# Patient Record
Sex: FEMALE | Race: WHITE | NOT HISPANIC OR LATINO | URBAN - METROPOLITAN AREA
[De-identification: names, ages, dates, MRNs, and addresses within clinical notes are randomized per-mention and may not be internally consistent; named-entity substitution may affect disease eponyms.]

---

## 2019-09-19 PROBLEM — Z00.00 ENCOUNTER FOR PREVENTIVE HEALTH EXAMINATION: Noted: 2019-09-19

## 2020-05-31 ENCOUNTER — INPATIENT (INPATIENT)
Facility: HOSPITAL | Age: 85
LOS: 4 days | Discharge: NURSING, REHAB OR LONG-TERM CARE | End: 2020-06-05
Attending: INTERNAL MEDICINE | Admitting: INTERNAL MEDICINE
Payer: MEDICARE

## 2020-05-31 VITALS
HEART RATE: 68 BPM | OXYGEN SATURATION: 78 % | DIASTOLIC BLOOD PRESSURE: 64 MMHG | RESPIRATION RATE: 20 BRPM | SYSTOLIC BLOOD PRESSURE: 99 MMHG | HEIGHT: 62 IN | WEIGHT: 149.91 LBS

## 2020-05-31 DIAGNOSIS — J96.01 ACUTE RESPIRATORY FAILURE WITH HYPOXIA: ICD-10-CM

## 2020-05-31 DIAGNOSIS — J44.1 CHRONIC OBSTRUCTIVE PULMONARY DISEASE WITH (ACUTE) EXACERBATION: ICD-10-CM

## 2020-05-31 DIAGNOSIS — I48.19 OTHER PERSISTENT ATRIAL FIBRILLATION: ICD-10-CM

## 2020-05-31 DIAGNOSIS — J15.6 PNEUMONIA DUE TO OTHER GRAM-NEGATIVE BACTERIA: ICD-10-CM

## 2020-05-31 DIAGNOSIS — E87.1 HYPO-OSMOLALITY AND HYPONATREMIA: ICD-10-CM

## 2020-05-31 LAB
ALBUMIN SERPL ELPH-MCNC: 3.5 G/DL — SIGNIFICANT CHANGE UP (ref 3.3–5)
ALP SERPL-CCNC: 76 U/L — SIGNIFICANT CHANGE UP (ref 40–120)
ALT FLD-CCNC: 24 U/L — SIGNIFICANT CHANGE UP (ref 12–78)
ANION GAP SERPL CALC-SCNC: 8 MMOL/L — SIGNIFICANT CHANGE UP (ref 5–17)
APPEARANCE UR: CLEAR — SIGNIFICANT CHANGE UP
APTT BLD: 30.3 SEC — SIGNIFICANT CHANGE UP (ref 27.5–36.3)
AST SERPL-CCNC: 34 U/L — SIGNIFICANT CHANGE UP (ref 15–37)
BACTERIA # UR AUTO: ABNORMAL
BASE EXCESS BLDA CALC-SCNC: 2 MMOL/L — SIGNIFICANT CHANGE UP (ref -2–2)
BASOPHILS # BLD AUTO: 0.04 K/UL — SIGNIFICANT CHANGE UP (ref 0–0.2)
BASOPHILS NFR BLD AUTO: 0.5 % — SIGNIFICANT CHANGE UP (ref 0–2)
BILIRUB SERPL-MCNC: 0.7 MG/DL — SIGNIFICANT CHANGE UP (ref 0.2–1.2)
BILIRUB UR-MCNC: NEGATIVE — SIGNIFICANT CHANGE UP
BLOOD GAS COMMENTS: SIGNIFICANT CHANGE UP
BLOOD GAS COMMENTS: SIGNIFICANT CHANGE UP
BLOOD GAS SOURCE: SIGNIFICANT CHANGE UP
BUN SERPL-MCNC: 24 MG/DL — HIGH (ref 7–23)
CALCIUM SERPL-MCNC: 8.5 MG/DL — SIGNIFICANT CHANGE UP (ref 8.5–10.1)
CHLORIDE SERPL-SCNC: 94 MMOL/L — LOW (ref 96–108)
CK SERPL-CCNC: 102 U/L — SIGNIFICANT CHANGE UP (ref 26–192)
CO2 SERPL-SCNC: 25 MMOL/L — SIGNIFICANT CHANGE UP (ref 22–31)
COLOR SPEC: YELLOW — SIGNIFICANT CHANGE UP
CREAT SERPL-MCNC: 0.93 MG/DL — SIGNIFICANT CHANGE UP (ref 0.5–1.3)
D DIMER BLD IA.RAPID-MCNC: 363 NG/ML DDU — HIGH
DIFF PNL FLD: NEGATIVE — SIGNIFICANT CHANGE UP
EOSINOPHIL # BLD AUTO: 0.18 K/UL — SIGNIFICANT CHANGE UP (ref 0–0.5)
EOSINOPHIL NFR BLD AUTO: 2.3 % — SIGNIFICANT CHANGE UP (ref 0–6)
EPI CELLS # UR: SIGNIFICANT CHANGE UP
FIBRINOGEN PPP-MCNC: 455 MG/DL — SIGNIFICANT CHANGE UP (ref 350–510)
GLUCOSE SERPL-MCNC: 101 MG/DL — HIGH (ref 70–99)
GLUCOSE UR QL: NEGATIVE MG/DL — SIGNIFICANT CHANGE UP
HCO3 BLDA-SCNC: 28 MMOL/L — SIGNIFICANT CHANGE UP (ref 21–29)
HCT VFR BLD CALC: 33.8 % — LOW (ref 34.5–45)
HGB BLD-MCNC: 11.4 G/DL — LOW (ref 11.5–15.5)
HOROWITZ INDEX BLDA+IHG-RTO: 100 — SIGNIFICANT CHANGE UP
IMM GRANULOCYTES NFR BLD AUTO: 0.3 % — SIGNIFICANT CHANGE UP (ref 0–1.5)
INR BLD: 2.05 RATIO — HIGH (ref 0.88–1.16)
KETONES UR-MCNC: NEGATIVE — SIGNIFICANT CHANGE UP
LACTATE SERPL-SCNC: 0.8 MMOL/L — SIGNIFICANT CHANGE UP (ref 0.7–2)
LEUKOCYTE ESTERASE UR-ACNC: NEGATIVE — SIGNIFICANT CHANGE UP
LYMPHOCYTES # BLD AUTO: 1.84 K/UL — SIGNIFICANT CHANGE UP (ref 1–3.3)
LYMPHOCYTES # BLD AUTO: 23.2 % — SIGNIFICANT CHANGE UP (ref 13–44)
MCHC RBC-ENTMCNC: 33.7 GM/DL — SIGNIFICANT CHANGE UP (ref 32–36)
MCHC RBC-ENTMCNC: 34.5 PG — HIGH (ref 27–34)
MCV RBC AUTO: 102.4 FL — HIGH (ref 80–100)
MONOCYTES # BLD AUTO: 0.74 K/UL — SIGNIFICANT CHANGE UP (ref 0–0.9)
MONOCYTES NFR BLD AUTO: 9.3 % — SIGNIFICANT CHANGE UP (ref 2–14)
NEUTROPHILS # BLD AUTO: 5.1 K/UL — SIGNIFICANT CHANGE UP (ref 1.8–7.4)
NEUTROPHILS NFR BLD AUTO: 64.4 % — SIGNIFICANT CHANGE UP (ref 43–77)
NITRITE UR-MCNC: NEGATIVE — SIGNIFICANT CHANGE UP
NRBC # BLD: 0 /100 WBCS — SIGNIFICANT CHANGE UP (ref 0–0)
NT-PROBNP SERPL-SCNC: 1956 PG/ML — HIGH (ref 0–450)
PCO2 BLDA: 51 MMHG — HIGH (ref 32–46)
PH BLD: 7.35 — SIGNIFICANT CHANGE UP (ref 7.35–7.45)
PH UR: 5 — SIGNIFICANT CHANGE UP (ref 5–8)
PLATELET # BLD AUTO: 180 K/UL — SIGNIFICANT CHANGE UP (ref 150–400)
PO2 BLDA: 222 MMHG — HIGH (ref 74–108)
POTASSIUM SERPL-MCNC: 5.7 MMOL/L — HIGH (ref 3.5–5.3)
POTASSIUM SERPL-SCNC: 5.7 MMOL/L — HIGH (ref 3.5–5.3)
PROT SERPL-MCNC: 7.7 GM/DL — SIGNIFICANT CHANGE UP (ref 6–8.3)
PROT UR-MCNC: 30 MG/DL
PROTHROM AB SERPL-ACNC: 23.5 SEC — HIGH (ref 10–12.9)
RBC # BLD: 3.3 M/UL — LOW (ref 3.8–5.2)
RBC # FLD: 15.4 % — HIGH (ref 10.3–14.5)
RBC CASTS # UR COMP ASSIST: NEGATIVE /HPF — SIGNIFICANT CHANGE UP (ref 0–4)
SAO2 % BLDA: 100 % — HIGH (ref 92–96)
SARS-COV-2 RNA SPEC QL NAA+PROBE: SIGNIFICANT CHANGE UP
SODIUM SERPL-SCNC: 127 MMOL/L — LOW (ref 135–145)
SP GR SPEC: 1.01 — SIGNIFICANT CHANGE UP (ref 1.01–1.02)
TROPONIN I SERPL-MCNC: <.015 NG/ML — SIGNIFICANT CHANGE UP (ref 0.01–0.04)
UROBILINOGEN FLD QL: NEGATIVE MG/DL — SIGNIFICANT CHANGE UP
WBC # BLD: 7.92 K/UL — SIGNIFICANT CHANGE UP (ref 3.8–10.5)
WBC # FLD AUTO: 7.92 K/UL — SIGNIFICANT CHANGE UP (ref 3.8–10.5)
WBC UR QL: SIGNIFICANT CHANGE UP

## 2020-05-31 PROCEDURE — 93010 ELECTROCARDIOGRAM REPORT: CPT

## 2020-05-31 PROCEDURE — 71045 X-RAY EXAM CHEST 1 VIEW: CPT | Mod: 26

## 2020-05-31 PROCEDURE — 99291 CRITICAL CARE FIRST HOUR: CPT

## 2020-05-31 RX ORDER — AZITHROMYCIN 500 MG/1
500 TABLET, FILM COATED ORAL EVERY 24 HOURS
Refills: 0 | Status: DISCONTINUED | OUTPATIENT
Start: 2020-06-01 | End: 2020-06-03

## 2020-05-31 RX ORDER — AZITHROMYCIN 500 MG/1
500 TABLET, FILM COATED ORAL ONCE
Refills: 0 | Status: COMPLETED | OUTPATIENT
Start: 2020-05-31 | End: 2020-05-31

## 2020-05-31 RX ORDER — IPRATROPIUM/ALBUTEROL SULFATE 18-103MCG
3 AEROSOL WITH ADAPTER (GRAM) INHALATION EVERY 6 HOURS
Refills: 0 | Status: DISCONTINUED | OUTPATIENT
Start: 2020-05-31 | End: 2020-06-05

## 2020-05-31 RX ORDER — SODIUM CHLORIDE 9 MG/ML
1000 INJECTION INTRAMUSCULAR; INTRAVENOUS; SUBCUTANEOUS
Refills: 0 | Status: DISCONTINUED | OUTPATIENT
Start: 2020-05-31 | End: 2020-06-02

## 2020-05-31 RX ORDER — PANTOPRAZOLE SODIUM 20 MG/1
40 TABLET, DELAYED RELEASE ORAL
Refills: 0 | Status: DISCONTINUED | OUTPATIENT
Start: 2020-05-31 | End: 2020-06-05

## 2020-05-31 RX ORDER — CEFTRIAXONE 500 MG/1
1000 INJECTION, POWDER, FOR SOLUTION INTRAMUSCULAR; INTRAVENOUS ONCE
Refills: 0 | Status: COMPLETED | OUTPATIENT
Start: 2020-05-31 | End: 2020-05-31

## 2020-05-31 RX ORDER — IPRATROPIUM/ALBUTEROL SULFATE 18-103MCG
3 AEROSOL WITH ADAPTER (GRAM) INHALATION
Refills: 0 | Status: COMPLETED | OUTPATIENT
Start: 2020-05-31 | End: 2020-05-31

## 2020-05-31 RX ORDER — CEFTRIAXONE 500 MG/1
1000 INJECTION, POWDER, FOR SOLUTION INTRAMUSCULAR; INTRAVENOUS EVERY 24 HOURS
Refills: 0 | Status: DISCONTINUED | OUTPATIENT
Start: 2020-06-01 | End: 2020-06-03

## 2020-05-31 RX ADMIN — Medication 3 MILLILITER(S): at 19:00

## 2020-05-31 RX ADMIN — AZITHROMYCIN 255 MILLIGRAM(S): 500 TABLET, FILM COATED ORAL at 19:34

## 2020-05-31 RX ADMIN — SODIUM CHLORIDE 60 MILLILITER(S): 9 INJECTION INTRAMUSCULAR; INTRAVENOUS; SUBCUTANEOUS at 19:35

## 2020-05-31 RX ADMIN — CEFTRIAXONE 100 MILLIGRAM(S): 500 INJECTION, POWDER, FOR SOLUTION INTRAMUSCULAR; INTRAVENOUS at 20:21

## 2020-05-31 RX ADMIN — SODIUM CHLORIDE 60 MILLILITER(S): 9 INJECTION INTRAMUSCULAR; INTRAVENOUS; SUBCUTANEOUS at 20:22

## 2020-05-31 RX ADMIN — Medication 3 MILLILITER(S): at 18:45

## 2020-05-31 RX ADMIN — Medication 0.5 MILLIGRAM(S): at 20:27

## 2020-05-31 RX ADMIN — AZITHROMYCIN 500 MILLIGRAM(S): 500 TABLET, FILM COATED ORAL at 20:23

## 2020-05-31 RX ADMIN — Medication 40 MILLIGRAM(S): at 22:16

## 2020-05-31 RX ADMIN — Medication 125 MILLIGRAM(S): at 19:34

## 2020-05-31 RX ADMIN — Medication 3 MILLILITER(S): at 20:00

## 2020-05-31 NOTE — ED PROVIDER NOTE - CLINICAL SUMMARY MEDICAL DECISION MAKING FREE TEXT BOX
pt comfortable on bipap, + copd exacerbation, possible pna, still r/o covid. d/w dr saldivar for admission.

## 2020-05-31 NOTE — ED PROVIDER NOTE - EKG #1 DATE/TIME
Pt here with c/o R flank and R abd pain since this morning. Nausea, no vomiting. No dysuria. C-sections, hysterectomy. ABC intact. A&O x4.   
31-May-2020 18:08

## 2020-05-31 NOTE — ED ADULT NURSE NOTE - ED STAT RN HANDOFF DETAILS
Report received from RN at this time. Assessment available on KB. will continue to monitor. NN not completed

## 2020-05-31 NOTE — CONSULT NOTE ADULT - SUBJECTIVE AND OBJECTIVE BOX
Patient is a 89y old  Female who presents with a chief complaint of Cough and SOB (31 May 2020 19:02)    HPI:  89yo, WF with HTN, COPD, CHF, hypothyroidism ,A fib on Coumadin, Valve replacement.,  Obesity. Has home O2 from prior admission but not using.  Presented  with increasing cough and SOB for 10 days.  . Per EMS, SPO2 was 75% on RA.  Denies CP,  palpitation, n/v/d, fever, chills, weakness, abdominal pain ,dysuria ,HA ,dizziness. (31 May 2020 19:02).  Started on BIPAP for hypoxic Respiratory failure. No more details available.    PAST MEDICAL & SURGICAL HISTORY: as above    FAMILY HISTORY: not available    SOCIAL HISTORY: BMI (kg/m2): 27.4 ( @ 17:02). not known    Allergies  No Known Allergies    MEDICATIONS  (STANDING):  methylPREDNISolone sodium succinate Injectable 40 milliGRAM(s) IV Push every 8 hours  multivitamin 1 Tablet(s) Oral daily  pantoprazole    Tablet 40 milliGRAM(s) Oral before breakfast  sodium chloride 0.9%. 1000 milliLiter(s) (60 mL/Hr) IV Continuous <Continuous>    MEDICATIONS  (PRN):  bisacodyl 5 milliGRAM(s) Oral daily PRN Constipation    REVIEW OF SYSTEMS:  not able to provide.      Vital Signs Last 24 Hrs  T(C): 36.8 (31 May 2020 18:30), Max: 36.8 (31 May 2020 18:30)  T(F): 98.2 (31 May 2020 18:30), Max: 98.2 (31 May 2020 18:30)  HR: 100 (31 May 2020 22:08) (53 - 106)  BP: 149/76 (31 May 2020 20:30) (99/64 - 149/76)  BP(mean): --  RR: 18 (31 May 2020 20:30) (18 - 22)  SpO2: 98% (31 May 2020 22:08) (78% - 99%)    PHYSICAL EXAM:  GEN:         Lethargic, comfortable.  HEENT:    BIPAP  RESP:       decreased air entry.  CVS:           Regular rate and rhythm.   ABD:         Soft, non-tender, non-distended; obese  SKIN:           Warm and dry.  EXTR:           trace edema  CNS:            lethargic  PSYCH:        lethargic    LABS:                        11.4   7.92  )-----------( 180      ( 31 May 2020 17:55 )             33.8         127<L>  |  94<L>  |  24<H>  ----------------------------<  101<H>  5.7<H>   |  25  |  0.93    Ca    8.5      31 May 2020 17:55    TPro  7.7  /  Alb  3.5  /  TBili  0.7  /  DBili  x   /  AST  34  /  ALT  24  /  AlkPhos  76      PT/INR - ( 31 May 2020 17:57 )   PT: 23.5 sec;   INR: 2.05 ratio      PTT - ( 31 May 2020 17:57 )  PTT:30.3 sec   @ 18:17  pH: 7.35  pCO2: 51  pO2: 222  SaO2: 100    Urinalysis Basic - ( 31 May 2020 17:54 )    Color: Yellow / Appearance: Clear / S.010 / pH: x  Gluc: x / Ketone: Negative  / Bili: Negative / Urobili: Negative mg/dL   Blood: x / Protein: 30 mg/dL / Nitrite: Negative   Leuk Esterase: Negative / RBC: Negative /HPF / WBC 0-2   Sq Epi: x / Non Sq Epi: Occasional / Bacteria: Few    EKG: irregular rhythm    RADIOLOGY & ADDITIONAL STUDIES:  Sternotomy Bilateral opacities.    ASSESSMENT AND PLAN:  ·	Acute hypoxic Respiratory failure.  ·	Acute COPD exacerbation.  ·	Suspected Pneumonia.  ·	Hyponatremia.  ·	Hyperkalemia.  ·	CHF Systolic/diastolic.  ·	Chronic A Fib.  ·	Hypothyroidism.      Continue BIPAP, titrate down FIO2.  Continue steroids, will add nebulizer.  Obtain procalcitonin, Echo.  Repeat proBNP.  On Coumadin.

## 2020-05-31 NOTE — H&P ADULT - NSHPPHYSICALEXAM_GEN_ALL_CORE
PHYSICAL EXAM:  GENERAL: NAD, On BIPAP  HEAD:  Atraumatic, Normocephalic  EYES: EOMI, PERRLA, conjunctiva and sclera clear  ENMT: No tonsillar erythema, exudates, or enlargement; Moist mucous membranes, No lesions  NECK: Supple, No JVD, Normal thyroid  NERVOUS SYSTEM:  Alert ; Motor Strength 4/5   CHEST/LUNG: Wheezing bilaterally; No rales, rhonchi, or rubs  HEART: IRRegular rate and rhythm; No murmurs, rubs, or gallops  ABDOMEN: Soft, Nontender, Nondistended; Bowel sounds present  EXTREMITIES:  No clubbing, cyanosis.   Trace  edema  LYMPH: No lymphadenopathy noted  SKIN: No rashes or lesions

## 2020-05-31 NOTE — ED PROVIDER NOTE - PHYSICAL EXAMINATION
Gen: Alert, NAD, mild tachypea, sat 97% on NRB  Head: NC, AT, PERRL, normal lids/conjunctiva   ENT: Bilateral TM WNL, patent oropharynx without erythema/exudate, uvula midline  Neck: supple, no tenderness/meningismus  Pulm: dimished, exp wheeze  CV: RRR, no M/R/G, +dist pulses   Abd: soft, NT/ND, +BS, no guarding/rebound tenderness  Mskel: + mild LE edema,   Skin: no rash, no bruising  Neuro: AAO, no sensory/motor deficits, CN 2-12 intact

## 2020-05-31 NOTE — ED ADULT NURSE NOTE - NSIMPLEMENTINTERV_GEN_ALL_ED
Implemented All Fall with Harm Risk Interventions:  [place] to call system. Call bell, personal items and telephone within reach. Instruct patient to call for assistance. Room bathroom lighting operational. Non-slip footwear when patient is off stretcher. Physically safe environment: no spills, clutter or unnecessary equipment. Stretcher in lowest position, wheels locked, appropriate side rails in place. Provide visual cue, wrist band, yellow gown, etc. Monitor gait and stability. Monitor for mental status changes and reorient to person, place, and time. Review medications for side effects contributing to fall risk. Reinforce activity limits and safety measures with patient and family. Provide visual clues: red socks.

## 2020-05-31 NOTE — H&P ADULT - NSHPLABSRESULTS_GEN_ALL_CORE
LABS:                        11.4   7.92  )-----------( 180      ( 31 May 2020 17:55 )             33.8     05-    127<L>  |  94<L>  |  24<H>  ----------------------------<  101<H>  5.7<H>   |  25  |  0.93    Ca    8.5      31 May 2020 17:55    TPro  7.7  /  Alb  3.5  /  TBili  0.7  /  DBili  x   /  AST  34  /  ALT  24  /  AlkPhos  76  05-31    PT/INR - ( 31 May 2020 17:57 )   PT: 23.5 sec;   INR: 2.05 ratio         PTT - ( 31 May 2020 17:57 )  PTT:30.3 sec  Urinalysis Basic - ( 31 May 2020 17:54 )    Color: Yellow / Appearance: Clear / S.010 / pH: x  Gluc: x / Ketone: Negative  / Bili: Negative / Urobili: Negative mg/dL   Blood: x / Protein: 30 mg/dL / Nitrite: Negative   Leuk Esterase: Negative / RBC: Negative /HPF / WBC 0-2   Sq Epi: x / Non Sq Epi: Occasional / Bacteria: Few      CAPILLARY BLOOD GLUCOSE        CARDIAC MARKERS ( 31 May 2020 17:55 )  <.015 ng/mL / x     / 102 U/L / x     / x

## 2020-05-31 NOTE — ED PROVIDER NOTE - OBJECTIVE STATEMENT
89yo female from home with pmh HTN, COPD, CHF, hypothyroid, presents with tachypnea and hypoxia. Per EMS, pt 75% on RA. Pt denies fever, but c/o sob, cough, cp.     meds: lasix 20mg, diltiazem, potassium, losartan. Prior chart with xanax, coumadin,    No fever/chills, No photophobia/eye pain/changes in vision, No ear pain/sore throat/dysphagia, +chest pain, no palpitations, + SOB/cough, no wheeze/stridor, No abdominal pain, No N/V/D, no dysuria/frequency/discharge, No neck/back pain, no rash, no changes in neurological status/function. 89yo female from home with pmh HTN, COPD, CHF, hypothyroid, psh: cardiac surgery presents with tachypnea and hypoxia. Per EMS, pt 75% on RA. Pt denies fever, but c/o sob, cough, cp.   pmd: [facility] dr sony valdes [phone], cardio: dr iman juares [phone]  meds: lasix 20mg, diltiazem, potassium, losartan. Prior chart with xanax, coumadin,    No fever/chills, No photophobia/eye pain/changes in vision, No ear pain/sore throat/dysphagia, +chest pain, no palpitations, + SOB/cough, no wheeze/stridor, No abdominal pain, No N/V/D, no dysuria/frequency/discharge, No neck/back pain, no rash, no changes in neurological status/function. 89yo female from home with pmh HTN, COPD, CHF, hypothyroid, ? afib psh: cardiac surgery/midline scar presents with tachypnea and hypoxia. Per EMS, pt 75% on RA. Pt denies fever, but c/o sob, cough, cp.   pmd: [facility] dr sony valdes [phone], cardio: dr iman juares [phone]  meds: lasix 20mg, diltiazem, potassium, losartan. Prior chart with xanax, coumadin,    No fever/chills, No photophobia/eye pain/changes in vision, No ear pain/sore throat/dysphagia, +chest pain, no palpitations, + SOB/cough, no wheeze/stridor, No abdominal pain, No N/V/D, no dysuria/frequency/discharge, No neck/back pain, no rash, no changes in neurological status/function. 89yo female from home with pmh HTN, COPD, valve surgery, hypothyroid, afib on coumadin presents with tachypnea and hypoxia. per daughter, pt has been sick with cough, sob, x 10 days. per daughter, no fever.  Was started on home O2 2L she had from prior admission but not normally on it. Per EMS, pt 75% on RA. Pt denies fever, but c/o sob, cough, cp.   pmd: [facility] dr sony valdes [phone], cardio: dr iman juares [phone]  meds: lasix 20mg, diltiazem, potassium, losartan. Prior chart with xanax, coumadin,    No fever/chills, No photophobia/eye pain/changes in vision, No ear pain/sore throat/dysphagia, +chest pain, no palpitations, + SOB/cough, no wheeze/stridor, No abdominal pain, No N/V/D, no dysuria/frequency/discharge, No neck/back pain, no rash, no changes in neurological status/function.

## 2020-05-31 NOTE — ED ADULT NURSE REASSESSMENT NOTE - NS ED NURSE REASSESS COMMENT FT1
anxious daughter contacted ER and spoke to MD for results and this RN for status of mother. States mother is severely claustrophobic. Reassured of plan of care and comfort measures provided to mother. Pt resting comfortably at this time. No s/s of pain noted. BIPAP in progress

## 2020-05-31 NOTE — H&P ADULT - HISTORY OF PRESENT ILLNESS
89yo, WF with h/o HTN, COPD, CHF, hypothyroid ,A fib, presents with increasing cough and SOB for 10 days.  . Per EMS, pt 75% on RA.  Denies CP,  palpitation, n/v/d, fever, chills, weakness, abdominal pain ,dysuria ,HA ,dizziness. 89yo, WF with h/o HTN, COPD, CHF, hypothyroid ,A fib, Value replacement., presents with increasing cough and SOB for 10 days.  . Per EMS, pt 75% on RA.  Denies CP,  palpitation, n/v/d, fever, chills, weakness, abdominal pain ,dysuria ,HA ,dizziness.

## 2020-06-01 DIAGNOSIS — I50.41 ACUTE COMBINED SYSTOLIC (CONGESTIVE) AND DIASTOLIC (CONGESTIVE) HEART FAILURE: ICD-10-CM

## 2020-06-01 PROBLEM — Z00.00 ENCOUNTER FOR PREVENTIVE HEALTH EXAMINATION: Status: ACTIVE | Noted: 2020-06-01

## 2020-06-01 LAB
ANION GAP SERPL CALC-SCNC: 2 MMOL/L — LOW (ref 5–17)
BASOPHILS # BLD AUTO: 0 K/UL — SIGNIFICANT CHANGE UP (ref 0–0.2)
BASOPHILS NFR BLD AUTO: 0 % — SIGNIFICANT CHANGE UP (ref 0–2)
BUN SERPL-MCNC: 20 MG/DL — SIGNIFICANT CHANGE UP (ref 7–23)
CALCIUM SERPL-MCNC: 8.2 MG/DL — LOW (ref 8.5–10.1)
CHLORIDE SERPL-SCNC: 99 MMOL/L — SIGNIFICANT CHANGE UP (ref 96–108)
CO2 SERPL-SCNC: 30 MMOL/L — SIGNIFICANT CHANGE UP (ref 22–31)
CREAT SERPL-MCNC: 0.84 MG/DL — SIGNIFICANT CHANGE UP (ref 0.5–1.3)
CRP SERPL-MCNC: <0.1 MG/DL — SIGNIFICANT CHANGE UP (ref 0–0.4)
CULTURE RESULTS: NO GROWTH — SIGNIFICANT CHANGE UP
EOSINOPHIL # BLD AUTO: 0 K/UL — SIGNIFICANT CHANGE UP (ref 0–0.5)
EOSINOPHIL NFR BLD AUTO: 0 % — SIGNIFICANT CHANGE UP (ref 0–6)
FERRITIN SERPL-MCNC: 57 NG/ML — SIGNIFICANT CHANGE UP (ref 15–150)
GLUCOSE SERPL-MCNC: 148 MG/DL — HIGH (ref 70–99)
HCT VFR BLD CALC: 34.4 % — LOW (ref 34.5–45)
HGB BLD-MCNC: 11 G/DL — LOW (ref 11.5–15.5)
HYPOCHROMIA BLD QL: SLIGHT — SIGNIFICANT CHANGE UP
INR BLD: 1.83 RATIO — HIGH (ref 0.88–1.16)
LACTATE SERPL-SCNC: 0.7 MMOL/L — SIGNIFICANT CHANGE UP (ref 0.7–2)
LDH SERPL L TO P-CCNC: 320 U/L — HIGH (ref 50–242)
LG PLATELETS BLD QL AUTO: SLIGHT — SIGNIFICANT CHANGE UP
LYMPHOCYTES # BLD AUTO: 0.64 K/UL — LOW (ref 1–3.3)
LYMPHOCYTES # BLD AUTO: 13 % — SIGNIFICANT CHANGE UP (ref 13–44)
MACROCYTES BLD QL: SLIGHT — SIGNIFICANT CHANGE UP
MANUAL SMEAR VERIFICATION: SIGNIFICANT CHANGE UP
MCHC RBC-ENTMCNC: 32 GM/DL — SIGNIFICANT CHANGE UP (ref 32–36)
MCHC RBC-ENTMCNC: 34.1 PG — HIGH (ref 27–34)
MCV RBC AUTO: 106.5 FL — HIGH (ref 80–100)
MICROCYTES BLD QL: SLIGHT — SIGNIFICANT CHANGE UP
MONOCYTES # BLD AUTO: 0.1 K/UL — SIGNIFICANT CHANGE UP (ref 0–0.9)
MONOCYTES NFR BLD AUTO: 2 % — SIGNIFICANT CHANGE UP (ref 2–14)
NEUTROPHILS # BLD AUTO: 4.11 K/UL — SIGNIFICANT CHANGE UP (ref 1.8–7.4)
NEUTROPHILS NFR BLD AUTO: 82 % — HIGH (ref 43–77)
NEUTS BAND # BLD: 1 % — SIGNIFICANT CHANGE UP (ref 0–8)
NRBC # BLD: 0 /100 — SIGNIFICANT CHANGE UP (ref 0–0)
NRBC # BLD: SIGNIFICANT CHANGE UP /100 WBCS (ref 0–0)
OVALOCYTES BLD QL SMEAR: SLIGHT — SIGNIFICANT CHANGE UP
PLAT MORPH BLD: ABNORMAL
PLATELET # BLD AUTO: 158 K/UL — SIGNIFICANT CHANGE UP (ref 150–400)
PLATELET COUNT - ESTIMATE: NORMAL — SIGNIFICANT CHANGE UP
POLYCHROMASIA BLD QL SMEAR: SLIGHT — SIGNIFICANT CHANGE UP
POTASSIUM SERPL-MCNC: 5 MMOL/L — SIGNIFICANT CHANGE UP (ref 3.5–5.3)
POTASSIUM SERPL-SCNC: 5 MMOL/L — SIGNIFICANT CHANGE UP (ref 3.5–5.3)
PROCALCITONIN SERPL-MCNC: 0.04 NG/ML — SIGNIFICANT CHANGE UP (ref 0.02–0.1)
PROCALCITONIN SERPL-MCNC: 0.04 NG/ML — SIGNIFICANT CHANGE UP (ref 0.02–0.1)
PROTHROM AB SERPL-ACNC: 20.9 SEC — HIGH (ref 10–12.9)
RBC # BLD: 3.23 M/UL — LOW (ref 3.8–5.2)
RBC # FLD: 15.3 % — HIGH (ref 10.3–14.5)
RBC BLD AUTO: ABNORMAL
SMUDGE CELLS # BLD: PRESENT — SIGNIFICANT CHANGE UP
SODIUM SERPL-SCNC: 131 MMOL/L — LOW (ref 135–145)
SPECIMEN SOURCE: SIGNIFICANT CHANGE UP
VARIANT LYMPHS # BLD: 2 % — SIGNIFICANT CHANGE UP (ref 0–6)
WBC # BLD: 4.95 K/UL — SIGNIFICANT CHANGE UP (ref 3.8–10.5)
WBC # FLD AUTO: 4.95 K/UL — SIGNIFICANT CHANGE UP (ref 3.8–10.5)

## 2020-06-01 PROCEDURE — 93306 TTE W/DOPPLER COMPLETE: CPT | Mod: 26

## 2020-06-01 RX ORDER — WARFARIN SODIUM 2.5 MG/1
2.5 TABLET ORAL ONCE
Refills: 0 | Status: COMPLETED | OUTPATIENT
Start: 2020-06-01 | End: 2020-06-01

## 2020-06-01 RX ORDER — LEVOTHYROXINE SODIUM 125 MCG
50 TABLET ORAL DAILY
Refills: 0 | Status: DISCONTINUED | OUTPATIENT
Start: 2020-06-01 | End: 2020-06-05

## 2020-06-01 RX ORDER — LANOLIN ALCOHOL/MO/W.PET/CERES
3 CREAM (GRAM) TOPICAL ONCE
Refills: 0 | Status: COMPLETED | OUTPATIENT
Start: 2020-06-01 | End: 2020-06-02

## 2020-06-01 RX ORDER — DILTIAZEM HCL 120 MG
300 CAPSULE, EXT RELEASE 24 HR ORAL DAILY
Refills: 0 | Status: DISCONTINUED | OUTPATIENT
Start: 2020-06-01 | End: 2020-06-05

## 2020-06-01 RX ORDER — FUROSEMIDE 40 MG
40 TABLET ORAL DAILY
Refills: 0 | Status: COMPLETED | OUTPATIENT
Start: 2020-06-01 | End: 2020-06-04

## 2020-06-01 RX ORDER — LOSARTAN POTASSIUM 100 MG/1
50 TABLET, FILM COATED ORAL DAILY
Refills: 0 | Status: DISCONTINUED | OUTPATIENT
Start: 2020-06-01 | End: 2020-06-05

## 2020-06-01 RX ADMIN — PANTOPRAZOLE SODIUM 40 MILLIGRAM(S): 20 TABLET, DELAYED RELEASE ORAL at 08:48

## 2020-06-01 RX ADMIN — Medication 3 MILLILITER(S): at 05:25

## 2020-06-01 RX ADMIN — WARFARIN SODIUM 2.5 MILLIGRAM(S): 2.5 TABLET ORAL at 21:36

## 2020-06-01 RX ADMIN — Medication 40 MILLIGRAM(S): at 17:19

## 2020-06-01 RX ADMIN — Medication 3 MILLILITER(S): at 17:57

## 2020-06-01 RX ADMIN — AZITHROMYCIN 255 MILLIGRAM(S): 500 TABLET, FILM COATED ORAL at 20:44

## 2020-06-01 RX ADMIN — Medication 1 TABLET(S): at 12:58

## 2020-06-01 RX ADMIN — Medication 40 MILLIGRAM(S): at 05:31

## 2020-06-01 RX ADMIN — CEFTRIAXONE 100 MILLIGRAM(S): 500 INJECTION, POWDER, FOR SOLUTION INTRAMUSCULAR; INTRAVENOUS at 20:08

## 2020-06-01 RX ADMIN — Medication 40 MILLIGRAM(S): at 21:36

## 2020-06-01 RX ADMIN — Medication 3 MILLILITER(S): at 00:24

## 2020-06-01 RX ADMIN — Medication 40 MILLIGRAM(S): at 17:20

## 2020-06-01 RX ADMIN — Medication 3 MILLILITER(S): at 12:00

## 2020-06-01 RX ADMIN — Medication 20 MILLIGRAM(S): at 21:36

## 2020-06-01 NOTE — INPATIENT CERTIFICATION FOR MEDICARE PATIENTS - THE STATUS OF COMORBIDITIES.
2. The status of comorbities. (See ED/admit documents) Pt slightly more calm today, AOx3, still disorganized, perseverative, insists she is pregnant.  Ate well per PCA.  Needed Haldol PRN this AM.

## 2020-06-01 NOTE — PROGRESS NOTE ADULT - PROBLEM SELECTOR PLAN 1
Pulmonary consult noted;  ·Acute hypoxic Respiratory failure.  ·Acute COPD exacerbation.  ·Suspected Pneumonia.  ·Hyponatremia.  ·Hyperkalemia.  ·CHF Systolic/diastolic.  ·Chronic A Fib.  ·Hypothyroidism.  Continue BIPAP, titrate down FIO2.  Continue steroids, will add nebulizer.  Obtain procalcitonin, Echo.  Repeat proBNP.  On Coumadin.  IV Steroids

## 2020-06-01 NOTE — PROGRESS NOTE ADULT - SUBJECTIVE AND OBJECTIVE BOX
Patient is a 89y old  Female who presents with a chief complaint of Cough and SOB (31 May 2020 22:13)      SUBJECTIVE/OBJECTIVE:    Without complaints. Patient resting comfortably.     MEDICATIONS  (STANDING):  albuterol/ipratropium for Nebulization 3 milliLiter(s) Nebulizer every 6 hours  azithromycin  IVPB 500 milliGRAM(s) IV Intermittent every 24 hours  cefTRIAXone   IVPB 1000 milliGRAM(s) IV Intermittent every 24 hours  methylPREDNISolone sodium succinate Injectable 40 milliGRAM(s) IV Push every 8 hours  multivitamin 1 Tablet(s) Oral daily  pantoprazole    Tablet 40 milliGRAM(s) Oral before breakfast  sodium chloride 0.9%. 1000 milliLiter(s) (60 mL/Hr) IV Continuous <Continuous>    MEDICATIONS  (PRN):  bisacodyl 5 milliGRAM(s) Oral daily PRN Constipation      Allergies    No Known Allergies    Intolerances          Vital Signs Last 24 Hrs  T(C): 36.7 (31 May 2020 22:08), Max: 36.8 (31 May 2020 18:30)  T(F): 98.1 (31 May 2020 22:08), Max: 98.2 (31 May 2020 18:30)  HR: 67 (2020 12:45) (53 - 106)  BP: 157/69 (2020 10:45) (99/64 - 157/69)  BP(mean): --  RR: 18 (2020 10:45) (17 - 22)  SpO2: 94% (2020 12:45) (78% - 99%)    Physical Exam: PHYSICAL EXAM:  GENERAL: NAD, On NC  HEAD:  Atraumatic, Normocephalic  EYES: EOMI, PERRLA, conjunctiva and sclera clear  ENMT: No tonsillar erythema, exudates, or enlargement; Moist mucous membranes, No lesions  NECK: Supple, No JVD, Normal thyroid  NERVOUS SYSTEM:  Alert ; Motor Strength 4/5   CHEST/LUNG: Decreased BS base bilaterally; No rales, wheezing, rhonchi, or rubs  HEART: IRRegular rate and rhythm; No murmurs, rubs, or gallops  ABDOMEN: Soft, Nontender, Nondistended; Bowel sounds present  EXTREMITIES:  No clubbing, cyanosis.   Trace  edema  LYMPH: No lymphadenopathy noted SKIN: No rashes or lesions	      LABS:                        11.0   4.95  )-----------( 158      ( 2020 06:31 )             34.4     06-01    131<L>  |  99  |  20  ----------------------------<  148<H>  5.0   |  30  |  0.84    Ca    8.2<L>      2020 06:31    TPro  7.7  /  Alb  3.5  /  TBili  0.7  /  DBili  x   /  AST  34  /  ALT  24  /  AlkPhos  76  05-31    PT/INR - ( 2020 06:31 )   PT: 20.9 sec;   INR: 1.83 ratio         PTT - ( 31 May 2020 17:57 )  PTT:30.3 sec  Urinalysis Basic - ( 31 May 2020 17:54 )    Color: Yellow / Appearance: Clear / S.010 / pH: x  Gluc: x / Ketone: Negative  / Bili: Negative / Urobili: Negative mg/dL   Blood: x / Protein: 30 mg/dL / Nitrite: Negative   Leuk Esterase: Negative / RBC: Negative /HPF / WBC 0-2   Sq Epi: x / Non Sq Epi: Occasional / Bacteria: Few      CAPILLARY BLOOD GLUCOSE        CARDIAC MARKERS ( 31 May 2020 17:55 )  <.015 ng/mL / x     / 102 U/L / x     / x        COVID-19 PCR: NotDetec: This test has been validated by [employer] to be accurate;  though it has not been FDA cleared/approved by the usual pathway  As with all laboratory test, results should be correlated with clinical  findings.  https://www.fda.gov/media/820999/download  https://www.fda.gov/media/227412/download (20 @ 19:19)        RADIOLOGY & ADDITIONAL TESTS:  < from: Xray Chest 1 View-PORTABLE IMMEDIATE (20 @ 19:32) >  FINDINGS:   There is extensive overlying artifact related to sheets and bedding.    There is right basilar opacity obscuring the right hemidiaphragm and blunting of the right lateral costophrenic angle, compatible with small pleural effusion and atelectasis.    There is questionable perihilar opacity versus prominent vasculature.    The cardiac silhouette appears enlarged.    Aortic contours are not well visualized.    Sternal wires are intact and well aligned.    There is osteoarthrosis in both glenohumeral joints.    IMPRESSION:   1.  Radiograph is degraded by extensive overlying artifact.    2.  Small right pleural effusion and atelectasis.  3.  Questionable perihilar opacity versus prominent vasculature.  Pulmonary edema or infection is not excluded.  4.  The cardiac silhouette appears enlarged.  Pericardial effusion is not excluded.      < end of copied text >        Consultant(s) Notes Reviewed:  [ ] YES  [ ] NO

## 2020-06-01 NOTE — PROGRESS NOTE ADULT - SUBJECTIVE AND OBJECTIVE BOX
INTERVAL HPI:  89yo, WF with HTN, COPD, CHF, hypothyroidism ,A fib on Coumadin, Valve replacement.,  Obesity. Has home O2 from prior admission but not using.  Presented  with increasing cough and SOB for 10 days.  . Per EMS, SPO2 was 75% on RA.  Denies CP,  palpitation, n/v/d, fever, chills, weakness, abdominal pain ,dysuria ,HA ,dizziness. (31 May 2020 19:02).  Started on BIPAP for hypoxic Respiratory failure. No more details available.    OVERNIGHT EVENTS:  Awake, responsive, on nasal O2    Vital Signs Last 24 Hrs  T(C): 36.7 (31 May 2020 22:08), Max: 36.7 (31 May 2020 22:08)  T(F): 98.1 (31 May 2020 22:08), Max: 98.1 (31 May 2020 22:08)  HR: 56 (2020 18:15) (56 - 106)  BP: 131/75 (2020 17:05) (100/70 - 157/69)  BP(mean): --  RR: 18 (2020 17:05) (17 - 22)  SpO2: 92% (2020 18:15) (92% - 99%)    PHYSICAL EXAM:  GEN:         Awake, responsive and comfortable.  HEENT:    Normal.    RESP:        no distress  CVS:          Regular rate and rhythm.   ABD:         Soft, non-tender, non-distended;     MEDICATIONS  (STANDING):  albuterol/ipratropium for Nebulization 3 milliLiter(s) Nebulizer every 6 hours  azithromycin  IVPB 500 milliGRAM(s) IV Intermittent every 24 hours  cefTRIAXone   IVPB 1000 milliGRAM(s) IV Intermittent every 24 hours  diltiazem    milliGRAM(s) Oral daily  furosemide   Injectable 40 milliGRAM(s) IV Push daily  levothyroxine 50 MICROGram(s) Oral daily  losartan 50 milliGRAM(s) Oral daily  methylPREDNISolone sodium succinate Injectable 40 milliGRAM(s) IV Push every 8 hours  multivitamin 1 Tablet(s) Oral daily  pantoprazole    Tablet 40 milliGRAM(s) Oral before breakfast  sodium chloride 0.9%. 1000 milliLiter(s) (60 mL/Hr) IV Continuous <Continuous>  warfarin 2.5 milliGRAM(s) Oral once    MEDICATIONS  (PRN):  bisacodyl 5 milliGRAM(s) Oral daily PRN Constipation    LABS:                        11.0   4.95  )-----------( 158      ( 2020 06:31 )             34.4     06-01    131<L>  |  99  |  20  ----------------------------<  148<H>  5.0   |  30  |  0.84    Ca    8.2<L>      2020 06:31    TPro  7.7  /  Alb  3.5  /  TBili  0.7  /  DBili  x   /  AST  34  /  ALT  24  /  AlkPhos  76      PT/INR - ( 2020 06:31 )   PT: 20.9 sec;   INR: 1.83 ratio      PTT - ( 31 May 2020 17:57 )  PTT:30.3 sec   @ 18:17  pH: 7.35  pCO2: 51  pO2: 222  SaO2: 100    Urinalysis Basic - ( 31 May 2020 17:54 )    Color: Yellow / Appearance: Clear / S.010 / pH: x  Gluc: x / Ketone: Negative  / Bili: Negative / Urobili: Negative mg/dL   Blood: x / Protein: 30 mg/dL / Nitrite: Negative   Leuk Esterase: Negative / RBC: Negative /HPF / WBC 0-2   Sq Epi: x / Non Sq Epi: Occasional / Bacteria: Few  < from: TTE Echo Complete w/o contrast w/ Doppler (20 @ 10:59) >   EXAM:  ECHO TTE WO CON COMP W DOPP      PROCEDURE DATE:  2020      INTERPRETATION:  REPORT:    TRANSTHORACIC ECHOCARDIOGRAM REPORT    Patient Name:   FREDERICK MA Patient Location: [facility] Rec #:  [id number]       Accession #:      [id number]  Account #:                       Height:           61.8 in 157.0 cm  YOB: 1929        Weight:           153.7 lb 69.70 kg  Patient Age:    89 years         BSA:              1.71 m²  Patient Gender: F                BP:               157/69 mmHg    Date of Exam:        2020 10:59:28 AM  Sonographer:         SANTANA  Referring Physician: IVANA    Procedure:     2D Echo/Doppler/Color Doppler Complete.  Indications:   Dyspnea, unspecified - R06.00  Diagnosis:     Dyspnea, unspecified - R06.00  Study Details: Technically suboptimal study. Study quality was adversely                 affected due to body habitus.    2D AND M-MODE MEASUREMENTS (normal ranges within parentheses):  Left Ventricle:  Normal   Aorta/Left Atrium:          Normal  IVSd (2D):              1.15 cm (0.7-1.1) Aortic Root (2D):  1.91 cm (2.4-3.7)  LVPWd (2D):             1.11 cm (0.7-1.1) Left Atrium (2D):  4.37 cm (1.9-4.0)  LVIDd (2D):             3.96 cm (3.4-5.7) Right Ventricle:  LVIDs (2D):             2.59 cm           TAPSE:           0.87 cm  LV FS (2D):             34.6 %   (>25%)  Relative Wall Thickness  0.56    (<0.42)    LV DIASTOLIC FUNCTION:  MV Peak E: 1.28 m/s Decel Time: 170 msec  MV Peak A: 0.35m/s  E/A Ratio: 3.69    SPECTRAL DOPPLER ANALYSIS (where applicable):  Mitral Valve:  MV P1/2 Time: 49.27 msec  MV Area, PHT: 4.47 cm²    Aortic Valve: AoV Max Matias: 2.05 m/s AoV Peak P.9 mmHg AoV Mean P.6 mmHg    LVOT Vmax: 1.10 m/s LVOT VTI: 0.197 m LVOT Diameter: 1.60 cm    AoV Area, Vmax: 1.08 cm² AoV Area, VTI: 1.03 cm² AoV Area, Vmn: 1.02 cm²    Tricuspid Valve and PA/RV Systolic Pressure: TR Max Velocity: 3.63 m/s RA Pressure: 15 mmHg RVSP/PASP: 67.7 mmHg    PHYSICIAN INTERPRETATION:  Left Ventricle: Normal left ventricular size and wall thicknesses, with normal systolic and diastolic function. The left ventricle was not well visualized.  Left ventricular ejection fraction, by visual estimation, is 55 to 60%. [place] not ell visualized in any view. Consider contrast echo, if clinically warranted for further evaluation.  Right Ventricle: Normal right ventricular size and function.  Left Atrium: Mildly enlarged left atrium.  Right Atrium: The right atrium is normal in size. Mildly enlarged right atrium.  Pericardium: There is no evidence of pericardial effusion.  Mitral Valve: The mitral valve is degenerative in appearance. There is mild mitral annular calcification. Mild mitral valve regurgitation is seen.  Tricuspid Valve: The tricuspid valve is degenerative in appearance. Moderate tricuspid regurgitation is visualized. Estimated pulmonary artery systolic pressure is 67.7 mmHg assuming a right atrial pressure of 15 mmHg, which is consistent with severe pulmonary hypertension.  Aortic Valve: The aortic valve was not well visualized. The aortic valve is sclerotic with decreased cusp excursion. Peak transaortic gradient equals 16.9 mmHg, mean transaortic gradient equals 8.6 mmHg, the calculated aortic valve area equals 1.03 cm² by the continuity equation consistent with moderate aortic stenosis. Mild aortic valve regurgitation is seen.  Pulmonic Valve: Structurally normal pulmonic valve, with normal leaflet excursion. The pulmonic valve is normal. No indicationof pulmonic valve regurgitation.  Aorta: The aortic root and ascending aorta are structurally normal, with no evidence of dilitation.  Pulmonary Artery: The main pulmonary artery is normal in size.     Summary:   1. Left ventricular ejection fraction, by visual estimation, is 55 to 60%.   2. Technically suboptimal study.   3. [place] not ell visualized in any view. Consider contrast echo, if clinically warranted for further evaluation.   4. Degenerative mitral valve.   5. Mild mitral annular calcification.   6. Mild mitral valve regurgitation.   7. Degenerative tricuspid valve.   8. Moderate tricuspid regurgitation.   9. Aortic valve is sclerotic with decreased cusp excursion.  10. Mild aortic regurgitation.  11. Estimated pulmonary artery systolic pressure is 67.7 mmHg assuming a right atrial pressure of 15 mmHg, which is consistent with severe pulmonary hypertension.  12. Peak transaortic gradient equals 16.9 mmHg, mean transaortic gradient equals 8.6 mmHg, the calculated aortic valve area equals 1.03 cm² by the continuity equation consistent with moderate aortic stenosis.    [id number] Sesar Kendall MD, FACCMD, FACC  Electronically signed on 2020 at 6:08:15 PM     SESAR KENDALL M.D.; Attending Cardiologist  This document has been electronically signed. 2020 10:59AM      ASSESSMENT AND PLAN:  ·	Acute hypoxic Respiratory failure.  ·	Acute COPD exacerbation.  ·	Severe pHTN  ·	Suspected Pneumonia.  ·	Hyponatremia.  ·	Hyperkalemia.  ·	CHF Systolic/diastolic.  ·	Chronic A Fib.  ·	Hypothyroidism.    Continue O2 with as needed BIPAP.  Continue steroids and nebulizer.  Echo shows severe pHTN.  Procalcitonin is normal.  Add Sildenafil

## 2020-06-01 NOTE — ED ADULT NURSE REASSESSMENT NOTE - NS ED NURSE REASSESS COMMENT FT1
Patient transported to floor at this time. Respiratory handling Bipap and patient transported with NRB

## 2020-06-02 DIAGNOSIS — I27.20 PULMONARY HYPERTENSION, UNSPECIFIED: ICD-10-CM

## 2020-06-02 LAB
ANION GAP SERPL CALC-SCNC: 6 MMOL/L — SIGNIFICANT CHANGE UP (ref 5–17)
BUN SERPL-MCNC: 22 MG/DL — SIGNIFICANT CHANGE UP (ref 7–23)
CALCIUM SERPL-MCNC: 8.7 MG/DL — SIGNIFICANT CHANGE UP (ref 8.5–10.1)
CHLORIDE SERPL-SCNC: 98 MMOL/L — SIGNIFICANT CHANGE UP (ref 96–108)
CO2 SERPL-SCNC: 29 MMOL/L — SIGNIFICANT CHANGE UP (ref 22–31)
CREAT SERPL-MCNC: 0.79 MG/DL — SIGNIFICANT CHANGE UP (ref 0.5–1.3)
GLUCOSE SERPL-MCNC: 149 MG/DL — HIGH (ref 70–99)
HCT VFR BLD CALC: 33.4 % — LOW (ref 34.5–45)
HGB BLD-MCNC: 11.2 G/DL — LOW (ref 11.5–15.5)
INR BLD: 1.58 RATIO — HIGH (ref 0.88–1.16)
MCHC RBC-ENTMCNC: 33.5 GM/DL — SIGNIFICANT CHANGE UP (ref 32–36)
MCHC RBC-ENTMCNC: 34.5 PG — HIGH (ref 27–34)
MCV RBC AUTO: 102.8 FL — HIGH (ref 80–100)
NRBC # BLD: 0 /100 WBCS — SIGNIFICANT CHANGE UP (ref 0–0)
PLATELET # BLD AUTO: 155 K/UL — SIGNIFICANT CHANGE UP (ref 150–400)
POTASSIUM SERPL-MCNC: 3.9 MMOL/L — SIGNIFICANT CHANGE UP (ref 3.5–5.3)
POTASSIUM SERPL-SCNC: 3.9 MMOL/L — SIGNIFICANT CHANGE UP (ref 3.5–5.3)
PROTHROM AB SERPL-ACNC: 17.9 SEC — HIGH (ref 10–12.9)
RBC # BLD: 3.25 M/UL — LOW (ref 3.8–5.2)
RBC # FLD: 15.1 % — HIGH (ref 10.3–14.5)
SODIUM SERPL-SCNC: 133 MMOL/L — LOW (ref 135–145)
WBC # BLD: 6.65 K/UL — SIGNIFICANT CHANGE UP (ref 3.8–10.5)
WBC # FLD AUTO: 6.65 K/UL — SIGNIFICANT CHANGE UP (ref 3.8–10.5)

## 2020-06-02 RX ORDER — LANOLIN ALCOHOL/MO/W.PET/CERES
3 CREAM (GRAM) TOPICAL AT BEDTIME
Refills: 0 | Status: DISCONTINUED | OUTPATIENT
Start: 2020-06-02 | End: 2020-06-05

## 2020-06-02 RX ORDER — WARFARIN SODIUM 2.5 MG/1
2.5 TABLET ORAL ONCE
Refills: 0 | Status: COMPLETED | OUTPATIENT
Start: 2020-06-02 | End: 2020-06-02

## 2020-06-02 RX ORDER — ACETAMINOPHEN 500 MG
650 TABLET ORAL EVERY 6 HOURS
Refills: 0 | Status: DISCONTINUED | OUTPATIENT
Start: 2020-06-02 | End: 2020-06-05

## 2020-06-02 RX ADMIN — WARFARIN SODIUM 2.5 MILLIGRAM(S): 2.5 TABLET ORAL at 21:43

## 2020-06-02 RX ADMIN — Medication 3 MILLILITER(S): at 11:50

## 2020-06-02 RX ADMIN — Medication 20 MILLIGRAM(S): at 15:31

## 2020-06-02 RX ADMIN — Medication 20 MILLIGRAM(S): at 20:17

## 2020-06-02 RX ADMIN — Medication 1 TABLET(S): at 11:23

## 2020-06-02 RX ADMIN — Medication 3 MILLIGRAM(S): at 20:53

## 2020-06-02 RX ADMIN — Medication 40 MILLIGRAM(S): at 15:31

## 2020-06-02 RX ADMIN — Medication 40 MILLIGRAM(S): at 05:30

## 2020-06-02 RX ADMIN — PANTOPRAZOLE SODIUM 40 MILLIGRAM(S): 20 TABLET, DELAYED RELEASE ORAL at 09:02

## 2020-06-02 RX ADMIN — Medication 3 MILLILITER(S): at 00:16

## 2020-06-02 RX ADMIN — Medication 300 MILLIGRAM(S): at 05:30

## 2020-06-02 RX ADMIN — CEFTRIAXONE 100 MILLIGRAM(S): 500 INJECTION, POWDER, FOR SOLUTION INTRAMUSCULAR; INTRAVENOUS at 20:02

## 2020-06-02 RX ADMIN — Medication 200 MILLIGRAM(S): at 15:31

## 2020-06-02 RX ADMIN — AZITHROMYCIN 255 MILLIGRAM(S): 500 TABLET, FILM COATED ORAL at 20:17

## 2020-06-02 RX ADMIN — Medication 200 MILLIGRAM(S): at 20:17

## 2020-06-02 RX ADMIN — Medication 650 MILLIGRAM(S): at 20:53

## 2020-06-02 RX ADMIN — Medication 50 MICROGRAM(S): at 05:29

## 2020-06-02 RX ADMIN — Medication 3 MILLILITER(S): at 17:36

## 2020-06-02 RX ADMIN — Medication 3 MILLILITER(S): at 05:54

## 2020-06-02 RX ADMIN — SODIUM CHLORIDE 60 MILLILITER(S): 9 INJECTION INTRAMUSCULAR; INTRAVENOUS; SUBCUTANEOUS at 00:04

## 2020-06-02 RX ADMIN — LOSARTAN POTASSIUM 50 MILLIGRAM(S): 100 TABLET, FILM COATED ORAL at 05:29

## 2020-06-02 RX ADMIN — Medication 20 MILLIGRAM(S): at 05:30

## 2020-06-02 RX ADMIN — Medication 20 MILLIGRAM(S): at 21:43

## 2020-06-02 RX ADMIN — Medication 3 MILLILITER(S): at 23:42

## 2020-06-02 RX ADMIN — Medication 3 MILLIGRAM(S): at 00:03

## 2020-06-02 NOTE — PROGRESS NOTE ADULT - SUBJECTIVE AND OBJECTIVE BOX
Patient is a 89y old  Female who presents with a chief complaint of Cough and SOB (2020 18:49)      SUBJECTIVE/OBJECTIVE:    Without complaints. Patient resting comfortably.   OOB in chair    MEDICATIONS  (STANDING):  albuterol/ipratropium for Nebulization 3 milliLiter(s) Nebulizer every 6 hours  azithromycin  IVPB 500 milliGRAM(s) IV Intermittent every 24 hours  cefTRIAXone   IVPB 1000 milliGRAM(s) IV Intermittent every 24 hours  diltiazem    milliGRAM(s) Oral daily  furosemide   Injectable 40 milliGRAM(s) IV Push daily  guaiFENesin   Syrup  (Sugar-Free) 200 milliGRAM(s) Oral every 6 hours  levothyroxine 50 MICROGram(s) Oral daily  losartan 50 milliGRAM(s) Oral daily  methylPREDNISolone sodium succinate Injectable 40 milliGRAM(s) IV Push every 8 hours  multivitamin 1 Tablet(s) Oral daily  pantoprazole    Tablet 40 milliGRAM(s) Oral before breakfast  sildenafil (REVATIO) 20 milliGRAM(s) Oral every 8 hours  warfarin 2.5 milliGRAM(s) Oral once    MEDICATIONS  (PRN):  bisacodyl 5 milliGRAM(s) Oral daily PRN Constipation      Allergies    No Known Allergies    Intolerances          Vital Signs Last 24 Hrs  T(C): 37.1 (2020 04:15), Max: 37.1 (2020 00:03)  T(F): 98.8 (2020 04:15), Max: 98.8 (2020 00:03)  HR: 107 (2020 09:18) (56 - 107)  BP: 134/85 (2020 04:15) (131/75 - 140/58)  BP(mean): --  RR: 18 (2020 04:15) (16 - 18)  SpO2: 90% (2020 09:18) (90% - 98%)          Physical Exam: PHYSICAL EXAM:  GENERAL: NAD, On NC  HEAD:  Atraumatic, Normocephalic  EYES: EOMI, PERRLA, conjunctiva and sclera clear  ENMT: No tonsillar erythema, exudates, or enlargement; Moist mucous membranes, No lesions  NECK: Supple, No JVD, Normal thyroid  NERVOUS SYSTEM:  Alert ; Motor Strength 4/5   CHEST/LUNG: Decreased BS base bilaterally; No rales, wheezing, rhonchi, or rubs  HEART: IRRegular rate and rhythm; No murmurs, rubs, or gallops  ABDOMEN: Soft, Nontender, Nondistended; Bowel sounds present  EXTREMITIES:  No clubbing, cyanosis.   Trace  edema  LYMPH: No lymphadenopathy noted SKIN: No rashes or lesions	          LABS:                        11.2   6.65  )-----------( 155      ( 2020 06:19 )             33.4     06-02    133<L>  |  98  |  22  ----------------------------<  149<H>  3.9   |  29  |  0.79    Ca    8.7      2020 06:19    TPro  7.7  /  Alb  3.5  /  TBili  0.7  /  DBili  x   /  AST  34  /  ALT  24  /  AlkPhos  76  05-31    PT/INR - ( 2020 06:19 )   PT: 17.9 sec;   INR: 1.58 ratio         PTT - ( 31 May 2020 17:57 )  PTT:30.3 sec  Urinalysis Basic - ( 31 May 2020 17:54 )    Color: Yellow / Appearance: Clear / S.010 / pH: x  Gluc: x / Ketone: Negative  / Bili: Negative / Urobili: Negative mg/dL   Blood: x / Protein: 30 mg/dL / Nitrite: Negative   Leuk Esterase: Negative / RBC: Negative /HPF / WBC 0-2   Sq Epi: x / Non Sq Epi: Occasional / Bacteria: Few      CAPILLARY BLOOD GLUCOSE        CARDIAC MARKERS ( 31 May 2020 17:55 )  <.015 ng/mL / x     / 102 U/L / x     / x            RADIOLOGY & ADDITIONAL TESTS:      < from: TTE Echo Complete w/o contrast w/ Doppler (20 @ 10:59) >  Summary:   1. Left ventricular ejection fraction, by visual estimation, is 55 to 60%.   2. Technically suboptimal study.   3. [place] not ell visualized in any view. Consider contrast echo, if clinically warranted for further evaluation.   4. Degenerative mitral valve.   5. Mild mitral annular calcification.   6. Mild mitral valve regurgitation.   7. Degenerative tricuspid valve.   8. Moderate tricuspid regurgitation.   9. Aortic valve is sclerotic with decreased cusp excursion.  10. Mild aortic regurgitation.  11. Estimated pulmonary artery systolic pressure is 67.7 mmHg assuming a right atrial pressure of 15 mmHg, which is consistent with severe pulmonary hypertension.  12. Peak transaortic gradient equals 16.9 mmHg, mean transaortic gradient equals 8.6 mmHg, the calculated aortic valve area equals 1.03 cm² by the continuity equation consistent with moderate aortic stenosis.    < end of copied text >    Consultant(s) Notes Reviewed:  [xxx ] YES  [ ] NO

## 2020-06-02 NOTE — PHYSICAL THERAPY INITIAL EVALUATION ADULT - ADDITIONAL COMMENTS
As per pt and Care Coordination Notes, pt lives alone c 6 steps to enter the home. Pt amenities located on main floor of the home. Pt states she was ambulating prior to admission with the use of Rolling Walker (good condition) and minimal assist of HHA. Pt has HHA for 7days/12hr/day. Pt has Home O2 prior to admission.

## 2020-06-02 NOTE — PHYSICAL THERAPY INITIAL EVALUATION ADULT - STRENGTHENING, PT EVAL
Pt will improve bilat. UE and LE muscle strength full grade within 6-8 weeks to improve safety and decrease risk of falls.

## 2020-06-02 NOTE — PHYSICAL THERAPY INITIAL EVALUATION ADULT - GENERAL OBSERVATIONS, REHAB EVAL
Pt encountered seated in bedside chair, +supp O2 via nasal cannula, +primafit, +IV lock, denies pain and discomfort, NAd and comfortable.

## 2020-06-02 NOTE — PHYSICAL THERAPY INITIAL EVALUATION ADULT - BALANCE TRAINING, PT EVAL
Patient will improve static and dynamic standing balance with rolling walker to fair+ or greater balance in 8 weeks to improve safety and decrease risk of falls.

## 2020-06-02 NOTE — PROGRESS NOTE ADULT - SUBJECTIVE AND OBJECTIVE BOX
INTERVAL HPI:  89yo, WF with HTN, COPD, CHF, hypothyroidism ,A fib on Coumadin, Valve replacement.,  Obesity. Has home O2 from prior admission but not using.  Presented  with increasing cough and SOB for 10 days.  . Per EMS, SPO2 was 75% on RA.  Denies CP,  palpitation, n/v/d, fever, chills, weakness, abdominal pain ,dysuria ,HA ,dizziness. (31 May 2020 19:02).  Started on BIPAP for hypoxic Respiratory failure. No more details available.    OVERNIGHT EVENTS:  Comfortable on nasal O2, refused BIPAP per RN    Vital Signs Last 24 Hrs  T(C): 36.6 (02 Jun 2020 17:39), Max: 37.1 (02 Jun 2020 00:03)  T(F): 97.8 (02 Jun 2020 17:39), Max: 98.8 (02 Jun 2020 00:03)  HR: 57 (02 Jun 2020 18:18) (57 - 107)  BP: 123/64 (02 Jun 2020 17:39) (122/57 - 147/71)  BP(mean): --  RR: 18 (02 Jun 2020 17:39) (16 - 18)  SpO2: 96% (02 Jun 2020 18:18) (90% - 98%)    PHYSICAL EXAM:  GEN:         Awake, responsive and comfortable.  HEENT:    Normal.    RESP:        no wheezing.  CVS:           Regular rate and rhythm.   ABD:         Soft, non-tender, non-distended;     MEDICATIONS  (STANDING):  albuterol/ipratropium for Nebulization 3 milliLiter(s) Nebulizer every 6 hours  azithromycin  IVPB 500 milliGRAM(s) IV Intermittent every 24 hours  cefTRIAXone   IVPB 1000 milliGRAM(s) IV Intermittent every 24 hours  diltiazem    milliGRAM(s) Oral daily  furosemide   Injectable 40 milliGRAM(s) IV Push daily  guaiFENesin   Syrup  (Sugar-Free) 200 milliGRAM(s) Oral every 6 hours  levothyroxine 50 MICROGram(s) Oral daily  losartan 50 milliGRAM(s) Oral daily  methylPREDNISolone sodium succinate Injectable 40 milliGRAM(s) IV Push every 8 hours  multivitamin 1 Tablet(s) Oral daily  pantoprazole    Tablet 40 milliGRAM(s) Oral before breakfast  sildenafil (REVATIO) 20 milliGRAM(s) Oral every 8 hours  warfarin 2.5 milliGRAM(s) Oral once    MEDICATIONS  (PRN):  bisacodyl 5 milliGRAM(s) Oral daily PRN Constipation    LABS:                        11.2   6.65  )-----------( 155      ( 02 Jun 2020 06:19 )             33.4     06-02    133<L>  |  98  |  22  ----------------------------<  149<H>  3.9   |  29  |  0.79    Ca    8.7      02 Jun 2020 06:19    PT/INR - ( 02 Jun 2020 06:19 )   PT: 17.9 sec;   INR: 1.58 ratio      05-31 @ 18:17  pH: 7.35  pCO2: 51  pO2: 222  SaO2: 100    ASSESSMENT AND PLAN:  ·	Acute hypoxic Respiratory failure.  ·	Acute COPD exacerbation.  ·	Severe pHTN  ·	Suspected Pneumonia.  ·	Hyponatremia.  ·	Hyperkalemia.  ·	CHF Systolic/diastolic.  ·	Chronic A Fib.  ·	Hypothyroidism.    Continue O2, DC BIPAP as refusing to use.  Reduce steroids.  Continue Sildenafil.  On empiric antibiotics.  Nebulizer as needed.

## 2020-06-03 LAB
ANION GAP SERPL CALC-SCNC: 7 MMOL/L — SIGNIFICANT CHANGE UP (ref 5–17)
BUN SERPL-MCNC: 32 MG/DL — HIGH (ref 7–23)
CALCIUM SERPL-MCNC: 8.8 MG/DL — SIGNIFICANT CHANGE UP (ref 8.5–10.1)
CHLORIDE SERPL-SCNC: 96 MMOL/L — SIGNIFICANT CHANGE UP (ref 96–108)
CO2 SERPL-SCNC: 29 MMOL/L — SIGNIFICANT CHANGE UP (ref 22–31)
CREAT SERPL-MCNC: 0.94 MG/DL — SIGNIFICANT CHANGE UP (ref 0.5–1.3)
GLUCOSE SERPL-MCNC: 132 MG/DL — HIGH (ref 70–99)
HCT VFR BLD CALC: 33.7 % — LOW (ref 34.5–45)
HGB BLD-MCNC: 11.6 G/DL — SIGNIFICANT CHANGE UP (ref 11.5–15.5)
INR BLD: 1.5 RATIO — HIGH (ref 0.88–1.16)
MCHC RBC-ENTMCNC: 34.4 GM/DL — SIGNIFICANT CHANGE UP (ref 32–36)
MCHC RBC-ENTMCNC: 34.5 PG — HIGH (ref 27–34)
MCV RBC AUTO: 100.3 FL — HIGH (ref 80–100)
NRBC # BLD: 0 /100 WBCS — SIGNIFICANT CHANGE UP (ref 0–0)
PLATELET # BLD AUTO: 174 K/UL — SIGNIFICANT CHANGE UP (ref 150–400)
POTASSIUM SERPL-MCNC: 3.8 MMOL/L — SIGNIFICANT CHANGE UP (ref 3.5–5.3)
POTASSIUM SERPL-SCNC: 3.8 MMOL/L — SIGNIFICANT CHANGE UP (ref 3.5–5.3)
PROTHROM AB SERPL-ACNC: 17 SEC — HIGH (ref 10–12.9)
RBC # BLD: 3.36 M/UL — LOW (ref 3.8–5.2)
RBC # FLD: 15.5 % — HIGH (ref 10.3–14.5)
SODIUM SERPL-SCNC: 132 MMOL/L — LOW (ref 135–145)
WBC # BLD: 8.74 K/UL — SIGNIFICANT CHANGE UP (ref 3.8–10.5)
WBC # FLD AUTO: 8.74 K/UL — SIGNIFICANT CHANGE UP (ref 3.8–10.5)

## 2020-06-03 PROCEDURE — 71045 X-RAY EXAM CHEST 1 VIEW: CPT | Mod: 26

## 2020-06-03 RX ORDER — CEFTRIAXONE 500 MG/1
1000 INJECTION, POWDER, FOR SOLUTION INTRAMUSCULAR; INTRAVENOUS EVERY 24 HOURS
Refills: 0 | Status: COMPLETED | OUTPATIENT
Start: 2020-06-03 | End: 2020-06-04

## 2020-06-03 RX ORDER — AZITHROMYCIN 500 MG/1
500 TABLET, FILM COATED ORAL EVERY 24 HOURS
Refills: 0 | Status: COMPLETED | OUTPATIENT
Start: 2020-06-03 | End: 2020-06-04

## 2020-06-03 RX ORDER — WARFARIN SODIUM 2.5 MG/1
3 TABLET ORAL ONCE
Refills: 0 | Status: COMPLETED | OUTPATIENT
Start: 2020-06-03 | End: 2020-06-03

## 2020-06-03 RX ADMIN — Medication 650 MILLIGRAM(S): at 21:52

## 2020-06-03 RX ADMIN — Medication 3 MILLILITER(S): at 05:03

## 2020-06-03 RX ADMIN — PANTOPRAZOLE SODIUM 40 MILLIGRAM(S): 20 TABLET, DELAYED RELEASE ORAL at 07:41

## 2020-06-03 RX ADMIN — Medication 20 MILLIGRAM(S): at 21:50

## 2020-06-03 RX ADMIN — WARFARIN SODIUM 3 MILLIGRAM(S): 2.5 TABLET ORAL at 22:56

## 2020-06-03 RX ADMIN — Medication 200 MILLIGRAM(S): at 22:57

## 2020-06-03 RX ADMIN — Medication 200 MILLIGRAM(S): at 06:22

## 2020-06-03 RX ADMIN — Medication 20 MILLIGRAM(S): at 13:05

## 2020-06-03 RX ADMIN — Medication 3 MILLILITER(S): at 17:11

## 2020-06-03 RX ADMIN — Medication 1 TABLET(S): at 11:31

## 2020-06-03 RX ADMIN — Medication 3 MILLILITER(S): at 11:31

## 2020-06-03 RX ADMIN — Medication 40 MILLIGRAM(S): at 06:22

## 2020-06-03 RX ADMIN — Medication 200 MILLIGRAM(S): at 17:11

## 2020-06-03 RX ADMIN — Medication 20 MILLIGRAM(S): at 06:22

## 2020-06-03 RX ADMIN — Medication 300 MILLIGRAM(S): at 06:22

## 2020-06-03 RX ADMIN — Medication 200 MILLIGRAM(S): at 12:58

## 2020-06-03 RX ADMIN — AZITHROMYCIN 500 MILLIGRAM(S): 500 TABLET, FILM COATED ORAL at 21:51

## 2020-06-03 RX ADMIN — Medication 20 MILLIGRAM(S): at 21:51

## 2020-06-03 RX ADMIN — Medication 200 MILLIGRAM(S): at 01:45

## 2020-06-03 RX ADMIN — Medication 50 MICROGRAM(S): at 06:22

## 2020-06-03 RX ADMIN — CEFTRIAXONE 100 MILLIGRAM(S): 500 INJECTION, POWDER, FOR SOLUTION INTRAMUSCULAR; INTRAVENOUS at 21:51

## 2020-06-03 RX ADMIN — LOSARTAN POTASSIUM 50 MILLIGRAM(S): 100 TABLET, FILM COATED ORAL at 06:22

## 2020-06-03 RX ADMIN — Medication 20 MILLIGRAM(S): at 13:04

## 2020-06-03 NOTE — PROGRESS NOTE ADULT - SUBJECTIVE AND OBJECTIVE BOX
INTERVAL HPI:  89yo, WF with HTN, COPD, CHF, hypothyroidism ,A fib on Coumadin, Valve replacement.,  Obesity. Has home O2 from prior admission but not using.  Presented  with increasing cough and SOB for 10 days.  . Per EMS, SPO2 was 75% on RA.  Denies CP,  palpitation, n/v/d, fever, chills, weakness, abdominal pain ,dysuria ,HA ,dizziness. (31 May 2020 19:02).  Started on BIPAP for hypoxic Respiratory failure. No more details available.    OVERNIGHT EVENTS:  Awake responsive and comfortable.    Vital Signs Last 24 Hrs  T(C): 37.1 (03 Jun 2020 14:16), Max: 37.1 (03 Jun 2020 14:16)  T(F): 98.8 (03 Jun 2020 14:16), Max: 98.8 (03 Jun 2020 14:16)  HR: 106 (03 Jun 2020 17:12) (57 - 108)  BP: 134/70 (03 Jun 2020 14:16) (121/62 - 137/80)  BP(mean): --  RR: 18 (03 Jun 2020 14:16) (18 - 18)  SpO2: 92% (03 Jun 2020 17:12) (91% - 97%)    PHYSICAL EXAM:  GEN:         Awake, responsive and comfortable.  HEENT:    Normal.    RESP:        no wheezing.  CVS:             Regular rate and rhythm.   ABD:         Soft, non-tender, non-distended;     MEDICATIONS  (STANDING):  albuterol/ipratropium for Nebulization 3 milliLiter(s) Nebulizer every 6 hours  azithromycin   Tablet 500 milliGRAM(s) Oral every 24 hours  cefTRIAXone   IVPB 1000 milliGRAM(s) IV Intermittent every 24 hours  diltiazem    milliGRAM(s) Oral daily  furosemide   Injectable 40 milliGRAM(s) IV Push daily  guaiFENesin   Syrup  (Sugar-Free) 200 milliGRAM(s) Oral every 6 hours  levothyroxine 50 MICROGram(s) Oral daily  losartan 50 milliGRAM(s) Oral daily  methylPREDNISolone sodium succinate Injectable 20 milliGRAM(s) IV Push every 8 hours  multivitamin 1 Tablet(s) Oral daily  pantoprazole    Tablet 40 milliGRAM(s) Oral before breakfast  sildenafil (REVATIO) 20 milliGRAM(s) Oral every 8 hours  warfarin 3 milliGRAM(s) Oral once    MEDICATIONS  (PRN):  acetaminophen   Tablet .. 650 milliGRAM(s) Oral every 6 hours PRN Mild Pain (1 - 3)  bisacodyl 5 milliGRAM(s) Oral daily PRN Constipation  melatonin 3 milliGRAM(s) Oral at bedtime PRN Sleep    LABS:                        11.6   8.74  )-----------( 174      ( 03 Jun 2020 07:42 )             33.7     06-03    132<L>  |  96  |  32<H>  ----------------------------<  132<H>  3.8   |  29  |  0.94    Ca    8.8      03 Jun 2020 07:42    PT/INR - ( 03 Jun 2020 07:42 )   PT: 17.0 sec;   INR: 1.50 ratio      05-31 @ 18:17  pH: 7.35  pCO2: 51  pO2: 222  SaO2: 100    ASSESSMENT AND PLAN:  ·	Acute hypoxic Respiratory failure.  ·	Acute COPD exacerbation.  ·	Severe pHTN  ·	Suspected Pneumonia.  ·	Hyponatremia.  ·	Hyperkalemia.  ·	CHF Systolic/diastolic.  ·	Chronic A Fib.  ·	Hypothyroidism.    Continue O2.  Reduce steroids.  Continue Sildenafil.  On empiric antibiotics.  Nebulizer as needed.

## 2020-06-03 NOTE — PROGRESS NOTE ADULT - SUBJECTIVE AND OBJECTIVE BOX
Patient is a 89y old  Female who presents with a chief complaint of Cough and SOB (02 Jun 2020 18:33)      SUBJECTIVE/OBJECTIVE:    Having neb treatment.  Comfortable    MEDICATIONS  (STANDING):  albuterol/ipratropium for Nebulization 3 milliLiter(s) Nebulizer every 6 hours  azithromycin  IVPB 500 milliGRAM(s) IV Intermittent every 24 hours  cefTRIAXone   IVPB 1000 milliGRAM(s) IV Intermittent every 24 hours  diltiazem    milliGRAM(s) Oral daily  furosemide   Injectable 40 milliGRAM(s) IV Push daily  guaiFENesin   Syrup  (Sugar-Free) 200 milliGRAM(s) Oral every 6 hours  levothyroxine 50 MICROGram(s) Oral daily  losartan 50 milliGRAM(s) Oral daily  methylPREDNISolone sodium succinate Injectable 20 milliGRAM(s) IV Push every 8 hours  multivitamin 1 Tablet(s) Oral daily  pantoprazole    Tablet 40 milliGRAM(s) Oral before breakfast  sildenafil (REVATIO) 20 milliGRAM(s) Oral every 8 hours  warfarin 3 milliGRAM(s) Oral once    MEDICATIONS  (PRN):  acetaminophen   Tablet .. 650 milliGRAM(s) Oral every 6 hours PRN Mild Pain (1 - 3)  bisacodyl 5 milliGRAM(s) Oral daily PRN Constipation  melatonin 3 milliGRAM(s) Oral at bedtime PRN Sleep      Allergies    No Known Allergies    Intolerances          Vital Signs Last 24 Hrs  T(C): 36.1 (03 Jun 2020 05:33), Max: 36.6 (02 Jun 2020 17:39)  T(F): 97 (03 Jun 2020 05:33), Max: 97.8 (02 Jun 2020 17:39)  HR: 102 (03 Jun 2020 11:35) (57 - 108)  BP: 137/80 (03 Jun 2020 05:33) (121/62 - 147/71)  BP(mean): --  RR: 18 (03 Jun 2020 05:33) (18 - 18)  SpO2: 92% (03 Jun 2020 11:35) (91% - 97%)      Physical Exam: PHYSICAL EXAM:  GENERAL: NAD, On NC  HEAD:  Atraumatic, Normocephalic  EYES: EOMI, PERRLA, conjunctiva and sclera clear  ENMT: No tonsillar erythema, exudates, or enlargement; Moist mucous membranes, No lesions  NECK: Supple, No JVD, Normal thyroid  NERVOUS SYSTEM:  Alert ; Motor Strength 4/5   CHEST/LUNG: Mild wheezes bilaterally; No rales,, rhonchi, or rubs  HEART: IRRegular rate and rhythm; No murmurs, rubs, or gallops  ABDOMEN: Soft, Nontender, Nondistended; Bowel sounds present  EXTREMITIES:  No clubbing, cyanosis. No  edema  LYMPH: No lymphadenopathy noted SKIN: No rashes or lesions	              LABS:                        11.6   8.74  )-----------( 174      ( 03 Jun 2020 07:42 )             33.7     06-03    132<L>  |  96  |  32<H>  ----------------------------<  132<H>  3.8   |  29  |  0.94    Ca    8.8      03 Jun 2020 07:42      PT/INR - ( 03 Jun 2020 07:42 )   PT: 17.0 sec;   INR: 1.50 ratio             CAPILLARY BLOOD GLUCOSE              RADIOLOGY & ADDITIONAL TESTS:        Consultant(s) Notes Reviewed:  [xxx ] YES  [ ] NO

## 2020-06-03 NOTE — PROGRESS NOTE ADULT - PROBLEM SELECTOR PLAN 5
Monitoring INR,PT  Coumadin INR 2-3 Tenderness to palpation at the upper trapezius, left greater than right. Tenderness to palpation to the left paraspinal greater than right paraspinal, L3, L4.

## 2020-06-04 LAB
ANION GAP SERPL CALC-SCNC: 5 MMOL/L — SIGNIFICANT CHANGE UP (ref 5–17)
BUN SERPL-MCNC: 35 MG/DL — HIGH (ref 7–23)
CALCIUM SERPL-MCNC: 8.3 MG/DL — LOW (ref 8.5–10.1)
CHLORIDE SERPL-SCNC: 96 MMOL/L — SIGNIFICANT CHANGE UP (ref 96–108)
CO2 SERPL-SCNC: 36 MMOL/L — HIGH (ref 22–31)
CREAT SERPL-MCNC: 1.06 MG/DL — SIGNIFICANT CHANGE UP (ref 0.5–1.3)
GLUCOSE SERPL-MCNC: 133 MG/DL — HIGH (ref 70–99)
HCT VFR BLD CALC: 33.7 % — LOW (ref 34.5–45)
HGB BLD-MCNC: 11.6 G/DL — SIGNIFICANT CHANGE UP (ref 11.5–15.5)
INR BLD: 1.62 RATIO — HIGH (ref 0.88–1.16)
MCHC RBC-ENTMCNC: 34.4 GM/DL — SIGNIFICANT CHANGE UP (ref 32–36)
MCHC RBC-ENTMCNC: 34.6 PG — HIGH (ref 27–34)
MCV RBC AUTO: 100.6 FL — HIGH (ref 80–100)
NRBC # BLD: 0 /100 WBCS — SIGNIFICANT CHANGE UP (ref 0–0)
PLATELET # BLD AUTO: 169 K/UL — SIGNIFICANT CHANGE UP (ref 150–400)
POTASSIUM SERPL-MCNC: 3.3 MMOL/L — LOW (ref 3.5–5.3)
POTASSIUM SERPL-SCNC: 3.3 MMOL/L — LOW (ref 3.5–5.3)
PROTHROM AB SERPL-ACNC: 18.3 SEC — HIGH (ref 10–12.9)
RBC # BLD: 3.35 M/UL — LOW (ref 3.8–5.2)
RBC # FLD: 15.8 % — HIGH (ref 10.3–14.5)
SODIUM SERPL-SCNC: 137 MMOL/L — SIGNIFICANT CHANGE UP (ref 135–145)
WBC # BLD: 7.99 K/UL — SIGNIFICANT CHANGE UP (ref 3.8–10.5)
WBC # FLD AUTO: 7.99 K/UL — SIGNIFICANT CHANGE UP (ref 3.8–10.5)

## 2020-06-04 RX ORDER — WARFARIN SODIUM 2.5 MG/1
3 TABLET ORAL ONCE
Refills: 0 | Status: COMPLETED | OUTPATIENT
Start: 2020-06-04 | End: 2020-06-04

## 2020-06-04 RX ORDER — POTASSIUM CHLORIDE 20 MEQ
40 PACKET (EA) ORAL ONCE
Refills: 0 | Status: COMPLETED | OUTPATIENT
Start: 2020-06-04 | End: 2020-06-04

## 2020-06-04 RX ORDER — FUROSEMIDE 40 MG
40 TABLET ORAL DAILY
Refills: 0 | Status: DISCONTINUED | OUTPATIENT
Start: 2020-06-04 | End: 2020-06-05

## 2020-06-04 RX ADMIN — Medication 40 MILLIGRAM(S): at 11:00

## 2020-06-04 RX ADMIN — LOSARTAN POTASSIUM 50 MILLIGRAM(S): 100 TABLET, FILM COATED ORAL at 05:01

## 2020-06-04 RX ADMIN — Medication 3 MILLILITER(S): at 17:02

## 2020-06-04 RX ADMIN — AZITHROMYCIN 500 MILLIGRAM(S): 500 TABLET, FILM COATED ORAL at 21:14

## 2020-06-04 RX ADMIN — Medication 3 MILLILITER(S): at 05:05

## 2020-06-04 RX ADMIN — Medication 20 MILLIGRAM(S): at 17:23

## 2020-06-04 RX ADMIN — Medication 1 TABLET(S): at 11:01

## 2020-06-04 RX ADMIN — Medication 20 MILLIGRAM(S): at 21:14

## 2020-06-04 RX ADMIN — Medication 40 MILLIEQUIVALENT(S): at 11:00

## 2020-06-04 RX ADMIN — Medication 3 MILLILITER(S): at 00:16

## 2020-06-04 RX ADMIN — Medication 50 MICROGRAM(S): at 05:01

## 2020-06-04 RX ADMIN — Medication 650 MILLIGRAM(S): at 17:27

## 2020-06-04 RX ADMIN — PANTOPRAZOLE SODIUM 40 MILLIGRAM(S): 20 TABLET, DELAYED RELEASE ORAL at 08:14

## 2020-06-04 RX ADMIN — CEFTRIAXONE 100 MILLIGRAM(S): 500 INJECTION, POWDER, FOR SOLUTION INTRAMUSCULAR; INTRAVENOUS at 21:14

## 2020-06-04 RX ADMIN — Medication 40 MILLIGRAM(S): at 05:01

## 2020-06-04 RX ADMIN — Medication 20 MILLIGRAM(S): at 13:14

## 2020-06-04 RX ADMIN — Medication 300 MILLIGRAM(S): at 05:01

## 2020-06-04 RX ADMIN — Medication 3 MILLILITER(S): at 11:02

## 2020-06-04 RX ADMIN — WARFARIN SODIUM 3 MILLIGRAM(S): 2.5 TABLET ORAL at 21:14

## 2020-06-04 RX ADMIN — Medication 20 MILLIGRAM(S): at 05:01

## 2020-06-04 RX ADMIN — Medication 200 MILLIGRAM(S): at 05:01

## 2020-06-04 RX ADMIN — Medication 20 MILLIGRAM(S): at 05:00

## 2020-06-04 NOTE — PROGRESS NOTE ADULT - SUBJECTIVE AND OBJECTIVE BOX
Patient is a 89y old  Female who presents with a chief complaint of Cough and SOB (03 Jun 2020 17:18)      SUBJECTIVE/OBJECTIVE:    Without complaints. Patient resting comfortably.     MEDICATIONS  (STANDING):  albuterol/ipratropium for Nebulization 3 milliLiter(s) Nebulizer every 6 hours  azithromycin   Tablet 500 milliGRAM(s) Oral every 24 hours  cefTRIAXone   IVPB 1000 milliGRAM(s) IV Intermittent every 24 hours  diltiazem    milliGRAM(s) Oral daily  furosemide   Injectable 40 milliGRAM(s) IV Push daily  levothyroxine 50 MICROGram(s) Oral daily  losartan 50 milliGRAM(s) Oral daily  methylPREDNISolone sodium succinate Injectable 20 milliGRAM(s) IV Push every 8 hours  multivitamin 1 Tablet(s) Oral daily  pantoprazole    Tablet 40 milliGRAM(s) Oral before breakfast  sildenafil (REVATIO) 20 milliGRAM(s) Oral every 8 hours  warfarin 3 milliGRAM(s) Oral once    MEDICATIONS  (PRN):  acetaminophen   Tablet .. 650 milliGRAM(s) Oral every 6 hours PRN Mild Pain (1 - 3)  bisacodyl 5 milliGRAM(s) Oral daily PRN Constipation  melatonin 3 milliGRAM(s) Oral at bedtime PRN Sleep      Allergies    No Known Allergies    Intolerances          Vital Signs Last 24 Hrs  T(C): 36.4 (04 Jun 2020 05:17), Max: 37.1 (03 Jun 2020 14:16)  T(F): 97.6 (04 Jun 2020 05:17), Max: 98.8 (03 Jun 2020 14:16)  HR: 106 (04 Jun 2020 05:17) (72 - 108)  BP: 154/76 (04 Jun 2020 05:17) (129/63 - 154/76)  BP(mean): --  RR: 18 (04 Jun 2020 05:17) (16 - 18)  SpO2: 96% (04 Jun 2020 05:17) (92% - 96%)      Physical Exam: PHYSICAL EXAM:  GENERAL: NAD,   HEAD:  Atraumatic, Normocephalic  EYES: EOMI, PERRLA, conjunctiva and sclera clear  ENMT: No tonsillar erythema, exudates, or enlargement; Moist mucous membranes, No lesions  NECK: Supple, No JVD, Normal thyroid  NERVOUS SYSTEM:  Alert ; Motor Strength 4/5   CHEST/LUNG: Clear bilaterally; No rales, wheezes  rhonchi, or rubs  HEART: IRRegular rate and rhythm; No murmurs, rubs, or gallops  ABDOMEN: Soft, Nontender, Nondistended; Bowel sounds present  EXTREMITIES:  No clubbing, cyanosis. No  edema  LYMPH: No lymphadenopathy noted SKIN: No rashes or lesions	      LABS:                        11.6   7.99  )-----------( 169      ( 04 Jun 2020 08:16 )             33.7     06-04    137  |  96  |  35<H>  ----------------------------<  133<H>  3.3<L>   |  36<H>  |  1.06    Ca    8.3<L>      04 Jun 2020 08:16      PT/INR - ( 04 Jun 2020 08:16 )   PT: 18.3 sec;   INR: 1.62 ratio             CAPILLARY BLOOD GLUCOSE              RADIOLOGY & ADDITIONAL TESTS:        Consultant(s) Notes Reviewed:  [ xx] YES  [ ] NO

## 2020-06-04 NOTE — PROGRESS NOTE ADULT - SUBJECTIVE AND OBJECTIVE BOX
INTERVAL HPI:    89yo, WF with HTN, COPD, CHF, hypothyroidism ,A fib on Coumadin, Valve replacement.,  Obesity. Has home O2 from prior admission but not using.  Presented  with increasing cough and SOB for 10 days.  . Per EMS, SPO2 was 75% on RA.  Denies CP,  palpitation, n/v/d, fever, chills, weakness, abdominal pain ,dysuria ,HA ,dizziness. (31 May 2020 19:02).  Started on BIPAP for hypoxic Respiratory failure. No more details available.    OVERNIGHT EVENTS:  Comfortable in bed.    Vital Signs Last 24 Hrs  T(C): 36.9 (04 Jun 2020 12:09), Max: 37.1 (03 Jun 2020 14:16)  T(F): 98.5 (04 Jun 2020 12:09), Max: 98.8 (03 Jun 2020 14:16)  HR: 85 (04 Jun 2020 12:09) (72 - 108)  BP: 133/62 (04 Jun 2020 12:09) (129/63 - 154/76)  BP(mean): --  RR: 18 (04 Jun 2020 12:09) (16 - 18)  SpO2: 93% (04 Jun 2020 12:09) (92% - 96%)    PHYSICAL EXAM:  GEN:         Awake, responsive and comfortable.  HEENT:    Normal.    RESP:        no wheezing.  CVS:             Regular rate and rhythm.   ABD:         Soft, non-tender, non-distended;     MEDICATIONS  (STANDING):  albuterol/ipratropium for Nebulization 3 milliLiter(s) Nebulizer every 6 hours  azithromycin   Tablet 500 milliGRAM(s) Oral every 24 hours  cefTRIAXone   IVPB 1000 milliGRAM(s) IV Intermittent every 24 hours  diltiazem    milliGRAM(s) Oral daily  furosemide   Injectable 40 milliGRAM(s) IV Push daily  levothyroxine 50 MICROGram(s) Oral daily  losartan 50 milliGRAM(s) Oral daily  methylPREDNISolone sodium succinate Injectable 20 milliGRAM(s) IV Push every 8 hours  multivitamin 1 Tablet(s) Oral daily  pantoprazole    Tablet 40 milliGRAM(s) Oral before breakfast  sildenafil (REVATIO) 20 milliGRAM(s) Oral every 8 hours  warfarin 3 milliGRAM(s) Oral once    MEDICATIONS  (PRN):  acetaminophen   Tablet .. 650 milliGRAM(s) Oral every 6 hours PRN Mild Pain (1 - 3)  bisacodyl 5 milliGRAM(s) Oral daily PRN Constipation  melatonin 3 milliGRAM(s) Oral at bedtime PRN Sleep    LABS:                        11.6   7.99  )-----------( 169      ( 04 Jun 2020 08:16 )             33.7     06-04    137  |  96  |  35<H>  ----------------------------<  133<H>  3.3<L>   |  36<H>  |  1.06    Ca    8.3<L>      04 Jun 2020 08:16    PT/INR - ( 04 Jun 2020 08:16 )   PT: 18.3 sec;   INR: 1.62 ratio      05-31 @ 18:17  pH: 7.35  pCO2: 51  pO2: 222  SaO2: 100    ASSESSMENT AND PLAN:  ·	Acute hypoxic Respiratory failure.  ·	Acute COPD exacerbation.  ·	Severe pHTN  ·	Suspected Pneumonia.  ·	Hyponatremia.  ·	Hyperkalemia.  ·	CHF Systolic/diastolic.  ·	Chronic A Fib.  ·	Hypothyroidism.    Taper steroids, continue O2 and nebulizer.  Continue Sildenafil.  On empiric antibiotics.

## 2020-06-05 ENCOUNTER — TRANSCRIPTION ENCOUNTER (OUTPATIENT)
Age: 85
End: 2020-06-05

## 2020-06-05 VITALS — OXYGEN SATURATION: 94 %

## 2020-06-05 LAB
ANION GAP SERPL CALC-SCNC: 4 MMOL/L — LOW (ref 5–17)
BUN SERPL-MCNC: 36 MG/DL — HIGH (ref 7–23)
CALCIUM SERPL-MCNC: 8.5 MG/DL — SIGNIFICANT CHANGE UP (ref 8.5–10.1)
CHLORIDE SERPL-SCNC: 96 MMOL/L — SIGNIFICANT CHANGE UP (ref 96–108)
CO2 SERPL-SCNC: 36 MMOL/L — HIGH (ref 22–31)
CREAT SERPL-MCNC: 1.08 MG/DL — SIGNIFICANT CHANGE UP (ref 0.5–1.3)
GLUCOSE SERPL-MCNC: 115 MG/DL — HIGH (ref 70–99)
HCT VFR BLD CALC: 34.9 % — SIGNIFICANT CHANGE UP (ref 34.5–45)
HGB BLD-MCNC: 11.7 G/DL — SIGNIFICANT CHANGE UP (ref 11.5–15.5)
INR BLD: 1.79 RATIO — HIGH (ref 0.88–1.16)
MCHC RBC-ENTMCNC: 33.5 GM/DL — SIGNIFICANT CHANGE UP (ref 32–36)
MCHC RBC-ENTMCNC: 33.9 PG — SIGNIFICANT CHANGE UP (ref 27–34)
MCV RBC AUTO: 101.2 FL — HIGH (ref 80–100)
NRBC # BLD: 0 /100 WBCS — SIGNIFICANT CHANGE UP (ref 0–0)
PLATELET # BLD AUTO: 158 K/UL — SIGNIFICANT CHANGE UP (ref 150–400)
POTASSIUM SERPL-MCNC: 3.8 MMOL/L — SIGNIFICANT CHANGE UP (ref 3.5–5.3)
POTASSIUM SERPL-SCNC: 3.8 MMOL/L — SIGNIFICANT CHANGE UP (ref 3.5–5.3)
PROTHROM AB SERPL-ACNC: 20.2 SEC — HIGH (ref 10–12.9)
RBC # BLD: 3.45 M/UL — LOW (ref 3.8–5.2)
RBC # FLD: 15.9 % — HIGH (ref 10.3–14.5)
SARS-COV-2 RNA SPEC QL NAA+PROBE: SIGNIFICANT CHANGE UP
SODIUM SERPL-SCNC: 136 MMOL/L — SIGNIFICANT CHANGE UP (ref 135–145)
WBC # BLD: 9.5 K/UL — SIGNIFICANT CHANGE UP (ref 3.8–10.5)
WBC # FLD AUTO: 9.5 K/UL — SIGNIFICANT CHANGE UP (ref 3.8–10.5)

## 2020-06-05 RX ORDER — ALPRAZOLAM 0.25 MG
1 TABLET ORAL
Qty: 0 | Refills: 0 | DISCHARGE
Start: 2020-06-05

## 2020-06-05 RX ORDER — LANOLIN ALCOHOL/MO/W.PET/CERES
1 CREAM (GRAM) TOPICAL
Qty: 0 | Refills: 0 | DISCHARGE
Start: 2020-06-05

## 2020-06-05 RX ORDER — ALPRAZOLAM 0.25 MG
0.25 TABLET ORAL AT BEDTIME
Refills: 0 | Status: DISCONTINUED | OUTPATIENT
Start: 2020-06-05 | End: 2020-06-05

## 2020-06-05 RX ORDER — WARFARIN SODIUM 2.5 MG/1
3 TABLET ORAL ONCE
Refills: 0 | Status: DISCONTINUED | OUTPATIENT
Start: 2020-06-05 | End: 2020-06-05

## 2020-06-05 RX ORDER — ACETAMINOPHEN 500 MG
2 TABLET ORAL
Qty: 0 | Refills: 0 | DISCHARGE
Start: 2020-06-05

## 2020-06-05 RX ADMIN — Medication 3 MILLILITER(S): at 11:08

## 2020-06-05 RX ADMIN — Medication 20 MILLIGRAM(S): at 04:37

## 2020-06-05 RX ADMIN — Medication 1 TABLET(S): at 11:55

## 2020-06-05 RX ADMIN — Medication 40 MILLIGRAM(S): at 04:37

## 2020-06-05 RX ADMIN — Medication 300 MILLIGRAM(S): at 04:37

## 2020-06-05 RX ADMIN — PANTOPRAZOLE SODIUM 40 MILLIGRAM(S): 20 TABLET, DELAYED RELEASE ORAL at 07:45

## 2020-06-05 RX ADMIN — Medication 0.25 MILLIGRAM(S): at 12:25

## 2020-06-05 RX ADMIN — Medication 20 MILLIGRAM(S): at 17:42

## 2020-06-05 RX ADMIN — Medication 3 MILLILITER(S): at 05:28

## 2020-06-05 RX ADMIN — Medication 3 MILLILITER(S): at 00:58

## 2020-06-05 RX ADMIN — LOSARTAN POTASSIUM 50 MILLIGRAM(S): 100 TABLET, FILM COATED ORAL at 04:37

## 2020-06-05 RX ADMIN — Medication 20 MILLIGRAM(S): at 14:18

## 2020-06-05 RX ADMIN — Medication 3 MILLILITER(S): at 17:33

## 2020-06-05 RX ADMIN — Medication 50 MICROGRAM(S): at 04:37

## 2020-06-05 NOTE — DISCHARGE NOTE PROVIDER - NSDCCPCAREPLAN_GEN_ALL_CORE_FT
PRINCIPAL DISCHARGE DIAGNOSIS  Diagnosis: COPD exacerbation  Assessment and Plan of Treatment: CLINICALLY STABLE TO DISCHARGE TO REHAB  continue medrol dose pack PENDING COVID RESULT.      SECONDARY DISCHARGE DIAGNOSES  Diagnosis: Pneumonia  Assessment and Plan of Treatment: Augmentin 875 mg PO BID X10 DAYS

## 2020-06-05 NOTE — DISCHARGE NOTE NURSING/CASE MANAGEMENT/SOCIAL WORK - PATIENT PORTAL LINK FT
You can access the FollowMyHealth Patient Portal offered by [facility] by registering at the following website: [email or web address]. By joining [facility]’s FollowMyHealth portal, you will also be able to view your health information using other applications (apps) compatible with our system.

## 2020-06-05 NOTE — DIETITIAN INITIAL EVALUATION ADULT. - OTHER INFO
Pt is confused, poking inside her nose, pt requested napkin, unable to obtain diet hx, strong accent/language barrier noted. Pt appeared to have own teeth, no problems c chewing/swallowing noted.   Pt lived alone c HHA PTA, d/c plan is for Rehab.   Problem diagnosis include acute combined systolic and diastolic heart failure.

## 2020-06-05 NOTE — DIETITIAN INITIAL EVALUATION ADULT. - PERTINENT LABORATORY DATA
06-05 Na136 mmol/L Glu 115 mg/dL<H> K+ 3.8 mmol/L Cr  1.08 mg/dL BUN 36 mg/dL<H> 05-31 Alb 3.5 g/dL05-31 ALT 24 U/L AST 34 U/L Alkaline Phosphatase 76 U/L

## 2020-06-05 NOTE — DIETITIAN INITIAL EVALUATION ADULT. - ENERGY NEEDS
Height (cm): 157.48 (05-31)  Weight (kg): 69.7 (06-01)  BMI (kg/m2): 28.1 (06-01)  IBW: 49.8 kg         % IBW:  139%           UBW:  ?         %UBW: ?, wt. fluctuation c wt. loss of 1 kg/1.43% wt. loss noted( on furosemide)

## 2020-06-05 NOTE — PROGRESS NOTE ADULT - REASON FOR ADMISSION
Cough and SOB

## 2020-06-05 NOTE — PROGRESS NOTE ADULT - SUBJECTIVE AND OBJECTIVE BOX
Patient is a 89y old  Female who presents with a chief complaint of Cough and SOB (04 Jun 2020 12:14)      SUBJECTIVE/OBJECTIVE:    Without complaints. Patient resting comfortably.     MEDICATIONS  (STANDING):  albuterol/ipratropium for Nebulization 3 milliLiter(s) Nebulizer every 6 hours  ALPRAZolam 0.25 milliGRAM(s) Oral at bedtime  diltiazem    milliGRAM(s) Oral daily  furosemide   Injectable 40 milliGRAM(s) IV Push daily  levothyroxine 50 MICROGram(s) Oral daily  losartan 50 milliGRAM(s) Oral daily  methylPREDNISolone sodium succinate Injectable 20 milliGRAM(s) IV Push every 12 hours  multivitamin 1 Tablet(s) Oral daily  pantoprazole    Tablet 40 milliGRAM(s) Oral before breakfast  sildenafil (REVATIO) 20 milliGRAM(s) Oral every 8 hours  warfarin 3 milliGRAM(s) Oral once    MEDICATIONS  (PRN):  acetaminophen   Tablet .. 650 milliGRAM(s) Oral every 6 hours PRN Mild Pain (1 - 3)  bisacodyl 5 milliGRAM(s) Oral daily PRN Constipation  melatonin 3 milliGRAM(s) Oral at bedtime PRN Sleep      Allergies    No Known Allergies    Intolerances          Vital Signs Last 24 Hrs  T(C): 36.2 (05 Jun 2020 05:50), Max: 36.9 (04 Jun 2020 12:09)  T(F): 97.1 (05 Jun 2020 05:50), Max: 98.5 (04 Jun 2020 12:09)  HR: 61 (05 Jun 2020 11:12) (61 - 96)  BP: 156/75 (05 Jun 2020 05:50) (122/62 - 156/75)  BP(mean): --  RR: 18 (05 Jun 2020 05:50) (18 - 18)  SpO2: 92% (05 Jun 2020 11:12) (92% - 96%)          Physical Exam: PHYSICAL EXAM:  GENERAL: NAD,   HEAD:  Atraumatic, Normocephalic  EYES: EOMI, PERRLA, conjunctiva and sclera clear  ENMT: No tonsillar erythema, exudates, or enlargement; Moist mucous membranes, No lesions  NECK: Supple, No JVD, Normal thyroid  NERVOUS SYSTEM:  Alert ; Motor Strength 4/5   CHEST/LUNG: Clear bilaterally; No rales, wheezes  rhonchi, or rubs  HEART: IRRegular rate and rhythm; No murmurs, rubs, or gallops  ABDOMEN: Soft, Nontender, Nondistended; Bowel sounds present  EXTREMITIES:  No clubbing, cyanosis. No  edema  LYMPH: No lymphadenopathy noted SKIN: No rashes or lesions	        LABS:                        11.7   9.50  )-----------( 158      ( 05 Jun 2020 06:12 )             34.9     06-05    136  |  96  |  36<H>  ----------------------------<  115<H>  3.8   |  36<H>  |  1.08    Ca    8.5      05 Jun 2020 06:12      PT/INR - ( 05 Jun 2020 06:12 )   PT: 20.2 sec;   INR: 1.79 ratio             CAPILLARY BLOOD GLUCOSE              RADIOLOGY & ADDITIONAL TESTS:        Consultant(s) Notes Reviewed:  [ ] YES  [ ] NO

## 2020-06-05 NOTE — DIETITIAN INITIAL EVALUATION ADULT. - PHYSICAL APPEARANCE
other (specify)/BMI=28.1(06/01/20) pt appeared to have mild orbital, buccal, temple & clavicle areas.

## 2020-06-05 NOTE — DIETITIAN INITIAL EVALUATION ADULT. - PERTINENT MEDS FT
MEDICATIONS  (STANDING):  albuterol/ipratropium for Nebulization 3 milliLiter(s) Nebulizer every 6 hours  ALPRAZolam 0.25 milliGRAM(s) Oral at bedtime  diltiazem    milliGRAM(s) Oral daily  furosemide   Injectable 40 milliGRAM(s) IV Push daily  levothyroxine 50 MICROGram(s) Oral daily  losartan 50 milliGRAM(s) Oral daily  methylPREDNISolone sodium succinate Injectable 20 milliGRAM(s) IV Push every 12 hours  multivitamin 1 Tablet(s) Oral daily  pantoprazole    Tablet 40 milliGRAM(s) Oral before breakfast  sildenafil (REVATIO) 20 milliGRAM(s) Oral every 8 hours  warfarin 3 milliGRAM(s) Oral once    MEDICATIONS  (PRN):  acetaminophen   Tablet .. 650 milliGRAM(s) Oral every 6 hours PRN Mild Pain (1 - 3)  bisacodyl 5 milliGRAM(s) Oral daily PRN Constipation  melatonin 3 milliGRAM(s) Oral at bedtime PRN Sleep

## 2020-06-05 NOTE — DISCHARGE NOTE PROVIDER - NSDCMRMEDTOKEN_GEN_ALL_CORE_FT
acetaminophen 325 mg oral tablet: 2 tab(s) orally every 6 hours, As needed, Mild Pain (1 - 3)  ALPRAZolam 0.25 mg oral tablet: 1 tab(s) orally once a day (at bedtime)  bisacodyl 5 mg oral delayed release tablet: 1 tab(s) orally once a day, As needed, Constipation  Coumadin 3 mg oral tablet: 1 tab(s) orally once a day  DilTIAZem Hydrochloride  mg/24 hours oral capsule, extended release: 1 cap(s) orally once a day  ipratropium-albuterol 0.5 mg-2.5 mg/3 mLinhalation solution: 3 milliliter(s) inhaled every 6 hours  Lasix 20 mg oral tablet: 1 tab(s) orally once a day  losartan 50 mg oral tablet: 1 tab(s) orally once a day  Medrol 2 mg oral tablet: 3 tab(s) oral - orally once a day x 3 days  2 tab(s) oral - orally once a day x 3 days  1 tab(s) oral - orally once a day x 3 days  melatonin 3 mg oral tablet: 1 tab(s) orally once a day (at bedtime), As needed, Sleep  Multiple Vitamins oral tablet: 1 tab(s) orally once a day  pantoprazole 40 mg oral delayed release tablet: 1 tab(s) orally once a day (before a meal)  sildenafil 20 mg oral tablet: 1 tab(s) orally every 8 hours  Synthroid: 50  orally once a day acetaminophen 325 mg oral tablet: 2 tab(s) orally every 6 hours, As needed, Mild Pain (1 - 3)  ALPRAZolam 0.25 mg oral tablet: 1 tab(s) orally once a day (at bedtime)  Augmentin 875 mg-125 mg oral tablet: 875 milligram(s) orally every 12 hours   bisacodyl 5 mg oral delayed release tablet: 1 tab(s) orally once a day, As needed, Constipation  Coumadin 3 mg oral tablet: 1 tab(s) orally once a day  DilTIAZem Hydrochloride  mg/24 hours oral capsule, extended release: 1 cap(s) orally once a day  ipratropium-albuterol 0.5 mg-2.5 mg/3 mLinhalation solution: 3 milliliter(s) inhaled every 6 hours  Lasix 20 mg oral tablet: 1 tab(s) orally once a day  losartan 50 mg oral tablet: 1 tab(s) orally once a day  Medrol 2 mg oral tablet: 3 tab(s) oral - orally once a day x 3 days  2 tab(s) oral - orally once a day x 3 days  1 tab(s) oral - orally once a day x 3 days  melatonin 3 mg oral tablet: 1 tab(s) orally once a day (at bedtime), As needed, Sleep  Multiple Vitamins oral tablet: 1 tab(s) orally once a day  pantoprazole 40 mg oral delayed release tablet: 1 tab(s) orally once a day (before a meal)  sildenafil 20 mg oral tablet: 1 tab(s) orally every 8 hours  Synthroid: 50  orally once a day

## 2020-06-05 NOTE — DIETITIAN INITIAL EVALUATION ADULT. - FACTORS AFF FOOD INTAKE
change in mental status/other (specify)/pt c/o difficulty breathing/anxious, needed something to calm her down, RN aware, sickness c cough, SOB x 10 days PTA noted on adm

## 2020-06-05 NOTE — DISCHARGE NOTE PROVIDER - HOSPITAL COURSE
89yo, WF with h/o HTN, COPD, CHF, hypothyroid ,A fib, Value replacement., presents with increasing cough and SOB for 10 days.  . Per EMS, pt 75% on RA.  Denies CP,  palpitation, n/v/d, fever, chills, weakness, abdominal pain ,dysuria ,HA ,dizziness.     · Assessment    Admitted for Respiratory Failure with COPD exacerbation PNA, r/o COVID    Problem/Plan - 1:    ·  Problem: COPD exacerbation.  Plan: Pulmonary consult noted;  ·Acute hypoxic Respiratory failure.    ·Acute COPD exacerbation.    ·Suspected Pneumonia.    ·Hyponatremia.    ·Hyperkalemia.    ·CHF Systolic/diastolic.    ·Chronic A Fib.    ·Hypothyroidism.    Continue BIPAP, titrate down FIO2.    Continue steroids, will add nebulizer.    Obtain procalcitonin, Echo.    Repeat proBNP.    On Coumadin.    IV Steroids.     Problem/Plan - 2:    ·  Problem: Pneumonia due to other aerobic gram-negative bacteria.  Plan: IV Rocephin, Zithro.     Problem/Plan - 3:    ·  Problem: Acute respiratory failure with hypoxia and hypercapnia.  Plan: O2 support and monitoring    Isolation    COVID Negative.     Problem/Plan - 4:    ·  Problem: Hyponatremia.  Plan: IVF discontinued    Normalized.     Problem/Plan - 5:    ·  Problem: Persistent atrial fibrillation.  Plan: Monitoring INR,PT    Coumadin INR 2-3.     Problem/Plan - 6:    Problem: Acute combined systolic and diastolic congestive heart failure. Plan: IV Lasix.    Problem/Plan - 7:    ·  Problem: Pulmonary hypertension.  Plan: Started Sildenafil     Spoke with patient Cardiologist.     Attending Attestation:     Discharge planning for Rehab    Repeat COVID test x2    Continue current care and treatment.    Discharge to rehab pending Covid result.

## 2020-06-05 NOTE — PROGRESS NOTE ADULT - ATTENDING COMMENTS
Continue current care and treatment.
Discharge planning for Rehab  Repeat COVID test x2  Continue current care and treatment.
Discussed GOC with daughter today.  Continue current care and treatment.
Discussed GOC with daughter.  Continue current care and treatment.
Pulmonary following;  Continue O2, DC BIPAP as refusing to use.  Reduce steroids.  Continue Sildenafil.  On empiric antibiotics.  Nebulizer as needed.    CXR today  PT  Continue current care and treatment.

## 2020-06-05 NOTE — PROGRESS NOTE ADULT - SUBJECTIVE AND OBJECTIVE BOX
INTERVAL HPI:  89yo, WF with HTN, COPD, CHF, hypothyroidism ,A fib on Coumadin, Valve replacement.,  Obesity. Has home O2 from prior admission but not using.  Presented  with increasing cough and SOB for 10 days.  . Per EMS, SPO2 was 75% on RA.  Denies CP,  palpitation, n/v/d, fever, chills, weakness, abdominal pain ,dysuria ,HA ,dizziness. (31 May 2020 19:02).  Started on BIPAP for hypoxic Respiratory failure. No more details available.    OVERNIGHT EVENTS:  Awake and comfortable.    Vital Signs Last 24 Hrs  T(C): 36.6 (05 Jun 2020 12:06), Max: 36.6 (04 Jun 2020 18:05)  T(F): 97.8 (05 Jun 2020 12:06), Max: 97.9 (04 Jun 2020 18:05)  HR: 88 (05 Jun 2020 12:06) (61 - 96)  BP: 123/76 (05 Jun 2020 12:06) (122/62 - 156/75)  BP(mean): --  RR: 18 (05 Jun 2020 12:06) (18 - 18)  SpO2: 95% (05 Jun 2020 12:06) (92% - 96%)    PHYSICAL EXAM:  GEN:         Awake, responsive and comfortable.  HEENT:    Normal.    RESP:       no distress  CVS:             Regular rate and rhythm.   ABD:         Soft, non-tender, non-distended;     MEDICATIONS  (STANDING):  albuterol/ipratropium for Nebulization 3 milliLiter(s) Nebulizer every 6 hours  ALPRAZolam 0.25 milliGRAM(s) Oral at bedtime  diltiazem    milliGRAM(s) Oral daily  furosemide   Injectable 40 milliGRAM(s) IV Push daily  levothyroxine 50 MICROGram(s) Oral daily  losartan 50 milliGRAM(s) Oral daily  methylPREDNISolone sodium succinate Injectable 20 milliGRAM(s) IV Push every 12 hours  multivitamin 1 Tablet(s) Oral daily  pantoprazole    Tablet 40 milliGRAM(s) Oral before breakfast  sildenafil (REVATIO) 20 milliGRAM(s) Oral every 8 hours  warfarin 3 milliGRAM(s) Oral once    MEDICATIONS  (PRN):  acetaminophen   Tablet .. 650 milliGRAM(s) Oral every 6 hours PRN Mild Pain (1 - 3)  bisacodyl 5 milliGRAM(s) Oral daily PRN Constipation  melatonin 3 milliGRAM(s) Oral at bedtime PRN Sleep    LABS:                        11.7   9.50  )-----------( 158      ( 05 Jun 2020 06:12 )             34.9     06-05    136  |  96  |  36<H>  ----------------------------<  115<H>  3.8   |  36<H>  |  1.08    Ca    8.5      05 Jun 2020 06:12    PT/INR - ( 05 Jun 2020 06:12 )   PT: 20.2 sec;   INR: 1.79 ratio      05-31 @ 18:17  pH: 7.35  pCO2: 51  pO2: 222  SaO2: 100    ASSESSMENT AND PLAN:  ·	Acute hypoxic Respiratory failure.  ·	Acute COPD exacerbation.  ·	Severe pHTN  ·	Suspected Pneumonia.  ·	Hyponatremia.  ·	Hyperkalemia.  ·	CHF Systolic/diastolic.  ·	Chronic A Fib.  ·	Hypothyroidism.    Taper steroid over 5-7 days.  Continue nebulizer.  Continue Sildenafil.  Complete one week of antibiotics.

## 2020-06-06 LAB
CULTURE RESULTS: SIGNIFICANT CHANGE UP
CULTURE RESULTS: SIGNIFICANT CHANGE UP
SPECIMEN SOURCE: SIGNIFICANT CHANGE UP
SPECIMEN SOURCE: SIGNIFICANT CHANGE UP

## 2020-06-09 DIAGNOSIS — Z79.01 LONG TERM (CURRENT) USE OF ANTICOAGULANTS: ICD-10-CM

## 2020-06-09 DIAGNOSIS — I11.0 HYPERTENSIVE HEART DISEASE WITH HEART FAILURE: ICD-10-CM

## 2020-06-09 DIAGNOSIS — Z66 DO NOT RESUSCITATE: ICD-10-CM

## 2020-06-09 DIAGNOSIS — I48.19 OTHER PERSISTENT ATRIAL FIBRILLATION: ICD-10-CM

## 2020-06-09 DIAGNOSIS — Z95.2 PRESENCE OF PROSTHETIC HEART VALVE: ICD-10-CM

## 2020-06-09 DIAGNOSIS — J44.1 CHRONIC OBSTRUCTIVE PULMONARY DISEASE WITH (ACUTE) EXACERBATION: ICD-10-CM

## 2020-06-09 DIAGNOSIS — J15.6 PNEUMONIA DUE TO OTHER GRAM-NEGATIVE BACTERIA: ICD-10-CM

## 2020-06-09 DIAGNOSIS — I50.43 ACUTE ON CHRONIC COMBINED SYSTOLIC (CONGESTIVE) AND DIASTOLIC (CONGESTIVE) HEART FAILURE: ICD-10-CM

## 2020-06-09 DIAGNOSIS — I27.20 PULMONARY HYPERTENSION, UNSPECIFIED: ICD-10-CM

## 2020-06-09 DIAGNOSIS — J44.0 CHRONIC OBSTRUCTIVE PULMONARY DISEASE WITH (ACUTE) LOWER RESPIRATORY INFECTION: ICD-10-CM

## 2020-06-09 DIAGNOSIS — J96.02 ACUTE RESPIRATORY FAILURE WITH HYPERCAPNIA: ICD-10-CM

## 2020-06-09 DIAGNOSIS — Z11.59 ENCOUNTER FOR SCREENING FOR OTHER VIRAL DISEASES: ICD-10-CM

## 2020-06-09 DIAGNOSIS — E44.0 MODERATE PROTEIN-CALORIE MALNUTRITION: ICD-10-CM

## 2020-06-09 DIAGNOSIS — E87.1 HYPO-OSMOLALITY AND HYPONATREMIA: ICD-10-CM

## 2020-06-09 DIAGNOSIS — E03.9 HYPOTHYROIDISM, UNSPECIFIED: ICD-10-CM

## 2020-06-09 DIAGNOSIS — J96.01 ACUTE RESPIRATORY FAILURE WITH HYPOXIA: ICD-10-CM

## 2020-06-09 DIAGNOSIS — E87.5 HYPERKALEMIA: ICD-10-CM

## 2020-06-14 ENCOUNTER — INPATIENT (INPATIENT)
Facility: HOSPITAL | Age: 85
LOS: 5 days | Discharge: NURSING, REHAB OR LONG-TERM CARE | End: 2020-06-20
Attending: INTERNAL MEDICINE | Admitting: INTERNAL MEDICINE
Payer: MEDICARE

## 2020-06-14 VITALS
TEMPERATURE: 98 F | HEART RATE: 74 BPM | HEIGHT: 61.81 IN | DIASTOLIC BLOOD PRESSURE: 82 MMHG | OXYGEN SATURATION: 98 % | RESPIRATION RATE: 17 BRPM | SYSTOLIC BLOOD PRESSURE: 134 MMHG | WEIGHT: 123.46 LBS

## 2020-06-14 DIAGNOSIS — Z29.9 ENCOUNTER FOR PROPHYLACTIC MEASURES, UNSPECIFIED: ICD-10-CM

## 2020-06-14 LAB
ALBUMIN SERPL ELPH-MCNC: 3.1 G/DL — LOW (ref 3.3–5)
ALP SERPL-CCNC: 62 U/L — SIGNIFICANT CHANGE UP (ref 40–120)
ALT FLD-CCNC: 50 U/L — SIGNIFICANT CHANGE UP (ref 12–78)
ANION GAP SERPL CALC-SCNC: 5 MMOL/L — SIGNIFICANT CHANGE UP (ref 5–17)
APTT BLD: 34.3 SEC — SIGNIFICANT CHANGE UP (ref 27.5–36.3)
AST SERPL-CCNC: 33 U/L — SIGNIFICANT CHANGE UP (ref 15–37)
BASE EXCESS BLDA CALC-SCNC: 12.8 MMOL/L — HIGH (ref -2–2)
BILIRUB SERPL-MCNC: 0.5 MG/DL — SIGNIFICANT CHANGE UP (ref 0.2–1.2)
BLOOD GAS COMMENTS: SIGNIFICANT CHANGE UP
BLOOD GAS SOURCE: SIGNIFICANT CHANGE UP
BUN SERPL-MCNC: 39 MG/DL — HIGH (ref 7–23)
CALCIUM SERPL-MCNC: 8.3 MG/DL — LOW (ref 8.5–10.1)
CHLORIDE SERPL-SCNC: 102 MMOL/L — SIGNIFICANT CHANGE UP (ref 96–108)
CO2 SERPL-SCNC: 35 MMOL/L — HIGH (ref 22–31)
CREAT SERPL-MCNC: 1.03 MG/DL — SIGNIFICANT CHANGE UP (ref 0.5–1.3)
GLUCOSE SERPL-MCNC: 178 MG/DL — HIGH (ref 70–99)
HCO3 BLDA-SCNC: 70 MMOL/L — HIGH (ref 21–29)
HCT VFR BLD CALC: 36.7 % — SIGNIFICANT CHANGE UP (ref 34.5–45)
HGB BLD-MCNC: 11.6 G/DL — SIGNIFICANT CHANGE UP (ref 11.5–15.5)
HOROWITZ INDEX BLDA+IHG-RTO: 32 — SIGNIFICANT CHANGE UP
INR BLD: 2.86 RATIO — HIGH (ref 0.88–1.16)
MAGNESIUM SERPL-MCNC: 2.4 MG/DL — SIGNIFICANT CHANGE UP (ref 1.6–2.6)
MCHC RBC-ENTMCNC: 31.6 GM/DL — LOW (ref 32–36)
MCHC RBC-ENTMCNC: 34.2 PG — HIGH (ref 27–34)
MCV RBC AUTO: 108.3 FL — HIGH (ref 80–100)
NRBC # BLD: 0 /100 WBCS — SIGNIFICANT CHANGE UP (ref 0–0)
NT-PROBNP SERPL-SCNC: 1176 PG/ML — HIGH (ref 0–450)
PCO2 BLDA: 70 MMHG — CRITICAL HIGH (ref 32–46)
PH BLD: 7.38 — SIGNIFICANT CHANGE UP (ref 7.35–7.45)
PLATELET # BLD AUTO: 92 K/UL — LOW (ref 150–400)
PO2 BLDA: 74 MMHG — SIGNIFICANT CHANGE UP (ref 74–108)
POTASSIUM SERPL-MCNC: 5.3 MMOL/L — SIGNIFICANT CHANGE UP (ref 3.5–5.3)
POTASSIUM SERPL-SCNC: 5.3 MMOL/L — SIGNIFICANT CHANGE UP (ref 3.5–5.3)
PROT SERPL-MCNC: 7 GM/DL — SIGNIFICANT CHANGE UP (ref 6–8.3)
PROTHROM AB SERPL-ACNC: 32.8 SEC — HIGH (ref 10–12.9)
RBC # BLD: 3.39 M/UL — LOW (ref 3.8–5.2)
RBC # FLD: 15.8 % — HIGH (ref 10.3–14.5)
SAO2 % BLDA: 95 % — SIGNIFICANT CHANGE UP (ref 92–96)
SARS-COV-2 RNA SPEC QL NAA+PROBE: SIGNIFICANT CHANGE UP
SODIUM SERPL-SCNC: 142 MMOL/L — SIGNIFICANT CHANGE UP (ref 135–145)
TROPONIN I SERPL-MCNC: <.015 NG/ML — SIGNIFICANT CHANGE UP (ref 0.01–0.04)
WBC # BLD: 10.64 K/UL — HIGH (ref 3.8–10.5)
WBC # FLD AUTO: 10.64 K/UL — HIGH (ref 3.8–10.5)

## 2020-06-14 PROCEDURE — 99223 1ST HOSP IP/OBS HIGH 75: CPT | Mod: AI

## 2020-06-14 PROCEDURE — 71045 X-RAY EXAM CHEST 1 VIEW: CPT | Mod: 26

## 2020-06-14 PROCEDURE — 93010 ELECTROCARDIOGRAM REPORT: CPT

## 2020-06-14 PROCEDURE — 99285 EMERGENCY DEPT VISIT HI MDM: CPT | Mod: CS

## 2020-06-14 RX ORDER — LEVOTHYROXINE SODIUM 125 MCG
50 TABLET ORAL DAILY
Refills: 0 | Status: DISCONTINUED | OUTPATIENT
Start: 2020-06-14 | End: 2020-06-20

## 2020-06-14 RX ORDER — PANTOPRAZOLE SODIUM 20 MG/1
40 TABLET, DELAYED RELEASE ORAL
Refills: 0 | Status: DISCONTINUED | OUTPATIENT
Start: 2020-06-14 | End: 2020-06-20

## 2020-06-14 RX ORDER — WARFARIN SODIUM 2.5 MG/1
3 TABLET ORAL ONCE
Refills: 0 | Status: COMPLETED | OUTPATIENT
Start: 2020-06-14 | End: 2020-06-15

## 2020-06-14 RX ORDER — LOSARTAN POTASSIUM 100 MG/1
50 TABLET, FILM COATED ORAL DAILY
Refills: 0 | Status: DISCONTINUED | OUTPATIENT
Start: 2020-06-14 | End: 2020-06-20

## 2020-06-14 RX ORDER — ACETAMINOPHEN 500 MG
650 TABLET ORAL EVERY 6 HOURS
Refills: 0 | Status: DISCONTINUED | OUTPATIENT
Start: 2020-06-14 | End: 2020-06-20

## 2020-06-14 RX ORDER — ALPRAZOLAM 0.25 MG
0.25 TABLET ORAL AT BEDTIME
Refills: 0 | Status: DISCONTINUED | OUTPATIENT
Start: 2020-06-14 | End: 2020-06-20

## 2020-06-14 RX ORDER — IPRATROPIUM/ALBUTEROL SULFATE 18-103MCG
3 AEROSOL WITH ADAPTER (GRAM) INHALATION
Refills: 0 | Status: COMPLETED | OUTPATIENT
Start: 2020-06-14 | End: 2020-06-14

## 2020-06-14 RX ORDER — DILTIAZEM HCL 120 MG
300 CAPSULE, EXT RELEASE 24 HR ORAL DAILY
Refills: 0 | Status: DISCONTINUED | OUTPATIENT
Start: 2020-06-14 | End: 2020-06-20

## 2020-06-14 RX ORDER — IPRATROPIUM/ALBUTEROL SULFATE 18-103MCG
3 AEROSOL WITH ADAPTER (GRAM) INHALATION EVERY 6 HOURS
Refills: 0 | Status: DISCONTINUED | OUTPATIENT
Start: 2020-06-14 | End: 2020-06-20

## 2020-06-14 RX ADMIN — Medication 3 MILLILITER(S): at 20:20

## 2020-06-14 RX ADMIN — Medication 125 MILLIGRAM(S): at 20:19

## 2020-06-14 RX ADMIN — Medication 3 MILLILITER(S): at 21:18

## 2020-06-14 RX ADMIN — Medication 3 MILLILITER(S): at 20:41

## 2020-06-14 NOTE — ED ADULT TRIAGE NOTE - NSTRIAGECARE_GEN_A_ER
Supplemental Oxygen (see orders)/Face Mask/EKG Supplemental Oxygen (see orders)/Face Mask/EKG/Cardiac Monitoring

## 2020-06-14 NOTE — ED ADULT NURSE NOTE - OBJECTIVE STATEMENT
pt received from [facility] c/o SOB after dinner. at this time pt is using any acessory muscles to brearthe, pt appears comfortable on room airt

## 2020-06-14 NOTE — ED ADULT NURSE NOTE - CHIEF COMPLAINT QUOTE
[facility] stated [facility] "[facility]". POX 80% on 3LNC  Patient wheezing in triage on auscultation  HX anxiety, PNA, pulm HTN, coumading on hold  DNR/DNI, covid neg

## 2020-06-14 NOTE — H&P ADULT - NSHPPHYSICALEXAM_GEN_ALL_CORE
Vital Signs Last 24 Hrs  T(C): 36.4 (14 Jun 2020 22:34), Max: 36.7 (14 Jun 2020 19:43)  T(F): 97.5 (14 Jun 2020 22:34), Max: 98 (14 Jun 2020 19:43)  HR: 86 (14 Jun 2020 22:34) (70 - 86)  BP: 134/69 (14 Jun 2020 22:34) (131/58 - 134/82)  BP(mean): --  RR: 16 (14 Jun 2020 22:34) (16 - 18)  SpO2: 95% (14 Jun 2020 22:34) (90% - 98%)    PHYSICAL EXAM:    GENERAL: elderly female on bipap  HEAD:  Atraumatic, Normocephalic  EYES: EOMI, PERRLA, conjunctiva and sclera clear  ENMT: on bipap  NECK: Supple, No JVD, Normal thyroid  NERVOUS SYSTEM:  Alert & Oriented [id number] Strength 5/5 B/L upper and lower extremities; DTRs 2+ intact and symmetric  CHEST/LUNG: pt w/mild wheezing  HEART: Regular rate and rhythm; No rubs, or gallops, +S1,S2  ABDOMEN: Soft, Nontender, Nondistended; Bowel sounds present  EXTREMITIES:  2+ Peripheral Pulses, No clubbing, cyanosis, or edema  LYMPH: No cervical adenopathy  RECTAL: deferred  BREAST: deferred  : deferred  SKIN: No rashes or lesions    IMPROVE VTE Individual Risk Assessment        RISK                                                          Points  [  ] Previous VTE                                                3  [  ] Thrombophilia                                             2  [  ] Lower limb paralysis                                    2        (unable to hold up >15 seconds)    [  ] Current Cancer                                             2         (within 6 months)  [  x] Immobilization > 24 hrs                              1  [  ] ICU/CCU stay > 24 hours                            1  [ x ] Age > 60                                                    1  IMPROVE VTE Score ___2______

## 2020-06-14 NOTE — ED ADULT NURSE REASSESSMENT NOTE - NS ED NURSE REASSESS COMMENT FT1
pt's O2 dropped to 89% after first neb treatment. Dr. Wade made aware. stating it is okay to proceed with the next neb treatment as long as O2 is above 88%

## 2020-06-14 NOTE — H&P ADULT - NSHPLABSRESULTS_GEN_ALL_CORE
LABS:                        11.6   10.64 )-----------( 92       ( 14 Jun 2020 20:28 )             36.7     06-14    142  |  102  |  39<H>  ----------------------------<  178<H>  5.3   |  35<H>  |  1.03    Ca    8.3<L>      14 Jun 2020 20:28  Mg     2.4     06-14    TPro  7.0  /  Alb  3.1<L>  /  TBili  0.5  /  DBili  x   /  AST  33  /  ALT  50  /  AlkPhos  62  06-14    PT/INR - ( 14 Jun 2020 20:28 )   PT: 32.8 sec;   INR: 2.86 ratio         PTT - ( 14 Jun 2020 20:28 )  PTT:34.3 sec    CAPILLARY BLOOD GLUCOSE            RADIOLOGY & ADDITIONAL TESTS:    Imaging Personally Reviewed:  [ X] YES  [ ] NO

## 2020-06-14 NOTE — ED PROVIDER NOTE - PROGRESS NOTE DETAILS
pt feels muchj better, though still diffusely wheezing. O2 90% on 3L. will do abg. ABg noted, mild hypercapnea, will start on bipap and admit.

## 2020-06-14 NOTE — ED PROVIDER NOTE - OBJECTIVE STATEMENT
89yo female from [facility] with pmh COPD on continuous 3L O2 NC, afib on coumadin, HTN, CHF, hypothyroid, anxiety on xanax, recent admission for COPD/pna currently on day 8 of 10 for pna presents with sob and chest tightness and not feeling well after dinner ~ 2 hours ago. RN reports pt was well prior. Noted O2 went down to 80%, but was also anxious and panting. Pt calmed down with RN and pulse ox currently on 3L is 98%.     No fever/chills, No photophobia/eye pain/changes in vision, No ear pain/sore throat/dysphagia, + chest pain, no palpitations, +SOB, no cough, no wheeze/stridor, No abdominal pain, No N/V/D, no dysuria/frequency/discharge, No neck/back pain, no rash, no changes in neurological status/function.

## 2020-06-14 NOTE — ED PROVIDER NOTE - CLINICAL SUMMARY MEDICAL DECISION MAKING FREE TEXT BOX
pt feels improved, but still diffusely wheezing, mild hypoxia compared to baseline, will place on bipap and admit. dw dr woods for admission. xray improved from prior, still with rt pleural effusion, but also improved. afebrile. no wcc. DNR confirmed with son.

## 2020-06-14 NOTE — ED PROVIDER NOTE - PHYSICAL EXAMINATION
Gen: Alert, Well appearing. NAD    Head: NC, AT, PERRL, normal lids/conjunctiva   ENT: Bilateral TM WNL, patent oropharynx without erythema/exudate, uvula midline  Neck: supple, no tenderness/meningismus  Pulm: diffuse wheeze  CV: RRR, no M/R/G, +dist pulses   Abd: soft, NT/ND, +BS, no guarding/rebound tenderness  Mskel: no edema/erythema/cyanosis   Skin: no rash, no bruising  Neuro: AAOx3, no sensory/motor deficits, CN 2-12 intact

## 2020-06-14 NOTE — ED ADULT TRIAGE NOTE - CHIEF COMPLAINT QUOTE
From [place] at 6pm after dinner stated she "can't breath". POX 80% on 3LNC  HX anxiety, PNA, pulm HTN, coumading on hold  [facility]DNI[facility] "[facility]". POX 80% on 3LNC  Patient wheezing in triage on auscultation  HX anxiety, PNA, pulm HTN, coumading on hold  DNR/DNI, covid neg

## 2020-06-14 NOTE — H&P ADULT - HISTORY OF PRESENT ILLNESS
Pt is  a 89 y/o, female w/pmhx of  HTN, COPD on 3L, CHF, hypothyroid ,A fib, Value replacement., and anxiet sent in for sob.  Pt was recently admitted for pna, Today after dinner became sob and on pulse ox 80% and sent in.  pt denies any feve, hills sob, cpm, palpitations, n.v.d.

## 2020-06-15 RX ADMIN — Medication 300 MILLIGRAM(S): at 06:06

## 2020-06-15 RX ADMIN — Medication 40 MILLIGRAM(S): at 17:29

## 2020-06-15 RX ADMIN — Medication 1 TABLET(S): at 12:40

## 2020-06-15 RX ADMIN — Medication 3 MILLILITER(S): at 23:57

## 2020-06-15 RX ADMIN — Medication 650 MILLIGRAM(S): at 17:30

## 2020-06-15 RX ADMIN — Medication 20 MILLIGRAM(S): at 12:40

## 2020-06-15 RX ADMIN — Medication 20 MILLIGRAM(S): at 22:11

## 2020-06-15 RX ADMIN — Medication 3 MILLILITER(S): at 17:01

## 2020-06-15 RX ADMIN — Medication 0.25 MILLIGRAM(S): at 22:10

## 2020-06-15 RX ADMIN — PANTOPRAZOLE SODIUM 40 MILLIGRAM(S): 20 TABLET, DELAYED RELEASE ORAL at 06:07

## 2020-06-15 RX ADMIN — WARFARIN SODIUM 3 MILLIGRAM(S): 2.5 TABLET ORAL at 22:11

## 2020-06-15 RX ADMIN — Medication 3 MILLILITER(S): at 05:29

## 2020-06-15 RX ADMIN — Medication 20 MILLIGRAM(S): at 06:07

## 2020-06-15 RX ADMIN — Medication 40 MILLIGRAM(S): at 06:07

## 2020-06-15 RX ADMIN — LOSARTAN POTASSIUM 50 MILLIGRAM(S): 100 TABLET, FILM COATED ORAL at 06:07

## 2020-06-15 RX ADMIN — Medication 50 MICROGRAM(S): at 06:07

## 2020-06-15 RX ADMIN — Medication 3 MILLILITER(S): at 11:11

## 2020-06-15 RX ADMIN — Medication 3 MILLILITER(S): at 01:00

## 2020-06-15 NOTE — PROGRESS NOTE ADULT - SUBJECTIVE AND OBJECTIVE BOX
HPI:  Pt is  a 89 y/o, female w/pmhx of  HTN, COPD on 3L, CHF, hypothyroid ,A fib, Value replacement., and anxiet sent in for sob.  Pt was recently admitted for pna, Today after dinner became sob and on pulse ox 80% and sent in.  pt denies any feve, hills sob, cpm, palpitations, n.v.d. (14 Jun 2020 23:29)      PAST MEDICAL & SURGICAL HISTORY:  COPD exacerbation      REVIEW OF SYSTEMS:    CONSTITUTIONAL: No fever, weight loss, or fatigue  EYES: No eye pain, visual disturbances, or discharge  ENMT:  No difficulty hearing, tinnitus, vertigo; No sinus or throat pain  NECK: No pain or stiffness  BREASTS: No pain, masses, or nipple discharge  RESPIRATORY: No cough, wheezing, chills or hemoptysis; No shortness of breath  CARDIOVASCULAR: No chest pain, palpitations, dizziness, or leg swelling  GASTROINTESTINAL: No abdominal or epigastric pain. No nausea, vomiting, or hematemesis; No diarrhea or constipation. No melena or hematochezia.  GENITOURINARY: No dysuria, frequency, hematuria, or incontinence  NEUROLOGICAL: No headaches, memory loss, loss of strength, numbness, or tremors  SKIN: No itching, burning, rashes, or lesions   LYMPH NODES: No enlarged glands  ENDOCRINE: No heat or cold intolerance; No hair loss  MUSCULOSKELETAL: No joint pain or swelling; No muscle, back, or extremity pain  PSYCHIATRIC: No depression, anxiety, mood swings, or difficulty sleeping  HEME/LYMPH: No easy bruising, or bleeding gums  ALLERGY AND IMMUNOLOGIC: No hives or eczema      MEDICATIONS  (STANDING):  albuterol/ipratropium for Nebulization 3 milliLiter(s) Nebulizer every 6 hours  ALPRAZolam 0.25 milliGRAM(s) Oral at bedtime  diltiazem    milliGRAM(s) Oral daily  levothyroxine 50 MICROGram(s) Oral daily  losartan 50 milliGRAM(s) Oral daily  methylPREDNISolone sodium succinate Injectable 40 milliGRAM(s) IV Push two times a day  multivitamin 1 Tablet(s) Oral daily  pantoprazole    Tablet 40 milliGRAM(s) Oral before breakfast  sildenafil (REVATIO) 20 milliGRAM(s) Oral every 8 hours  warfarin 3 milliGRAM(s) Oral once    MEDICATIONS  (PRN):  acetaminophen   Tablet .. 650 milliGRAM(s) Oral every 6 hours PRN Temp greater or equal to 38C (100.4F), Mild Pain (1 - 3)      Allergies    No Known Allergies    Intolerances        SOCIAL HISTORY:    FAMILY HISTORY:      Vital Signs Last 24 Hrs  T(C): 36.6 (15 Chet 2020 04:36), Max: 36.7 (14 Jun 2020 19:43)  T(F): 97.8 (15 Chet 2020 04:36), Max: 98 (14 Jun 2020 19:43)  HR: 85 (15 Chet 2020 05:30) (70 - 89)  BP: 129/82 (15 Chet 2020 04:36) (129/82 - 140/74)  BP(mean): --  RR: 16 (15 Chet 2020 04:36) (16 - 18)  SpO2: 95% (15 Chet 2020 05:30) (90% - 98%)    PHYSICAL EXAM:    GENERAL: NAD,  well-developed. On BiPAP  HEAD:  Atraumatic, Normocephalic  EYES: EOMI, PERRL, conjunctiva and sclera clear  ENMT: No tonsillar erythema, exudates, or enlargement; Moist mucous membranes, Good dentition, No lesions  NECK: Supple, No JVD, Normal thyroid  NERVOUS SYSTEM:  Alert & Oriented X 3, Good concentration; Motor Strength 5/5 B/L upper and lower extremities; DTRs 2+ intact and symmetric  CHEST/LUNG: Clear to percussion bilaterally; No rales, rhonchi, wheezing, or rubs  HEART: Regular rate and rhythm; No murmurs, rubs, or gallops  ABDOMEN: Soft, Nontender, Nondistended; Bowel sounds present  EXTREMITIES:  2+ Peripheral Pulses, No clubbing, cyanosis, or edema  LYMPH: No lymphadenopathy notedRECTAL: No masses, prostate normal size and smooth, Guiac negative   BREAST: No palpatble masses, skin no lesions no retractions, no discharages. adenexa no palpable masses noted   SKIN: No rashes or lesions      LABS:                        11.6   10.64 )-----------( 92       ( 14 Jun 2020 20:28 )             36.7     06-14    142  |  102  |  39<H>  ----------------------------<  178<H>  5.3   |  35<H>  |  1.03    Ca    8.3<L>      14 Jun 2020 20:28  Mg     2.4     06-14    TPro  7.0  /  Alb  3.1<L>  /  TBili  0.5  /  DBili  x   /  AST  33  /  ALT  50  /  AlkPhos  62  06-14    PT/INR - ( 14 Jun 2020 20:28 )   PT: 32.8 sec;   INR: 2.86 ratio         PTT - ( 14 Jun 2020 20:28 )  PTT:34.3 sec      RADIOLOGY & ADDITIONAL STUDIES:  < from: Xray Chest 1 View- PORTABLE-Routine (06.03.20 @ 12:04) >  IMPRESSION:   1. Stable cardiomegaly.  2. Interval worsening of pulmonary edema and/or bilateral infiltrates, as above.  3. Stable small, right pleural effusion.    < end of copied text >  < from: TTE Echo Complete w/o contrast w/ Doppler (06.01.20 @ 10:59) >  Summary:   1. Left ventricular ejection fraction, by visual estimation, is 55 to 60%.   2. Technically suboptimal study.   3. [place] not ell visualized in any view. Consider contrast echo, if clinically warranted for further evaluation.   4. Degenerative mitral valve.   5. Mild mitral annular calcification.   6. Mild mitral valve regurgitation.   7. Degenerative tricuspid valve.   8. Moderate tricuspid regurgitation.   9. Aortic valve is sclerotic with decreased cusp excursion.  10. Mild aortic regurgitation.  11. Estimated pulmonary artery systolic pressure is 67.7 mmHg assuming a right atrial pressure of 15 mmHg, which is consistent with severe pulmonary hypertension.  12. Peak transaortic gradient equals 16.9 mmHg, mean transaortic gradient equals 8.6 mmHg, the calculated aortic valve area equals 1.03 cm² by the continuity equation consistent with moderate aortic stenosis.    [id number] Sesar Kendall MD, FACCMD, FACC  Electronically signed on 6/1/2020 at 6:08:15 PM    < end of copied text >

## 2020-06-15 NOTE — PROGRESS NOTE ADULT - SUBJECTIVE AND OBJECTIVE BOX
INTERVAL HPI:   89yo, WF with HTN, COPD on O2, Diastolic CHF, Severe pHTN, Hypothyroidism ,A fib on Coumadin, Valve replacement.,  Recent admission hypoxic hypercarbic Respiratory failure requiring BIPAP support. Was transferred to [facility] on tapering steroids, O2, Sildenafil and and as needed nebulizer. Also completed course of antibiotic for suspected pneumonia.  20: Sent  from [facility] with acute sob and reported to have SPO2 80% on 3 litre nasal O2. Reports having mild cough but no fever, chills or chest pain.    OVERNIGHT EVENTS:  Admitted from [facility] with hypoxic hypercarbic Respiratory failure    Vital Signs Last 24 Hrs  T(C): 36.8 (15 Chet 2020 16:25), Max: 36.8 (15 Chet 2020 16:25)  T(F): 98.3 (15 Chet 2020 16:25), Max: 98.3 (15 Chet 2020 16:25)  HR: 70 (15 Chet 2020 17:03) (62 - 89)  BP: 122/70 (15 Chet 2020 16:25) (122/70 - 140/74)  BP(mean): --  RR: 20 (15 Chet 2020 16:25) (16 - 20)  SpO2: 92% (15 Chet 2020 17:03) (90% - 98%)    PHYSICAL EXAM:  GEN:         Awake, responsive and comfortable.  HEENT:    Normal.    RESP:        decreased air entry.  CVS:           Regular rate and rhythm.   ABD:         Soft, non-tender, non-distended;   :             No costovertebral angle tenderness  EXTR:            No clubbing, cyanosis or edema  CNS:              Intact sensory and motor function.    MEDICATIONS  (STANDING):  albuterol/ipratropium for Nebulization 3 milliLiter(s) Nebulizer every 6 hours  ALPRAZolam 0.25 milliGRAM(s) Oral at bedtime  diltiazem    milliGRAM(s) Oral daily  levothyroxine 50 MICROGram(s) Oral daily  losartan 50 milliGRAM(s) Oral daily  methylPREDNISolone sodium succinate Injectable 40 milliGRAM(s) IV Push two times a day  multivitamin 1 Tablet(s) Oral daily  pantoprazole    Tablet 40 milliGRAM(s) Oral before breakfast  sildenafil (REVATIO) 20 milliGRAM(s) Oral every 8 hours  warfarin 3 milliGRAM(s) Oral once    MEDICATIONS  (PRN):  acetaminophen   Tablet .. 650 milliGRAM(s) Oral every 6 hours PRN Temp greater or equal to 38C (100.4F), Mild Pain (1 - 3)    LABS:                        11.6   10.64 )-----------( 92       ( 2020 20:28 )             36.7         142  |  102  |  39<H>  ----------------------------<  178<H>  5.3   |  35<H>  |  1.03    Ca    8.3<L>      2020 20:28  Mg     2.4         TPro  7.0  /  Alb  3.1<L>  /  TBili  0.5  /  DBili  x   /  AST  33  /  ALT  50  /  AlkPhos  62      PT/INR - ( 2020 20:28 )   PT: 32.8 sec;   INR: 2.86 ratio      PTT - ( 2020 20:28 )  PTT:34.3 sec   @ 21:39  pH: 7.38  pCO2: 70  pO2: 74  SaO2: 95    RADIOLOGY & ADDITIONAL STUDIES:  < from: Xray Chest 1 View AP/PA. (20 @ 21:18) >  EXAM:  XR CHEST AP OR PA 1V                          PROCEDURE DATE:  2020      INTERPRETATION:  RADIOGRAPH OF THE CHEST    Clinical Indication: Chest pain    Technique: Single frontal radiograph of the chest    Comparison: Prior radiograph of the chest from 6/3/2020 and 2020    Findings:    Lungs and pleura: Compared to the prior study, there has been slight interval improvement in previously seen pulmonary vascular congestion, with mild residual. There is a small right pleural effusion with adjacent right basilar atelectasis/consolidation, similar to the prior study. There is no pneumothorax.    Heart and mediastinum: The cardiomediastinal silhouette is stably prominent. Mediastinal vascular clips are noted. There is a normal hilar configuration. The aorta is calcified an tortuous/ectatic.    Bones and soft tissue: There are no obvious acute osseous or soft tissue abnormalities. Median sternotomy wires are noted. Severe degenerative changes of the bilateral shoulders noted with high riding humeral heads seen bilaterally.    IMPRESSION:   Compared to the prior study, there has been slight interval improvement in previously seen pulmonary vascular congestion, with mild residual.     Small right pleural effusion withadjacent right basilar atelectasis/consolidation, similar to the prior study.      Stable borderline cardiomegaly.    If symptoms persist despite conservative therapy and further evaluation of lung parenchyma required, a Chest CT can be considered toexclude occult pathology not evident on plain film radiography.    GILBERT VILLALTA M.D., ATTENDING RADIOLOGIST  This document has been electronically signed. Chet 15 2020  9:24AM    < from: TTE Echo Complete w/o contrast w/ Doppler (20 @ 10:59) >   EXAM:  ECHO TTE WO CON COMP W DOPP      PROCEDURE DATE:  2020      INTERPRETATION:  REPORT:    TRANSTHORACIC ECHOCARDIOGRAM REPORT    Patient Name:   FREDERICK MA Patient Location: [facility] Rec #:  [id number]       Accession #:      [id number]  Account #:                       Height:           61.8 in 157.0 cm  YOB: 1929        Weight:           153.7 lb 69.70 kg  Patient Age:    89 years         BSA:              1.71 m²  Patient Gender: F                BP:               157/69 mmHg    Date of Exam:        2020 10:59:28 AM  Sonographer:         SANTANA  Referring Physician: IVANA    Procedure:     2D Echo/Doppler/Color Doppler Complete.  Indications:   Dyspnea, unspecified - R06.00  Diagnosis:     Dyspnea, unspecified - R06.00  Study Details: Technically suboptimal study. Study quality was adversely                 affected due to body habitus.  2D AND M-MODE MEASUREMENTS (normal ranges within parentheses):  Left Ventricle:  Normal   Aorta/Left Atrium:          Normal  IVSd (2D):              1.15 cm (0.7-1.1) Aortic Root (2D):  1.91 cm (2.4-3.7)  LVPWd (2D):             1.11 cm (0.7-1.1) Left Atrium (2D):  4.37 cm (1.9-4.0)  LVIDd (2D):             3.96 cm (3.4-5.7) Right Ventricle:  LVIDs (2D):             2.59 cm           TAPSE:           0.87 cm  LV FS (2D):             34.6 %   (>25%)  Relative Wall Thickness  0.56    (<0.42)    LV DIASTOLIC FUNCTION:  MV Peak E: 1.28 m/s Decel Time: 170 msec  MV Peak A: 0.35m/s  E/A Ratio: 3.69    SPECTRAL DOPPLER ANALYSIS (where applicable):  Mitral Valve:  MV P1/2 Time: 49.27 msec  MV Area, PHT: 4.47 cm²    Aortic Valve: AoV Max Matias: 2.05 m/s AoV Peak P.9 mmHg AoV Mean P.6 mmHg    LVOT Vmax: 1.10 m/s LVOT VTI: 0.197 m LVOT Diameter: 1.60 cm    AoV Area, Vmax: 1.08 cm² AoV Area, VTI: 1.03 cm² AoV Area, Vmn: 1.02 cm²    Tricuspid Valve and PA/RV Systolic Pressure: TR Max Velocity: 3.63 m/s RA Pressure: 15 mmHg RVSP/PASP: 67.7 mmHg    PHYSICIAN INTERPRETATION:  Left Ventricle: Normal left ventricular size and wall thicknesses, with normal systolic and diastolic function. The left ventricle was not well visualized.  Left ventricular ejection fraction, by visual estimation, is 55 to 60%. Manderson not ell visualized in any view. Consider contrast echo, if clinically warranted for further evaluation.  Right Ventricle: Normal right ventricular size and function.  Left Atrium: Mildly enlarged left atrium.  Right Atrium: The right atrium is normal in size. Mildly enlarged right atrium.  Pericardium: There is no evidence of pericardial effusion.  Mitral Valve: The mitral valve is degenerative in appearance. There is mild mitral annular calcification. Mild mitral valve regurgitation is seen.  Tricuspid Valve: The tricuspid valve is degenerative in appearance. Moderate tricuspid regurgitation is visualized. Estimated pulmonary artery systolic pressure is 67.7 mmHg assuming a right atrial pressure of 15 mmHg, which is consistent with severe pulmonary hypertension.  Aortic Valve: The aortic valve was not well visualized. The aortic valve is sclerotic with decreased cusp excursion. Peak transaortic gradient equals 16.9 mmHg, mean transaortic gradient equals 8.6 mmHg, the calculated aortic valve area equals 1.03 cm² by the continuity equation consistent with moderate aortic stenosis. Mild aortic valve regurgitation is seen.  Pulmonic Valve: Structurally normal pulmonic valve, with normal leaflet excursion. The pulmonic valve is normal. No indicationof pulmonic valve regurgitation.  Aorta: The aortic root and ascending aorta are structurally normal, with no evidence of dilitation.  Pulmonary Artery: The main pulmonary artery is normal in size.     Summary:   1. Left ventricular ejection fraction, by visual estimation, is 55 to 60%.   2. Technically suboptimal study.   3. Manderson not ell visualized in any view. Consider contrast echo, if clinically warranted for further evaluation.   4. Degenerative mitral valve.   5. Mild mitral annular calcification.   6. Mild mitral valve regurgitation.   7. Degenerative tricuspid valve.   8. Moderate tricuspid regurgitation.   9. Aortic valve is sclerotic with decreased cusp excursion.  10. Mild aortic regurgitation.  11. Estimated pulmonary artery systolic pressure is 67.7 mmHg assuming a right atrial pressure of 15 mmHg, which is consistent with severe pulmonary hypertension.  12. Peak transaortic gradient equals 16.9 mmHg, mean transaortic gradient equals 8.6 mmHg, the calculated aortic valve area equals 1.03 cm² by the continuity equation consistent with moderate aortic stenosis.    [id number] Sesar Kendall MD, FACCMD, FACC  Electronically signed on 2020 at 6:08:15 PM    SESAR KENDALL M.D.; Attending Cardiologist  This document has been electronically signed. 2020 10:59AM      ASSESSMENT AND PLAN:  ·	Acute on chronic hypoxic hypercarbic  Respiratory failure.  ·	Acute COPD exacerbation.  ·	Chronic diastolic CHF,  ·	Severe pHTN  ·	Renal Insuffiencey.  ·	Chronic A Fib.  ·	Hypothyroidism.    Oxygenation better with supplemental O2, will try nocturnal BIPAP.  Continue steroids and nebulizer.  Continue Sildenafil.   On Coumadin with therapeutic INR.

## 2020-06-16 LAB
INR BLD: 1.78 RATIO — HIGH (ref 0.88–1.16)
PROCALCITONIN SERPL-MCNC: 0.03 NG/ML — SIGNIFICANT CHANGE UP (ref 0.02–0.1)
PROTHROM AB SERPL-ACNC: 20.1 SEC — HIGH (ref 10–12.9)

## 2020-06-16 PROCEDURE — 71045 X-RAY EXAM CHEST 1 VIEW: CPT | Mod: 26

## 2020-06-16 RX ORDER — WARFARIN SODIUM 2.5 MG/1
3 TABLET ORAL ONCE
Refills: 0 | Status: COMPLETED | OUTPATIENT
Start: 2020-06-16 | End: 2020-06-16

## 2020-06-16 RX ADMIN — Medication 300 MILLIGRAM(S): at 07:00

## 2020-06-16 RX ADMIN — Medication 3 MILLILITER(S): at 17:45

## 2020-06-16 RX ADMIN — Medication 40 MILLIGRAM(S): at 16:09

## 2020-06-16 RX ADMIN — WARFARIN SODIUM 3 MILLIGRAM(S): 2.5 TABLET ORAL at 21:41

## 2020-06-16 RX ADMIN — PANTOPRAZOLE SODIUM 40 MILLIGRAM(S): 20 TABLET, DELAYED RELEASE ORAL at 07:01

## 2020-06-16 RX ADMIN — Medication 0.25 MILLIGRAM(S): at 21:41

## 2020-06-16 RX ADMIN — Medication 40 MILLIGRAM(S): at 07:00

## 2020-06-16 RX ADMIN — Medication 20 MILLIGRAM(S): at 21:41

## 2020-06-16 RX ADMIN — Medication 3 MILLILITER(S): at 11:27

## 2020-06-16 RX ADMIN — LOSARTAN POTASSIUM 50 MILLIGRAM(S): 100 TABLET, FILM COATED ORAL at 07:00

## 2020-06-16 RX ADMIN — Medication 3 MILLILITER(S): at 05:33

## 2020-06-16 RX ADMIN — Medication 50 MICROGRAM(S): at 07:00

## 2020-06-16 RX ADMIN — Medication 20 MILLIGRAM(S): at 07:01

## 2020-06-16 RX ADMIN — Medication 20 MILLIGRAM(S): at 13:29

## 2020-06-16 RX ADMIN — Medication 1 TABLET(S): at 11:46

## 2020-06-16 NOTE — PHYSICAL THERAPY INITIAL EVALUATION ADULT - BALANCE TRAINING, PT EVAL
GOAL: pt will be able to independently sit at edge of bed while performing UE manipulation without loss of balance within 4 weeks. pt will be able to ambulate 100ft with supervision & use of rolling walker without loss of balance within 4 weeks

## 2020-06-16 NOTE — PROGRESS NOTE ADULT - ASSESSMENT
pt w/copd exacerbation. S/P Pneumonia.  06/16/2020 : More alert, On nasal canula, saturating well. Pulmonary consult noted. BiPAP @ night. Chest X-ray noted. Improved. Continue Steroids and Duoneb.

## 2020-06-16 NOTE — CONSULT NOTE ADULT - ASSESSMENT
I was called to evaluated this patient admitted because of Acute hypoxic/hypercapnic resp on BIPAP, COPD- E, Hyponatremia-Resolved    Recently admitted because of PNA    No fever  No significant leukocytosis  Normal procalcitonin  Elevated BNP: 1176    UA no significant for infection    COVID-19 PCR: negative    CXR: Since prior evaluation there is a suggestion for interval increase in bilateral lung parenchymal airspace opacities with bilateral pleural effusions    Dx:  COPD-Exacervation  Decompensated CHF  Rec:  Doubt PNA with normal procalcitonin, no fever, no leukocytosis.  -No clear need of Abx at this time  -Continue steroids and resp therapy  -Pulmonologist following    Thank you for the courtesy of this consultation.  Patient will be follow up closely with you. I was called to evaluated this patient admitted because of Acute hypoxic/hypercapnic resp on BIPAP, COPD- E, Hyponatremia-Resolved    Recently admitted because of PNA    With NC at the moment of evaluation  No fever  No significant leukocytosis  Normal procalcitonin  Elevated BNP: 1176    UA no significant for infection    COVID-19 PCR: negative    CXR: Since prior evaluation there is a suggestion for interval increase in bilateral lung parenchymal airspace opacities with bilateral pleural effusions    Dx:  COPD-Exacervation  Decompensated CHF  Rec:  Doubt PNA with normal procalcitonin, no fever, no leukocytosis.  -No clear need of Abx at this time  -Continue steroids and resp therapy  -Pulmonologist following    Thank you for the courtesy of this consultation.  Patient will be follow up closely with you.

## 2020-06-16 NOTE — PROGRESS NOTE ADULT - SUBJECTIVE AND OBJECTIVE BOX
INTERVAL HPI:  89yo, WF with HTN, COPD on O2, Diastolic CHF, Severe pHTN, Hypothyroidism ,A fib on Coumadin, Valve replacement.,  Recent admission hypoxic hypercarbic Respiratory failure requiring BIPAP support. Was transferred to [facility] on tapering steroids, O2, Sildenafil and and as needed nebulizer. Also completed course of antibiotic for suspected pneumonia.  06/13/20: Sent  from [facility] with acute sob and reported to have SPO2 80% on 3 litre nasal O2. Reports having mild cough but no fever, chills or chest pain.    OVERNIGHT EVENTS:  Out of bed to chair and comfortable.    Vital Signs Last 24 Hrs  T(C): 36.6 (16 Jun 2020 10:40), Max: 36.8 (15 Chet 2020 16:25)  T(F): 97.9 (16 Jun 2020 10:40), Max: 98.3 (15 Chet 2020 16:25)  HR: 85 (16 Jun 2020 12:00) (69 - 88)  BP: 120/60 (16 Jun 2020 12:00) (115/59 - 125/65)  BP(mean): --  RR: 18 (16 Jun 2020 12:00) (18 - 20)  SpO2: 96% (16 Jun 2020 12:00) (92% - 96%)    PHYSICAL EXAM:  GEN:         Awake, responsive and comfortable.  HEENT:    Normal.    RESP:       no wheezing.  CVS:             Regular rate and rhythm.   ABD:         Soft, non-tender, non-distended;     MEDICATIONS  (STANDING):  albuterol/ipratropium for Nebulization 3 milliLiter(s) Nebulizer every 6 hours  ALPRAZolam 0.25 milliGRAM(s) Oral at bedtime  diltiazem    milliGRAM(s) Oral daily  levothyroxine 50 MICROGram(s) Oral daily  losartan 50 milliGRAM(s) Oral daily  methylPREDNISolone sodium succinate Injectable 40 milliGRAM(s) IV Push two times a day  multivitamin 1 Tablet(s) Oral daily  pantoprazole    Tablet 40 milliGRAM(s) Oral before breakfast  sildenafil (REVATIO) 20 milliGRAM(s) Oral every 8 hours  warfarin 3 milliGRAM(s) Oral once    MEDICATIONS  (PRN):  acetaminophen   Tablet .. 650 milliGRAM(s) Oral every 6 hours PRN Temp greater or equal to 38C (100.4F), Mild Pain (1 - 3)    LABS:                        11.6   10.64 )-----------( 92       ( 14 Jun 2020 20:28 )             36.7     06-14    142  |  102  |  39<H>  ----------------------------<  178<H>  5.3   |  35<H>  |  1.03    Ca    8.3<L>      14 Jun 2020 20:28  Mg     2.4     06-14    TPro  7.0  /  Alb  3.1<L>  /  TBili  0.5  /  DBili  x   /  AST  33  /  ALT  50  /  AlkPhos  62  06-14    PT/INR - ( 16 Jun 2020 06:58 )   PT: 20.1 sec;   INR: 1.78 ratio      PTT - ( 14 Jun 2020 20:28 )  PTT:34.3 sec  06-14 @ 21:39  pH: 7.38  pCO2: 70  pO2: 74  SaO2: 95    ASSESSMENT AND PLAN:  ·	Acute on chronic hypoxic hypercarbic  Respiratory failure.  ·	Acute COPD exacerbation.  ·	Chronic diastolic CHF,  ·	Severe pHTN  ·	Renal Insuffiencey.  ·	Chronic A Fib.  ·	Hypothyroidism.    Continue steroids and nebulizer.  Continue Sildenafil.   O2 with nocturnal BIPAP.

## 2020-06-16 NOTE — CONSULT NOTE ADULT - SUBJECTIVE AND OBJECTIVE BOX
HPI:  Pt is  a 89 y/o, female w/pmhx of  HTN, COPD on 3L, CHF, hypothyroid ,A fib, Value replacement., and anxiet sent in for sob.  Pt was recently admitted for pna, Today after dinner became sob and on pulse ox 80% and sent in.  pt denies any feve, hills sob, cpm, palpitations, n.v.d. (14 Jun 2020 23:29)      PAST MEDICAL & SURGICAL HISTORY:  COPD exacerbation      SOCHX:   tobacco,  -  alcohol    FMHX: FA/MO  - contributory       Recent Travel:    Immunizations:    Allergies    No Known Allergies    Intolerances        MEDICATIONS  (STANDING):  albuterol/ipratropium for Nebulization 3 milliLiter(s) Nebulizer every 6 hours  ALPRAZolam 0.25 milliGRAM(s) Oral at bedtime  diltiazem    milliGRAM(s) Oral daily  levothyroxine 50 MICROGram(s) Oral daily  losartan 50 milliGRAM(s) Oral daily  methylPREDNISolone sodium succinate Injectable 40 milliGRAM(s) IV Push two times a day  multivitamin 1 Tablet(s) Oral daily  pantoprazole    Tablet 40 milliGRAM(s) Oral before breakfast  sildenafil (REVATIO) 20 milliGRAM(s) Oral every 8 hours  warfarin 3 milliGRAM(s) Oral once    MEDICATIONS  (PRN):  acetaminophen   Tablet .. 650 milliGRAM(s) Oral every 6 hours PRN Temp greater or equal to 38C (100.4F), Mild Pain (1 - 3)      REVIEW OF SYSTEMS:  CONSTITUTIONAL: No fever, weight loss, or fatigue  EYES: No eye pain, visual disturbances, or discharge  ENMT:  No difficulty hearing, tinnitus, vertigo; No sinus or throat pain  NECK: No pain or stiffness  BREASTS: No pain, masses, or nipple discharge  RESPIRATORY: No cough, wheezing, chills or hemoptysis; No shortness of breath  CARDIOVASCULAR: No chest pain, palpitations, dizziness, or leg swelling  GASTROINTESTINAL: No abdominal or epigastric pain. No nausea, vomiting, or hematemesis; No diarrhea or constipation. No melena or hematochezia.  GENITOURINARY: No dysuria, frequency, hematuria, or incontinence  NEUROLOGICAL: No headaches, memory loss, loss of strength, numbness, or tremors  SKIN: No itching, burning, rashes, or lesions   LYMPH NODES: No enlarged glands  ENDOCRINE: No heat or cold intolerance; No hair loss  MUSCULOSKELETAL: No joint pain or swelling; No muscle, back, or extremity pain  PSYCHIATRIC: No depression, anxiety, mood swings, or difficulty sleeping  HEME/LYMPH: No easy bruising, or bleeding gums  ALLERGY AND IMMUNOLOGIC: No hives or eczema    VITAL SIGNS:    T(C): 36.6 (06-16-20 @ 10:40), Max: 36.8 (06-15-20 @ 16:25)  T(F): 97.9 (06-16-20 @ 10:40), Max: 98.3 (06-15-20 @ 16:25)  HR: 85 (06-16-20 @ 12:00) (69 - 88)  BP: 120/60 (06-16-20 @ 12:00) (115/59 - 125/65)  RR: 18 (06-16-20 @ 12:00) (18 - 20)  SpO2: 96% (06-16-20 @ 12:00) (92% - 96%)    PHYSICAL EXAM:    GENERAL: NAD, well-groomed, well-developed  HEAD:  Atraumatic, Normocephalic  EYES: EOMI, PERRLA, conjunctiva and sclera clear  ENMT: No tonsillar erythema, exudates, or enlargement; Moist mucous membranes, Good dentition, No lesions  NECK: Supple, No JVD, Normal thyroid  NERVOUS SYSTEM:  Alert & Oriented X3, Good concentration; Motor Strength 5/5 B/L upper and lower extremities; DTRs 2+ intact and symmetric  CHEST/LUNG: Clear bilaterally; No rales, rhonchi, wheezing bilaterally  HEART: Regular rate and rhythm; No murmurs, rubs, or gallops  ABDOMEN: Soft, Nontender, Nondistended; Bowel sounds present  EXTREMITIES:  2+ Peripheral Pulses, No clubbing, cyanosis, or edema  LYMPH: No lymphadenopathy noted  SKIN: No rashes or lesions  BACK: no pressor sore     LABS:                         11.6   10.64 )-----------( 92       ( 14 Jun 2020 20:28 )             36.7     06-14    142  |  102  |  39<H>  ----------------------------<  178<H>  5.3   |  35<H>  |  1.03    Ca    8.3<L>      14 Jun 2020 20:28  Mg     2.4     06-14    TPro  7.0  /  Alb  3.1<L>  /  TBili  0.5  /  DBili  x   /  AST  33  /  ALT  50  /  AlkPhos  62  06-14    LIVER FUNCTIONS - ( 14 Jun 2020 20:28 )  Alb: 3.1 g/dL / Pro: 7.0 gm/dL / ALK PHOS: 62 U/L / ALT: 50 U/L / AST: 33 U/L / GGT: x           PT/INR - ( 16 Jun 2020 06:58 )   PT: 20.1 sec;   INR: 1.78 ratio         PTT - ( 14 Jun 2020 20:28 )  PTT:34.3 sec    ABG - ( 14 Jun 2020 21:39 )  pH, Arterial: x     pH, Blood: 7.38  /  pCO2: 70    /  pO2: 74    / HCO3: 70    / Base Excess: 12.8  /  SaO2: 95          CARDIAC MARKERS ( 14 Jun 2020 20:28 )  <.015 ng/mL / x     / x     / x     / x          Radiology: HPI:  Pt is  a 89 y/o, female w/pmhx of  HTN, COPD on 3L, CHF, hypothyroid ,A fib, Value replacement., and anxiet sent in for sob.  Pt was recently admitted for pna, Today after dinner became sob and on pulse ox 80% and sent in.  pt denies any feve, hills sob, cpm, palpitations, n.v.d. (14 Jun 2020 23:29)      PAST MEDICAL & SURGICAL HISTORY:  COPD exacerbation      SOCHX:   tobacco,  -  alcohol    FMHX: FA/MO  - contributory       Recent Travel:    Immunizations:    Allergies    No Known Allergies    Intolerances        MEDICATIONS  (STANDING):  albuterol/ipratropium for Nebulization 3 milliLiter(s) Nebulizer every 6 hours  ALPRAZolam 0.25 milliGRAM(s) Oral at bedtime  diltiazem    milliGRAM(s) Oral daily  levothyroxine 50 MICROGram(s) Oral daily  losartan 50 milliGRAM(s) Oral daily  methylPREDNISolone sodium succinate Injectable 40 milliGRAM(s) IV Push two times a day  multivitamin 1 Tablet(s) Oral daily  pantoprazole    Tablet 40 milliGRAM(s) Oral before breakfast  sildenafil (REVATIO) 20 milliGRAM(s) Oral every 8 hours  warfarin 3 milliGRAM(s) Oral once    MEDICATIONS  (PRN):  acetaminophen   Tablet .. 650 milliGRAM(s) Oral every 6 hours PRN Temp greater or equal to 38C (100.4F), Mild Pain (1 - 3)      REVIEW OF SYSTEMS:  CONSTITUTIONAL: No fever, weight loss, or fatigue  EYES: No eye pain, visual disturbances, or discharge  ENMT:  No difficulty hearing, tinnitus, vertigo; No sinus or throat pain  NECK: No pain or stiffness  BREASTS: No pain, masses, or nipple discharge  RESPIRATORY: No cough, wheezing, chills or hemoptysis; No shortness of breath  CARDIOVASCULAR: No chest pain, palpitations, dizziness, or leg swelling  GASTROINTESTINAL: No abdominal or epigastric pain. No nausea, vomiting, or hematemesis; No diarrhea or constipation. No melena or hematochezia.  GENITOURINARY: No dysuria, frequency, hematuria, or incontinence  NEUROLOGICAL: No headaches, memory loss, loss of strength, numbness, or tremors  SKIN: No itching, burning, rashes, or lesions   LYMPH NODES: No enlarged glands  ENDOCRINE: No heat or cold intolerance; No hair loss  MUSCULOSKELETAL: No joint pain or swelling; No muscle, back, or extremity pain  PSYCHIATRIC: No depression, anxiety, mood swings, or difficulty sleeping  HEME/LYMPH: No easy bruising, or bleeding gums  ALLERGY AND IMMUNOLOGIC: No hives or eczema    VITAL SIGNS:    T(C): 36.6 (06-16-20 @ 10:40), Max: 36.8 (06-15-20 @ 16:25)  T(F): 97.9 (06-16-20 @ 10:40), Max: 98.3 (06-15-20 @ 16:25)  HR: 85 (06-16-20 @ 12:00) (69 - 88)  BP: 120/60 (06-16-20 @ 12:00) (115/59 - 125/65)  RR: 18 (06-16-20 @ 12:00) (18 - 20)  SpO2: 96% (06-16-20 @ 12:00) (92% - 96%)    PHYSICAL EXAM:    GENERAL: NAD, well-groomed, well-developed  HEAD:  Atraumatic, Normocephalic  EYES: EOMI, PERRLA, conjunctiva and sclera clear, Nasal canula on place  ENMT: No tonsillar erythema, exudates, or enlargement; Moist mucous membranes, Good dentition, No lesions  NECK: Supple, No JVD, Normal thyroid  NERVOUS SYSTEM:  Alert & Oriented X3, Good concentration; Motor Strength 5/5 B/L upper and lower extremities; DTRs 2+ intact and symmetric  CHEST/LUNG: Clear bilaterally; No rales, rhonchi, wheezing bilaterally  HEART: Regular rate and rhythm; No murmurs, rubs, or gallops  ABDOMEN: Soft, Nontender, Nondistended; Bowel sounds present  EXTREMITIES:  2+ Peripheral Pulses, No clubbing, cyanosis, or edema  LYMPH: No lymphadenopathy noted  SKIN: No rashes or lesions  BACK: no pressor sore     LABS:                         11.6   10.64 )-----------( 92       ( 14 Jun 2020 20:28 )             36.7     06-14    142  |  102  |  39<H>  ----------------------------<  178<H>  5.3   |  35<H>  |  1.03    Ca    8.3<L>      14 Jun 2020 20:28  Mg     2.4     06-14    TPro  7.0  /  Alb  3.1<L>  /  TBili  0.5  /  DBili  x   /  AST  33  /  ALT  50  /  AlkPhos  62  06-14    LIVER FUNCTIONS - ( 14 Jun 2020 20:28 )  Alb: 3.1 g/dL / Pro: 7.0 gm/dL / ALK PHOS: 62 U/L / ALT: 50 U/L / AST: 33 U/L / GGT: x           PT/INR - ( 16 Jun 2020 06:58 )   PT: 20.1 sec;   INR: 1.78 ratio         PTT - ( 14 Jun 2020 20:28 )  PTT:34.3 sec    ABG - ( 14 Jun 2020 21:39 )  pH, Arterial: x     pH, Blood: 7.38  /  pCO2: 70    /  pO2: 74    / HCO3: 70    / Base Excess: 12.8  /  SaO2: 95          CARDIAC MARKERS ( 14 Jun 2020 20:28 )  <.015 ng/mL / x     / x     / x     / x          Radiology:

## 2020-06-16 NOTE — PROGRESS NOTE ADULT - SUBJECTIVE AND OBJECTIVE BOX
HPI:  Pt is  a 89 y/o, female w/pmhx of  HTN, COPD on 3L, CHF, hypothyroid ,A fib, Value replacement., and anxiet sent in for sob.  Pt was recently admitted for pna, Today after dinner became sob and on pulse ox 80% and sent in.  pt denies any feve, hills sob, cpm, palpitations, n.v.d. (14 Jun 2020 23:29)      PAST MEDICAL & SURGICAL HISTORY:  COPD exacerbation  EDICATIONS  (STANDING):  albuterol/ipratropium for Nebulization 3 milliLiter(s) Nebulizer every 6 hours  ALPRAZolam 0.25 milliGRAM(s) Oral at bedtime  diltiazem    milliGRAM(s) Oral daily  levothyroxine 50 MICROGram(s) Oral daily  losartan 50 milliGRAM(s) Oral daily  methylPREDNISolone sodium succinate Injectable 40 milliGRAM(s) IV Push two times a day  multivitamin 1 Tablet(s) Oral daily  pantoprazole    Tablet 40 milliGRAM(s) Oral before breakfast  sildenafil (REVATIO) 20 milliGRAM(s) Oral every 8 hours  warfarin 3 milliGRAM(s) Oral once    REVIEW OF SYSTEMS:    CONSTITUTIONAL: No fever, weight loss, or fatigue  EYES: No eye pain, visual disturbances, or discharge  ENMT:  No difficulty hearing, tinnitus, vertigo; No sinus or throat pain  NECK: No pain or stiffness  BREASTS: No pain, masses, or nipple discharge  RESPIRATORY: No cough, wheezing, chills or hemoptysis; No shortness of breath  CARDIOVASCULAR: No chest pain, palpitations, dizziness, or leg swelling  GASTROINTESTINAL: No abdominal or epigastric pain. No nausea, vomiting, or hematemesis; No diarrhea or constipation. No melena or hematochezia.  GENITOURINARY: No dysuria, frequency, hematuria, or incontinence  NEUROLOGICAL: No headaches, memory loss, loss of strength, numbness, or tremors  SKIN: No itching, burning, rashes, or lesions   LYMPH NODES: No enlarged glands  ENDOCRINE: No heat or cold intolerance; No hair loss  MUSCULOSKELETAL: No joint pain or swelling; No muscle, back, or extremity pain      PHYSICAL EXAM:    GENERAL: NAD,  well-developed. On BiPAP  HEAD:  Atraumatic, Normocephalic  EYES: EOMI, PERRL, conjunctiva and sclera clear  ENMT: No tonsillar erythema, exudates, or enlargement; Moist mucous membranes, Good dentition, No lesions  NECK: Supple, No JVD, Normal thyroid  NERVOUS SYSTEM:  Alert & Oriented X 3, Good concentration; Motor Strength 5/5 B/L upper and lower extremities; DTRs 2+ intact and symmetric  CHEST/LUNG: Clear to percussion bilaterally; No rales, rhonchi, wheezing, or rubs  HEART: Regular rate and rhythm; No murmurs, rubs, or gallops  ABDOMEN: Soft, Nontender, Nondistended; Bowel sounds present  EXTREMITIES:  2+ Peripheral Pulses, No clubbing, cyanosis, or edema  LYMPH: No lymphadenopathy notedRECTAL: No masses, prostate normal size and smooth, Guiac negative   BREAST: No palpatble masses, skin no lesions no retractions, no discharages. adenexa no palpable masses noted

## 2020-06-16 NOTE — PHYSICAL THERAPY INITIAL EVALUATION ADULT - PERTINENT HX OF CURRENT PROBLEM, REHAB EVAL
Pt is a 89yo Salvadorean-speaking F admitted from [facility] 2/2 SOB & hypoxia. Pt being treated for COPD exacerbation.

## 2020-06-16 NOTE — PHYSICAL THERAPY INITIAL EVALUATION ADULT - ADDITIONAL COMMENTS
Pt with recent admission to [facility] from 5/31-6/5 2/2 COPD. Pt was D/C'd to [facility] for rehab placement. Pt lives alone in a private home, 5 entry steps (+ rail), no steps to negotiate once inside. Prior to admission in May pt requires 1 person assist for all functional mobility including household ambulation (with use of rolling walker) & stair negotiation (with use of railing). Pt has 24/7 live in home health aide services (2 aides, 12 hour shifts). Pt needs assist with ADL's at baseline. Pt uses 3L home O2 at all times prior to admission. Pt is [place]guill[place]-speaking with minimal understanding of English. Goal of therapy: return to rehab.

## 2020-06-17 LAB
INR BLD: 1.85 RATIO — HIGH (ref 0.88–1.16)
PROTHROM AB SERPL-ACNC: 21.1 SEC — HIGH (ref 10–12.9)

## 2020-06-17 RX ORDER — POTASSIUM CHLORIDE 20 MEQ
10 PACKET (EA) ORAL DAILY
Refills: 0 | Status: DISCONTINUED | OUTPATIENT
Start: 2020-06-17 | End: 2020-06-20

## 2020-06-17 RX ORDER — FUROSEMIDE 40 MG
40 TABLET ORAL DAILY
Refills: 0 | Status: COMPLETED | OUTPATIENT
Start: 2020-06-17 | End: 2020-06-19

## 2020-06-17 RX ORDER — WARFARIN SODIUM 2.5 MG/1
3 TABLET ORAL ONCE
Refills: 0 | Status: COMPLETED | OUTPATIENT
Start: 2020-06-17 | End: 2020-06-17

## 2020-06-17 RX ADMIN — Medication 20 MILLIGRAM(S): at 21:41

## 2020-06-17 RX ADMIN — Medication 40 MILLIGRAM(S): at 06:23

## 2020-06-17 RX ADMIN — Medication 300 MILLIGRAM(S): at 06:21

## 2020-06-17 RX ADMIN — Medication 40 MILLIGRAM(S): at 18:21

## 2020-06-17 RX ADMIN — Medication 3 MILLILITER(S): at 00:00

## 2020-06-17 RX ADMIN — Medication 650 MILLIGRAM(S): at 12:27

## 2020-06-17 RX ADMIN — Medication 40 MILLIGRAM(S): at 21:41

## 2020-06-17 RX ADMIN — Medication 10 MILLIEQUIVALENT(S): at 12:26

## 2020-06-17 RX ADMIN — Medication 3 MILLILITER(S): at 11:01

## 2020-06-17 RX ADMIN — Medication 20 MILLIGRAM(S): at 06:21

## 2020-06-17 RX ADMIN — Medication 3 MILLILITER(S): at 05:03

## 2020-06-17 RX ADMIN — Medication 20 MILLIGRAM(S): at 18:21

## 2020-06-17 RX ADMIN — Medication 1 TABLET(S): at 12:26

## 2020-06-17 RX ADMIN — WARFARIN SODIUM 3 MILLIGRAM(S): 2.5 TABLET ORAL at 21:41

## 2020-06-17 RX ADMIN — PANTOPRAZOLE SODIUM 40 MILLIGRAM(S): 20 TABLET, DELAYED RELEASE ORAL at 06:21

## 2020-06-17 RX ADMIN — Medication 50 MICROGRAM(S): at 06:23

## 2020-06-17 RX ADMIN — Medication 3 MILLILITER(S): at 17:39

## 2020-06-17 RX ADMIN — LOSARTAN POTASSIUM 50 MILLIGRAM(S): 100 TABLET, FILM COATED ORAL at 06:21

## 2020-06-17 RX ADMIN — Medication 0.25 MILLIGRAM(S): at 21:41

## 2020-06-17 NOTE — PROGRESS NOTE ADULT - SUBJECTIVE AND OBJECTIVE BOX
Patient is a 89y old  Female who presents with a chief complaint of sob (16 Jun 2020 16:18)      INTERVAL HPI/OVERNIGHT EVENTS:    MEDICATIONS  (STANDING):  albuterol/ipratropium for Nebulization 3 milliLiter(s) Nebulizer every 6 hours  ALPRAZolam 0.25 milliGRAM(s) Oral at bedtime  diltiazem    milliGRAM(s) Oral daily  levothyroxine 50 MICROGram(s) Oral daily  losartan 50 milliGRAM(s) Oral daily  methylPREDNISolone sodium succinate Injectable 40 milliGRAM(s) IV Push two times a day  multivitamin 1 Tablet(s) Oral daily  pantoprazole    Tablet 40 milliGRAM(s) Oral before breakfast  sildenafil (REVATIO) 20 milliGRAM(s) Oral every 8 hours  warfarin 3 milliGRAM(s) Oral once    MEDICATIONS  (PRN):  acetaminophen   Tablet .. 650 milliGRAM(s) Oral every 6 hours PRN Temp greater or equal to 38C (100.4F), Mild Pain (1 - 3)      Allergies    No Known Allergies    Intolerances        REVIEW OF SYSTEMS:  CONSTITUTIONAL: No fever, weight loss, or fatigue  EYES: No eye pain, visual disturbances, or discharge  ENMT:  No difficulty hearing, tinnitus, vertigo; No sinus or throat pain  NECK: No pain or stiffness  BREASTS: No pain, masses, or nipple discharge  RESPIRATORY: No cough, wheezing, chills or hemoptysis; No shortness of breath  CARDIOVASCULAR: No chest pain, palpitations, dizziness, or leg swelling  GASTROINTESTINAL: No abdominal or epigastric pain. No nausea, vomiting, or hematemesis; No diarrhea or constipation. No melena or hematochezia.  GENITOURINARY: No dysuria, frequency, hematuria, or incontinence  NEUROLOGICAL: No headaches, memory loss, loss of strength, numbness, or tremors  SKIN: No itching, burning, rashes, or lesions   LYMPH NODES: No enlarged glands  ENDOCRINE: No heat or cold intolerance; No hair loss  MUSCULOSKELETAL: No joint pain or swelling; No muscle, back, or extremity pain  PSYCHIATRIC: No depression, anxiety, mood swings, or difficulty sleeping  HEME/LYMPH: No easy bruising, or bleeding gums  ALLERGY AND IMMUNOLOGIC: No hives or eczema    Vital Signs Last 24 Hrs  T(C): 35.8 (17 Jun 2020 06:11), Max: 36.6 (16 Jun 2020 10:40)  T(F): 96.5 (17 Jun 2020 06:11), Max: 97.9 (16 Jun 2020 10:40)  HR: 68 (17 Jun 2020 08:34) (68 - 88)  BP: 145/79 (17 Jun 2020 06:11) (115/59 - 146/63)  BP(mean): --  RR: 18 (16 Jun 2020 23:58) (18 - 18)  SpO2: 92% (17 Jun 2020 08:34) (92% - 97%)    PHYSICAL EXAM:  GENERAL: NAD, well-groomed, well-developed  HEAD:  Atraumatic, Normocephalic  EYES: EOMI, PERRL, conjunctiva and sclera clear  ENMT:  Moist mucous membranes, Good dentition, No lesions  NECK: Supple, No JVD, Normal thyroid  NERVOUS SYSTEM:  Alert & Oriented X3, Good concentration; Motor Strength 5/5 B/L upper and lower extremities; DTRs 2+ intact and symmetric  CHEST/LUNG: Clear to percussion bilaterally; No rales, rhonchi, wheezing, or rubs  HEART: Regular rate and rhythm; No murmurs, rubs, or gallops  ABDOMEN: Soft, Nontender, Nondistended; Bowel sounds present  EXTREMITIES:  2+ Peripheral Pulses, No clubbing, cyanosis, or edema  LYMPH: No lymphadenopathy noted  SKIN: No rashes or lesions    LABS:          PT/INR - ( 17 Jun 2020 07:40 )   PT: 21.1 sec;   INR: 1.85 ratio             CAPILLARY BLOOD GLUCOSE              Procalcitonin, Serum: 0.03 ng/mL (06-16 @ 09:39)            RADIOLOGY & ADDITIONAL TESTS:  < from: Xray Chest 1 View- PORTABLE-Routine (06.16.20 @ 07:34) >    EXAM:  XR CHEST PORTABLE ROUTINE 1V                            PROCEDURE DATE:  06/16/2020          INTERPRETATION:  INDICATION: Respiratory insufficiency.    PRIORS: 6/14/2020 9:03 PM    VIEWS: Portable AP radiography of the chest performed.    FINDINGS: Since prior evaluation there is a suggestion for interval increase in bilateral lung parenchymal airspace opacities with bilateral pleural effusions. Heart size appears within normal limits. Tortuosity and calcification of the thoracic aorta is demonstrated. Sternotomy wires are noted. No superior mediastinal widening is identified. No pneumothorax is demonstrated. No mediastinal shift noted. Degenerative changes of the shoulder regions identified.    IMPRESSION: Since prior evaluation there is a suggestion for interval increase in bilateral lung parenchymal airspace opacities with bilateral pleural effusions.                  MIRNA GLASER M.D., ATTENDING RADIOLOGIST  This document has been electronically signed. Jun 16 2020 11:55AM               < end of copied text >    Imaging Personally Reviewed:  [ ] YES  [ ] NO    Consultant(s) Notes Reviewed:  [ ] YES  [ ] NO    Care Discussed with Consultants/Other Providers [ ] YES  [ ] NO    PROBLEMS:  CHRONIC OBSTRUCTIVE PULMONARY DISEASE EXACERBATION  HYPERTENTION  COPD exacerbation  Preventive measure      Care discussed with family,         [  ]   yes  [  ]  No    imp:    stable[ ]    unstable[  ]     improving [   ]       unchanged  [  ]                Plans:  Continue present plans  [  ]               New consult [  ]   specialty  .......               order sabina[  ]    test name.                  Discharge Planning  [  ]

## 2020-06-17 NOTE — PROGRESS NOTE ADULT - ASSESSMENT
Patient is being seen by ID because of the concern of pssible PNA/ Acute hypoxic/hypercapnic resp on BIPAP/ COPD- E    No fever  No significant leukocytosis  Normal procalcitonin  Elevated BNP: 1176    UA no significant for infection    COVID-19 PCR: negative    CXR: Since prior evaluation there is a suggestion for interval increase in bilateral lung parenchymal airspace opacities with bilateral pleural effusions    Dx:  COPD- Exacerbation  CHF  Rec:  Doubt PNA with normal procalcitonin, no fever, no leukocytosis.  -Stable off Abx  -Continue steroids and resp therapy as per pulmonologist  will sign off the case, please reconsult if needed  Thank You

## 2020-06-17 NOTE — PROGRESS NOTE ADULT - SUBJECTIVE AND OBJECTIVE BOX
INTERVAL HPI:   89yo, WF with HTN, COPD on O2, Diastolic CHF, Severe pHTN, Hypothyroidism ,A fib on Coumadin, Valve replacement.,  Recent admission hypoxic hypercarbic Respiratory failure requiring BIPAP support. Was transferred to [facility] on tapering steroids, O2, Sildenafil and and as needed nebulizer. Also completed course of antibiotic for suspected pneumonia.  06/13/20: Sent  from [facility] with acute sob and reported to have SPO2 80% on 3 litre nasal O2. Reports having mild cough but no fever, chills or chest pain.    OVERNIGHT EVENTS:  OUt of bed to chair.    Vital Signs Last 24 Hrs  T(C): 36.4 (17 Jun 2020 11:22), Max: 36.6 (16 Jun 2020 18:35)  T(F): 97.6 (17 Jun 2020 11:22), Max: 97.8 (16 Jun 2020 18:35)  HR: 70 (17 Jun 2020 12:00) (68 - 88)  BP: 118/64 (17 Jun 2020 11:22) (118/64 - 146/63)  BP(mean): --  RR: 18 (17 Jun 2020 12:00) (17 - 18)  SpO2: 95% (17 Jun 2020 12:00) (92% - 97%)    PHYSICAL EXAM:  GEN:         Awake, responsive and comfortable.  HEENT:    Normal.    RESP:        no distress  CVS:          Regular rate and rhythm.   ABD:         Soft, non-tender, non-distended;     MEDICATIONS  (STANDING):  albuterol/ipratropium for Nebulization 3 milliLiter(s) Nebulizer every 6 hours  ALPRAZolam 0.25 milliGRAM(s) Oral at bedtime  diltiazem    milliGRAM(s) Oral daily  furosemide    Tablet 40 milliGRAM(s) Oral daily  levothyroxine 50 MICROGram(s) Oral daily  losartan 50 milliGRAM(s) Oral daily  methylPREDNISolone sodium succinate Injectable 40 milliGRAM(s) IV Push two times a day  multivitamin 1 Tablet(s) Oral daily  pantoprazole    Tablet 40 milliGRAM(s) Oral before breakfast  potassium chloride    Tablet ER 10 milliEquivalent(s) Oral daily  sildenafil (REVATIO) 20 milliGRAM(s) Oral every 8 hours  warfarin 3 milliGRAM(s) Oral once    MEDICATIONS  (PRN):  acetaminophen   Tablet .. 650 milliGRAM(s) Oral every 6 hours PRN Temp greater or equal to 38C (100.4F), Mild Pain (1 - 3)    PT/INR - ( 17 Jun 2020 07:40 )   PT: 21.1 sec;   INR: 1.85 ratio      06-14 @ 21:39  pH: 7.38  pCO2: 70  pO2: 74  SaO2: 95    ASSESSMENT AND PLAN:  ·	Acute on chronic hypoxic hypercarbic  Respiratory failure.  ·	Acute COPD exacerbation.  ·	Chronic diastolic CHF,  ·	Severe pHTN  ·	Renal Insuffiencey.  ·	Chronic A Fib.  ·	Hypothyroidism.    Taper steroids, continue nebulizer.  On Sildenafil.  O2 with nocturnal BIPAP.

## 2020-06-17 NOTE — PROGRESS NOTE ADULT - ASSESSMENT
pt w/copd exacerbation. S/P Pneumonia.  06/16/2020 : More alert, On nasal canula, saturating well. Pulmonary consult noted. BiPAP @ night. Chest X-ray noted. Improved. Continue Steroids and Duoneb.  06/17/2020 : Pt. alert, awake. C/O pain in joints. Pulmonary f/u noted. Id consult noted. No AbTx in view of normal WBC and Normal Procalcitonin. Will Start on diuretics.

## 2020-06-17 NOTE — PROGRESS NOTE ADULT - SUBJECTIVE AND OBJECTIVE BOX
HPI:  Pt is  a 89 y/o, female w/pmhx of  HTN, COPD on 3L, CHF, hypothyroid ,A fib, Value replacement., and anxiet sent in for sob.  Pt was recently admitted for pna, Today after dinner became sob and on pulse ox 80% and sent in.  pt denies any feve, hills sob, cpm, palpitations, n.v.d. (14 Jun 2020 23:29)      Allergies    No Known Allergies    Intolerances        MEDICATIONS  (STANDING):  albuterol/ipratropium for Nebulization 3 milliLiter(s) Nebulizer every 6 hours  ALPRAZolam 0.25 milliGRAM(s) Oral at bedtime  diltiazem    milliGRAM(s) Oral daily  furosemide    Tablet 40 milliGRAM(s) Oral daily  levothyroxine 50 MICROGram(s) Oral daily  losartan 50 milliGRAM(s) Oral daily  methylPREDNISolone sodium succinate Injectable 40 milliGRAM(s) IV Push two times a day  multivitamin 1 Tablet(s) Oral daily  pantoprazole    Tablet 40 milliGRAM(s) Oral before breakfast  potassium chloride    Tablet ER 10 milliEquivalent(s) Oral daily  sildenafil (REVATIO) 20 milliGRAM(s) Oral every 8 hours  warfarin 3 milliGRAM(s) Oral once    MEDICATIONS  (PRN):  acetaminophen   Tablet .. 650 milliGRAM(s) Oral every 6 hours PRN Temp greater or equal to 38C (100.4F), Mild Pain (1 - 3)      REVIEW OF SYSTEMS:    CONSTITUTIONAL: No fever, chills, weight loss, or fatigue  HEENT: No sore throat, runny nose, ear ache  RESPIRATORY: No cough, wheezing, No shortness of breath  CARDIOVASCULAR: No chest pain, palpitations, dizziness  GASTROINTESTINAL: No abdominal pain. No nausea, vomiting, diarrhea  GENITOURINARY: No dysuria, increase frequency, hematuria, or incontinence  NEUROLOGICAL: No headaches, memory loss, loss of strength, numbness, or tremors, no weakness  EXTREMITY: No pedal edema BLE  SKIN: No itching, burning, rashes, or lesions     VITAL SIGNS:  T(C): 36.4 (06-17-20 @ 11:22), Max: 36.6 (06-16-20 @ 18:35)  T(F): 97.6 (06-17-20 @ 11:22), Max: 97.8 (06-16-20 @ 18:35)  HR: 70 (06-17-20 @ 12:00) (68 - 88)  BP: 118/64 (06-17-20 @ 11:22) (118/64 - 146/63)  RR: 18 (06-17-20 @ 12:00) (17 - 18)  SpO2: 95% (06-17-20 @ 12:00) (92% - 97%)  Wt(kg): --    PHYSICAL EXAM:    GENERAL: not in any distress, Oriented x3  HEENT: Neck is supple, normocephalic, atraumatic   CHEST/LUNG: Clear to auscultation bilaterally  HEART: Regular rate and rhythm; No murmurs, rubs, or gallops  ABDOMEN: Soft, Nontender, Nondistended; Bowel sounds present, no rebound   EXTREMITIES: No edema    LABS:     PT/INR - ( 17 Jun 2020 07:40 )   PT: 21.1 sec;   INR: 1.85 ratio         Radiology:

## 2020-06-18 LAB
ANION GAP SERPL CALC-SCNC: 4 MMOL/L — LOW (ref 5–17)
BUN SERPL-MCNC: 49 MG/DL — HIGH (ref 7–23)
CALCIUM SERPL-MCNC: 8.4 MG/DL — LOW (ref 8.5–10.1)
CHLORIDE SERPL-SCNC: 100 MMOL/L — SIGNIFICANT CHANGE UP (ref 96–108)
CO2 SERPL-SCNC: 36 MMOL/L — HIGH (ref 22–31)
CREAT SERPL-MCNC: 1.1 MG/DL — SIGNIFICANT CHANGE UP (ref 0.5–1.3)
GLUCOSE SERPL-MCNC: 127 MG/DL — HIGH (ref 70–99)
INR BLD: 2.22 RATIO — HIGH (ref 0.88–1.16)
POTASSIUM SERPL-MCNC: 4.2 MMOL/L — SIGNIFICANT CHANGE UP (ref 3.5–5.3)
POTASSIUM SERPL-SCNC: 4.2 MMOL/L — SIGNIFICANT CHANGE UP (ref 3.5–5.3)
PROTHROM AB SERPL-ACNC: 25.3 SEC — HIGH (ref 10–12.9)
SODIUM SERPL-SCNC: 140 MMOL/L — SIGNIFICANT CHANGE UP (ref 135–145)

## 2020-06-18 RX ORDER — WARFARIN SODIUM 2.5 MG/1
3 TABLET ORAL ONCE
Refills: 0 | Status: COMPLETED | OUTPATIENT
Start: 2020-06-18 | End: 2020-06-18

## 2020-06-18 RX ADMIN — Medication 3 MILLILITER(S): at 17:16

## 2020-06-18 RX ADMIN — PANTOPRAZOLE SODIUM 40 MILLIGRAM(S): 20 TABLET, DELAYED RELEASE ORAL at 05:37

## 2020-06-18 RX ADMIN — Medication 3 MILLILITER(S): at 00:23

## 2020-06-18 RX ADMIN — Medication 20 MILLIGRAM(S): at 15:46

## 2020-06-18 RX ADMIN — Medication 3 MILLILITER(S): at 05:18

## 2020-06-18 RX ADMIN — Medication 10 MILLIEQUIVALENT(S): at 11:32

## 2020-06-18 RX ADMIN — Medication 1 TABLET(S): at 11:32

## 2020-06-18 RX ADMIN — Medication 20 MILLIGRAM(S): at 20:37

## 2020-06-18 RX ADMIN — Medication 40 MILLIGRAM(S): at 05:37

## 2020-06-18 RX ADMIN — LOSARTAN POTASSIUM 50 MILLIGRAM(S): 100 TABLET, FILM COATED ORAL at 05:37

## 2020-06-18 RX ADMIN — Medication 40 MILLIGRAM(S): at 05:36

## 2020-06-18 RX ADMIN — Medication 0.25 MILLIGRAM(S): at 20:36

## 2020-06-18 RX ADMIN — Medication 50 MICROGRAM(S): at 05:36

## 2020-06-18 RX ADMIN — Medication 300 MILLIGRAM(S): at 05:36

## 2020-06-18 RX ADMIN — WARFARIN SODIUM 3 MILLIGRAM(S): 2.5 TABLET ORAL at 20:37

## 2020-06-18 RX ADMIN — Medication 20 MILLIGRAM(S): at 05:37

## 2020-06-18 RX ADMIN — Medication 40 MILLIGRAM(S): at 17:35

## 2020-06-18 RX ADMIN — Medication 3 MILLILITER(S): at 11:27

## 2020-06-18 NOTE — PROGRESS NOTE ADULT - SUBJECTIVE AND OBJECTIVE BOX
INTERVAL HPI:   89yo, WF with HTN, COPD on O2, Diastolic CHF, Severe pHTN, Hypothyroidism ,A fib on Coumadin, Valve replacement.,  Recent admission hypoxic hypercarbic Respiratory failure requiring BIPAP support. Was transferred to [facility] on tapering steroids, O2, Sildenafil and and as needed nebulizer. Also completed course of antibiotic for suspected pneumonia.  06/13/20: Sent  from [facility] with acute sob and reported to have SPO2 80% on 3 litre nasal O2. Reports having mild cough but no fever, chills or chest pain.    OVERNIGHT EVENTS:  Resting comfortably    Vital Signs Last 24 Hrs  T(C): 36.1 (18 Jun 2020 05:10), Max: 36.6 (17 Jun 2020 16:00)  T(F): 97 (18 Jun 2020 05:10), Max: 97.9 (17 Jun 2020 16:00)  HR: 83 (18 Jun 2020 08:39) (75 - 110)  BP: 152/94 (18 Jun 2020 05:10) (107/57 - 152/94)  BP(mean): 80 (17 Jun 2020 16:00) (80 - 80)  RR: 18 (18 Jun 2020 05:10) (17 - 18)  SpO2: 93% (18 Jun 2020 08:39) (93% - 97%)    PHYSICAL EXAM:  GEN:        comfortable.  HEENT:    Normal.    RESP:        Resting  CVS:          Regular rate and rhythm.   ABD:         Soft, non-tender, non-distended;     MEDICATIONS  (STANDING):  albuterol/ipratropium for Nebulization 3 milliLiter(s) Nebulizer every 6 hours  ALPRAZolam 0.25 milliGRAM(s) Oral at bedtime  diltiazem    milliGRAM(s) Oral daily  furosemide    Tablet 40 milliGRAM(s) Oral daily  levothyroxine 50 MICROGram(s) Oral daily  losartan 50 milliGRAM(s) Oral daily  methylPREDNISolone sodium succinate Injectable 40 milliGRAM(s) IV Push two times a day  multivitamin 1 Tablet(s) Oral daily  pantoprazole    Tablet 40 milliGRAM(s) Oral before breakfast  potassium chloride    Tablet ER 10 milliEquivalent(s) Oral daily  sildenafil (REVATIO) 20 milliGRAM(s) Oral every 8 hours  warfarin 3 milliGRAM(s) Oral once    MEDICATIONS  (PRN):  acetaminophen   Tablet .. 650 milliGRAM(s) Oral every 6 hours PRN Temp greater or equal to 38C (100.4F), Mild Pain (1 - 3)    LABS:    06-18    140  |  100  |  49<H>  ----------------------------<  127<H>  4.2   |  36<H>  |  1.10    Ca    8.4<L>      18 Jun 2020 07:30    PT/INR - ( 18 Jun 2020 07:30 )   PT: 25.3 sec;   INR: 2.22 ratio      06-14 @ 21:39  pH: 7.38  pCO2: 70  pO2: 74  SaO2: 95    ASSESSMENT AND PLAN:  ·	Acute on chronic hypoxic hypercarbic  Respiratory failure.  ·	Acute COPD exacerbation.  ·	Chronic diastolic CHF,  ·	Severe pHTN  ·	Renal Insuffiencey.  ·	Chronic A Fib.  ·	Hypothyroidism.    Clinically better.  Taper steroids, continue nebulizer.  On Sildenafil.  O2 with nocturnal BIPAP.

## 2020-06-18 NOTE — PROGRESS NOTE ADULT - SUBJECTIVE AND OBJECTIVE BOX
HPI:  Pt is  a 89 y/o, female w/pmhx of  HTN, COPD on 3L, CHF, hypothyroid ,A fib, Value replacement., and anxiet sent in for sob.  Pt was recently admitted for pna, Today after dinner became sob and on pulse ox 80% and sent in.  pt denies any feve, hills sob, cpm, palpitations, n.v.d. (14 Jun 2020 23:29)      Allergies    No Known Allergies    Intolerances        MEDICATIONS  (STANDING):  albuterol/ipratropium for Nebulization 3 milliLiter(s) Nebulizer every 6 hours  ALPRAZolam 0.25 milliGRAM(s) Oral at bedtime  diltiazem    milliGRAM(s) Oral daily  furosemide    Tablet 40 milliGRAM(s) Oral daily  levothyroxine 50 MICROGram(s) Oral daily  losartan 50 milliGRAM(s) Oral daily  methylPREDNISolone sodium succinate Injectable 40 milliGRAM(s) IV Push two times a day  multivitamin 1 Tablet(s) Oral daily  pantoprazole    Tablet 40 milliGRAM(s) Oral before breakfast  potassium chloride    Tablet ER 10 milliEquivalent(s) Oral daily  sildenafil (REVATIO) 20 milliGRAM(s) Oral every 8 hours  warfarin 3 milliGRAM(s) Oral once    MEDICATIONS  (PRN):  acetaminophen   Tablet .. 650 milliGRAM(s) Oral every 6 hours PRN Temp greater or equal to 38C (100.4F), Mild Pain (1 - 3)      REVIEW OF SYSTEMS:    CONSTITUTIONAL: No fever, chills, weight loss, or fatigue  HEENT: No sore throat, runny nose, ear ache  RESPIRATORY: No cough, wheezing, No shortness of breath  CARDIOVASCULAR: No chest pain, palpitations, dizziness  GASTROINTESTINAL: No abdominal pain. No nausea, vomiting, diarrhea  GENITOURINARY: No dysuria, increase frequency, hematuria, or incontinence  NEUROLOGICAL: No headaches, memory loss, loss of strength, numbness, or tremors, no weakness  EXTREMITY: No pedal edema BLE  SKIN: No itching, burning, rashes, or lesions     VITAL SIGNS:  T(C): 36.1 (06-18-20 @ 05:10), Max: 36.6 (06-17-20 @ 16:00)  T(F): 97 (06-18-20 @ 05:10), Max: 97.9 (06-17-20 @ 16:00)  HR: 83 (06-18-20 @ 08:39) (70 - 110)  BP: 152/94 (06-18-20 @ 05:10) (107/57 - 152/94)  RR: 18 (06-18-20 @ 05:10) (17 - 18)  SpO2: 93% (06-18-20 @ 08:39) (93% - 97%)  Wt(kg): --    PHYSICAL EXAM:    GENERAL: not in any distress, Oriented x3  HEENT: Neck is supple, normocephalic, atraumatic   CHEST/LUNG: Clear to auscultation bilaterally  HEART: Regular rate and rhythm; No murmurs, rubs, or gallops  ABDOMEN: Soft, Nontender, Nondistended; Bowel sounds present, no rebound   EXTREMITIES: No edema    LABS:     06-18    140  |  100  |  49<H>  ----------------------------<  127<H>  4.2   |  36<H>  |  1.10    Ca    8.4<L>      18 Jun 2020 07:30        PT/INR - ( 18 Jun 2020 07:30 )   PT: 25.3 sec;   INR: 2.22 ratio           Radiology:

## 2020-06-18 NOTE — PROGRESS NOTE ADULT - SUBJECTIVE AND OBJECTIVE BOX
Patient is a 89y old  Female who presents with a chief complaint of sob (17 Jun 2020 16:06)      INTERVAL HPI/OVERNIGHT EVENTS:    MEDICATIONS  (STANDING):  albuterol/ipratropium for Nebulization 3 milliLiter(s) Nebulizer every 6 hours  ALPRAZolam 0.25 milliGRAM(s) Oral at bedtime  diltiazem    milliGRAM(s) Oral daily  furosemide    Tablet 40 milliGRAM(s) Oral daily  levothyroxine 50 MICROGram(s) Oral daily  losartan 50 milliGRAM(s) Oral daily  methylPREDNISolone sodium succinate Injectable 40 milliGRAM(s) IV Push two times a day  multivitamin 1 Tablet(s) Oral daily  pantoprazole    Tablet 40 milliGRAM(s) Oral before breakfast  potassium chloride    Tablet ER 10 milliEquivalent(s) Oral daily  sildenafil (REVATIO) 20 milliGRAM(s) Oral every 8 hours    MEDICATIONS  (PRN):  acetaminophen   Tablet .. 650 milliGRAM(s) Oral every 6 hours PRN Temp greater or equal to 38C (100.4F), Mild Pain (1 - 3)      Allergies    No Known Allergies    Intolerances        REVIEW OF SYSTEMS:  CONSTITUTIONAL: No fever, weight loss, or fatigue  EYES: No eye pain, visual disturbances, or discharge  ENMT:  No difficulty hearing, tinnitus, vertigo; No sinus or throat pain  NECK: No pain or stiffness  BREASTS: No pain, masses, or nipple discharge  RESPIRATORY: No cough, wheezing, chills or hemoptysis; No shortness of breath  CARDIOVASCULAR: No chest pain, palpitations, dizziness, or leg swelling  GASTROINTESTINAL: No abdominal or epigastric pain. No nausea, vomiting, or hematemesis; No diarrhea or constipation. No melena or hematochezia.  GENITOURINARY: No dysuria, frequency, hematuria, or incontinence  NEUROLOGICAL: No headaches, memory loss, loss of strength, numbness, or tremors  SKIN: No itching, burning, rashes, or lesions   LYMPH NODES: No enlarged glands  ENDOCRINE: No heat or cold intolerance; No hair loss  MUSCULOSKELETAL: No joint pain or swelling; No muscle, back, or extremity pain  PSYCHIATRIC: No depression, anxiety, mood swings, or difficulty sleeping  HEME/LYMPH: No easy bruising, or bleeding gums  ALLERGY AND IMMUNOLOGIC: No hives or eczema    Vital Signs Last 24 Hrs  T(C): 36.1 (18 Jun 2020 05:10), Max: 36.6 (17 Jun 2020 16:00)  T(F): 97 (18 Jun 2020 05:10), Max: 97.9 (17 Jun 2020 16:00)  HR: 83 (18 Jun 2020 08:39) (68 - 110)  BP: 152/94 (18 Jun 2020 05:10) (107/57 - 152/94)  BP(mean): 80 (17 Jun 2020 16:00) (80 - 80)  RR: 18 (18 Jun 2020 05:10) (17 - 18)  SpO2: 93% (18 Jun 2020 08:39) (93% - 97%)    PHYSICAL EXAM:  GENERAL: NAD, well-groomed, well-developed  HEAD:  Atraumatic, Normocephalic  EYES: EOMI, PERRL, conjunctiva and sclera clear  ENMT:  Moist mucous membranes, Good dentition, No lesions  NECK: Supple, No JVD, Normal thyroid  NERVOUS SYSTEM:  Alert & Oriented X3, Good concentration; Motor Strength 5/5 B/L upper and lower extremities; DTRs 2+ intact and symmetric  CHEST/LUNG: Clear to percussion bilaterally; No rales, rhonchi, wheezing, or rubs  HEART: Regular rate and rhythm; No murmurs, rubs, or gallops  ABDOMEN: Soft, Nontender, Nondistended; Bowel sounds present  EXTREMITIES:  2+ Peripheral Pulses, No clubbing, cyanosis, or edema  LYMPH: No lymphadenopathy noted  SKIN: No rashes or lesions    LABS:    06-18    140  |  100  |  49<H>  ----------------------------<  127<H>  4.2   |  36<H>  |  1.10    Ca    8.4<L>      18 Jun 2020 07:30      PT/INR - ( 18 Jun 2020 07:30 )   PT: 25.3 sec;   INR: 2.22 ratio             CAPILLARY BLOOD GLUCOSE              Procalcitonin, Serum: 0.03 ng/mL (06-16 @ 09:39)            RADIOLOGY & ADDITIONAL TESTS:  < from: Xray Chest 1 View- PORTABLE-Routine (06.16.20 @ 07:34) >    IMPRESSION: Since prior evaluation there is a suggestion for interval increase in bilateral lung parenchymal airspace opacities with bilateral pleural effusions.    < end of copied text >  < from: TTE Echo Complete w/o contrast w/ Doppler (06.01.20 @ 10:59) >  Summary:   1. Left ventricular ejection fraction, by visual estimation, is 55 to 60%.   2. Technically suboptimal study.   3. Esmond not ell visualized in any view. Consider contrast echo, if clinically warranted for further evaluation.   4. Degenerative mitral valve.   5. Mild mitral annular calcification.   6. Mild mitral valve regurgitation.   7. Degenerative tricuspid valve.   8. Moderate tricuspid regurgitation.   9. Aortic valve is sclerotic with decreased cusp excursion.  10. Mild aortic regurgitation.  11. Estimated pulmonary artery systolic pressure is 67.7 mmHg assuming a right atrial pressure of 15 mmHg, which is consistent with severe pulmonary hypertension.  12. Peak transaortic gradient equals 16.9 mmHg, mean transaortic gradient equals 8.6 mmHg, the calculated aortic valve area equals 1.03 cm² by the continuity equation consistent with moderate aortic stenosis.    < end of copied text >    Imaging Personally Reviewed:  [ ] YES  [ ] NO    Consultant(s) Notes Reviewed:  [ ] YES  [ ] NO    Care Discussed with Consultants/Other Providers [ ] YES  [ ] NO    PROBLEMS:  CHRONIC OBSTRUCTIVE PULMONARY DISEASE EXACERBATION  HYPERTENTION  COPD exacerbation  Preventive measure      Care discussed with family,         [  ]   yes  [  ]  No    imp:    stable[ ]    unstable[  ]     improving [   ]       unchanged  [  ]                Plans:  Continue present plans  [  ]               New consult [  ]   specialty  .......               order sabina[  ]    test name.                  Discharge Planning  [  ]

## 2020-06-18 NOTE — PROGRESS NOTE ADULT - ASSESSMENT
Patient is being seen by ID because of the concern of pssible PNA/ Acute hypoxic/hypercapnic resp on BIPAP/ COPD- E    No fever  No significant leukocytosis  Normal procalcitonin  Elevated BNP: 1176    UA no significant for infection    COVID-19 PCR: negative    CXR: Since prior evaluation there is a suggestion for interval increase in bilateral lung parenchymal airspace opacities with bilateral pleural effusions    Rec:  Doubt PNA with normal procalcitonin, no fever, no leukocytosis.  -Stable off Abx  -Continue steroids and resp therapy as per pulmonologist  will sign off the case, please reconsult if needed  Thank You

## 2020-06-18 NOTE — PROGRESS NOTE ADULT - ASSESSMENT
pt w/copd exacerbation. S/P Pneumonia.  06/16/2020 : More alert, On nasal canula, saturating well. Pulmonary consult noted. BiPAP @ night. Chest X-ray noted. Improved. Continue Steroids and Duoneb.  06/17/2020 : Pt. alert, awake. C/O pain in joints. Pulmonary f/u noted. Id consult noted. No AbTx in view of normal WBC and Normal Procalcitonin. Will Start on diuretics.  06/18/2020 : Alert. C/O being week. No respiratory distress noted. Lungs clear. Continue Diuresis. CPAP PRN during the night.

## 2020-06-19 LAB
BASE EXCESS BLDA CALC-SCNC: 9.9 MMOL/L — HIGH (ref -2–2)
BLOOD GAS COMMENTS: SIGNIFICANT CHANGE UP
BLOOD GAS SOURCE: SIGNIFICANT CHANGE UP
GLUCOSE BLDC GLUCOMTR-MCNC: 131 MG/DL — HIGH (ref 70–99)
HCO3 BLDA-SCNC: 35 MMOL/L — HIGH (ref 21–29)
HOROWITZ INDEX BLDA+IHG-RTO: 21 — SIGNIFICANT CHANGE UP
INR BLD: 2.53 RATIO — HIGH (ref 0.88–1.16)
PCO2 BLDA: 49 MMHG — HIGH (ref 32–46)
PH BLD: 7.46 — HIGH (ref 7.35–7.45)
PO2 BLDA: 48 MMHG — CRITICAL LOW (ref 74–108)
PROTHROM AB SERPL-ACNC: 28.9 SEC — HIGH (ref 10–12.9)
SAO2 % BLDA: 85 % — LOW (ref 92–96)

## 2020-06-19 RX ORDER — WARFARIN SODIUM 2.5 MG/1
2 TABLET ORAL ONCE
Refills: 0 | Status: COMPLETED | OUTPATIENT
Start: 2020-06-19 | End: 2020-06-19

## 2020-06-19 RX ORDER — FUROSEMIDE 40 MG
40 TABLET ORAL DAILY
Refills: 0 | Status: DISCONTINUED | OUTPATIENT
Start: 2020-06-19 | End: 2020-06-20

## 2020-06-19 RX ADMIN — Medication 20 MILLIGRAM(S): at 04:47

## 2020-06-19 RX ADMIN — Medication 40 MILLIGRAM(S): at 04:47

## 2020-06-19 RX ADMIN — Medication 0.25 MILLIGRAM(S): at 22:28

## 2020-06-19 RX ADMIN — Medication 3 MILLILITER(S): at 05:19

## 2020-06-19 RX ADMIN — Medication 3 MILLILITER(S): at 00:31

## 2020-06-19 RX ADMIN — LOSARTAN POTASSIUM 50 MILLIGRAM(S): 100 TABLET, FILM COATED ORAL at 04:48

## 2020-06-19 RX ADMIN — WARFARIN SODIUM 2 MILLIGRAM(S): 2.5 TABLET ORAL at 22:28

## 2020-06-19 RX ADMIN — Medication 20 MILLIGRAM(S): at 22:28

## 2020-06-19 RX ADMIN — Medication 40 MILLIGRAM(S): at 04:48

## 2020-06-19 RX ADMIN — Medication 3 MILLILITER(S): at 17:01

## 2020-06-19 RX ADMIN — Medication 3 MILLILITER(S): at 11:09

## 2020-06-19 RX ADMIN — Medication 650 MILLIGRAM(S): at 09:40

## 2020-06-19 RX ADMIN — Medication 50 MICROGRAM(S): at 04:48

## 2020-06-19 RX ADMIN — Medication 1 TABLET(S): at 11:18

## 2020-06-19 RX ADMIN — Medication 300 MILLIGRAM(S): at 04:47

## 2020-06-19 RX ADMIN — PANTOPRAZOLE SODIUM 40 MILLIGRAM(S): 20 TABLET, DELAYED RELEASE ORAL at 04:47

## 2020-06-19 RX ADMIN — Medication 10 MILLIEQUIVALENT(S): at 11:18

## 2020-06-19 RX ADMIN — Medication 20 MILLIGRAM(S): at 14:45

## 2020-06-19 NOTE — PROGRESS NOTE ADULT - SUBJECTIVE AND OBJECTIVE BOX
INTERVAL HPI:   89yo, WF with HTN, COPD on O2, Diastolic CHF, Severe pHTN, Hypothyroidism ,A fib on Coumadin, Valve replacement.,  Recent admission hypoxic hypercarbic Respiratory failure requiring BIPAP support. Was transferred to [facility] on tapering steroids, O2, Sildenafil and and as needed nebulizer. Also completed course of antibiotic for suspected pneumonia.  06/13/20: Sent  from [facility] with acute sob and reported to have SPO2 80% on 3 litre nasal O2. Reports having mild cough but no fever, chills or chest pain.    OVERNIGHT EVENTS:  Out of bed to chair and comfortable.    Vital Signs Last 24 Hrs  T(C): 36.5 (19 Jun 2020 10:40), Max: 36.7 (18 Jun 2020 16:32)  T(F): 97.7 (19 Jun 2020 10:40), Max: 98 (18 Jun 2020 16:32)  HR: 86 (19 Jun 2020 12:00) (69 - 100)  BP: 98/45 (19 Jun 2020 10:40) (98/45 - 132/72)  BP(mean): --  RR: 18 (19 Jun 2020 12:00) (18 - 18)  SpO2: 95% (19 Jun 2020 12:00) (93% - 96%)    PHYSICAL EXAM:  GEN:         Awake, responsive and comfortable.  HEENT:    Normal.    RESP:       no wheezing.  CVS:          Regular rate and rhythm.   ABD:         Soft, non-tender, non-distended;     MEDICATIONS  (STANDING):  albuterol/ipratropium for Nebulization 3 milliLiter(s) Nebulizer every 6 hours  ALPRAZolam 0.25 milliGRAM(s) Oral at bedtime  diltiazem    milliGRAM(s) Oral daily  furosemide    Tablet 40 milliGRAM(s) Oral daily  levothyroxine 50 MICROGram(s) Oral daily  losartan 50 milliGRAM(s) Oral daily  methylPREDNISolone sodium succinate Injectable 40 milliGRAM(s) IV Push two times a day  multivitamin 1 Tablet(s) Oral daily  pantoprazole    Tablet 40 milliGRAM(s) Oral before breakfast  potassium chloride    Tablet ER 10 milliEquivalent(s) Oral daily  sildenafil (REVATIO) 20 milliGRAM(s) Oral every 8 hours  warfarin 2 milliGRAM(s) Oral once    MEDICATIONS  (PRN):  acetaminophen   Tablet .. 650 milliGRAM(s) Oral every 6 hours PRN Temp greater or equal to 38C (100.4F), Mild Pain (1 - 3)    LABS:    06-18    140  |  100  |  49<H>  ----------------------------<  127<H>  4.2   |  36<H>  |  1.10    Ca    8.4<L>      18 Jun 2020 07:30    PT/INR - ( 19 Jun 2020 06:36 )   PT: 28.9 sec;   INR: 2.53 ratio      06-19 @ 07:56  pH: 7.46  pCO2: 49  pO2: 48  SaO2: 85  06-14 @ 21:39  pH: 7.38  pCO2: 70  pO2: 74  SaO2: 95    ASSESSMENT AND PLAN:  ·	Acute on chronic hypoxic hypercarbic  Respiratory failure.  ·	Acute COPD exacerbation.  ·	Chronic diastolic CHF,  ·	Severe pHTN  ·	Renal Insuffiencey.  ·	Chronic A Fib.  ·	Hypothyroidism.    Pt with acute on chronic hypoxic hypercarbic Respiratory failure due to COPD. Showed significant improvement with use of NIV after admission.  She is expected to benefit from NIV/Trilogy at home, which will minimize recurrent hospitalization and even may prevent sudden death.  Clinically better.  Taper steroids, continue nebulizer.  On Sildenafil.

## 2020-06-19 NOTE — PROGRESS NOTE ADULT - ASSESSMENT
/t w/copd exacerbation. S/P Pneumonia.  06/16/2020 : More alert, On nasal canula, saturating well. Pulmonary consult noted. BiPAP @ night. Chest X-ray noted. Improved. Continue Steroids and  : neb.  06/17/2020 : Pt. alert, awake. C/O pain in joints. Pulmonary f/u noted. Id consult noted. No AbTx in view of normal WBC and Normal Procalcitonin. Will Start on diuretics.  06/18/2020 : Alert. C/O being week. No respiratory distress noted. Lungs clear. Continue Diuresis. CPAP PRN during the night.  06/19/2020 : Alert, awake. ABG noted on RA, Hypoxia and hypercarbia. Dr Kidd to follow. Saturating well on Nasal canula. Taper steroids, continue Lasix.

## 2020-06-19 NOTE — PROGRESS NOTE ADULT - SUBJECTIVE AND OBJECTIVE BOX
Patient is a 89y old  Female who presents with a chief complaint of sob (18 Jun 2020 12:02)      INTERVAL HPI/OVERNIGHT EVENTS:    MEDICATIONS  (STANDING):  albuterol/ipratropium for Nebulization 3 milliLiter(s) Nebulizer every 6 hours  ALPRAZolam 0.25 milliGRAM(s) Oral at bedtime  diltiazem    milliGRAM(s) Oral daily  furosemide    Tablet 40 milliGRAM(s) Oral daily  levothyroxine 50 MICROGram(s) Oral daily  losartan 50 milliGRAM(s) Oral daily  methylPREDNISolone sodium succinate Injectable 40 milliGRAM(s) IV Push two times a day  multivitamin 1 Tablet(s) Oral daily  pantoprazole    Tablet 40 milliGRAM(s) Oral before breakfast  potassium chloride    Tablet ER 10 milliEquivalent(s) Oral daily  sildenafil (REVATIO) 20 milliGRAM(s) Oral every 8 hours  warfarin 2 milliGRAM(s) Oral once    MEDICATIONS  (PRN):  acetaminophen   Tablet .. 650 milliGRAM(s) Oral every 6 hours PRN Temp greater or equal to 38C (100.4F), Mild Pain (1 - 3)      Allergies    No Known Allergies    Intolerances        REVIEW OF SYSTEMS:  CONSTITUTIONAL: No fever, weight loss, or fatigue  EYES: No eye pain, visual disturbances, or discharge  ENMT:  No difficulty hearing, tinnitus, vertigo; No sinus or throat pain  NECK: No pain or stiffness  BREASTS: No pain, masses, or nipple discharge  RESPIRATORY: No cough, wheezing, chills or hemoptysis; No shortness of breath  CARDIOVASCULAR: No chest pain, palpitations, dizziness, or leg swelling  GASTROINTESTINAL: No abdominal or epigastric pain. No nausea, vomiting, or hematemesis; No diarrhea or constipation. No melena or hematochezia.  GENITOURINARY: No dysuria, frequency, hematuria, or incontinence  NEUROLOGICAL: No headaches, memory loss, loss of strength, numbness, or tremors  SKIN: No itching, burning, rashes, or lesions   LYMPH NODES: No enlarged glands  ENDOCRINE: No heat or cold intolerance; No hair loss  MUSCULOSKELETAL: No joint pain or swelling; No muscle, back, or extremity pain  PSYCHIATRIC: No depression, anxiety, mood swings, or difficulty sleeping  HEME/LYMPH: No easy bruising, or bleeding gums  ALLERGY AND IMMUNOLOGIC: No hives or eczema    Vital Signs Last 24 Hrs  T(C): 36.2 (19 Jun 2020 04:38), Max: 36.7 (18 Jun 2020 16:32)  T(F): 97.1 (19 Jun 2020 04:38), Max: 98 (18 Jun 2020 16:32)  HR: 89 (19 Jun 2020 08:05) (69 - 100)  BP: 132/72 (19 Jun 2020 04:38) (102/52 - 132/72)  BP(mean): --  RR: 18 (19 Jun 2020 04:38) (18 - 18)  SpO2: 93% (19 Jun 2020 08:05) (92% - 96%)    PHYSICAL EXAM:  GENERAL: NAD, well-groomed, well-developed  HEAD:  Atraumatic, Normocephalic  EYES: EOMI, PERRL, conjunctiva and sclera clear  ENMT:  Moist mucous membranes, Good dentition, No lesions  NECK: Supple, No JVD, Normal thyroid  NERVOUS SYSTEM:  Alert & Oriented X3, Good concentration; Motor Strength 5/5 B/L upper and lower extremities; DTRs 2+ intact and symmetric  CHEST/LUNG: Clear to percussion bilaterally; No rales, rhonchi, wheezing, or rubs  HEART: Regular rate and rhythm; No murmurs, rubs, or gallops  ABDOMEN: Soft, Nontender, Nondistended; Bowel sounds present  EXTREMITIES:  2+ Peripheral Pulses, No clubbing, cyanosis, or edema  LYMPH: No lymphadenopathy noted  SKIN: No rashes or lesions    LABS:    06-18    140  |  100  |  49<H>  ----------------------------<  127<H>  4.2   |  36<H>  |  1.10    Ca    8.4<L>      18 Jun 2020 07:30      PT/INR - ( 19 Jun 2020 06:36 )   PT: 28.9 sec;   INR: 2.53 ratio             CAPILLARY BLOOD GLUCOSE        ABG - ( 19 Jun 2020 07:56 )  pH, Arterial: x     pH, Blood: 7.46  /  pCO2: 49    /  pO2: 48    / HCO3: 35    / Base Excess: 9.9   /  SaO2: 85                    Procalcitonin, Serum: 0.03 ng/mL (06-16 @ 09:39)            RADIOLOGY & ADDITIONAL TESTS:    Imaging Personally Reviewed:  [ ] YES  [ ] NO    Consultant(s) Notes Reviewed:  [ ] YES  [ ] NO    Care Discussed with Consultants/Other Providers [ ] YES  [ ] NO    PROBLEMS:  CHRONIC OBSTRUCTIVE PULMONARY DISEASE EXACERBATION  HYPERTENTION  COPD exacerbation  Preventive measure      Care discussed with family,         [  ]   yes  [  ]  No    imp:    stable[ ]    unstable[  ]     improving [   ]       unchanged  [  ]                Plans:  Continue present plans  [  ]               New consult [  ]   specialty  .......               order sabnia[  ]    test name.                  Discharge Planning  [  ]

## 2020-06-20 ENCOUNTER — TRANSCRIPTION ENCOUNTER (OUTPATIENT)
Age: 85
End: 2020-06-20

## 2020-06-20 VITALS — WEIGHT: 110.23 LBS

## 2020-06-20 LAB
INR BLD: 3.03 RATIO — HIGH (ref 0.88–1.16)
PROTHROM AB SERPL-ACNC: 34.8 SEC — HIGH (ref 10–12.9)
SARS-COV-2 RNA SPEC QL NAA+PROBE: SIGNIFICANT CHANGE UP

## 2020-06-20 RX ORDER — FUROSEMIDE 40 MG
1 TABLET ORAL
Qty: 0 | Refills: 0 | DISCHARGE

## 2020-06-20 RX ORDER — POTASSIUM CHLORIDE 20 MEQ
1 PACKET (EA) ORAL
Qty: 0 | Refills: 0 | DISCHARGE
Start: 2020-06-20

## 2020-06-20 RX ADMIN — Medication 3 MILLILITER(S): at 11:01

## 2020-06-20 RX ADMIN — Medication 40 MILLIGRAM(S): at 05:22

## 2020-06-20 RX ADMIN — PANTOPRAZOLE SODIUM 40 MILLIGRAM(S): 20 TABLET, DELAYED RELEASE ORAL at 05:22

## 2020-06-20 RX ADMIN — LOSARTAN POTASSIUM 50 MILLIGRAM(S): 100 TABLET, FILM COATED ORAL at 05:22

## 2020-06-20 RX ADMIN — Medication 20 MILLIGRAM(S): at 05:22

## 2020-06-20 RX ADMIN — Medication 50 MICROGRAM(S): at 05:22

## 2020-06-20 RX ADMIN — Medication 1 TABLET(S): at 11:42

## 2020-06-20 RX ADMIN — Medication 10 MILLIEQUIVALENT(S): at 11:42

## 2020-06-20 RX ADMIN — Medication 300 MILLIGRAM(S): at 05:22

## 2020-06-20 NOTE — DISCHARGE NOTE NURSING/CASE MANAGEMENT/SOCIAL WORK - PATIENT PORTAL LINK FT
You can access the FollowMyHealth Patient Portal offered by [facility] by registering at the following website: [email or web address]. By joining [employer]’s FollowMyHealth portal, you will also be able to view your health information using other applications (apps) compatible with our system.

## 2020-06-20 NOTE — DISCHARGE NOTE NURSING/CASE MANAGEMENT/SOCIAL WORK - NSDCPEPTCOWAR_GEN_ALL_CORE
Warfarin/Coumadin - Dietary Advice/Warfarin/Coumadin - Follow up monitoring/Warfarin/Coumadin - Compliance/Warfarin/Coumadin - Potential for adverse drug reactions and interactions

## 2020-06-20 NOTE — DIETITIAN INITIAL EVALUATION ADULT. - OTHER INFO
Pt lives alone, has HHA assistance; supportive children nearby. Pt c recent admission to [facility] for respiratory failure requiring BiPAP support; [facility]; & now re-hospitalized for SOB. Able to converse c pt in English; refused video . Reports appetite as fair; wt has been stable.  Pt requested cranberry juice & soft cut-up consistency for ease of eating; receptive to Glucerna. Stressed the importance of modifying CHO foods & beverage while on steroid. Denies any N/V/C/D or chew/swallowing difficulty.

## 2020-06-20 NOTE — DISCHARGE NOTE PROVIDER - NSDCMRMEDTOKEN_GEN_ALL_CORE_FT
ALPRAZolam 0.25 mg oral tablet: 1 tab(s) orally once a day (at bedtime)  bisacodyl 5 mg oral delayed release tablet: 1 tab(s) orally once a day, As needed, Constipation  Coumadin 3 mg oral tablet: 1 tab(s) orally once a day  DilTIAZem Hydrochloride  mg/24 hours oral capsule, extended release: 1 cap(s) orally once a day  furosemide 40 mg oral tablet: 1 tab(s) orally once a day  ipratropium-albuterol 0.5 mg-2.5 mg/3 mLinhalation solution: 3 milliliter(s) inhaled every 6 hours  losartan 50 mg oral tablet: 1 tab(s) orally once a day  melatonin 3 mg oral tablet: 1 tab(s) orally once a day (at bedtime), As needed, Sleep  Multiple Vitamins oral tablet: 1 tab(s) orally once a day  pantoprazole 40 mg oral delayed release tablet: 1 tab(s) orally once a day (before a meal)  potassium chloride 10 mEq oral tablet, extended release: 1 tab(s) orally once a day  predniSONE 10 mg oral tablet: 3 tab(s) orally once a day x 3 days, then 2 tablets x 3 days, then 1 tablet daily x 3 days.  sildenafil 20 mg oral tablet: 1 tab(s) orally every 8 hours  Synthroid: 50  orally once a day

## 2020-06-20 NOTE — DIETITIAN INITIAL EVALUATION ADULT. - PERTINENT LABORATORY DATA
06-18 Na140 mmol/L Glu 127 mg/dL<H> K+ 4.2 mmol/L Cr  1.10 mg/dL BUN 49 mg/dL<H> 06-14 Alb 3.1 g/dL<L>

## 2020-06-20 NOTE — PROGRESS NOTE ADULT - SUBJECTIVE AND OBJECTIVE BOX
INTERVAL HPI:  89yo, WF with HTN, COPD on O2, Diastolic CHF, Severe pHTN, Hypothyroidism ,A fib on Coumadin, Valve replacement.,  Recent admission hypoxic hypercarbic Respiratory failure requiring BIPAP support. Was transferred to [facility] on tapering steroids, O2, Sildenafil and and as needed nebulizer. Also completed course of antibiotic for suspected pneumonia.  06/13/20: Sent  from [facility] with acute sob and reported to have SPO2 80% on 3 litre nasal O2. Reports having mild cough but no fever, chills or chest pain.    OVERNIGHT EVENTS:  For discharge to [facility] with nocturnal BIPAP    Vital Signs Last 24 Hrs  T(C): 37 (20 Jun 2020 10:25), Max: 37 (20 Jun 2020 10:25)  T(F): 98.6 (20 Jun 2020 10:25), Max: 98.6 (20 Jun 2020 10:25)  HR: 73 (20 Jun 2020 11:03) (73 - 102)  BP: 111/54 (20 Jun 2020 10:25) (108/70 - 139/72)  BP(mean): --  RR: 19 (20 Jun 2020 10:25) (17 - 19)  SpO2: 93% (20 Jun 2020 11:03) (93% - 98%)    PHYSICAL EXAM:  GEN:         Awake, responsive and comfortable.  HEENT:    Normal.    RESP:        no wheezing.  CVS:           Regular rate and rhythm.   ABD:         Soft, non-tender, non-distended;     MEDICATIONS  (STANDING):  albuterol/ipratropium for Nebulization 3 milliLiter(s) Nebulizer every 6 hours  ALPRAZolam 0.25 milliGRAM(s) Oral at bedtime  diltiazem    milliGRAM(s) Oral daily  furosemide    Tablet 40 milliGRAM(s) Oral daily  levothyroxine 50 MICROGram(s) Oral daily  losartan 50 milliGRAM(s) Oral daily  multivitamin 1 Tablet(s) Oral daily  pantoprazole    Tablet 40 milliGRAM(s) Oral before breakfast  potassium chloride    Tablet ER 10 milliEquivalent(s) Oral daily  predniSONE   Tablet 40 milliGRAM(s) Oral daily  sildenafil (REVATIO) 20 milliGRAM(s) Oral every 8 hours    MEDICATIONS  (PRN):  acetaminophen   Tablet .. 650 milliGRAM(s) Oral every 6 hours PRN Temp greater or equal to 38C (100.4F), Mild Pain (1 - 3)    PT/INR - ( 20 Jun 2020 07:36 )   PT: 34.8 sec;   INR: 3.03 ratio      06-19 @ 07:56  pH: 7.46  pCO2: 49  pO2: 48  SaO2: 85  06-14 @ 21:39  pH: 7.38  pCO2: 70  pO2: 74  SaO2: 95  ASSESSMENT AND PLAN:  ·	Acute on chronic hypoxic hypercarbic  Respiratory failure.  ·	Acute COPD exacerbation.  ·	Chronic diastolic CHF,  ·	Severe pHTN  ·	Renal Insuffiencey.  ·	Chronic A Fib.  ·	Hypothyroidism.    Pt with acute on chronic hypoxic hypercarbic Respiratory failure due to COPD. Showed significant improvement with use of NIV after admission.  She is expected to benefit from NIV/Trilogy at home, which will minimize recurrent hospitalization and even may prevent sudden death.  Clinically better.  Taper steroids, continue nebulizer.  On Sildenafil.  For transfer to [facility] with nocturnal BIPAP, Then Trilogy for home upon discharge.

## 2020-06-20 NOTE — DISCHARGE NOTE PROVIDER - NSDCCPCAREPLAN_GEN_ALL_CORE_FT
PRINCIPAL DISCHARGE DIAGNOSIS  Diagnosis: COPD exacerbation  Assessment and Plan of Treatment:       SECONDARY DISCHARGE DIAGNOSES  Diagnosis: CHF (congestive heart failure), NYHA class II, acute on chronic, diastolic  Assessment and Plan of Treatment:     Diagnosis: Primary hypertension  Assessment and Plan of Treatment:     Diagnosis: Chronic pulmonary hypertension  Assessment and Plan of Treatment: Severe    Diagnosis: Hypothyroidism, adult  Assessment and Plan of Treatment:     Diagnosis: Atrial fibrillation with controlled ventricular response  Assessment and Plan of Treatment:

## 2020-06-20 NOTE — DIETITIAN INITIAL EVALUATION ADULT. - ENERGY NEEDS
Height (cm): 157 (06-14)  Weight (kg): 64.5 (06-15)  BMI (kg/m2): 26.2 (06-15)  IBW: 50 kg +/- 10%   % IBW:   129%   UBW:   stable   %UBW: 100%

## 2020-06-20 NOTE — DISCHARGE NOTE PROVIDER - HOSPITAL COURSE
Pt w/copd exacerbation. S/P Pneumonia.    06/16/2020 : More alert, On nasal canula, saturating well. Pulmonary consult noted. BiPAP @ night. Chest X-ray noted. Improved. Continue Steroids and  : neb.    06/17/2020 : Pt. alert, awake. C/O pain in joints. Pulmonary f/u noted. Id consult noted. No AbTx in view of normal WBC and Normal Procalcitonin. Will Start on diuretics.    06/18/2020 : Alert. C/O being week. No respiratory distress noted. Lungs clear. Continue Diuresis. CPAP PRN during the night.    06/19/2020 : Alert, awake. ABG noted on RA, Hypoxia and hypercarbia. Dr Kidd to follow. Saturating well on Nasal canula. Taper steroids, continue Lasix.    06/20/2020 : Alert. Saturating well on Nasal canula. On prednisone taper. Pulmonary female/u noted. May be discharged to Rehab. On BiPAP PRN.

## 2020-06-20 NOTE — DISCHARGE NOTE PROVIDER - CARE PROVIDER_API CALL
[facility]  [facility]NAL [facility]  [address] [place] [address]  Phone: [phone]  Fax: [phone]  Follow Up Time:

## 2020-06-23 DIAGNOSIS — I27.20 PULMONARY HYPERTENSION, UNSPECIFIED: ICD-10-CM

## 2020-06-23 DIAGNOSIS — I48.19 OTHER PERSISTENT ATRIAL FIBRILLATION: ICD-10-CM

## 2020-06-23 DIAGNOSIS — I11.0 HYPERTENSIVE HEART DISEASE WITH HEART FAILURE: ICD-10-CM

## 2020-06-23 DIAGNOSIS — Z66 DO NOT RESUSCITATE: ICD-10-CM

## 2020-06-23 DIAGNOSIS — J96.22 ACUTE AND CHRONIC RESPIRATORY FAILURE WITH HYPERCAPNIA: ICD-10-CM

## 2020-06-23 DIAGNOSIS — E87.1 HYPO-OSMOLALITY AND HYPONATREMIA: ICD-10-CM

## 2020-06-23 DIAGNOSIS — I50.32 CHRONIC DIASTOLIC (CONGESTIVE) HEART FAILURE: ICD-10-CM

## 2020-06-23 DIAGNOSIS — E03.9 HYPOTHYROIDISM, UNSPECIFIED: ICD-10-CM

## 2020-06-23 DIAGNOSIS — J44.1 CHRONIC OBSTRUCTIVE PULMONARY DISEASE WITH (ACUTE) EXACERBATION: ICD-10-CM

## 2020-06-23 DIAGNOSIS — Z79.01 LONG TERM (CURRENT) USE OF ANTICOAGULANTS: ICD-10-CM

## 2020-06-23 DIAGNOSIS — J96.21 ACUTE AND CHRONIC RESPIRATORY FAILURE WITH HYPOXIA: ICD-10-CM

## 2020-06-23 DIAGNOSIS — F41.9 ANXIETY DISORDER, UNSPECIFIED: ICD-10-CM

## 2020-06-23 DIAGNOSIS — Z11.59 ENCOUNTER FOR SCREENING FOR OTHER VIRAL DISEASES: ICD-10-CM

## 2020-06-23 DIAGNOSIS — R06.02 SHORTNESS OF BREATH: ICD-10-CM

## 2020-07-20 ENCOUNTER — INPATIENT (INPATIENT)
Facility: HOSPITAL | Age: 85
LOS: 6 days | Discharge: HOME HEALTH | End: 2020-07-27
Attending: INTERNAL MEDICINE | Admitting: INTERNAL MEDICINE
Payer: MEDICARE

## 2020-07-20 VITALS
RESPIRATION RATE: 18 BRPM | SYSTOLIC BLOOD PRESSURE: 113 MMHG | WEIGHT: 149.91 LBS | HEART RATE: 94 BPM | TEMPERATURE: 101 F | HEIGHT: 64 IN | DIASTOLIC BLOOD PRESSURE: 57 MMHG | OXYGEN SATURATION: 96 %

## 2020-07-20 LAB
ALBUMIN SERPL ELPH-MCNC: 2.8 G/DL — LOW (ref 3.3–5)
ALP SERPL-CCNC: 98 U/L — SIGNIFICANT CHANGE UP (ref 40–120)
ALT FLD-CCNC: 31 U/L — SIGNIFICANT CHANGE UP (ref 12–78)
ANION GAP SERPL CALC-SCNC: 9 MMOL/L — SIGNIFICANT CHANGE UP (ref 5–17)
APTT BLD: 34.3 SEC — SIGNIFICANT CHANGE UP (ref 27.5–35.5)
AST SERPL-CCNC: 49 U/L — HIGH (ref 15–37)
BASE EXCESS BLDA CALC-SCNC: 6.5 MMOL/L — HIGH (ref -2–2)
BASOPHILS # BLD AUTO: 0.06 K/UL — SIGNIFICANT CHANGE UP (ref 0–0.2)
BASOPHILS NFR BLD AUTO: 0.4 % — SIGNIFICANT CHANGE UP (ref 0–2)
BILIRUB SERPL-MCNC: 0.7 MG/DL — SIGNIFICANT CHANGE UP (ref 0.2–1.2)
BLOOD GAS COMMENTS: SIGNIFICANT CHANGE UP
BLOOD GAS COMMENTS: SIGNIFICANT CHANGE UP
BLOOD GAS SOURCE: SIGNIFICANT CHANGE UP
BUN SERPL-MCNC: 39 MG/DL — HIGH (ref 7–23)
CALCIUM SERPL-MCNC: 8.5 MG/DL — SIGNIFICANT CHANGE UP (ref 8.5–10.1)
CHLORIDE SERPL-SCNC: 97 MMOL/L — SIGNIFICANT CHANGE UP (ref 96–108)
CK SERPL-CCNC: 56 U/L — SIGNIFICANT CHANGE UP (ref 26–192)
CO2 SERPL-SCNC: 28 MMOL/L — SIGNIFICANT CHANGE UP (ref 22–31)
CREAT SERPL-MCNC: 1.56 MG/DL — HIGH (ref 0.5–1.3)
D DIMER BLD IA.RAPID-MCNC: 599 NG/ML DDU — HIGH
EOSINOPHIL # BLD AUTO: 0.04 K/UL — SIGNIFICANT CHANGE UP (ref 0–0.5)
EOSINOPHIL NFR BLD AUTO: 0.3 % — SIGNIFICANT CHANGE UP (ref 0–6)
FIBRINOGEN PPP-MCNC: 739 MG/DL — HIGH (ref 350–510)
GLUCOSE BLDC GLUCOMTR-MCNC: 163 MG/DL — HIGH (ref 70–99)
GLUCOSE SERPL-MCNC: 156 MG/DL — HIGH (ref 70–99)
HCO3 BLDA-SCNC: 30 MMOL/L — HIGH (ref 21–29)
HCT VFR BLD CALC: 34.8 % — SIGNIFICANT CHANGE UP (ref 34.5–45)
HGB BLD-MCNC: 11.7 G/DL — SIGNIFICANT CHANGE UP (ref 11.5–15.5)
HOROWITZ INDEX BLDA+IHG-RTO: 21 — SIGNIFICANT CHANGE UP
IMM GRANULOCYTES NFR BLD AUTO: 0.6 % — SIGNIFICANT CHANGE UP (ref 0–1.5)
INR BLD: 2.32 RATIO — HIGH (ref 0.88–1.16)
LACTATE SERPL-SCNC: 0.9 MMOL/L — SIGNIFICANT CHANGE UP (ref 0.7–2)
LYMPHOCYTES # BLD AUTO: 21.3 % — SIGNIFICANT CHANGE UP (ref 13–44)
LYMPHOCYTES # BLD AUTO: 3.04 K/UL — SIGNIFICANT CHANGE UP (ref 1–3.3)
MCHC RBC-ENTMCNC: 33.6 GM/DL — SIGNIFICANT CHANGE UP (ref 32–36)
MCHC RBC-ENTMCNC: 33.9 PG — SIGNIFICANT CHANGE UP (ref 27–34)
MCV RBC AUTO: 100.9 FL — HIGH (ref 80–100)
MONOCYTES # BLD AUTO: 1.63 K/UL — HIGH (ref 0–0.9)
MONOCYTES NFR BLD AUTO: 11.4 % — SIGNIFICANT CHANGE UP (ref 2–14)
NEUTROPHILS # BLD AUTO: 9.39 K/UL — HIGH (ref 1.8–7.4)
NEUTROPHILS NFR BLD AUTO: 66 % — SIGNIFICANT CHANGE UP (ref 43–77)
NRBC # BLD: 0 /100 WBCS — SIGNIFICANT CHANGE UP (ref 0–0)
NT-PROBNP SERPL-SCNC: 838 PG/ML — HIGH (ref 0–450)
PCO2 BLDA: 40 MMHG — SIGNIFICANT CHANGE UP (ref 32–46)
PH BLD: 7.49 — HIGH (ref 7.35–7.45)
PLATELET # BLD AUTO: 325 K/UL — SIGNIFICANT CHANGE UP (ref 150–400)
PO2 BLDA: 57 MMHG — LOW (ref 74–108)
POTASSIUM SERPL-MCNC: 4.8 MMOL/L — SIGNIFICANT CHANGE UP (ref 3.5–5.3)
POTASSIUM SERPL-SCNC: 4.8 MMOL/L — SIGNIFICANT CHANGE UP (ref 3.5–5.3)
PROT SERPL-MCNC: 8.1 GM/DL — SIGNIFICANT CHANGE UP (ref 6–8.3)
PROTHROM AB SERPL-ACNC: 24.7 SEC — HIGH (ref 10.6–13.6)
RBC # BLD: 3.45 M/UL — LOW (ref 3.8–5.2)
RBC # FLD: 14.8 % — HIGH (ref 10.3–14.5)
SAO2 % BLDA: 87 % — LOW (ref 92–96)
SODIUM SERPL-SCNC: 134 MMOL/L — LOW (ref 135–145)
TROPONIN I SERPL-MCNC: <.015 NG/ML — SIGNIFICANT CHANGE UP (ref 0.01–0.04)
WBC # BLD: 14.24 K/UL — HIGH (ref 3.8–10.5)
WBC # FLD AUTO: 14.24 K/UL — HIGH (ref 3.8–10.5)

## 2020-07-20 PROCEDURE — 93010 ELECTROCARDIOGRAM REPORT: CPT

## 2020-07-20 PROCEDURE — 71045 X-RAY EXAM CHEST 1 VIEW: CPT | Mod: 26,CS

## 2020-07-20 PROCEDURE — 99285 EMERGENCY DEPT VISIT HI MDM: CPT | Mod: CS

## 2020-07-20 PROCEDURE — 74176 CT ABD & PELVIS W/O CONTRAST: CPT | Mod: 26

## 2020-07-20 PROCEDURE — 71250 CT THORAX DX C-: CPT | Mod: 26

## 2020-07-20 RX ORDER — ACETAMINOPHEN 500 MG
650 TABLET ORAL ONCE
Refills: 0 | Status: COMPLETED | OUTPATIENT
Start: 2020-07-20 | End: 2020-07-20

## 2020-07-20 RX ORDER — PIPERACILLIN AND TAZOBACTAM 4; .5 G/20ML; G/20ML
3.38 INJECTION, POWDER, LYOPHILIZED, FOR SOLUTION INTRAVENOUS ONCE
Refills: 0 | Status: COMPLETED | OUTPATIENT
Start: 2020-07-20 | End: 2020-07-20

## 2020-07-20 RX ADMIN — PIPERACILLIN AND TAZOBACTAM 200 GRAM(S): 4; .5 INJECTION, POWDER, LYOPHILIZED, FOR SOLUTION INTRAVENOUS at 22:22

## 2020-07-20 RX ADMIN — Medication 650 MILLIGRAM(S): at 22:25

## 2020-07-20 NOTE — ED PROVIDER NOTE - OBJECTIVE STATEMENT
89yo female from home with pmh HTN, COPD, valve surgery, hypothyroid, afib on coumadin presents with fever, sob, cough.  Per chart, pt was dc yesterday from [place]. Fever started todayPt is AOX2 but answering questions appropriately. denies cp, palptiations, headache, dizziness, abd pain, NVD, dysuria. States she has joint pain from her arthritis. Of note, pt was recently admitted 1 month ago for CHF/PNA    pmd: [facility] sony valdes [phone], cardio: dr iman juares [phone]  meds: lasix,  potassium, losartan, levothyroxine, pantoprazole, prevatio, coumadin, xanax, dilitiazem, duoneb,     + fever, no chills, No photophobia/eye pain/changes in vision, No ear pain/sore throat/dysphagia, No chest pain, no palpitations, + SOB/cough, no wheeze/stridor, No abdominal pain, No N/V/D, no dysuria/frequency/discharge, No neck/back pain, no rash, no changes in neurological status/function. 89yo female from home with pmh HTN, COPD, valve surgery, hypothyroid, afib on coumadin presents with fever.  Family reports pt was dc home ~ 1.5 weeks ago. Pt has been doing well and on O2 24hrs, however noted fever last night and tonight. Given tylenol. Pt is AOX2 but answering questions appropriately. Pt states some sob and cough. denies cp, palptiations, headache, dizziness, abd pain, NVD, dysuria. States she has joint pain from her arthritis. Of note, pt was recently admitted 1 month ago for CHF/PNA    pmd: [facility] sony valdes [phone], cardio: dr iman juares [phone]  meds: lasix,  potassium, losartan, levothyroxine, pantoprazole, prevatio, coumadin, xanax, dilitiazem, duoneb,     + fever, no chills, No photophobia/eye pain/changes in vision, No ear pain/sore throat/dysphagia, No chest pain, no palpitations, + SOB/cough, no wheeze/stridor, No abdominal pain, No N/V/D, no dysuria/frequency/discharge, No neck/back pain, no rash, no changes in neurological status/function.

## 2020-07-20 NOTE — ED ADULT TRIAGE NOTE - CHIEF COMPLAINT QUOTE
per EMS family C/O generalized pain, and fever.  used pt unable to answer questions alert to person. EMS states pt desatuatio 90 % placed on NRB with improvemtn. per EMS family C/O generalized pain, and fever.  used pt unable to answer questions alert to person. EMS states pt desaturation 90 % placed on NRB with improvement. family contact Jenniffer [phone], son chris [phone].

## 2020-07-20 NOTE — ED PROVIDER NOTE - PHYSICAL EXAMINATION
Gen: Alert, Well appearing. + mild tachypnea  Head: NC, AT, PERRL, normal lids/conjunctiva   ENT: Bilateral TM WNL, patent oropharynx without erythema/exudate, uvula midline  Neck: supple, no tenderness/meningismus  Pulm: b/l rales  CV: RRR, no M/R/G, +dist pulses   Abd: soft, + rt sided tenderness/ND, +BS, no guarding/rebound tenderness  Mskel: + edema, no erythema/cyanosis   Skin: no rash, no bruising  Neuro: AAOx2, no sensory/motor deficits, CN 2-12 intact

## 2020-07-20 NOTE — ED ADULT NURSE NOTE - NSIMPLEMENTINTERV_GEN_ALL_ED
Implemented All Universal Safety Interventions:  [place] to call system. Call bell, personal items and telephone within reach. Instruct patient to call for assistance. Room bathroom lighting operational. Non-slip footwear when patient is off stretcher. Physically safe environment: no spills, clutter or unnecessary equipment. Stretcher in lowest position, wheels locked, appropriate side rails in place.

## 2020-07-20 NOTE — ED PROVIDER NOTE - CLINICAL SUMMARY MEDICAL DECISION MAKING FREE TEXT BOX
Pt AO, mild tachypnea, sat likely baseline on O2. Pt with temp of 103, will need admission for possible pna, r/o COVID.

## 2020-07-20 NOTE — ED ADULT NURSE NOTE - CHIEF COMPLAINT QUOTE
per EMS family C/O generalized pain, and fever.  used pt unable to answer questions alert to person. EMS states pt desaturation 90 % placed on NRB with improvement. family contact Jenniffer [phone], son chris [phone].

## 2020-07-20 NOTE — ED ADULT NURSE NOTE - OBJECTIVE STATEMENT
used [id number]. christopher  used [id number]. christopher. Patient c/o SOB and coughing. Denies chest pain, headache, dizziness. C/o general body aches from arthritis.

## 2020-07-21 DIAGNOSIS — I48.91 UNSPECIFIED ATRIAL FIBRILLATION: ICD-10-CM

## 2020-07-21 DIAGNOSIS — D72.829 ELEVATED WHITE BLOOD CELL COUNT, UNSPECIFIED: ICD-10-CM

## 2020-07-21 DIAGNOSIS — J18.9 PNEUMONIA, UNSPECIFIED ORGANISM: ICD-10-CM

## 2020-07-21 DIAGNOSIS — E03.9 HYPOTHYROIDISM, UNSPECIFIED: ICD-10-CM

## 2020-07-21 DIAGNOSIS — J44.1 CHRONIC OBSTRUCTIVE PULMONARY DISEASE WITH (ACUTE) EXACERBATION: ICD-10-CM

## 2020-07-21 DIAGNOSIS — N17.9 ACUTE KIDNEY FAILURE, UNSPECIFIED: ICD-10-CM

## 2020-07-21 DIAGNOSIS — B99.9 UNSPECIFIED INFECTIOUS DISEASE: ICD-10-CM

## 2020-07-21 DIAGNOSIS — I10 ESSENTIAL (PRIMARY) HYPERTENSION: ICD-10-CM

## 2020-07-21 LAB
CRP SERPL-MCNC: 15.29 MG/DL — HIGH (ref 0–0.4)
FERRITIN SERPL-MCNC: 252 NG/ML — HIGH (ref 15–150)
PROCALCITONIN SERPL-MCNC: 0.14 NG/ML — HIGH (ref 0.02–0.1)
SARS-COV-2 IGG SERPL QL IA: NEGATIVE — SIGNIFICANT CHANGE UP
SARS-COV-2 IGM SERPL IA-ACNC: <0.1 INDEX — SIGNIFICANT CHANGE UP
SARS-COV-2 RNA SPEC QL NAA+PROBE: SIGNIFICANT CHANGE UP

## 2020-07-21 PROCEDURE — 12345: CPT | Mod: NC

## 2020-07-21 PROCEDURE — 99223 1ST HOSP IP/OBS HIGH 75: CPT

## 2020-07-21 RX ORDER — LEVOTHYROXINE SODIUM 125 MCG
50 TABLET ORAL DAILY
Refills: 0 | Status: DISCONTINUED | OUTPATIENT
Start: 2020-07-21 | End: 2020-07-27

## 2020-07-21 RX ORDER — WARFARIN SODIUM 2.5 MG/1
1 TABLET ORAL ONCE
Refills: 0 | Status: COMPLETED | OUTPATIENT
Start: 2020-07-21 | End: 2020-07-21

## 2020-07-21 RX ORDER — WARFARIN SODIUM 2.5 MG/1
1 TABLET ORAL ONCE
Refills: 0 | Status: DISCONTINUED | OUTPATIENT
Start: 2020-07-21 | End: 2020-07-21

## 2020-07-21 RX ORDER — PIPERACILLIN AND TAZOBACTAM 4; .5 G/20ML; G/20ML
3.38 INJECTION, POWDER, LYOPHILIZED, FOR SOLUTION INTRAVENOUS EVERY 8 HOURS
Refills: 0 | Status: DISCONTINUED | OUTPATIENT
Start: 2020-07-21 | End: 2020-07-21

## 2020-07-21 RX ORDER — NYSTATIN CREAM 100000 [USP'U]/G
1 CREAM TOPICAL
Refills: 0 | Status: DISCONTINUED | OUTPATIENT
Start: 2020-07-21 | End: 2020-07-27

## 2020-07-21 RX ORDER — PANTOPRAZOLE SODIUM 20 MG/1
40 TABLET, DELAYED RELEASE ORAL
Refills: 0 | Status: DISCONTINUED | OUTPATIENT
Start: 2020-07-21 | End: 2020-07-27

## 2020-07-21 RX ORDER — ACETAMINOPHEN 500 MG
325 TABLET ORAL ONCE
Refills: 0 | Status: COMPLETED | OUTPATIENT
Start: 2020-07-21 | End: 2020-07-21

## 2020-07-21 RX ORDER — SODIUM CHLORIDE 9 MG/ML
500 INJECTION INTRAMUSCULAR; INTRAVENOUS; SUBCUTANEOUS ONCE
Refills: 0 | Status: COMPLETED | OUTPATIENT
Start: 2020-07-21 | End: 2020-07-21

## 2020-07-21 RX ORDER — CEFEPIME 1 G/1
1000 INJECTION, POWDER, FOR SOLUTION INTRAMUSCULAR; INTRAVENOUS EVERY 24 HOURS
Refills: 0 | Status: DISCONTINUED | OUTPATIENT
Start: 2020-07-21 | End: 2020-07-24

## 2020-07-21 RX ORDER — IPRATROPIUM/ALBUTEROL SULFATE 18-103MCG
3 AEROSOL WITH ADAPTER (GRAM) INHALATION EVERY 6 HOURS
Refills: 0 | Status: DISCONTINUED | OUTPATIENT
Start: 2020-07-21 | End: 2020-07-27

## 2020-07-21 RX ORDER — VANCOMYCIN HCL 1 G
1000 VIAL (EA) INTRAVENOUS ONCE
Refills: 0 | Status: COMPLETED | OUTPATIENT
Start: 2020-07-21 | End: 2020-07-21

## 2020-07-21 RX ORDER — LANOLIN ALCOHOL/MO/W.PET/CERES
3 CREAM (GRAM) TOPICAL ONCE
Refills: 0 | Status: COMPLETED | OUTPATIENT
Start: 2020-07-21 | End: 2020-07-21

## 2020-07-21 RX ADMIN — PIPERACILLIN AND TAZOBACTAM 25 GRAM(S): 4; .5 INJECTION, POWDER, LYOPHILIZED, FOR SOLUTION INTRAVENOUS at 05:40

## 2020-07-21 RX ADMIN — Medication 40 MILLIGRAM(S): at 14:38

## 2020-07-21 RX ADMIN — Medication 50 MICROGRAM(S): at 05:39

## 2020-07-21 RX ADMIN — Medication 40 MILLIGRAM(S): at 05:40

## 2020-07-21 RX ADMIN — Medication 40 MILLIGRAM(S): at 21:09

## 2020-07-21 RX ADMIN — Medication 325 MILLIGRAM(S): at 02:03

## 2020-07-21 RX ADMIN — SODIUM CHLORIDE 1000 MILLILITER(S): 9 INJECTION INTRAMUSCULAR; INTRAVENOUS; SUBCUTANEOUS at 03:00

## 2020-07-21 RX ADMIN — Medication 650 MILLIGRAM(S): at 01:30

## 2020-07-21 RX ADMIN — WARFARIN SODIUM 1 MILLIGRAM(S): 2.5 TABLET ORAL at 21:09

## 2020-07-21 RX ADMIN — PANTOPRAZOLE SODIUM 40 MILLIGRAM(S): 20 TABLET, DELAYED RELEASE ORAL at 05:39

## 2020-07-21 RX ADMIN — Medication 1 APPLICATION(S): at 17:24

## 2020-07-21 RX ADMIN — Medication 250 MILLIGRAM(S): at 01:30

## 2020-07-21 RX ADMIN — NYSTATIN CREAM 1 APPLICATION(S): 100000 CREAM TOPICAL at 17:24

## 2020-07-21 RX ADMIN — Medication 3 MILLIGRAM(S): at 21:37

## 2020-07-21 RX ADMIN — CEFEPIME 100 MILLIGRAM(S): 1 INJECTION, POWDER, FOR SOLUTION INTRAMUSCULAR; INTRAVENOUS at 14:37

## 2020-07-21 NOTE — OCCUPATIONAL THERAPY INITIAL EVALUATION ADULT - BALANCE TRAINING, PT EVAL
Patient will be able to increase static and dynamic sitting/standing by 1/2 grade in order to participate in self care tasks and functional mobility/transfers within 8 weeks

## 2020-07-21 NOTE — OCCUPATIONAL THERAPY INITIAL EVALUATION ADULT - BED MOBILITY TRAINING, PT EVAL
Patient will be able to perform bed mobility tasks with assist, using least restrictive device, within 8 weeks.

## 2020-07-21 NOTE — CONSULT NOTE ADULT - SUBJECTIVE AND OBJECTIVE BOX
Patient is a 89y old  Female who presents with a chief complaint of Fever and cough. (2020 12:04)    HPI: 89yo, WF with HTN, COPD on O2, Diastolic CHF, Severe pHTN, Hypothyroidism ,A fib on Coumadin, Valve replacement.,  Recent admission hypoxic hypercarbic Respiratory failure requiring BIPAP support.   Was recently discharged from [facility] and is reported to be well on nasal O2.  Brought to ED with fever of 2 days ( up to 101).  Pt also reports having cough and SOB.    PAST MEDICAL & SURGICAL HISTORY:  Essential hypertension  Hypothyroidism  Atrial fibrillation  COPD exacerbation  No significant past surgical history    FAMILY HISTORY:  No pertinent family history in first degree relatives    SOCIAL HISTORY: BMI (kg/m2): 24.2 (07-21 @ 10:59). not known    Allergies  No Known Allergies    MEDICATIONS  (STANDING):  albuterol/ipratropium for Nebulization 3 milliLiter(s) Nebulizer every 6 hours  levothyroxine 50 MICROGram(s) Oral daily  methylPREDNISolone sodium succinate Injectable 40 milliGRAM(s) IV Push every 8 hours  pantoprazole    Tablet 40 milliGRAM(s) Oral before breakfast  piperacillin/tazobactam IVPB.. 3.375 Gram(s) IV Intermittent every 8 hours  warfarin 1 milliGRAM(s) Oral once  warfarin 1 milliGRAM(s) Oral once    REVIEW OF SYSTEMS:    Constitutional:            fever,   HEENT:                       No difficulty hearing, tinnitus, vertigo; No sinus or throat pain  Respiratory:               cough  Cardiovascular:           No chest pain, palpitations  Gastrointestinal:        No abdominal or epigastric pain. No N/V/diarrhea or hematemesis  SKIN:                             no rash  Musculoskeletal:        No joint pain or swelling  Extremities:                No swelling  Neurological:              No headaches  Psychiatric:                 No depression, anxiety    Vital Signs Last 24 Hrs  T(C): 36.7 (2020 10:59), Max: 39.4 (2020 22:25)  T(F): 98 (2020 10:59), Max: 103 (2020 22:25)  HR: 65 (2020 10:59) (59 - 94)  BP: 104/45 (2020 10:59) (97/51 - 117/67)  BP(mean): 78 (2020 03:11) (67 - 78)  RR: 18 (2020 10:59) (15 - 25)  SpO2: 97% (2020 10:59) (95% - 98%)    PHYSICAL EXAM:  GEN:         Awake, responsive and comfortable.  HEENT:    Normal.    RESP:       decreased air entry  CVS:          Regular rate and rhythm.   ABD:         Soft, non-tender, non-distended;   SKIN:           Warm and dry.  EXTR:            No clubbing, cyanosis or edema  CNS:              Intact sensory and motor function.  PSYCH:        cooperative, no anxiety or depression    LABS:                        11.7   14.24 )-----------( 325      ( 2020 22:47 )             34.8         134<L>  |  97  |  39<H>  ----------------------------<  156<H>  4.8   |  28  |  1.56<H>    Ca    8.5      2020 22:47    TPro  8.1  /  Alb  2.8<L>  /  TBili  0.7  /  DBili  x   /  AST  49<H>  /  ALT  31  /  AlkPhos  98  20    PT/INR - ( 2020 22:47 )   PT: 24.7 sec;   INR: 2.32 ratio      PTT - ( 2020 22:47 )  PTT:34.3 sec   @ 22:30  pH: 7.49  pCO2: 40  pO2: 57  SaO2: 87    EKG:  sinus rhythm    RADIOLOGY & ADDITIONAL STUDIES:  < from: CT Chest No Cont (20 @ 23:36) >  EXAM:  CT ABDOMEN AND PELVIS                          EXAM:  CT CHEST                          PROCEDURE DATE:  2020      INTERPRETATION:  CLINICAL INFORMATION: Fever. Cough. Right-sided abdominal pain.    COMPARISON: None.    PROCEDURE:   CT of the Chest, Abdomen and Pelvis was performed without intravenous contrast.   Intravenous contrast: None.  Oral contrast: None.  Sagittal and coronal reformats were performed.    FINDINGS: Evaluation is limited by lack of IV contrast.  CHEST:   LUNGS AND LARGE AIRWAYS: Patent central airways. No pulmonary nodules. Patchy areas of groundglass opacities bilaterally.  PLEURA: Small right pleural effusion. Trace left pleural fluid.  VESSELS: Within normal limits.  HEART: Cardiomegaly. Status post CABG. No pericardial effusion.  MEDIASTINUM AND URBAN: No lymphadenopathy.  CHEST WALL AND LOWER NECK: Within normal limits.    ABDOMEN AND PELVIS:  LIVER: Within normal limits.  BILE DUCTS: Normal caliber.  GALLBLADDER: Within normal limits.  SPLEEN: Within normal limits.  PANCREAS: Within normal limits.  ADRENALS: Within normal limits.  KIDNEYS/URETERS: Bilateral renal cysts.    BLADDER: Within normal limits.  REPRODUCTIVE ORGANS: Hysterectomy.    BOWEL: No bowel obstruction. Appendix is not visualized. No evidence of inflammation in the pericecal region.  PERITONEUM: No ascites.  VESSELS: Atherosclerotic changes.  RETROPERITONEUM/LYMPH NODES: No lymphadenopathy.    ABDOMINAL WALL: Within normal limits.  BONES: Degenerative changes. Grade1 anterolisthesis of L4 on L5. Mild superior endplate compression deformity of L3, age indeterminate.    IMPRESSION:   Small right pleural effusion. Groundglass opacities in the lungs may reflect areas of air trapping versus infection.    No acute abdominal pathology.    TANJA MENEZES M.D., ATTENDING RADIOLOGIST  This document has been electronically signed. 2020 12:08AM   < from: TTE Echo Complete w/o contrast w/ Doppler (20 @ 10:59) >   EXAM:  ECHO TTE WO CON COMP W DOPP      PROCEDURE DATE:  2020      INTERPRETATION:  REPORT:    TRANSTHORACIC ECHOCARDIOGRAM REPORT    Patient Name:   FREDERICK AM Patient Location: [facility] Rec #:  [id number]       Accession #:      [id number]  Account #:                       Height:           61.8 in 157.0 cm  YOB: 1929        Weight:           153.7 lb 69.70 kg  Patient Age:    89 years         BSA:              1.71 m²  Patient Gender: F                BP:               157/69 mmHg    Date of Exam:        2020 10:59:28 AM  Sonographer:         SANTANA  Referring Physician: IVANA    Procedure:     2D Echo/Doppler/Color Doppler Complete.  Indications:   Dyspnea, unspecified - R06.00  Diagnosis:     Dyspnea, unspecified - R06.00  Study Details: Technically suboptimal study. Study quality was adversely                 affected due to body habitus.    2D AND M-MODE MEASUREMENTS (normal ranges within parentheses):  Left Ventricle:  Normal   Aorta/Left Atrium:          Normal  IVSd (2D):              1.15 cm (0.7-1.1) Aortic Root (2D):  1.91 cm (2.4-3.7)  LVPWd (2D):             1.11 cm (0.7-1.1) Left Atrium (2D):  4.37 cm (1.9-4.0)  LVIDd (2D):             3.96 cm (3.4-5.7) Right Ventricle:  LVIDs (2D):             2.59 cm           TAPSE:           0.87 cm  LV FS (2D):             34.6 %   (>25%)  Relative Wall Thickness  0.56    (<0.42)    LV DIASTOLIC FUNCTION:  MV Peak E: 1.28 m/s Decel Time: 170 msec  MV Peak A: 0.35m/s  E/A Ratio: 3.69    SPECTRAL DOPPLER ANALYSIS (where applicable):  Mitral Valve:  MV P1/2 Time: 49.27 msec  MV Area, PHT: 4.47 cm²    Aortic Valve: AoV Max Matias: 2.05 m/s AoV Peak P.9 mmHg AoV Mean P.6 mmHg    LVOT Vmax: 1.10 m/s LVOT VTI: 0.197 m LVOT Diameter: 1.60 cm    AoV Area, Vmax: 1.08 cm² AoV Area, VTI: 1.03 cm² AoV Area, Vmn: 1.02 cm²    Tricuspid Valve and PA/RV Systolic Pressure: TR Max Velocity: 3.63 m/s RA Pressure: 15 mmHg RVSP/PASP: 67.7 mmHg    PHYSICIAN INTERPRETATION:  Left Ventricle: Normal left ventricular size and wall thicknesses, with normal systolic and diastolic function. The left ventricle was not well visualized.  Left ventricular ejection fraction, by visual estimation, is 55 to 60%. [place] not ell visualized in any view. Consider contrast echo, if clinically warranted for further evaluation.  Right Ventricle: Normal right ventricular size and function.  Left Atrium: Mildly enlarged left atrium.  Right Atrium: The right atrium is normal in size. Mildly enlarged right atrium.  Pericardium: There is no evidence of pericardial effusion.  Mitral Valve: The mitral valve is degenerative in appearance. There is mild mitral annular calcification. Mild mitral valve regurgitation is seen.  Tricuspid Valve: The tricuspid valve is degenerative in appearance. Moderate tricuspid regurgitation is visualized. Estimated pulmonary artery systolic pressure is 67.7 mmHg assuming a right atrial pressure of 15 mmHg, which is consistent with severe pulmonary hypertension.  Aortic Valve: The aortic valve was not well visualized. The aortic valve is sclerotic with decreased cusp excursion. Peak transaortic gradient equals 16.9 mmHg, mean transaortic gradient equals 8.6 mmHg, the calculated aortic valve area equals 1.03 cm² by the continuity equation consistent with moderate aortic stenosis. Mild aortic valve regurgitation is seen.  Pulmonic Valve: Structurally normal pulmonic valve, with normal leaflet excursion. The pulmonic valve is normal. No indicationof pulmonic valve regurgitation.  Aorta: The aortic root and ascending aorta are structurally normal, with no evidence of dilitation.  Pulmonary Artery: The main pulmonary artery is normal in size.     Summary:   1. Left ventricular ejection fraction, by visual estimation, is 55 to 60%.   2. Technically suboptimal study.   3. [place] not ell visualized in any view. Consider contrast echo, if clinically warranted for further evaluation.   4. Degenerative mitral valve.   5. Mild mitral annular calcification.   6. Mild mitral valve regurgitation.   7. Degenerative tricuspid valve.   8. Moderate tricuspid regurgitation.   9. Aortic valve is sclerotic with decreased cusp excursion.  10. Mild aortic regurgitation.  11. Estimated pulmonary artery systolic pressure is 67.7 mmHg assuming a right atrial pressure of 15 mmHg, which is consistent with severe pulmonary hypertension.  12. Peak transaortic gradient equals 16.9 mmHg, mean transaortic gradient equals 8.6 mmHg, the calculated aortic valve area equals 1.03 cm² by the continuity equation consistent with moderate aortic stenosis.    [id number] Sesar Kendall MD, FACCMD, FACC  Electronically signed on 2020 at 6:08:15 PM    SESAR KENDALL M.D.; Attending Cardiologist  This document has been electronically signed. 2020 10:59AM     ASSESSMENT AND PLAN:  ·	SOB.  ·	Possible Pneumonia.  ·	Leukocytosis.  ·	Chronic hypoxic hypercarbic  Respiratory failure.  ·	Acute COPD exacerbation.  ·	Chronic diastolic CHF,  ·	Severe pHTN  ·	Renal Insuffiencey.  ·	Chronic A Fib.  ·	Hypothyroidism.    Continue O2, Nebulizer and Antibiotics.  Follow cultures.  Would like to start Sildenafil but BP is low normal.

## 2020-07-21 NOTE — CONSULT NOTE ADULT - PROBLEM SELECTOR RECOMMENDATION 5
-unclear if any known exposures to COVID19  -She has had several negative covid swabs in last 2 months  -Repeat COVID PCR and Ab have been sent  -Maintain Airborne and contact isolation until ruled out

## 2020-07-21 NOTE — H&P ADULT - NSHPPHYSICALEXAM_GEN_ALL_CORE
T(C): 38.6 (21 Jul 2020 01:30), Max: 39.4 (20 Jul 2020 22:25)  T(F): 101.4 (21 Jul 2020 01:30), Max: 103 (20 Jul 2020 22:25)  HR: 72 (21 Jul 2020 02:07) (72 - 94)  BP: 97/51 (21 Jul 2020 02:07) (97/51 - 117/67)  BP(mean): 67 (21 Jul 2020 02:07) (67 - 67)  RR: 20 (21 Jul 2020 02:07) (15 - 20)  SpO2: 97% (21 Jul 2020 02:07) (95% - 97%)    PHYSICAL EXAM:  GENERAL: NAD, well-groomed, well-developed  HEAD:  Atraumatic, Normocephalic  EYES: EOMI, PERRLA, conjunctiva and sclera clear  ENMT: No tonsillar erythema, exudates, or enlargement; Moist mucous membranes, No lesions  NECK: Supple, No JVD, Normal thyroid  NERVOUS SYSTEM:  Alert & Oriented X2, CN 2-12 intact, no focal deficits  CHEST/LUNG:  rales at bases  HEART: irregular rate and rhythm; No murmurs, rubs, or gallops  ABDOMEN: Soft, Nontender, Nondistended; Bowel sounds present  EXTREMITIES:  + Peripheral Pulses, No clubbing, cyanosis, + edema  LYMPH: No lymphadenopathy noted  SKIN: No rashes or lesions

## 2020-07-21 NOTE — H&P ADULT - NSICDXPASTMEDICALHX_GEN_ALL_CORE_FT
PAST MEDICAL HISTORY:  Atrial fibrillation     COPD exacerbation     Essential hypertension     Hypothyroidism

## 2020-07-21 NOTE — CONSULT NOTE ADULT - ATTENDING COMMENTS
Will continue to follow   Thank you for this very interesting consult     Thomas Esquivel DO  Cell: [phone]  Pager [phone]  Infectious Disease Attending  After 5pm/weekends call [phone]

## 2020-07-21 NOTE — OCCUPATIONAL THERAPY INITIAL EVALUATION ADULT - PERTINENT HX OF CURRENT PROBLEM, REHAB EVAL
As per H&P, 89 y/o female from home with pmh HTN, COPD, valve surgery, hypothyroid, afib on coumadin presents with fever.  Family reports pt was dc home ~ 1.5 weeks ago. Pt has been doing well and on O2 24hrs, however noted fever last night and tonight, pt c/o cough and sob. [facility]

## 2020-07-21 NOTE — H&P ADULT - ASSESSMENT
89 y/o female from home with pmh HTN, COPD, ? valve surgery, hypothyroid, afib on coumadin presents with fever.  Family reports pt was dc home ~ 1.5 weeks ago. Pt has been doing well and on O2 24hrs, however noted fever last night and tonight, pt c/o cough and sob. Given tylenol. Pt is AOX2 but answering questions appropriately. Pt states some sob and cough. She denies cp, palpitations, headache, dizziness, abd pain, N/V/D, dysuria. States she has joint pain from her arthritis. Of note, pt was recently admitted 1 month ago for CHF/PNA. Pt presents with COPD exacerbation and pneumonia, she has MARIUSZ, and in ED BP systolic decreased to 97.  IMPROVE VTE Individual Risk Assessment          RISK                                                          Points    [  ] Previous VTE                                                3    [  ] Thrombophilia                                             2    [  ] Lower limb paralysis                                    2        (unable to hold up >15 seconds)      [  ] Current Cancer                                             2         (within 6 months)    [  ] Immobilization > 24 hrs                              1    [  ] ICU/CCU stay > 24 hours                            1    [ x ] Age > 60                                                    1    IMPROVE VTE Score __1_______

## 2020-07-21 NOTE — OCCUPATIONAL THERAPY INITIAL EVALUATION ADULT - ADDITIONAL COMMENTS
Patient was recently here at [facility] 6/14/20-6/20/20 and then D/C to [facility]. As per PT Eval on June 16th, Pt with recent admission to [facility] from 5/31-6/5 2/2 COPD. Pt was D/C'd to [facility] for rehab placement. Pt lives alone in a private home, 5 entry steps (+ rail), no steps to negotiate once inside. Prior to admission in May pt requires 1 person assist for all functional mobility including household ambulation (with use of rolling walker) & stair negotiation (with use of railing). Pt has 24/7 live in home health aide services (2 aides, 12 hour shifts). Pt needs assist with ADL's at baseline. Pt uses 3L home O2 at all times prior to admission. Pt is [place]-speaking with minimal understanding of English.

## 2020-07-21 NOTE — CONSULT NOTE ADULT - PROBLEM SELECTOR RECOMMENDATION 4
-patient had creatinine less then 1 last month  -now has creatinine 1.56   -will dose all antibiotics based on CrCl and adjust accordingly   -rest of care per primary team

## 2020-07-21 NOTE — H&P ADULT - NSHPREVIEWOFSYSTEMS_GEN_ALL_CORE
+ fever, no chills, No photophobia/eye pain/changes in vision, No ear pain/sore throat/dysphagia, No chest pain, no palpitations, + SOB/cough, no wheeze/stridor, No abdominal pain, No N/V/D, no dysuria/frequency/discharge, No neck/back pain, no rash, no changes in neurological status/function.

## 2020-07-21 NOTE — CONSULT NOTE ADULT - PROBLEM SELECTOR RECOMMENDATION 9
-Stop Zosyn  -Start Cefepime 1g q24hrs based on CrCl  -f/u 2 sets of blood culture sent on admission  -received one dose of Vancomycin   -send vanco trough tomorrow AM with AM labs (therapeutic drug monitoring)  -perform MRSA/MSSA PCR nasal swab   -Send urine legionella antigen   -send sputum culture if possible  -f/u COVID19 pcr results

## 2020-07-21 NOTE — H&P ADULT - HISTORY OF PRESENT ILLNESS
89 y/o female from home with pmh HTN, COPD, ? valve surgery, hypothyroid, afib on coumadin presents with fever.  Family reports pt was dc home ~ 1.5 weeks ago. Pt has been doing well and on O2 24hrs, however noted fever last night and tonight, pt c/o cough and sob. Given tylenol. Pt is AOX2 but answering questions appropriately. Pt states some sob and cough. She denies cp, palpitations, headache, dizziness, abd pain, N/V/D, dysuria. States she has joint pain from her arthritis. Of note, pt was recently admitted 1 month ago for CHF/PNA.   pmd: [facility] sony valdes [phone], cardio: dr iman juares [phone]  meds: lasix,  potassium, losartan, levothyroxine, pantoprazole, prevatio, coumadin, xanax, dilitiazem, duoneb,   	  	        · 	potassium chloride 10 mEq oral tablet, extended release: 1 tab(s) orally once a day  · 	furosemide 40 mg oral tablet: 1 tab(s) orally once a day  · 	Multiple Vitamins oral tablet: 1 tab(s) orally once a day  · 	pantoprazole 40 mg oral delayed release tablet: 1 tab(s) orally once a day (before a meal)  · 	bisacodyl 5 mg oral delayed release tablet: 1 tab(s) orally once a day, As needed, Constipation  · 	ipratropium-albuterol 0.5 mg-2.5 mg/3 mLinhalation solution: 3 milliliter(s) inhaled every 6 hours  · 	melatonin 3 mg oral tablet: 1 tab(s) orally once a day (at bedtime), As needed, Sleep  · 	ALPRAZolam 0.25 mg oral tablet: 1 tab(s) orally once a day (at bedtime)  · 	sildenafil 20 mg oral tablet: 1 tab(s) orally every 8 hours  · 	Synthroid: 50  orally once a day  · 	DilTIAZem Hydrochloride  mg/24 hours oral capsule, extended release: 1 cap(s) orally once a day  · 	Coumadin 3 mg oral tablet: 1 tab(s) orally once a day  · 	losartan 50 mg oral tablet: 1 tab(s) orally once a day  · 	predniSONE 10 mg oral tablet: 3 tab(s) orally once a day x 3 days, then 2 tablets x 3 days, then 1 tablet daily x 3 days.    PHYSICAL EXAM: 89 y/o female from home with pmh HTN, COPD, ? valve surgery, hypothyroid, afib on coumadin presents with fever.  Family reports pt was dc home ~ 1.5 weeks ago. Pt has been doing well and on O2 24hrs, however noted fever last night and tonight, pt c/o cough and sob. Given tylenol. Pt is AOX2 but answering questions appropriately. Pt states some sob and cough. She denies cp, palpitations, headache, dizziness, abd pain, N/V/D, dysuria. States she has joint pain from her arthritis. Of note, pt was recently admitted 1 month ago for CHF/PNA.   pmd: [facility] sony valdes [phone], cardio: dr iman juares [phone]  meds: lasix,  potassium, losartan, levothyroxine, pantoprazole, prevatio, coumadin, xanax, dilitiazem, duoneb,

## 2020-07-21 NOTE — CONSULT NOTE ADULT - ASSESSMENT
89 y/o female from home with pmh HTN, COPD, ? valve surgery, hypothyroid, afib on coumadin presents with fever, SOB and cough x2 days.   Tmax 103, HR up to 90s, RR 25  sat 98% on 2L NC   CXR (personally reviewed) improved compared to CXR in June, evidence of old sternotomy   CT chest (personally reviewed) GGO and a small right pleural effusions but no dense consolidation   ECHO from June 2020 found moderate AS and severe pHTN   Was given one dose of Vanco and started on cefepime   COVID in June was negative  COVID PCR and antibodies pending  DDx includes viral pneumonia (including COVID), bacterial pneumonia/healthcare associated PNA, copd/pHTN exacerbation, CHF exacerbation  Leukocytosis to 14k, normal lymphocytes   MARIUSZ with creatinine 1.56 which is up from her baseline of 1.0 in June   CRP, D-Dimer, ferritin, procalcitonin elevated     Given the fever, SOB, cough and leukocytosis as well as recent hospitalization will cover with cefepime rather then zosyn as she had CHF exacerbation and sodium load may confound symptoms as well as possibility of vanco/zosyn renal toxicity   Will send MRSA PCR nasal swab, rule out covid, and look for additional causes of symptoms    Overall, 89F Fever, leukocytosis, pneumonia, MARIUSZ

## 2020-07-21 NOTE — OCCUPATIONAL THERAPY INITIAL EVALUATION ADULT - ADL RETRAINING, OT EVAL
Patient will be able to perform functional tasks with assist, using least restrictive device, within 8 weeks.

## 2020-07-21 NOTE — OCCUPATIONAL THERAPY INITIAL EVALUATION ADULT - PRECAUTIONS/LIMITATIONS, REHAB EVAL
fall precautions/2LO2; Patient is hard of hearing./cardiac precautions/hearing precautions/oxygen therapy device and L/min

## 2020-07-21 NOTE — OCCUPATIONAL THERAPY INITIAL EVALUATION ADULT - GENERAL OBSERVATIONS, REHAB EVAL
Pt was encountered semi-supine in bed; NAD, portable telemetry +, PIV +, O2 via nasal cannula on 3LO2, AXOX1, confused, cooperative, followed commands and directions. Patient is [place]raini speaking patient who requires [employer]  service. Patient and OT spoke with [facility] ID: [id number].

## 2020-07-21 NOTE — OCCUPATIONAL THERAPY INITIAL EVALUATION ADULT - RANGE OF MOTION EXAMINATION, LOWER EXTREMITY
BLE AROM grossly limited by more then 50%/bilateral LE Passive ROM was WFL  (within functional limits)

## 2020-07-21 NOTE — PATIENT PROFILE ADULT - NSPROMUTINFOINDIVIDFT_GEN_A_NUR
Feed patient. Patient states she understands but according to daughter preferably major decisions call daughter.

## 2020-07-21 NOTE — H&P ADULT - NSHPLABSRESULTS_GEN_ALL_CORE
LABS:                        11.7   14.24 )-----------( 325      ( 20 Jul 2020 22:47 )             34.8     07-20    134<L>  |  97  |  39<H>  ----------------------------<  156<H>  4.8   |  28  |  1.56<H>    Ca    8.5      20 Jul 2020 22:47    TPro  8.1  /  Alb  2.8<L>  /  TBili  0.7  /  DBili  x   /  AST  49<H>  /  ALT  31  /  AlkPhos  98  07-20    PT/INR - ( 20 Jul 2020 22:47 )   PT: 24.7 sec;   INR: 2.32 ratio         PTT - ( 20 Jul 2020 22:47 )  PTT:34.3 sec    CAPILLARY BLOOD GLUCOSE      POCT Blood Glucose.: 163 mg/dL (20 Jul 2020 21:55)  Troponin I, Serum: <.015: ng/mL (07.20.20 @ 22:47)  Serum Pro-Brain Natriuretic Peptide: 838 pg/mL (07.20.20 @ 22:47)   Lactate, Blood: 0.9 mmol/L (07.20.20 @ 22:45)  Blood Gas Profile - Arterial (07.20.20 @ 22:30)    pH, Blood: 7.49    Blood Gas Source: Arterial    Blood Gas Comments: the site, no hematoma/bleeding noted.  Positive jeremiah test noted.    pCO2, Arterial: 40 mmHg    pO2, Arterial: 57 mmHg    HCO3, Arterial: 30 mmol/L    Base Excess, Arterial: 6.5 mmol/L    Oxygen Saturation, Arterial: 87 %    FIO2, Arterial: 21  Creatine Kinase, Serum: 56 U/L (07.20.20 @ 22:47)  D-Dimer Assay, Quantitative: 599 ng/mL DDU (07.20.20 @ 22:47)  Fibrinogen Assay: 739 mg/dL (07.20.20 @ 22:47)              RADIOLOGY & ADDITIONAL TESTS:  < from: CT Chest/abd No Cont (07.20.20 @ 23:36) >    IMPRESSION:   Small right pleural effusion. Ground glass opacities in the lungs may reflect areas of air trapping versus infection.    No acute abdominal pathology.    < end of copied text >      Imaging Personally Reviewed:  [x ] YES  [ ] NO  EKG: a fib@ 82 no acute ischemic changes

## 2020-07-21 NOTE — OCCUPATIONAL THERAPY INITIAL EVALUATION ADULT - TRANSFER TRAINING, PT EVAL
Patient will be able to perform functional transfers, using least restrictive device, with assist within 8 weeks.

## 2020-07-21 NOTE — PATIENT PROFILE ADULT - ABILITY TO HEAR (WITH HEARING AID OR HEARING APPLIANCE IF NORMALLY USED):
I tried to reach mother to clarify the dose of Focalin being chosen. The patient had been last on Focalin 25 mg XR in the morning and a 20 mg XR dose at around lunchtime. Twice a day dosing has historically been necessary to cover him. Mother was unavailable. I left a message for her to call to clarify what her request actually is regarding dosing.   Mildly to Moderately Impaired: difficulty hearing in some environments or speaker may need to increase volume or speak distinctly

## 2020-07-21 NOTE — CONSULT NOTE ADULT - SUBJECTIVE AND OBJECTIVE BOX
Patient is a 89y old  Female who presents with a chief complaint of Fever and cough. (21 Jul 2020 12:56)      HPI:  89 y/o female from home with pmh HTN, COPD, ? valve surgery, hypothyroid, afib on coumadin presents with fever.  Family reports pt was dc home ~ 1.5 weeks ago. Pt has been doing well and on O2 24hrs, however noted fever last night and tonight, pt c/o cough and sob. Given tylenol. Pt is AOX2 but answering questions appropriately. Pt states some sob and cough. She denies cp, palpitations, headache, dizziness, abd pain, N/V/D, dysuria. States she has joint pain from her arthritis. Of note, pt was recently admitted 1 month ago for CHF/PNA.   pmd: [facility] sony valdes [phone], cardio: dr iman juares [phone]  meds: lasix,  potassium, losartan, levothyroxine, pantoprazole, prevatio, coumadin, xanax, dilitiazem, duoneb, (21 Jul 2020 03:02)    Above reviewed. Patient in hospital for CHF exacerbation. She is on continuos home O2, has severe pulmonary hypertension, COPD. She is coming in with fever, SOB, cough. She denies sick contacts but was in the hospital until 6/20/2020.   ID consulted for workup and antibiotic management     PAST MEDICAL & SURGICAL HISTORY:  Essential hypertension  Hypothyroidism  Atrial fibrillation  COPD exacerbation  No significant past surgical history      Allergies  No Known Allergies        ANTIMICROBIALS:  piperacillin/tazobactam IVPB.. 3.375 every 8 hours      MEDICATIONS  (STANDING):  piperacillin/tazobactam IVPB..   25 mL/Hr IV Intermittent (07-21-20 @ 05:40)    piperacillin/tazobactam IVPB...   200 mL/Hr IV Intermittent (07-20-20 @ 22:22)    vancomycin  IVPB   250 mL/Hr IV Intermittent (07-21-20 @ 01:30)        OTHER MEDS: MEDICATIONS  (STANDING):  albuterol/ipratropium for Nebulization 3 every 6 hours  levothyroxine 50 daily  methylPREDNISolone sodium succinate Injectable 40 every 8 hours  pantoprazole    Tablet 40 before breakfast  warfarin 1 once  warfarin 1 once      SOCIAL HISTORY:     Denies smoking, ETOH, or drugs     FAMILY HISTORY:  FH of stroke in mother       REVIEW OF SYSTEMS  [  ] ROS unobtainable because:    [ X ] All other systems negative except as noted below:	    Constitutional:  [X ] fever [ ] chills  [ ] weight loss  [ ] weakness  Skin:  [ ] rash [ ] phlebitis	  Eyes: [ ] icterus [ ] pain  [ ] discharge	  ENMT: [ ] sore throat  [ ] thrush [ ] ulcers [ ] exudates  Respiratory: [x ] dyspnea [ ] hemoptysis [x ] cough [ ] sputum	  Cardiovascular:  [ ] chest pain [ ] palpitations [ ] edema	  Gastrointestinal:  [ ] nausea [ ] vomiting [ ] diarrhea [ ] constipation [ ] pain	  Genitourinary:  [ ] dysuria [ ] frequency [ ] hematuria [ ] discharge [ ] flank pain  [ ] incontinence  Musculoskeletal:  [ ] myalgias [ ] arthralgias [ ] arthritis  [ ] back pain  Neurological:  [ ] headache [ ] seizures  [ ] confusion/altered mental status  Psychiatric:  [ ] anxiety [ ] depression	  Hematology/Lymphatics:  [ ] lymphadenopathy  Endocrine:  [ ] adrenal [ ] thyroid  Allergic/Immunologic:	 [ ] transplant [ ] seasonal    Vital Signs Last 24 Hrs  T(F): 98 (07-21-20 @ 10:59), Max: 103 (07-20-20 @ 22:25)    Vital Signs Last 24 Hrs  HR: 65 (07-21-20 @ 10:59) (59 - 94)  BP: 104/45 (07-21-20 @ 10:59) (97/51 - 117/67)  RR: 18 (07-21-20 @ 10:59)  SpO2: 97% (07-21-20 @ 10:59) (95% - 98%)  Wt(kg): --    PHYSICAL EXAM:  Constitutional: non-toxic, no distress, NC prongs in b/l nares   HEAD/EYES: anicteric, no conjunctival injection, + cataract in right eye   ENT:  supple, no thrush, poor dentition   Cardiovascular:   normal S1, S2, systolic murmur, no edema  Respiratory:  decreased BS bilaterally, no wheezes, no rales  GI:  soft, non-tender, normal bowel sounds  :  no nogueira, no CVA tenderness  Musculoskeletal:  no synovitis, normal ROM  Neurologic: awake and alert   Skin:  no rash, no erythema, no phlebitis  Heme/Onc: no lymphadenopathy   Psychiatric:  awake, alert, appropriate mood    WBC Count: 14.24 K/uL (07-20 @ 22:47)                            11.7   14.24 )-----------( 325      ( 20 Jul 2020 22:47 )             34.8       07-20    134<L>  |  97  |  39<H>  ----------------------------<  156<H>  4.8   |  28  |  1.56<H>    Ca    8.5      20 Jul 2020 22:47    TPro  8.1  /  Alb  2.8<L>  /  TBili  0.7  /  DBili  x   /  AST  49<H>  /  ALT  31  /  AlkPhos  98  07-20      Creatinine Trend: 1.56<--        MICROBIOLOGY:          RADIOLOGY:   Xray Chest 1 View-PORTABLE IMMEDIATE (07.20.20 @ 22:36)  IMPRESSION: Prior CHF findings have largely cleared.      CT Chest No Cont (07.20.20 @ 23:36)  IMPRESSION:   Small right pleural effusion. Groundglass opacities in the lungs may reflect areas of air trapping versus infection.    No acute abdominal pathology.    I personally reviewed the above imaging and agree with findings     < from: TTE Echo Complete w/o contrast w/ Doppler (06.01.20 @ 10:59) >  Summary:   1. Left ventricular ejection fraction, by visual estimation, is 55 to 60%.   2. Technically suboptimal study.   3. [place] not ell visualized in any view. Consider contrast echo, if clinically warranted for further evaluation.   4. Degenerative mitral valve.   5. Mild mitral annular calcification.   6. Mild mitral valve regurgitation.   7. Degenerative tricuspid valve.   8. Moderate tricuspid regurgitation.   9. Aortic valve is sclerotic with decreased cusp excursion.  10. Mild aortic regurgitation.  11. Estimated pulmonary artery systolic pressure is 67.7 mmHg assuming a right atrial pressure of 15 mmHg, which is consistent with severe pulmonary hypertension.  12. Peak transaortic gradient equals 16.9 mmHg, mean transaortic gradient equals 8.6 mmHg, the calculated aortic valve area equals 1.03 cm² by the continuity equation consistent with moderate aortic stenosis.    < end of copied text >

## 2020-07-21 NOTE — OCCUPATIONAL THERAPY INITIAL EVALUATION ADULT - STRENGTHENING, PT EVAL
Pt will increase BUE/BLE strength to 5/5 to improve functional strength needed to engage in functional tasks by 8 weeks

## 2020-07-22 LAB
ANION GAP SERPL CALC-SCNC: 7 MMOL/L — SIGNIFICANT CHANGE UP (ref 5–17)
BUN SERPL-MCNC: 39 MG/DL — HIGH (ref 7–23)
CALCIUM SERPL-MCNC: 8.8 MG/DL — SIGNIFICANT CHANGE UP (ref 8.5–10.1)
CHLORIDE SERPL-SCNC: 102 MMOL/L — SIGNIFICANT CHANGE UP (ref 96–108)
CO2 SERPL-SCNC: 30 MMOL/L — SIGNIFICANT CHANGE UP (ref 22–31)
CREAT SERPL-MCNC: 1.47 MG/DL — HIGH (ref 0.5–1.3)
GLUCOSE SERPL-MCNC: 167 MG/DL — HIGH (ref 70–99)
HCT VFR BLD CALC: 33.7 % — LOW (ref 34.5–45)
HGB BLD-MCNC: 11 G/DL — LOW (ref 11.5–15.5)
INR BLD: 2.75 RATIO — HIGH (ref 0.88–1.16)
LEGIONELLA AG UR QL: NEGATIVE — SIGNIFICANT CHANGE UP
MCHC RBC-ENTMCNC: 32.6 GM/DL — SIGNIFICANT CHANGE UP (ref 32–36)
MCHC RBC-ENTMCNC: 33.7 PG — SIGNIFICANT CHANGE UP (ref 27–34)
MCV RBC AUTO: 103.4 FL — HIGH (ref 80–100)
MRSA PCR RESULT.: DETECTED
NRBC # BLD: 0 /100 WBCS — SIGNIFICANT CHANGE UP (ref 0–0)
PLATELET # BLD AUTO: 274 K/UL — SIGNIFICANT CHANGE UP (ref 150–400)
POTASSIUM SERPL-MCNC: 3.9 MMOL/L — SIGNIFICANT CHANGE UP (ref 3.5–5.3)
POTASSIUM SERPL-SCNC: 3.9 MMOL/L — SIGNIFICANT CHANGE UP (ref 3.5–5.3)
PROTHROM AB SERPL-ACNC: 29.1 SEC — HIGH (ref 10.6–13.6)
RBC # BLD: 3.26 M/UL — LOW (ref 3.8–5.2)
RBC # FLD: 14.6 % — HIGH (ref 10.3–14.5)
S AUREUS DNA NOSE QL NAA+PROBE: DETECTED
SODIUM SERPL-SCNC: 139 MMOL/L — SIGNIFICANT CHANGE UP (ref 135–145)
VANCOMYCIN FLD-MCNC: 7.4 UG/ML — SIGNIFICANT CHANGE UP
WBC # BLD: 9.93 K/UL — SIGNIFICANT CHANGE UP (ref 3.8–10.5)
WBC # FLD AUTO: 9.93 K/UL — SIGNIFICANT CHANGE UP (ref 3.8–10.5)

## 2020-07-22 PROCEDURE — 99232 SBSQ HOSP IP/OBS MODERATE 35: CPT

## 2020-07-22 RX ORDER — VANCOMYCIN HCL 1 G
1000 VIAL (EA) INTRAVENOUS ONCE
Refills: 0 | Status: COMPLETED | OUTPATIENT
Start: 2020-07-22 | End: 2020-07-22

## 2020-07-22 RX ORDER — WARFARIN SODIUM 2.5 MG/1
3 TABLET ORAL ONCE
Refills: 0 | Status: COMPLETED | OUTPATIENT
Start: 2020-07-22 | End: 2020-07-22

## 2020-07-22 RX ORDER — LANOLIN ALCOHOL/MO/W.PET/CERES
3 CREAM (GRAM) TOPICAL ONCE
Refills: 0 | Status: COMPLETED | OUTPATIENT
Start: 2020-07-22 | End: 2020-07-22

## 2020-07-22 RX ADMIN — Medication 3 MILLIGRAM(S): at 21:11

## 2020-07-22 RX ADMIN — WARFARIN SODIUM 3 MILLIGRAM(S): 2.5 TABLET ORAL at 21:08

## 2020-07-22 RX ADMIN — Medication 1 APPLICATION(S): at 05:42

## 2020-07-22 RX ADMIN — Medication 3 MILLILITER(S): at 17:15

## 2020-07-22 RX ADMIN — NYSTATIN CREAM 1 APPLICATION(S): 100000 CREAM TOPICAL at 17:30

## 2020-07-22 RX ADMIN — Medication 3 MILLILITER(S): at 11:18

## 2020-07-22 RX ADMIN — Medication 1 APPLICATION(S): at 17:30

## 2020-07-22 RX ADMIN — Medication 40 MILLIGRAM(S): at 13:46

## 2020-07-22 RX ADMIN — Medication 50 MICROGRAM(S): at 05:47

## 2020-07-22 RX ADMIN — CEFEPIME 100 MILLIGRAM(S): 1 INJECTION, POWDER, FOR SOLUTION INTRAMUSCULAR; INTRAVENOUS at 14:47

## 2020-07-22 RX ADMIN — Medication 250 MILLIGRAM(S): at 13:46

## 2020-07-22 RX ADMIN — Medication 40 MILLIGRAM(S): at 21:08

## 2020-07-22 RX ADMIN — Medication 40 MILLIGRAM(S): at 05:42

## 2020-07-22 RX ADMIN — PANTOPRAZOLE SODIUM 40 MILLIGRAM(S): 20 TABLET, DELAYED RELEASE ORAL at 05:42

## 2020-07-22 RX ADMIN — NYSTATIN CREAM 1 APPLICATION(S): 100000 CREAM TOPICAL at 05:42

## 2020-07-23 LAB
-  AMIKACIN: SIGNIFICANT CHANGE UP
-  AMOXICILLIN/CLAVULANIC ACID: SIGNIFICANT CHANGE UP
-  AMPICILLIN/SULBACTAM: SIGNIFICANT CHANGE UP
-  AMPICILLIN: SIGNIFICANT CHANGE UP
-  AZTREONAM: SIGNIFICANT CHANGE UP
-  CEFAZOLIN: SIGNIFICANT CHANGE UP
-  CEFEPIME: SIGNIFICANT CHANGE UP
-  CEFOXITIN: SIGNIFICANT CHANGE UP
-  CEFTRIAXONE: SIGNIFICANT CHANGE UP
-  CIPROFLOXACIN: SIGNIFICANT CHANGE UP
-  ERTAPENEM: SIGNIFICANT CHANGE UP
-  GENTAMICIN: SIGNIFICANT CHANGE UP
-  IMIPENEM: SIGNIFICANT CHANGE UP
-  LEVOFLOXACIN: SIGNIFICANT CHANGE UP
-  MEROPENEM: SIGNIFICANT CHANGE UP
-  NITROFURANTOIN: SIGNIFICANT CHANGE UP
-  PIPERACILLIN/TAZOBACTAM: SIGNIFICANT CHANGE UP
-  TIGECYCLINE: SIGNIFICANT CHANGE UP
-  TOBRAMYCIN: SIGNIFICANT CHANGE UP
-  TRIMETHOPRIM/SULFAMETHOXAZOLE: SIGNIFICANT CHANGE UP
ALBUMIN SERPL ELPH-MCNC: 2.5 G/DL — LOW (ref 3.3–5)
ALP SERPL-CCNC: 71 U/L — SIGNIFICANT CHANGE UP (ref 40–120)
ALT FLD-CCNC: 34 U/L — SIGNIFICANT CHANGE UP (ref 12–78)
ANION GAP SERPL CALC-SCNC: 8 MMOL/L — SIGNIFICANT CHANGE UP (ref 5–17)
AST SERPL-CCNC: 31 U/L — SIGNIFICANT CHANGE UP (ref 15–37)
BILIRUB SERPL-MCNC: 0.4 MG/DL — SIGNIFICANT CHANGE UP (ref 0.2–1.2)
BUN SERPL-MCNC: 50 MG/DL — HIGH (ref 7–23)
CALCIUM SERPL-MCNC: 8.6 MG/DL — SIGNIFICANT CHANGE UP (ref 8.5–10.1)
CHLORIDE SERPL-SCNC: 102 MMOL/L — SIGNIFICANT CHANGE UP (ref 96–108)
CO2 SERPL-SCNC: 29 MMOL/L — SIGNIFICANT CHANGE UP (ref 22–31)
CREAT SERPL-MCNC: 1.43 MG/DL — HIGH (ref 0.5–1.3)
GLUCOSE SERPL-MCNC: 216 MG/DL — HIGH (ref 70–99)
HCT VFR BLD CALC: 34.3 % — LOW (ref 34.5–45)
HGB BLD-MCNC: 11.3 G/DL — LOW (ref 11.5–15.5)
INR BLD: 2.27 RATIO — HIGH (ref 0.88–1.16)
MCHC RBC-ENTMCNC: 32.9 GM/DL — SIGNIFICANT CHANGE UP (ref 32–36)
MCHC RBC-ENTMCNC: 34 PG — SIGNIFICANT CHANGE UP (ref 27–34)
MCV RBC AUTO: 103.3 FL — HIGH (ref 80–100)
METHOD TYPE: SIGNIFICANT CHANGE UP
NRBC # BLD: 0 /100 WBCS — SIGNIFICANT CHANGE UP (ref 0–0)
PLATELET # BLD AUTO: 309 K/UL — SIGNIFICANT CHANGE UP (ref 150–400)
POTASSIUM SERPL-MCNC: 3.8 MMOL/L — SIGNIFICANT CHANGE UP (ref 3.5–5.3)
POTASSIUM SERPL-SCNC: 3.8 MMOL/L — SIGNIFICANT CHANGE UP (ref 3.5–5.3)
PROT SERPL-MCNC: 7.2 GM/DL — SIGNIFICANT CHANGE UP (ref 6–8.3)
PROTHROM AB SERPL-ACNC: 24.4 SEC — HIGH (ref 10.6–13.6)
RBC # BLD: 3.32 M/UL — LOW (ref 3.8–5.2)
RBC # FLD: 14.5 % — SIGNIFICANT CHANGE UP (ref 10.3–14.5)
SODIUM SERPL-SCNC: 139 MMOL/L — SIGNIFICANT CHANGE UP (ref 135–145)
WBC # BLD: 11.31 K/UL — HIGH (ref 3.8–10.5)
WBC # FLD AUTO: 11.31 K/UL — HIGH (ref 3.8–10.5)

## 2020-07-23 PROCEDURE — 99232 SBSQ HOSP IP/OBS MODERATE 35: CPT

## 2020-07-23 RX ORDER — WARFARIN SODIUM 2.5 MG/1
3 TABLET ORAL ONCE
Refills: 0 | Status: COMPLETED | OUTPATIENT
Start: 2020-07-23 | End: 2020-07-23

## 2020-07-23 RX ORDER — DILTIAZEM HCL 120 MG
120 CAPSULE, EXT RELEASE 24 HR ORAL DAILY
Refills: 0 | Status: DISCONTINUED | OUTPATIENT
Start: 2020-07-23 | End: 2020-07-27

## 2020-07-23 RX ADMIN — Medication 50 MICROGRAM(S): at 05:15

## 2020-07-23 RX ADMIN — Medication 120 MILLIGRAM(S): at 15:04

## 2020-07-23 RX ADMIN — Medication 40 MILLIGRAM(S): at 05:15

## 2020-07-23 RX ADMIN — Medication 3 MILLILITER(S): at 23:27

## 2020-07-23 RX ADMIN — Medication 1 APPLICATION(S): at 17:08

## 2020-07-23 RX ADMIN — CEFEPIME 100 MILLIGRAM(S): 1 INJECTION, POWDER, FOR SOLUTION INTRAMUSCULAR; INTRAVENOUS at 15:04

## 2020-07-23 RX ADMIN — Medication 3 MILLILITER(S): at 05:05

## 2020-07-23 RX ADMIN — WARFARIN SODIUM 3 MILLIGRAM(S): 2.5 TABLET ORAL at 21:43

## 2020-07-23 RX ADMIN — Medication 20 MILLIGRAM(S): at 21:42

## 2020-07-23 RX ADMIN — Medication 1 APPLICATION(S): at 05:14

## 2020-07-23 RX ADMIN — Medication 3 MILLILITER(S): at 18:00

## 2020-07-23 RX ADMIN — PANTOPRAZOLE SODIUM 40 MILLIGRAM(S): 20 TABLET, DELAYED RELEASE ORAL at 05:15

## 2020-07-23 RX ADMIN — Medication 20 MILLIGRAM(S): at 15:04

## 2020-07-23 RX ADMIN — NYSTATIN CREAM 1 APPLICATION(S): 100000 CREAM TOPICAL at 17:08

## 2020-07-23 RX ADMIN — NYSTATIN CREAM 1 APPLICATION(S): 100000 CREAM TOPICAL at 05:18

## 2020-07-23 RX ADMIN — Medication 3 MILLILITER(S): at 00:11

## 2020-07-23 RX ADMIN — Medication 3 MILLILITER(S): at 11:21

## 2020-07-24 LAB
-  AMPICILLIN: SIGNIFICANT CHANGE UP
-  CIPROFLOXACIN: SIGNIFICANT CHANGE UP
-  LEVOFLOXACIN: SIGNIFICANT CHANGE UP
-  NITROFURANTOIN: SIGNIFICANT CHANGE UP
-  TETRACYCLINE: SIGNIFICANT CHANGE UP
-  VANCOMYCIN: SIGNIFICANT CHANGE UP
ALBUMIN SERPL ELPH-MCNC: 2.5 G/DL — LOW (ref 3.3–5)
ALP SERPL-CCNC: 66 U/L — SIGNIFICANT CHANGE UP (ref 40–120)
ALT FLD-CCNC: 40 U/L — SIGNIFICANT CHANGE UP (ref 12–78)
ANION GAP SERPL CALC-SCNC: 7 MMOL/L — SIGNIFICANT CHANGE UP (ref 5–17)
AST SERPL-CCNC: 38 U/L — HIGH (ref 15–37)
BILIRUB SERPL-MCNC: 0.3 MG/DL — SIGNIFICANT CHANGE UP (ref 0.2–1.2)
BUN SERPL-MCNC: 52 MG/DL — HIGH (ref 7–23)
CALCIUM SERPL-MCNC: 8.5 MG/DL — SIGNIFICANT CHANGE UP (ref 8.5–10.1)
CHLORIDE SERPL-SCNC: 101 MMOL/L — SIGNIFICANT CHANGE UP (ref 96–108)
CO2 SERPL-SCNC: 29 MMOL/L — SIGNIFICANT CHANGE UP (ref 22–31)
CREAT SERPL-MCNC: 1.24 MG/DL — SIGNIFICANT CHANGE UP (ref 0.5–1.3)
CULTURE RESULTS: SIGNIFICANT CHANGE UP
GLUCOSE SERPL-MCNC: 134 MG/DL — HIGH (ref 70–99)
HCT VFR BLD CALC: 30.6 % — LOW (ref 34.5–45)
HGB BLD-MCNC: 10.2 G/DL — LOW (ref 11.5–15.5)
INR BLD: 2.38 RATIO — HIGH (ref 0.88–1.16)
MCHC RBC-ENTMCNC: 33.3 GM/DL — SIGNIFICANT CHANGE UP (ref 32–36)
MCHC RBC-ENTMCNC: 33.9 PG — SIGNIFICANT CHANGE UP (ref 27–34)
MCV RBC AUTO: 101.7 FL — HIGH (ref 80–100)
METHOD TYPE: SIGNIFICANT CHANGE UP
NRBC # BLD: 0 /100 WBCS — SIGNIFICANT CHANGE UP (ref 0–0)
ORGANISM # SPEC MICROSCOPIC CNT: SIGNIFICANT CHANGE UP
PLATELET # BLD AUTO: 295 K/UL — SIGNIFICANT CHANGE UP (ref 150–400)
POTASSIUM SERPL-MCNC: 4.1 MMOL/L — SIGNIFICANT CHANGE UP (ref 3.5–5.3)
POTASSIUM SERPL-SCNC: 4.1 MMOL/L — SIGNIFICANT CHANGE UP (ref 3.5–5.3)
PROT SERPL-MCNC: 6.8 GM/DL — SIGNIFICANT CHANGE UP (ref 6–8.3)
PROTHROM AB SERPL-ACNC: 25.4 SEC — HIGH (ref 10.6–13.6)
RBC # BLD: 3.01 M/UL — LOW (ref 3.8–5.2)
RBC # FLD: 14.6 % — HIGH (ref 10.3–14.5)
SODIUM SERPL-SCNC: 137 MMOL/L — SIGNIFICANT CHANGE UP (ref 135–145)
SPECIMEN SOURCE: SIGNIFICANT CHANGE UP
VANCOMYCIN FLD-MCNC: 9.8 UG/ML — SIGNIFICANT CHANGE UP
WBC # BLD: 10.52 K/UL — HIGH (ref 3.8–10.5)
WBC # FLD AUTO: 10.52 K/UL — HIGH (ref 3.8–10.5)

## 2020-07-24 PROCEDURE — 99232 SBSQ HOSP IP/OBS MODERATE 35: CPT

## 2020-07-24 RX ORDER — ALPRAZOLAM 0.25 MG
0.25 TABLET ORAL AT BEDTIME
Refills: 0 | Status: DISCONTINUED | OUTPATIENT
Start: 2020-07-24 | End: 2020-07-27

## 2020-07-24 RX ORDER — WARFARIN SODIUM 2.5 MG/1
3 TABLET ORAL ONCE
Refills: 0 | Status: COMPLETED | OUTPATIENT
Start: 2020-07-24 | End: 2020-07-24

## 2020-07-24 RX ADMIN — Medication 120 MILLIGRAM(S): at 06:23

## 2020-07-24 RX ADMIN — Medication 3 MILLILITER(S): at 11:54

## 2020-07-24 RX ADMIN — Medication 3 MILLILITER(S): at 05:31

## 2020-07-24 RX ADMIN — Medication 3 MILLILITER(S): at 17:19

## 2020-07-24 RX ADMIN — PANTOPRAZOLE SODIUM 40 MILLIGRAM(S): 20 TABLET, DELAYED RELEASE ORAL at 06:24

## 2020-07-24 RX ADMIN — NYSTATIN CREAM 1 APPLICATION(S): 100000 CREAM TOPICAL at 17:22

## 2020-07-24 RX ADMIN — WARFARIN SODIUM 3 MILLIGRAM(S): 2.5 TABLET ORAL at 21:56

## 2020-07-24 RX ADMIN — Medication 20 MILLIGRAM(S): at 21:56

## 2020-07-24 RX ADMIN — Medication 20 MILLIGRAM(S): at 13:44

## 2020-07-24 RX ADMIN — Medication 20 MILLIGRAM(S): at 06:23

## 2020-07-24 RX ADMIN — CEFEPIME 100 MILLIGRAM(S): 1 INJECTION, POWDER, FOR SOLUTION INTRAMUSCULAR; INTRAVENOUS at 13:43

## 2020-07-24 RX ADMIN — NYSTATIN CREAM 1 APPLICATION(S): 100000 CREAM TOPICAL at 06:24

## 2020-07-24 RX ADMIN — Medication 3 MILLILITER(S): at 23:17

## 2020-07-24 RX ADMIN — Medication 1 APPLICATION(S): at 06:24

## 2020-07-24 RX ADMIN — Medication 50 MICROGRAM(S): at 06:24

## 2020-07-24 NOTE — PROGRESS NOTE ADULT - ATTENDING COMMENTS
Will continue to follow     Thomas Esquivel DO  Cell: [phone]  Pager [phone]  Infectious Disease Attending  After 5pm/weekends call [phone]
Will continue to follow     Thomas Esquivel DO  Cell: [phone]  Pager [phone]  Infectious Disease Attending  After 5pm/weekends call [phone]
Will continue to follow   Thank you for this very interesting consult     Thomas Esquivel DO  Cell: [phone]  Pager [phone]  Infectious Disease Attending  After 5pm/weekends call [phone]
discussed with son Tommy [phone]

## 2020-07-25 LAB
INR BLD: 2.57 RATIO — HIGH (ref 0.88–1.16)
PROTHROM AB SERPL-ACNC: 27.5 SEC — HIGH (ref 10.6–13.6)

## 2020-07-25 RX ORDER — WARFARIN SODIUM 2.5 MG/1
3 TABLET ORAL ONCE
Refills: 0 | Status: COMPLETED | OUTPATIENT
Start: 2020-07-25 | End: 2020-07-25

## 2020-07-25 RX ADMIN — Medication 120 MILLIGRAM(S): at 06:21

## 2020-07-25 RX ADMIN — WARFARIN SODIUM 3 MILLIGRAM(S): 2.5 TABLET ORAL at 22:24

## 2020-07-25 RX ADMIN — NYSTATIN CREAM 1 APPLICATION(S): 100000 CREAM TOPICAL at 18:00

## 2020-07-25 RX ADMIN — Medication 3 MILLILITER(S): at 17:41

## 2020-07-25 RX ADMIN — Medication 0.25 MILLIGRAM(S): at 00:16

## 2020-07-25 RX ADMIN — Medication 3 MILLILITER(S): at 11:15

## 2020-07-25 RX ADMIN — NYSTATIN CREAM 1 APPLICATION(S): 100000 CREAM TOPICAL at 06:20

## 2020-07-25 RX ADMIN — Medication 20 MILLIGRAM(S): at 06:21

## 2020-07-25 RX ADMIN — Medication 1 APPLICATION(S): at 18:00

## 2020-07-25 RX ADMIN — PANTOPRAZOLE SODIUM 40 MILLIGRAM(S): 20 TABLET, DELAYED RELEASE ORAL at 08:53

## 2020-07-25 RX ADMIN — Medication 1 APPLICATION(S): at 06:20

## 2020-07-25 RX ADMIN — Medication 50 MICROGRAM(S): at 06:21

## 2020-07-25 RX ADMIN — Medication 3 MILLILITER(S): at 05:39

## 2020-07-26 LAB
CULTURE RESULTS: SIGNIFICANT CHANGE UP
CULTURE RESULTS: SIGNIFICANT CHANGE UP
FOLATE SERPL-MCNC: 14 NG/ML — SIGNIFICANT CHANGE UP
INR BLD: 3.11 RATIO — HIGH (ref 0.88–1.16)
PROTHROM AB SERPL-ACNC: 32.7 SEC — HIGH (ref 10.6–13.6)
SPECIMEN SOURCE: SIGNIFICANT CHANGE UP
SPECIMEN SOURCE: SIGNIFICANT CHANGE UP
VIT B12 SERPL-MCNC: 1144 PG/ML — SIGNIFICANT CHANGE UP (ref 232–1245)

## 2020-07-26 RX ORDER — WARFARIN SODIUM 2.5 MG/1
3 TABLET ORAL ONCE
Refills: 0 | Status: DISCONTINUED | OUTPATIENT
Start: 2020-07-26 | End: 2020-07-26

## 2020-07-26 RX ADMIN — Medication 3 MILLILITER(S): at 17:03

## 2020-07-26 RX ADMIN — NYSTATIN CREAM 1 APPLICATION(S): 100000 CREAM TOPICAL at 17:12

## 2020-07-26 RX ADMIN — NYSTATIN CREAM 1 APPLICATION(S): 100000 CREAM TOPICAL at 06:04

## 2020-07-26 RX ADMIN — Medication 30 MILLIGRAM(S): at 06:05

## 2020-07-26 RX ADMIN — Medication 3 MILLILITER(S): at 05:09

## 2020-07-26 RX ADMIN — Medication 3 MILLILITER(S): at 11:03

## 2020-07-26 RX ADMIN — Medication 0.25 MILLIGRAM(S): at 00:02

## 2020-07-26 RX ADMIN — Medication 50 MICROGRAM(S): at 06:04

## 2020-07-26 RX ADMIN — Medication 120 MILLIGRAM(S): at 06:04

## 2020-07-26 RX ADMIN — Medication 1 APPLICATION(S): at 17:11

## 2020-07-26 RX ADMIN — Medication 3 MILLILITER(S): at 00:09

## 2020-07-26 RX ADMIN — Medication 1 APPLICATION(S): at 06:04

## 2020-07-26 RX ADMIN — PANTOPRAZOLE SODIUM 40 MILLIGRAM(S): 20 TABLET, DELAYED RELEASE ORAL at 06:05

## 2020-07-27 ENCOUNTER — TRANSCRIPTION ENCOUNTER (OUTPATIENT)
Age: 85
End: 2020-07-27

## 2020-07-27 VITALS — OXYGEN SATURATION: 99 %

## 2020-07-27 LAB
INR BLD: 2.75 RATIO — HIGH (ref 0.88–1.16)
PROTHROM AB SERPL-ACNC: 29.4 SEC — HIGH (ref 10.6–13.6)

## 2020-07-27 RX ORDER — WARFARIN SODIUM 2.5 MG/1
3 TABLET ORAL ONCE
Refills: 0 | Status: DISCONTINUED | OUTPATIENT
Start: 2020-07-27 | End: 2020-07-27

## 2020-07-27 RX ORDER — WARFARIN SODIUM 2.5 MG/1
1 TABLET ORAL
Qty: 0 | Refills: 0 | DISCHARGE

## 2020-07-27 RX ADMIN — Medication 0.25 MILLIGRAM(S): at 00:00

## 2020-07-27 RX ADMIN — NYSTATIN CREAM 1 APPLICATION(S): 100000 CREAM TOPICAL at 06:39

## 2020-07-27 RX ADMIN — PANTOPRAZOLE SODIUM 40 MILLIGRAM(S): 20 TABLET, DELAYED RELEASE ORAL at 06:39

## 2020-07-27 RX ADMIN — Medication 120 MILLIGRAM(S): at 06:39

## 2020-07-27 RX ADMIN — Medication 30 MILLIGRAM(S): at 06:39

## 2020-07-27 RX ADMIN — Medication 3 MILLILITER(S): at 11:29

## 2020-07-27 RX ADMIN — Medication 3 MILLILITER(S): at 00:26

## 2020-07-27 RX ADMIN — Medication 3 MILLILITER(S): at 05:26

## 2020-07-27 RX ADMIN — Medication 50 MICROGRAM(S): at 06:39

## 2020-07-27 RX ADMIN — Medication 1 APPLICATION(S): at 06:39

## 2020-07-27 NOTE — PROGRESS NOTE ADULT - REASON FOR ADMISSION
Fever and cough.

## 2020-07-27 NOTE — DISCHARGE NOTE PROVIDER - NSDCMRMEDTOKEN_GEN_ALL_CORE_FT
ALPRAZolam 0.25 mg oral tablet: 1 tab(s) orally once a day (at bedtime), As Needed  bisacodyl 5 mg oral delayed release tablet: 1 tab(s) orally once a day, As needed, Constipation  Coumadin 3 mg oral tablet: 1 tab(s) orally once a day  DilTIAZem Hydrochloride  mg/24 hours oral capsule, extended release: 1 cap(s) orally once a day  furosemide 40 mg oral tablet: 1 tab(s) orally once a day  ipratropium-albuterol 0.5 mg-2.5 mg/3 mLinhalation solution: 3 milliliter(s) inhaled every 6 hours  losartan 50 mg oral tablet: 1 tab(s) orally once a day  Multiple Vitamins oral tablet: 1 tab(s) orally once a day  pantoprazole 40 mg oral delayed release tablet: 1 tab(s) orally once a day (before a meal)  potassium chloride 10 mEq oral tablet, extended release: 1 tab(s) orally once a day  predniSONE 10 mg oral tablet: 3 tab(s) orally once a day x 3 days, then 2 tablets x 3 days, then 1 tablet daily x 3 days.  sildenafil 20 mg oral tablet: 1 tab(s) orally every 8 hours  Synthroid: 50  orally once a day

## 2020-07-27 NOTE — PROGRESS NOTE ADULT - PROBLEM SELECTOR PLAN 5
-unclear if any known exposures to COVID19  -She has had several negative covid swabs in last 2 months  -Repeat COVID PCR negative, isolation discontinued
BP borderline, monitor

## 2020-07-27 NOTE — PROGRESS NOTE ADULT - SUBJECTIVE AND OBJECTIVE BOX
Follow Up:  ID following for pneumonia     Interval History: patient seen and examined. COVID negative. She reports feeling "so-so." Overall getting better.       ROS:    All other systems negative    Constitutional: no fever, no chills  Head: no trauma  Eyes: no vision changes, no eye pain  ENT:  no sore throat, no rhinorrhea  Cardiovascular:  no chest pain, no palpitation  Respiratory:  no SOB, +cough  GI:  no abd pain, no vomiting, no diarrhea  urinary: no dysuria, no hematuria, no flank pain  musculoskeletal:  no joint pain, no joint swelling  skin:  no rash  neurology:  no headache, no seizure, no change in mental status  psych: no anxiety, no depression         Allergies  No Known Allergies      cefepime   IVPB 1000 every 24 hours      MEDICATIONS  (STANDING):  albuterol/ipratropium for Nebulization 3 every 6 hours  diltiazem    daily  levothyroxine 50 daily  methylPREDNISolone sodium succinate Injectable 20 every 8 hours  pantoprazole    Tablet 40 before breakfast  warfarin 3 once      PRN      Vital Signs Last 24 Hrs  T(F): 98.8 (07-23-20 @ 16:08), Max: 98.9 (07-23-20 @ 11:28)  HR: 94 (07-23-20 @ 16:08)  BP: 120/72 (07-23-20 @ 16:08)  RR: 18 (07-23-20 @ 16:08)  SpO2: 97% (07-23-20 @ 16:08) (93% - 97%)  Wt(kg): --        Physical Exam:  General:    NAD,  non toxic  Head: atraumatic, normocephalic  Eye: normal sclera and conjunctiva  ENT:    no oral lesions, neck supple, tenderness in back of neck   Cardio:     regular S1, S2,  + murmur  Respiratory:    breath sounds more clear b/l,    no wheezing  abd:     soft,   BS +,   no tenderness  :   no CVAT,  no suprapubic tenderness,   no  nogueira  Musculoskeletal:   no joint swelling,   no edema  vascular: no central lines, +PIV   Skin:    no rash  Neurologic:     no focal deficit  psych: normal affect    WBC Count: 11.31 K/uL (07-23 @ 09:43)  WBC Count: 9.93 K/uL (07-22 @ 07:00)  WBC Count: 14.24 K/uL (07-20 @ 22:47)                            11.3   11.31 )-----------( 309      ( 23 Jul 2020 09:43 )             34.3       07-23    139  |  102  |  50<H>  ----------------------------<  216<H>  3.8   |  29  |  1.43<H>    Ca    8.6      23 Jul 2020 09:43    TPro  7.2  /  Alb  2.5<L>  /  TBili  0.4  /  DBili  x   /  AST  31  /  ALT  34  /  AlkPhos  71  07-23      Creatinine Trend: 1.43<--, 1.47<--, 1.56<--        MICROBIOLOGY:  Vancomycin Level, Random: 7.4 ug/mL (07-22-20 @ 07:00)  v    Culture - Urine (07.21.20 @ 10:01)    -  Gentamicin: S <=2    -  Imipenem: S <=1    -  Levofloxacin: S <=0.5    -  Meropenem: S <=1    -  Nitrofurantoin: S <=32 Should not be used to treat pyelonephritis    -  Piperacillin/Tazobactam: S <=8    -  Tigecycline: S <=2    -  Tobramycin: S <=2    -  Trimethoprim/Sulfamethoxazole: S <=0.5/9.5    -  Amikacin: S <=16    -  Amoxicillin/Clavulanic Acid: S <=8/4    -  Ampicillin: S <=8 These ampicillin results predict results for amoxicillin    -  Ampicillin/Sulbactam: S <=4/2 Enterobacter, Citrobacter, and Serratia may develop resistance during prolonged therapy (3-4 days)    -  Aztreonam: S <=4    -  Cefazolin: S <=2 (MIC_CL_COM_ENTERIC_CEFAZU) For uncomplicated UTI with K. pneumoniae, E. coli, or P. mirablis: COURTNEY <=16 is sensitive and COURTNEY >=32 is resistant. This also predicts results for oral agents cefaclor, cefdinir, cefpodoxime, cefprozil, cefuroxime axetil, cephalexin and locarbef for uncomplicated UTI. Note that some isolates may be susceptible to these agents while testing resistant to cefazolin.    -  Cefepime: S <=2    -  Cefoxitin: S <=8    -  Ceftriaxone: S <=1 Enterobacter, Citrobacter, and Serratia may develop resistance during prolonged therapy    -  Ciprofloxacin: S <=0.25    -  Ertapenem: S <=0.5    Specimen Source: .Urine Clean Catch (Midstream)    Culture Results:   10,000 - 49,000 CFU/mL Escherichia coli    Organism Identification: Escherichia coli    Organism: Escherichia coli    Method Type: COURTNEY          .Blood Blood-Peripheral  07-21-20   No growth to date.  --  --    RADIOLOGY:     Xray Chest 1 View-PORTABLE IMMEDIATE (07.20.20 @ 22:36)  IMPRESSION: Prior CHF findings have largely cleared.      CT Chest No Cont (07.20.20 @ 23:36)  IMPRESSION:   Small right pleural effusion. Groundglass opacities in the lungs may reflect areas of air trapping versus infection.    No acute abdominal pathology.    I personally reviewed the above imaging and agree with findings     < from: TTE Echo Complete w/o contrast w/ Doppler (06.01.20 @ 10:59) >  Summary:   1. Left ventricular ejection fraction, by visual estimation, is 55 to 60%.   2. Technically suboptimal study.   3. [place] not ell visualized in any view. Consider contrast echo, if clinically warranted for further evaluation.   4. Degenerative mitral valve.   5. Mild mitral annular calcification.   6. Mild mitral valve regurgitation.   7. Degenerative tricuspid valve.   8. Moderate tricuspid regurgitation.   9. Aortic valve is sclerotic with decreased cusp excursion.  10. Mild aortic regurgitation.  11. Estimated pulmonary artery systolic pressure is 67.7 mmHg assuming a right atrial pressure of 15 mmHg, which is consistent with severe pulmonary hypertension.  12. Peak transaortic gradient equals 16.9 mmHg, mean transaortic gradient equals 8.6 mmHg, the calculated aortic valve area equals 1.03 cm² by the continuity equation consistent with moderate aortic stenosis.    < end of copied text >
Follow Up:  ID following for pneumonia     Interval History: patient seen and examined. COVID negative. She reports feeling "so-so." Has a dry cough. Doesnt feel short of breath but does feel very tired and weak.      ROS:    All other systems negative    Constitutional: no fever, no chills  Head: no trauma  Eyes: no vision changes, no eye pain  ENT:  no sore throat, no rhinorrhea  Cardiovascular:  no chest pain, no palpitation  Respiratory:  no SOB, +cough  GI:  no abd pain, no vomiting, no diarrhea  urinary: no dysuria, no hematuria, no flank pain  musculoskeletal:  no joint pain, no joint swelling  skin:  no rash  neurology:  no headache, no seizure, no change in mental status  psych: no anxiety, no depression         Allergies  No Known Allergies        ANTIMICROBIALS:  cefepime   IVPB 1000 every 24 hours  vancomycin  IVPB 1000 once      OTHER MEDS:  albuterol/ipratropium for Nebulization 3 milliLiter(s) Nebulizer every 6 hours  clotrimazole 1% Cream 1 Application(s) Topical two times a day  levothyroxine 50 MICROGram(s) Oral daily  methylPREDNISolone sodium succinate Injectable 40 milliGRAM(s) IV Push every 8 hours  nystatin Cream 1 Application(s) Topical two times a day  pantoprazole    Tablet 40 milliGRAM(s) Oral before breakfast  warfarin 3 milliGRAM(s) Oral once      Vital Signs Last 24 Hrs  T(C): 36.5 (22 Jul 2020 10:45), Max: 37.1 (21 Jul 2020 17:01)  T(F): 97.7 (22 Jul 2020 10:45), Max: 98.7 (21 Jul 2020 17:01)  HR: 70 (22 Jul 2020 11:19) (63 - 86)  BP: 117/63 (22 Jul 2020 10:45) (115/68 - 134/75)  BP(mean): --  RR: 18 (22 Jul 2020 10:45) (18 - 18)  SpO2: 93% (22 Jul 2020 11:19) (93% - 99%)    Physical Exam:  General:    NAD,  non toxic  Head: atraumatic, normocephalic  Eye: normal sclera and conjunctiva  ENT:    no oral lesions, neck supple, tenderness in back of neck   Cardio:     regular S1, S2,  + murmur  Respiratory:    crackles b/l,    no wheezing  abd:     soft,   BS +,   no tenderness  :   no CVAT,  no suprapubic tenderness,   no  nogueira  Musculoskeletal:   no joint swelling,   no edema  vascular: no central lines, +PIV   Skin:    no rash  Neurologic:     no focal deficit  psych: normal affect    WBC Count: 9.93 K/uL (07-22 @ 07:00)  WBC Count: 14.24 K/uL (07-20 @ 22:47)                            11.0   9.93  )-----------( 274      ( 22 Jul 2020 07:00 )             33.7       07-22    139  |  102  |  39<H>  ----------------------------<  167<H>  3.9   |  30  |  1.47<H>    Ca    8.8      22 Jul 2020 07:00    TPro  8.1  /  Alb  2.8<L>  /  TBili  0.7  /  DBili  x   /  AST  49<H>  /  ALT  31  /  AlkPhos  98  07-20      Creatinine Trend: 1.47<--, 1.56<--  Lactate, Blood: 0.9 mmol/L (07-20-20 @ 22:45)      MICROBIOLOGY:  Vancomycin Level, Random: 7.4 ug/mL (07-22-20 @ 07:00)  v  .Urine Clean Catch (Midstream)  07-21-20   10,000 - 49,000 CFU/mL Gram Negative Rods  --  --      .Blood Blood-Peripheral  07-21-20   No growth to date.  --  --    RADIOLOGY:     Xray Chest 1 View-PORTABLE IMMEDIATE (07.20.20 @ 22:36)  IMPRESSION: Prior CHF findings have largely cleared.      CT Chest No Cont (07.20.20 @ 23:36)  IMPRESSION:   Small right pleural effusion. Groundglass opacities in the lungs may reflect areas of air trapping versus infection.    No acute abdominal pathology.    I personally reviewed the above imaging and agree with findings     < from: TTE Echo Complete w/o contrast w/ Doppler (06.01.20 @ 10:59) >  Summary:   1. Left ventricular ejection fraction, by visual estimation, is 55 to 60%.   2. Technically suboptimal study.   3. [place] not ell visualized in any view. Consider contrast echo, if clinically warranted for further evaluation.   4. Degenerative mitral valve.   5. Mild mitral annular calcification.   6. Mild mitral valve regurgitation.   7. Degenerative tricuspid valve.   8. Moderate tricuspid regurgitation.   9. Aortic valve is sclerotic with decreased cusp excursion.  10. Mild aortic regurgitation.  11. Estimated pulmonary artery systolic pressure is 67.7 mmHg assuming a right atrial pressure of 15 mmHg, which is consistent with severe pulmonary hypertension.  12. Peak transaortic gradient equals 16.9 mmHg, mean transaortic gradient equals 8.6 mmHg, the calculated aortic valve area equals 1.03 cm² by the continuity equation consistent with moderate aortic stenosis.    < end of copied text >
Follow Up:  ID following for pneumonia     Interval History: patient seen and examined. COVID negative. She reports feeling "so-so." Overall getting better.   continues to report angelo very tired.     ROS:    All other systems negative    Constitutional: no fever, no chills  Head: no trauma  Eyes: no vision changes, no eye pain  ENT:  no sore throat, no rhinorrhea  Cardiovascular:  no chest pain, no palpitation  Respiratory:  no SOB, +cough  GI:  no abd pain, no vomiting, no diarrhea  urinary: no dysuria, no hematuria, no flank pain  musculoskeletal:  no joint pain, no joint swelling  skin:  no rash  neurology:  no headache, no seizure, no change in mental status  psych: no anxiety, no depression         Allergies  No Known Allergies      cefepime   IVPB 1000 every 24 hours      MEDICATIONS  (STANDING):  albuterol/ipratropium for Nebulization 3 every 6 hours  diltiazem    daily  levothyroxine 50 daily  methylPREDNISolone sodium succinate Injectable 20 every 8 hours  pantoprazole    Tablet 40 before breakfast  warfarin 3 once      PRN      Vital Signs Last 24 Hrs  T(F): 97.5 (07-24-20 @ 10:50), Max: 97.9 (07-24-20 @ 04:49)  HR: 85 (07-24-20 @ 11:54)  BP: 118/58 (07-24-20 @ 11:30)  RR: 18 (07-24-20 @ 11:30)  SpO2: 98% (07-24-20 @ 11:54) (93% - 98%)  Wt(kg): --    PRN      Vital Signs Last 24 Hrs  T(F): 98.8 (07-23-20 @ 16:08), Max: 98.9 (07-23-20 @ 11:28)  HR: 94 (07-23-20 @ 16:08)  BP: 120/72 (07-23-20 @ 16:08)  RR: 18 (07-23-20 @ 16:08)  SpO2: 97% (07-23-20 @ 16:08) (93% - 97%)  Wt(kg): --        Physical Exam:  General:    NAD,  non toxic  Head: atraumatic, normocephalic  Eye: normal sclera and conjunctiva  ENT:    no oral lesions, neck supple, tenderness in back of neck   Cardio:     regular S1, S2,  + murmur  Respiratory:    breath sounds more clear b/l but with faint crackles,    no wheezing  abd:     soft,   BS +,   no tenderness  :   no CVAT,  no suprapubic tenderness,   no  nogueira  Musculoskeletal:   no joint swelling,   no edema  vascular: no central lines, +PIV   Skin:    no rash  Neurologic:     no focal deficit  psych: normal affect    WBC Count: 10.52 K/uL (07-24 @ 07:24)  WBC Count: 11.31 K/uL (07-23 @ 09:43)  WBC Count: 9.93 K/uL (07-22 @ 07:00)  WBC Count: 14.24 K/uL (07-20 @ 22:47)                            10.2   10.52 )-----------( 295      ( 24 Jul 2020 07:24 )             30.6       07-24    137  |  101  |  52<H>  ----------------------------<  134<H>  4.1   |  29  |  1.24    Ca    8.5      24 Jul 2020 07:24    TPro  6.8  /  Alb  2.5<L>  /  TBili  0.3  /  DBili  x   /  AST  38<H>  /  ALT  40  /  AlkPhos  66  07-24      Creatinine Trend: 1.24<--, 1.43<--, 1.47<--, 1.56<--          MICROBIOLOGY:  Vancomycin Level, Random: 7.4 ug/mL (07-22-20 @ 07:00)  v    Culture - Urine (07.21.20 @ 10:01)    -  Gentamicin: S <=2    -  Imipenem: S <=1    -  Levofloxacin: S <=0.5    -  Meropenem: S <=1    -  Nitrofurantoin: S <=32 Should not be used to treat pyelonephritis    -  Piperacillin/Tazobactam: S <=8    -  Tigecycline: S <=2    -  Tobramycin: S <=2    -  Trimethoprim/Sulfamethoxazole: S <=0.5/9.5    -  Amikacin: S <=16    -  Amoxicillin/Clavulanic Acid: S <=8/4    -  Ampicillin: S <=8 These ampicillin results predict results for amoxicillin    -  Ampicillin/Sulbactam: S <=4/2 Enterobacter, Citrobacter, and Serratia may develop resistance during prolonged therapy (3-4 days)    -  Aztreonam: S <=4    -  Cefazolin: S <=2 (MIC_CL_COM_ENTERIC_CEFAZU) For uncomplicated UTI with K. pneumoniae, E. coli, or P. mirablis: COURTNEY <=16 is sensitive and COURTNEY >=32 is resistant. This also predicts results for oral agents cefaclor, cefdinir, cefpodoxime, cefprozil, cefuroxime axetil, cephalexin and locarbef for uncomplicated UTI. Note that some isolates may be susceptible to these agents while testing resistant to cefazolin.    -  Cefepime: S <=2    -  Cefoxitin: S <=8    -  Ceftriaxone: S <=1 Enterobacter, Citrobacter, and Serratia may develop resistance during prolonged therapy    -  Ciprofloxacin: S <=0.25    -  Ertapenem: S <=0.5    Specimen Source: .Urine Clean Catch (Midstream)    Culture Results:   10,000 - 49,000 CFU/mL Escherichia coli    Organism Identification: Escherichia coli    Organism: Escherichia coli    Method Type: COURTNEY          .Blood Blood-Peripheral  07-21-20   No growth to date.  --  --    RADIOLOGY:     Xray Chest 1 View-PORTABLE IMMEDIATE (07.20.20 @ 22:36)  IMPRESSION: Prior CHF findings have largely cleared.      CT Chest No Cont (07.20.20 @ 23:36)  IMPRESSION:   Small right pleural effusion. Groundglass opacities in the lungs may reflect areas of air trapping versus infection.    No acute abdominal pathology.    I personally reviewed the above imaging and agree with findings     < from: TTE Echo Complete w/o contrast w/ Doppler (06.01.20 @ 10:59) >  Summary:   1. Left ventricular ejection fraction, by visual estimation, is 55 to 60%.   2. Technically suboptimal study.   3. [place] not ell visualized in any view. Consider contrast echo, if clinically warranted for further evaluation.   4. Degenerative mitral valve.   5. Mild mitral annular calcification.   6. Mild mitral valve regurgitation.   7. Degenerative tricuspid valve.   8. Moderate tricuspid regurgitation.   9. Aortic valve is sclerotic with decreased cusp excursion.  10. Mild aortic regurgitation.  11. Estimated pulmonary artery systolic pressure is 67.7 mmHg assuming a right atrial pressure of 15 mmHg, which is consistent with severe pulmonary hypertension.  12. Peak transaortic gradient equals 16.9 mmHg, mean transaortic gradient equals 8.6 mmHg, the calculated aortic valve area equals 1.03 cm² by the continuity equation consistent with moderate aortic stenosis.    < end of copied text >
HPI:  89 y/o female from home with pmh HTN, COPD, ? valve surgery, hypothyroid, afib on coumadin presents with fever.  Family reports pt was dc home ~ 1.5 weeks ago. Pt has been doing well and on O2 24hrs, however noted fever last night and tonight, pt c/o cough and sob. Given tylenol. Pt is AOX2 but answering questions appropriately. Pt states some sob and cough. She denies cp, palpitations, headache, dizziness, abd pain, N/V/D, dysuria. States she has joint pain from her arthritis. Of note, pt was recently admitted 1 month ago for CHF/PNA.   pmd: [facility] dr sony valdes [phone], cardio: dr iman juares [phone]  meds: lasix,  potassium, losartan, levothyroxine, pantoprazole, prevatio, coumadin, xanax, dilitiazem, duoneb, (21 Jul 2020 03:02)      PAST MEDICAL & SURGICAL HISTORY:  Essential hypertension  Hypothyroidism  Atrial fibrillation  COPD exacerbation  No significant past surgical history      REVIEW OF SYSTEMS:    CONSTITUTIONAL: No fever, weight loss, or fatigue  EYES: No eye pain, visual disturbances, or discharge  ENMT:  No difficulty hearing, tinnitus, vertigo; No sinus or throat pain  NECK: No pain or stiffness  BREASTS: No pain, masses, or nipple discharge  RESPIRATORY: No cough, wheezing, chills or hemoptysis; No shortness of breath  CARDIOVASCULAR: No chest pain, palpitations, dizziness, or leg swelling  GASTROINTESTINAL: No abdominal or epigastric pain. No nausea, vomiting, or hematemesis; No diarrhea or constipation. No melena or hematochezia.  GENITOURINARY: No dysuria, frequency, hematuria, or incontinence  NEUROLOGICAL: No headaches, memory loss, loss of strength, numbness, or tremors  SKIN: No itching, burning, rashes, or lesions   LYMPH NODES: No enlarged glands  ENDOCRINE: No heat or cold intolerance; No hair loss  MUSCULOSKELETAL: No joint pain or swelling; No muscle, back, or extremity pain  PSYCHIATRIC: No depression, anxiety, mood swings, or difficulty sleeping  HEME/LYMPH: No easy bruising, or bleeding gums  ALLERGY AND IMMUNOLOGIC: No hives or eczema      MEDICATIONS  (STANDING):  albuterol/ipratropium for Nebulization 3 milliLiter(s) Nebulizer every 6 hours  cefepime   IVPB 1000 milliGRAM(s) IV Intermittent every 24 hours  clotrimazole 1% Cream 1 Application(s) Topical two times a day  levothyroxine 50 MICROGram(s) Oral daily  methylPREDNISolone sodium succinate Injectable 40 milliGRAM(s) IV Push every 8 hours  nystatin Cream 1 Application(s) Topical two times a day  pantoprazole    Tablet 40 milliGRAM(s) Oral before breakfast    MEDICATIONS  (PRN):      Allergies    No Known Allergies    Intolerances        SOCIAL HISTORY:    FAMILY HISTORY:  No pertinent family history in first degree relatives      Vital Signs Last 24 Hrs  T(C): 36.5 (22 Jul 2020 10:45), Max: 37.1 (21 Jul 2020 17:01)  T(F): 97.7 (22 Jul 2020 10:45), Max: 98.7 (21 Jul 2020 17:01)  HR: 70 (22 Jul 2020 11:19) (63 - 86)  BP: 117/63 (22 Jul 2020 10:45) (115/68 - 134/75)  BP(mean): --  RR: 18 (22 Jul 2020 10:45) (18 - 18)  SpO2: 93% (22 Jul 2020 11:19) (93% - 99%)    PHYSICAL EXAM:    GENERAL: NAD, well-groomed, well-developed  HEAD:  Atraumatic, Normocephalic  EYES: EOMI, PERRL, conjunctiva and sclera clear  ENMT: No tonsillar erythema, exudates, or enlargement; Moist mucous membranes, Good dentition, No lesions  NECK: Supple, No JVD, Normal thyroid  NERVOUS SYSTEM:  Alert & Oriented X3, Good concentration; Motor Strength 5/5 B/L upper and lower extremities; DTRs 2+ intact and symmetric  CHEST/LUNG: Clear to percussion bilaterally; No rales, rhonchi, wheezing, or rubs  HEART: Regular rate and rhythm; No murmurs, rubs, or gallops  ABDOMEN: Soft, Nontender, Nondistended; Bowel sounds present  EXTREMITIES:  2+ Peripheral Pulses, No clubbing, cyanosis, or edema  LYMPH: No lymphadenopathy noted SKIN: No rashes or lesions      LABS:                        11.0   9.93  )-----------( 274      ( 22 Jul 2020 07:00 )             33.7     07-22    139  |  102  |  39<H>  ----------------------------<  167<H>  3.9   |  30  |  1.47<H>    Ca    8.8      22 Jul 2020 07:00    TPro  8.1  /  Alb  2.8<L>  /  TBili  0.7  /  DBili  x   /  AST  49<H>  /  ALT  31  /  AlkPhos  98  07-20    PT/INR - ( 22 Jul 2020 07:00 )   PT: 29.1 sec;   INR: 2.75 ratio         PTT - ( 20 Jul 2020 22:47 )  PTT:34.3 sec      Culture - Urine (collected 21 Jul 2020 10:01)  Source: .Urine Clean Catch (Midstream)  Preliminary Report (22 Jul 2020 08:50):    10,000 - 49,000 CFU/mL Gram Negative Rods    Culture - Blood (collected 21 Jul 2020 08:50)  Source: .Blood Blood-Peripheral  Preliminary Report (22 Jul 2020 09:01):    No growth to date.    Culture - Blood (collected 21 Jul 2020 08:50)  Source: .Blood Blood-Peripheral  Preliminary Report (22 Jul 2020 09:01):    No growth to date.      RADIOLOGY & ADDITIONAL STUDIES:  < from: CT Chest No Cont (07.20.20 @ 23:36) >    EXAM:  CT ABDOMEN AND PELVIS                          EXAM:  CT CHEST                            PROCEDURE DATE:  07/20/2020          INTERPRETATION:  CLINICAL INFORMATION: Fever. Cough. Right-sided abdominal pain.    COMPARISON: None.    PROCEDURE:   CT of the Chest, Abdomen and Pelvis was performed without intravenous contrast.   Intravenous contrast: None.  Oral contrast: None.  Sagittal and coronal reformats were performed.    FINDINGS: Evaluation is limited by lack of IV contrast.  CHEST:   LUNGS AND LARGE AIRWAYS: Patent central airways. No pulmonary nodules. Patchy areas of groundglass opacities bilaterally.  PLEURA: Small right pleural effusion. Trace left pleural fluid.  VESSELS: Within normal limits.  HEART: Cardiomegaly. Status post CABG. No pericardial effusion.  MEDIASTINUM AND URBAN: No lymphadenopathy.  CHEST WALL AND LOWER NECK: Within normal limits.    ABDOMEN AND PELVIS:  LIVER: Within normal limits.  BILE DUCTS: Normal caliber.  GALLBLADDER: Within normal limits.  SPLEEN: Within normal limits.  PANCREAS: Within normal limits.  ADRENALS: Within normal limits.  KIDNEYS/URETERS: Bilateral renal cysts.    BLADDER: Within normal limits.  REPRODUCTIVE ORGANS: Hysterectomy.    BOWEL: No bowel obstruction. Appendix is not visualized. No evidence of inflammation in the pericecal region.  PERITONEUM: No ascites.  VESSELS: Atherosclerotic changes.  RETROPERITONEUM/LYMPH NODES: No lymphadenopathy.    ABDOMINAL WALL: Within normal limits.  BONES: Degenerative changes. Grade1 anterolisthesis of L4 on L5. Mild superior endplate compression deformity of L3, age indeterminate.    IMPRESSION:   Small right pleural effusion. Groundglass opacities in the lungs may reflect areas of air trapping versus infection.    No acute abdominal pathology.                TANJA MENEZES M.D., ATTENDING RADIOLOGIST  This document has been electronically signed. Jul 21 2020 12:08AM                < end of copied text >
INTERVAL HPI:  89 WF with HTN, COPD on O2, Diastolic CHF, Severe pHTN, Hypothyroidism ,A fib on Coumadin, Valve replacement.,  Recent admission hypoxic hypercarbic Respiratory failure requiring BIPAP support.   Was recently discharged from [facility] and is reported to be well on nasal O2.  Brought to ED with fever of 2 days ( up to 101).  Pt also reports having cough and SOB.    OVERNIGHT EVENTS:  Pulmonary status better.    Vital Signs Last 24 Hrs  T(C): 36.8 (27 Jul 2020 10:45), Max: 36.8 (27 Jul 2020 10:45)  T(F): 98.3 (27 Jul 2020 10:45), Max: 98.3 (27 Jul 2020 10:45)  HR: 78 (27 Jul 2020 11:32) (78 - 88)  BP: 133/74 (27 Jul 2020 10:45) (122/81 - 144/79)  BP(mean): --  RR: 18 (27 Jul 2020 10:45) (18 - 18)  SpO2: 99% (27 Jul 2020 11:32) (90% - 99%)    PHYSICAL EXAM:  GEN:         Awake, responsive and comfortable.  HEENT:    Normal.    RESP:       no distress reported.  CVS:           Regular rate and rhythm.   ABD:         Soft, non-tender, non-distended;     MEDICATIONS  (STANDING):  albuterol/ipratropium for Nebulization 3 milliLiter(s) Nebulizer every 6 hours  clotrimazole 1% Cream 1 Application(s) Topical two times a day  diltiazem    milliGRAM(s) Oral daily  levothyroxine 50 MICROGram(s) Oral daily  nystatin Cream 1 Application(s) Topical two times a day  pantoprazole    Tablet 40 milliGRAM(s) Oral before breakfast  predniSONE   Tablet 30 milliGRAM(s) Oral daily  warfarin 3 milliGRAM(s) Oral once    MEDICATIONS  (PRN):  ALPRAZolam 0.25 milliGRAM(s) Oral at bedtime PRN restlessness, anxiety, agitation    LABS:    PT/INR - ( 27 Jul 2020 10:26 )   PT: 29.4 sec;   INR: 2.75 ratio      07-20 @ 22:30  pH: 7.49  pCO2: 40  pO2: 57  SaO2: 87    ASSESSMENT AND PLAN:  ·	SOB.  ·	Possible Pneumonia.  ·	Leukocytosis.  ·	Chronic hypoxic hypercarbic  Respiratory failure.  ·	Acute COPD exacerbation.  ·	Chronic diastolic CHF,  ·	Severe pHTN  ·	Renal Insuffiencey.  ·	Chronic A Fib.  ·	Hypothyroidism.    Pulmonary status better.  Taper steroids over 5-7 days.  Nebulizer as needed.  On O2 and NIV at home.  Discussed with daughter via phone, advised to encourage for nocturnal NIV use.
INTERVAL HPI:  WF with HTN, COPD on O2, Diastolic CHF, Severe pHTN, Hypothyroidism ,A fib on Coumadin, Valve replacement.,  Recent admission hypoxic hypercarbic Respiratory failure requiring BIPAP support.   Was recently discharged from [facility] and is reported to be well on nasal O2.  Brought to ED with fever of 2 days ( up to 101).  Pt also reports having cough and SOB.    OVERNIGHT EVENTS:  More comfortable looking.    Vital Signs Last 24 Hrs  T(C): 36.5 (22 Jul 2020 10:45), Max: 37.1 (21 Jul 2020 17:01)  T(F): 97.7 (22 Jul 2020 10:45), Max: 98.7 (21 Jul 2020 17:01)  HR: 70 (22 Jul 2020 11:19) (63 - 86)  BP: 117/63 (22 Jul 2020 10:45) (115/68 - 134/75)  BP(mean): --  RR: 18 (22 Jul 2020 10:45) (18 - 18)  SpO2: 93% (22 Jul 2020 11:19) (93% - 99%)    PHYSICAL EXAM:  GEN:         Awake, responsive and comfortable.  HEENT:    Normal.    RESP:       no wheezing.  CVS:          Regular rate and rhythm.   ABD:         Soft, non-tender, non-distended;     MEDICATIONS  (STANDING):  albuterol/ipratropium for Nebulization 3 milliLiter(s) Nebulizer every 6 hours  cefepime   IVPB 1000 milliGRAM(s) IV Intermittent every 24 hours  clotrimazole 1% Cream 1 Application(s) Topical two times a day  levothyroxine 50 MICROGram(s) Oral daily  methylPREDNISolone sodium succinate Injectable 40 milliGRAM(s) IV Push every 8 hours  nystatin Cream 1 Application(s) Topical two times a day  pantoprazole    Tablet 40 milliGRAM(s) Oral before breakfast  warfarin 3 milliGRAM(s) Oral once    LABS:                        11.0   9.93  )-----------( 274      ( 22 Jul 2020 07:00 )             33.7     07-22    139  |  102  |  39<H>  ----------------------------<  167<H>  3.9   |  30  |  1.47<H>    Ca    8.8      22 Jul 2020 07:00    TPro  8.1  /  Alb  2.8<L>  /  TBili  0.7  /  DBili  x   /  AST  49<H>  /  ALT  31  /  AlkPhos  98  07-20    PT/INR - ( 22 Jul 2020 07:00 )   PT: 29.1 sec;   INR: 2.75 ratio       PTT - ( 20 Jul 2020 22:47 )  PTT:34.3 sec  07-20 @ 22:30  pH: 7.49  pCO2: 40  pO2: 57  SaO2: 87    ASSESSMENT AND PLAN:  ·	SOB.  ·	Possible Pneumonia.  ·	Leukocytosis.  ·	Chronic hypoxic hypercarbic  Respiratory failure.  ·	Acute COPD exacerbation.  ·	Chronic diastolic CHF,  ·	Severe pHTN  ·	Renal Insuffiencey.  ·	Chronic A Fib.  ·	Hypothyroidism.    COVID PCR is negative.  Continue antibiotics and nebulizer.
INTERVAL HPI:  WF with HTN, COPD on O2, Diastolic CHF, Severe pHTN, Hypothyroidism ,A fib on Coumadin, Valve replacement.,  Recent admission hypoxic hypercarbic Respiratory failure requiring BIPAP support.   Was recently discharged from [facility] and is reported to be well on nasal O2.  Brought to ED with fever of 2 days ( up to 101).  Pt also reports having cough and SOB.    OVERNIGHT EVENTS:  Awake and comfortable    Vital Signs Last 24 Hrs  T(C): 37.1 (23 Jul 2020 04:32), Max: 37.1 (23 Jul 2020 00:26)  T(F): 98.7 (23 Jul 2020 04:32), Max: 98.7 (23 Jul 2020 00:26)  HR: 85 (23 Jul 2020 05:05) (70 - 94)  BP: 128/83 (23 Jul 2020 04:32) (126/72 - 144/72)  BP(mean): --  RR: 17 (23 Jul 2020 04:32) (17 - 17)  SpO2: 95% (23 Jul 2020 05:05) (93% - 95%)    PHYSICAL EXAM:  GEN:         Awake, responsive and comfortable.  HEENT:    Normal.    RESP:       no wheezing  CVS:           Regular rate and rhythm.   ABD:         Soft, non-tender, non-distended;     MEDICATIONS  (STANDING):  albuterol/ipratropium for Nebulization 3 milliLiter(s) Nebulizer every 6 hours  cefepime   IVPB 1000 milliGRAM(s) IV Intermittent every 24 hours  clotrimazole 1% Cream 1 Application(s) Topical two times a day  levothyroxine 50 MICROGram(s) Oral daily  methylPREDNISolone sodium succinate Injectable 40 milliGRAM(s) IV Push every 8 hours  nystatin Cream 1 Application(s) Topical two times a day  pantoprazole    Tablet 40 milliGRAM(s) Oral before breakfast  warfarin 3 milliGRAM(s) Oral once    LABS:                        11.3   11.31 )-----------( 309      ( 23 Jul 2020 09:43 )             34.3     07-23    139  |  102  |  50<H>  ----------------------------<  216<H>  3.8   |  29  |  1.43<H>    Ca    8.6      23 Jul 2020 09:43    TPro  7.2  /  Alb  2.5<L>  /  TBili  0.4  /  DBili  x   /  AST  31  /  ALT  34  /  AlkPhos  71  07-23    PT/INR - ( 23 Jul 2020 07:00 )   PT: 24.4 sec;   INR: 2.27 ratio      07-20 @ 22:30  pH: 7.49  pCO2: 40  pO2: 57  SaO2: 87  ASSESSMENT AND PLAN:  ·	SOB.  ·	Possible Pneumonia.  ·	Leukocytosis.  ·	Chronic hypoxic hypercarbic  Respiratory failure.  ·	Acute COPD exacerbation.  ·	Chronic diastolic CHF,  ·	Severe pHTN  ·	Renal Insuffiencey.  ·	Chronic A Fib.  ·	Hypothyroidism.    Pulmonary status improving..  Continue antibiotics for possible pneumonia.
INTERVAL HPI:  WF with HTN, COPD on O2, Diastolic CHF, Severe pHTN, Hypothyroidism ,A fib on Coumadin, Valve replacement.,  Recent admission hypoxic hypercarbic Respiratory failure requiring BIPAP support.   Was recently discharged from [facility] and is reported to be well on nasal O2.  Brought to ED with fever of 2 days ( up to 101).  Pt also reports having cough and SOB.    OVERNIGHT EVENTS:  Resting comfortably. RN reports no breathing issues.    Vital Signs Last 24 Hrs  T(C): 36.8 (26 Jul 2020 10:45), Max: 36.9 (25 Jul 2020 17:51)  T(F): 98.3 (26 Jul 2020 10:45), Max: 98.4 (25 Jul 2020 17:51)  HR: 76 (26 Jul 2020 11:13) (76 - 101)  BP: 123/76 (26 Jul 2020 10:45) (123/76 - 142/76)  BP(mean): --  RR: 18 (26 Jul 2020 10:45) (18 - 18)  SpO2: 94% (26 Jul 2020 11:13) (92% - 98%)    PHYSICAL EXAM:  GEN:        Resting, comfortable.  HEENT:    Normal.    RESP:        no distress  CVS:           Regular rate and rhythm.   ABD:         Soft, non-tender, non-distended;     MEDICATIONS  (STANDING):  albuterol/ipratropium for Nebulization 3 milliLiter(s) Nebulizer every 6 hours  clotrimazole 1% Cream 1 Application(s) Topical two times a day  diltiazem    milliGRAM(s) Oral daily  levoFLOXacin  Tablet 750 milliGRAM(s) Oral every 48 hours  levothyroxine 50 MICROGram(s) Oral daily  nystatin Cream 1 Application(s) Topical two times a day  pantoprazole    Tablet 40 milliGRAM(s) Oral before breakfast  predniSONE   Tablet 30 milliGRAM(s) Oral daily    MEDICATIONS  (PRN):  ALPRAZolam 0.25 milliGRAM(s) Oral at bedtime PRN restlessness, anxiety, agitation    PT/INR - ( 26 Jul 2020 08:05 )   PT: 32.7 sec;   INR: 3.11 ratio      07-20 @ 22:30  pH: 7.49  pCO2: 40  pO2: 57  SaO2: 87    ASSESSMENT AND PLAN:  ·	SOB.  ·	Possible Pneumonia.  ·	Leukocytosis.  ·	Chronic hypoxic hypercarbic  Respiratory failure.  ·	Acute COPD exacerbation.  ·	Chronic diastolic CHF,  ·	Severe pHTN  ·	Renal Insuffiencey.  ·	Chronic A Fib.  ·	Hypothyroidism.    On Steroid taper.  Continue O2, nebulizer.  Continue antibiotics.
INTERVAL HPI:  WF with HTN, COPD on O2, Diastolic CHF, Severe pHTN, Hypothyroidism ,A fib on Coumadin, Valve replacement.,  Recent admission hypoxic hypercarbic Respiratory failure requiring BIPAP support.   Was recently discharged from [facility] and is reported to be well on nasal O2.  Brought to ED with fever of 2 days ( up to 101).  Pt also reports having cough and SOB.    OVERNIGHT EVENTS:  Awake without any distress    Vital Signs Last 24 Hrs  T(C): 36.9 (25 Jul 2020 17:51), Max: 36.9 (25 Jul 2020 17:51)  T(F): 98.4 (25 Jul 2020 17:51), Max: 98.4 (25 Jul 2020 17:51)  HR: 78 (25 Jul 2020 17:51) (78 - 102)  BP: 142/76 (25 Jul 2020 17:51) (113/65 - 142/76)  BP(mean): --  RR: 18 (25 Jul 2020 17:51) (18 - 18)  SpO2: 94% (25 Jul 2020 17:51) (94% - 97%)    PHYSICAL EXAM:  GEN:         Awake, responsive and comfortable.  HEENT:    Normal.    RESP:       no distress  CVS:          Regular rate and rhythm.   ABD:         Soft, non-tender, non-distended;     MEDICATIONS  (STANDING):  albuterol/ipratropium for Nebulization 3 milliLiter(s) Nebulizer every 6 hours  clotrimazole 1% Cream 1 Application(s) Topical two times a day  diltiazem    milliGRAM(s) Oral daily  levoFLOXacin  Tablet 750 milliGRAM(s) Oral every 48 hours  levothyroxine 50 MICROGram(s) Oral daily  nystatin Cream 1 Application(s) Topical two times a day  pantoprazole    Tablet 40 milliGRAM(s) Oral before breakfast  predniSONE   Tablet 30 milliGRAM(s) Oral daily  warfarin 3 milliGRAM(s) Oral once    MEDICATIONS  (PRN):  ALPRAZolam 0.25 milliGRAM(s) Oral at bedtime PRN restlessness, anxiety, agitation    LABS:                        10.2   10.52 )-----------( 295      ( 24 Jul 2020 07:24 )             30.6     07-24    137  |  101  |  52<H>  ----------------------------<  134<H>  4.1   |  29  |  1.24    Ca    8.5      24 Jul 2020 07:24    TPro  6.8  /  Alb  2.5<L>  /  TBili  0.3  /  DBili  x   /  AST  38<H>  /  ALT  40  /  AlkPhos  66  07-24    PT/INR - ( 25 Jul 2020 10:39 )   PT: 27.5 sec;   INR: 2.57 ratio      07-20 @ 22:30  pH: 7.49  pCO2: 40  pO2: 57  SaO2: 87  ASSESSMENT AND PLAN:  ·	SOB.  ·	Possible Pneumonia.  ·	Leukocytosis.  ·	Chronic hypoxic hypercarbic  Respiratory failure.  ·	Acute COPD exacerbation.  ·	Chronic diastolic CHF,  ·	Severe pHTN  ·	Renal Insuffiencey.  ·	Chronic A Fib.  ·	Hypothyroidism.    Continue steroid taper.  Continue O2, nebulizer.  Continue antibiotics.
INTERVAL HPI:  WF with HTN, COPD on O2, Diastolic CHF, Severe pHTN, Hypothyroidism ,A fib on Coumadin, Valve replacement.,  Recent admission hypoxic hypercarbic Respiratory failure requiring BIPAP support.   Was recently discharged from [facility] and is reported to be well on nasal O2.  Brought to ED with fever of 2 days ( up to 101).  Pt also reports having cough and SOB.    OVERNIGHT EVENTS:  Comfortable in bed.    Vital Signs Last 24 Hrs  T(C): 36.6 (24 Jul 2020 16:39), Max: 36.6 (23 Jul 2020 23:49)  T(F): 97.8 (24 Jul 2020 16:39), Max: 97.9 (24 Jul 2020 04:49)  HR: 80 (24 Jul 2020 17:20) (80 - 113)  BP: 113/80 (24 Jul 2020 16:39) (113/80 - 137/85)  BP(mean): --  RR: 18 (24 Jul 2020 16:39) (18 - 18)  SpO2: 97% (24 Jul 2020 17:20) (93% - 98%)    PHYSICAL EXAM:  GEN:         Awake, responsive and comfortable.  HEENT:    Normal.    RESP:       no distress  CVS:          Regular rate and rhythm.   ABD:         Soft, non-tender, non-distended;     MEDICATIONS  (STANDING):  albuterol/ipratropium for Nebulization 3 milliLiter(s) Nebulizer every 6 hours  clotrimazole 1% Cream 1 Application(s) Topical two times a day  diltiazem    milliGRAM(s) Oral daily  levothyroxine 50 MICROGram(s) Oral daily  methylPREDNISolone sodium succinate Injectable 20 milliGRAM(s) IV Push every 8 hours  nystatin Cream 1 Application(s) Topical two times a day  pantoprazole    Tablet 40 milliGRAM(s) Oral before breakfast  warfarin 3 milliGRAM(s) Oral once    MEDICATIONS  (PRN):  ALPRAZolam 0.25 milliGRAM(s) Oral at bedtime PRN restlessness, anxiety, agitation    LABS:                        10.2   10.52 )-----------( 295      ( 24 Jul 2020 07:24 )             30.6     07-24    137  |  101  |  52<H>  ----------------------------<  134<H>  4.1   |  29  |  1.24    Ca    8.5      24 Jul 2020 07:24    TPro  6.8  /  Alb  2.5<L>  /  TBili  0.3  /  DBili  x   /  AST  38<H>  /  ALT  40  /  AlkPhos  66  07-24    PT/INR - ( 24 Jul 2020 07:24 )   PT: 25.4 sec;   INR: 2.38 ratio      07-20 @ 22:30  pH: 7.49  pCO2: 40  pO2: 57  SaO2: 87    ASSESSMENT AND PLAN:  ·	SOB.  ·	Possible Pneumonia.  ·	Leukocytosis.  ·	Chronic hypoxic hypercarbic  Respiratory failure.  ·	Acute COPD exacerbation.  ·	Chronic diastolic CHF,  ·	Severe pHTN  ·	Renal Insuffiencey.  ·	Chronic A Fib.  ·	Hypothyroidism.    Will taper steroids.  Continue O2, nebulizer.  Continue antibiotics.
Patient is a 89y old  Female who presents with a chief complaint of Fever and cough. (21 Jul 2020 03:02)      INTERVAL HPI/OVERNIGHT EVENTS: afebrile currently and reports improved breathing     MEDICATIONS  (STANDING):  albuterol/ipratropium for Nebulization 3 milliLiter(s) Nebulizer every 6 hours  levothyroxine 50 MICROGram(s) Oral daily  methylPREDNISolone sodium succinate Injectable 40 milliGRAM(s) IV Push every 8 hours  pantoprazole    Tablet 40 milliGRAM(s) Oral before breakfast  piperacillin/tazobactam IVPB.. 3.375 Gram(s) IV Intermittent every 8 hours  warfarin 1 milliGRAM(s) Oral once    MEDICATIONS  (PRN):      Allergies    No Known Allergies    Intolerances        REVIEW OF SYSTEMS:    EYES: No eye pain, visual disturbances, or discharge  ENMT:  No difficulty hearing, tinnitus, vertigo; No sinus or throat pain  NECK: No pain or stiffness  BREASTS: No pain, masses, or nipple discharge  RESPIRATORY: No cough, wheezing, chills or hemoptysis;   CARDIOVASCULAR: No chest pain, palpitations, dizziness, or leg swelling  GASTROINTESTINAL: No abdominal or epigastric pain. No nausea, vomiting, or hematemesis; No diarrhea or constipation. No melena or hematochezia.  GENITOURINARY: No dysuria, frequency, hematuria, or incontinence  NEUROLOGICAL: No headaches, memory loss, loss of strength, numbness, or tremors  SKIN: No itching, burning, rashes, or lesions   LYMPH NODES: No enlarged glands  ENDOCRINE: No heat or cold intolerance; No hair loss  MUSCULOSKELETAL: No joint pain or swelling; No muscle, back, or extremity pain    Vital Signs Last 24 Hrs  T(C): 36.7 (21 Jul 2020 10:59), Max: 39.4 (20 Jul 2020 22:25)  T(F): 98 (21 Jul 2020 10:59), Max: 103 (20 Jul 2020 22:25)  HR: 65 (21 Jul 2020 10:59) (59 - 94)  BP: 104/45 (21 Jul 2020 10:59) (97/51 - 117/67)  BP(mean): 78 (21 Jul 2020 03:11) (67 - 78)  RR: 18 (21 Jul 2020 10:59) (15 - 25)  SpO2: 97% (21 Jul 2020 10:59) (95% - 98%)    PHYSICAL EXAM:  GENERAL: NAD, well-groomed, well-developed, non-toxic   HEAD:  Atraumatic, Normocephalic  EYES: EOMI, PERRLA, conjunctiva and sclera clear  ENMT: No tonsillar erythema, exudates, or enlargement; Moist mucous membranes, Good dentition, No lesions  NECK: Supple, No JVD, Normal thyroid  NERVOUS SYSTEM:  Alert & Oriented X3, Good concentration; Motor Strength 5/5 B/L upper and lower extremities; DTRs 2+ intact and symmetric  CHEST/LUNG: decreased bs at the bases. no wheeze, rales or rhonchi. no distress   HEART: Regular rate and rhythm; No murmurs, rubs, or gallops  ABDOMEN: Soft, Nontender, Nondistended; Bowel sounds present  EXTREMITIES:  2+ Peripheral Pulses, No clubbing, cyanosis, or edema  LYMPH: No lymphadenopathy noted  SKIN: No rashes or lesions    LABS:                                 11.7   14.24 )-----------( 325      ( 20 Jul 2020 22:47 )             34.8     07-20    134<L>  |  97  |  39<H>  ----------------------------<  156<H>  4.8   |  28  |  1.56<H>    Ca    8.5      20 Jul 2020 22:47    TPro  8.1  /  Alb  2.8<L>  /  TBili  0.7  /  DBili  x   /  AST  49<H>  /  ALT  31  /  AlkPhos  98  07-20    PT/INR - ( 20 Jul 2020 22:47 )   PT: 24.7 sec;   INR: 2.32 ratio         PTT - ( 20 Jul 2020 22:47 )  PTT:34.3 sec        POCT Blood Glucose.: 163 mg/dL (20 Jul 2020 21:55)      RADIOLOGY & ADDITIONAL TESTS:    Imaging Personally Reviewed:  [ ] YES  [ ] NO    Consultant(s) Notes Reviewed:  [ x] YES  [ ] NO    Care Discussed with Consultants/Other Providers [ ] YES  [ ] NO
Patient is a 89y old  Female who presents with a chief complaint of Fever and cough. (22 Jul 2020 15:41)      INTERVAL HPI/OVERNIGHT EVENTS:    MEDICATIONS  (STANDING):  albuterol/ipratropium for Nebulization 3 milliLiter(s) Nebulizer every 6 hours  cefepime   IVPB 1000 milliGRAM(s) IV Intermittent every 24 hours  clotrimazole 1% Cream 1 Application(s) Topical two times a day  levothyroxine 50 MICROGram(s) Oral daily  methylPREDNISolone sodium succinate Injectable 40 milliGRAM(s) IV Push every 8 hours  nystatin Cream 1 Application(s) Topical two times a day  pantoprazole    Tablet 40 milliGRAM(s) Oral before breakfast  warfarin 3 milliGRAM(s) Oral once    MEDICATIONS  (PRN):      Allergies    No Known Allergies    Intolerances        REVIEW OF SYSTEMS:  CONSTITUTIONAL: No fever, weight loss, or fatigue  EYES: No eye pain, visual disturbances, or discharge  ENMT:  No difficulty hearing, tinnitus, vertigo; No sinus or throat pain  NECK: No pain or stiffness  BREASTS: No pain, masses, or nipple discharge  RESPIRATORY: No cough, wheezing, chills or hemoptysis; No shortness of breath  CARDIOVASCULAR: No chest pain, palpitations, dizziness, or leg swelling  GASTROINTESTINAL: No abdominal or epigastric pain. No nausea, vomiting, or hematemesis; No diarrhea or constipation. No melena or hematochezia.  GENITOURINARY: No dysuria, frequency, hematuria, or incontinence  NEUROLOGICAL: No headaches, memory loss, loss of strength, numbness, or tremors  SKIN: No itching, burning, rashes, or lesions   LYMPH NODES: No enlarged glands  ENDOCRINE: No heat or cold intolerance; No hair loss  MUSCULOSKELETAL: No joint pain or swelling; No muscle, back, or extremity pain  PSYCHIATRIC: No depression, anxiety, mood swings, or difficulty sleeping  HEME/LYMPH: No easy bruising, or bleeding gums  ALLERGY AND IMMUNOLOGIC: No hives or eczema    Vital Signs Last 24 Hrs  T(C): 37.1 (23 Jul 2020 04:32), Max: 37.1 (23 Jul 2020 00:26)  T(F): 98.7 (23 Jul 2020 04:32), Max: 98.7 (23 Jul 2020 00:26)  HR: 85 (23 Jul 2020 05:05) (70 - 94)  BP: 128/83 (23 Jul 2020 04:32) (126/72 - 144/72)  BP(mean): --  RR: 17 (23 Jul 2020 04:32) (17 - 17)  SpO2: 95% (23 Jul 2020 05:05) (93% - 95%)    PHYSICAL EXAM:  GENERAL: NAD, well-groomed, well-developed  HEAD:  Atraumatic, Normocephalic  EYES: EOMI, PERRL, conjunctiva and sclera clear  ENMT:  Moist mucous membranes, Good dentition, No lesions  NECK: Supple, No JVD, Normal thyroid  NERVOUS SYSTEM:  Alert & Oriented X3, Good concentration; Motor Strength 5/5 B/L upper and lower extremities; DTRs 2+ intact and symmetric  CHEST/LUNG: Clear to percussion bilaterally; No rales, rhonchi, wheezing, or rubs  HEART: Regular rate and rhythm; No murmurs, rubs, or gallops  ABDOMEN: Soft, Nontender, Nondistended; Bowel sounds present  EXTREMITIES:  2+ Peripheral Pulses, No clubbing, cyanosis, or edema  LYMPH: No lymphadenopathy noted  SKIN: No rashes or lesions    LABS:                        11.3   11.31 )-----------( 309      ( 23 Jul 2020 09:43 )             34.3     07-23    139  |  102  |  50<H>  ----------------------------<  216<H>  3.8   |  29  |  1.43<H>    Ca    8.6      23 Jul 2020 09:43    TPro  7.2  /  Alb  2.5<L>  /  TBili  0.4  /  DBili  x   /  AST  31  /  ALT  34  /  AlkPhos  71  07-23    PT/INR - ( 23 Jul 2020 07:00 )   PT: 24.4 sec;   INR: 2.27 ratio             CAPILLARY BLOOD GLUCOSE              Procalcitonin, Serum: 0.14 ng/mL (07-21 @ 08:08)        Culture - Urine (collected 07-21-20 @ 10:01)  Source: .Urine Clean Catch (Midstream)  Preliminary Report (07-22-20 @ 14:02):    10,000 - 49,000 CFU/mL Escherichia coli  Organism: Escherichia coli (07-23-20 @ 08:27)  Organism: Escherichia coli (07-23-20 @ 08:27)      -  Amikacin: S <=16      -  Amoxicillin/Clavulanic Acid: S <=8/4      -  Ampicillin: S <=8 These ampicillin results predict results for amoxicillin      -  Ampicillin/Sulbactam: S <=4/2 Enterobacter, Citrobacter, and Serratia may develop resistance during prolonged therapy (3-4 days)      -  Aztreonam: S <=4      -  Cefazolin: S <=2 (MIC_CL_[id number]) For uncomplicated UTI with K. pneumoniae, E. coli, or P. mirablis: COURTNEY <=16 is sensitive and COURTNEY >=32 is resistant. This also predicts results for oral agents cefaclor, cefdinir, cefpodoxime, cefprozil, cefuroxime axetil, cephalexin and locarbef for uncomplicated UTI. Note that some isolates may be susceptible to these agents while testing resistant to cefazolin.      -  Cefepime: S <=2      -  Cefoxitin: S <=8      -  Ceftriaxone: S <=1 Enterobacter, Citrobacter, and Serratia may develop resistance during prolonged therapy      -  Ciprofloxacin: S <=0.25      -  Ertapenem: S <=0.5      -  Gentamicin: S <=2      -  Imipenem: S <=1      -  Levofloxacin: S <=0.5      -  Meropenem: S <=1      -  Nitrofurantoin: S <=32 Should not be used to treat pyelonephritis      -  Piperacillin/Tazobactam: S <=8      -  Tigecycline: S <=2      -  Tobramycin: S <=2      -  Trimethoprim/Sulfamethoxazole: S <=0.5/9.5      Method Type: COURTNEY    Culture - Blood (collected 07-21-20 @ 08:50)  Source: .Blood Blood-Peripheral  Preliminary Report (07-22-20 @ 09:01):    No growth to date.    Culture - Blood (collected 07-21-20 @ 08:50)  Source: .Blood Blood-Peripheral  Preliminary Report (07-22-20 @ 09:01):    No growth to date.          RADIOLOGY & ADDITIONAL TESTS:  < from: CT Chest No Cont (07.20.20 @ 23:36) >    EXAM:  CT ABDOMEN AND PELVIS                          EXAM:  CT CHEST                            PROCEDURE DATE:  07/20/2020          INTERPRETATION:  CLINICAL INFORMATION: Fever. Cough. Right-sided abdominal pain.    COMPARISON: None.    PROCEDURE:   CT of the Chest, Abdomen and Pelvis was performed without intravenous contrast.   Intravenous contrast: None.  Oral contrast: None.  Sagittal and coronal reformats were performed.    FINDINGS: Evaluation is limited by lack of IV contrast.  CHEST:   LUNGS AND LARGE AIRWAYS: Patent central airways. No pulmonary nodules. Patchy areas of groundglass opacities bilaterally.  PLEURA: Small right pleural effusion. Trace left pleural fluid.  VESSELS: Within normal limits.  HEART: Cardiomegaly. Status post CABG. No pericardial effusion.  MEDIASTINUM AND URBAN: No lymphadenopathy.  CHEST WALL AND LOWER NECK: Within normal limits.    ABDOMEN AND PELVIS:  LIVER: Within normal limits.  BILE DUCTS: Normal caliber.  GALLBLADDER: Within normal limits.  SPLEEN: Within normal limits.  PANCREAS: Within normal limits.  ADRENALS: Within normal limits.  KIDNEYS/URETERS: Bilateral renal cysts.    BLADDER: Within normal limits.  REPRODUCTIVE ORGANS: Hysterectomy.    BOWEL: No bowel obstruction. Appendix is not visualized. No evidence of inflammation in the pericecal region.  PERITONEUM: No ascites.  VESSELS: Atherosclerotic changes.  RETROPERITONEUM/LYMPH NODES: No lymphadenopathy.    ABDOMINAL WALL: Within normal limits.  BONES: Degenerative changes. Grade1 anterolisthesis of L4 on L5. Mild superior endplate compression deformity of L3, age indeterminate.    IMPRESSION:   Small right pleural effusion. Groundglass opacities in the lungs may reflect areas of air trapping versus infection.    No acute abdominal pathology.                TANJA MENEZES M.D., ATTENDING RADIOLOGIST  This document has been electronically signed. Jul 21 2020 12:08AM                < end of copied text >    Imaging Personally Reviewed:  [ ] YES  [ ] NO    Consultant(s) Notes Reviewed:  [ ] YES  [ ] NO    Care Discussed with Consultants/Other Providers [ ] YES  [ ] NO    PROBLEMS:  SEPSIS / PNEUMONIA  FEVER, DIFFICULTY BREATHING  Sepsis  Leukocytosis, unspecified type  Fever due to infection  MARIUSZ (acute kidney injury)  Essential hypertension  Atrial fibrillation, unspecified type  Hypothyroidism, unspecified type  COPD exacerbation  Pneumonia of both lower lobes due to infectious organism      Care discussed with family,         [  ]   yes  [  ]  No    imp:    stable[ ]    unstable[  ]     improving [   ]       unchanged  [  ]                Plans:  Continue present plans  [  ]               New consult [  ]   specialty  .......               order sabina[  ]    test name.                  Discharge Planning  [  ]
Patient is a 89y old  Female who presents with a chief complaint of Fever and cough. (23 Jul 2020 17:12)      INTERVAL HPI/OVERNIGHT EVENTS:    MEDICATIONS  (STANDING):  albuterol/ipratropium for Nebulization 3 milliLiter(s) Nebulizer every 6 hours  cefepime   IVPB 1000 milliGRAM(s) IV Intermittent every 24 hours  clotrimazole 1% Cream 1 Application(s) Topical two times a day  diltiazem    milliGRAM(s) Oral daily  levothyroxine 50 MICROGram(s) Oral daily  methylPREDNISolone sodium succinate Injectable 20 milliGRAM(s) IV Push every 8 hours  nystatin Cream 1 Application(s) Topical two times a day  pantoprazole    Tablet 40 milliGRAM(s) Oral before breakfast  warfarin 3 milliGRAM(s) Oral once    MEDICATIONS  (PRN):      Allergies    No Known Allergies    Intolerances        REVIEW OF SYSTEMS:  CONSTITUTIONAL: No fever, weight loss, or fatigue  EYES: No eye pain, visual disturbances, or discharge  ENMT:  No difficulty hearing, tinnitus, vertigo; No sinus or throat pain  NECK: No pain or stiffness  BREASTS: No pain, masses, or nipple discharge  RESPIRATORY: No cough, wheezing, chills or hemoptysis; No shortness of breath  CARDIOVASCULAR: No chest pain, palpitations, dizziness, or leg swelling  GASTROINTESTINAL: No abdominal or epigastric pain. No nausea, vomiting, or hematemesis; No diarrhea or constipation. No melena or hematochezia.  GENITOURINARY: No dysuria, frequency, hematuria, or incontinence  NEUROLOGICAL: No headaches, memory loss, loss of strength, numbness, or tremors  SKIN: No itching, burning, rashes, or lesions   LYMPH NODES: No enlarged glands  ENDOCRINE: No heat or cold intolerance; No hair loss  MUSCULOSKELETAL: No joint pain or swelling; No muscle, back, or extremity pain  PSYCHIATRIC: No depression, anxiety, mood swings, or difficulty sleeping  HEME/LYMPH: No easy bruising, or bleeding gums  ALLERGY AND IMMUNOLOGIC: No hives or eczema    Vital Signs Last 24 Hrs  T(C): 36.6 (24 Jul 2020 04:49), Max: 37.2 (23 Jul 2020 11:28)  T(F): 97.9 (24 Jul 2020 04:49), Max: 98.9 (23 Jul 2020 11:28)  HR: 95 (24 Jul 2020 06:04) (86 - 113)  BP: 137/85 (24 Jul 2020 04:49) (120/72 - 137/85)  BP(mean): --  RR: 18 (24 Jul 2020 04:49) (17 - 18)  SpO2: 96% (24 Jul 2020 06:04) (93% - 98%)    PHYSICAL EXAM:  GENERAL: NAD, well-groomed, well-developed  HEAD:  Atraumatic, Normocephalic  EYES: EOMI, PERRL, conjunctiva and sclera clear  ENMT:  Moist mucous membranes, Good dentition, No lesions  NECK: Supple, No JVD, Normal thyroid  NERVOUS SYSTEM:  Alert & Oriented X 3, Good concentration; Motor Strength 4/5 B/L upper and lower extremities.  CHEST/LUNG: Clear to percussion bilaterally; No rales, rhonchi, wheezing, or rubs  HEART: A-Fib.  No murmurs, rubs, or gallops  ABDOMEN: Soft, Nontender, Nondistended; Bowel sounds present  EXTREMITIES:  2+ Peripheral Pulses, No clubbing, cyanosis, or edema  LYMPH: No lymphadenopathy noted  SKIN: No rashes or lesions    LABS:                        10.2   10.52 )-----------( 295      ( 24 Jul 2020 07:24 )             30.6     07-24    137  |  101  |  52<H>  ----------------------------<  134<H>  4.1   |  29  |  1.24    Ca    8.5      24 Jul 2020 07:24    TPro  6.8  /  Alb  2.5<L>  /  TBili  0.3  /  DBili  x   /  AST  38<H>  /  ALT  40  /  AlkPhos  66  07-24    PT/INR - ( 24 Jul 2020 07:24 )   PT: 25.4 sec;   INR: 2.38 ratio             CAPILLARY BLOOD GLUCOSE              Procalcitonin, Serum: 0.14 ng/mL (07-21 @ 08:08)        Culture - Urine (collected 07-21-20 @ 10:01)  Source: .Urine Clean Catch (Midstream)  Preliminary Report (07-22-20 @ 14:02):    10,000 - 49,000 CFU/mL Escherichia coli  Organism: Escherichia coli (07-23-20 @ 08:27)  Organism: Escherichia coli (07-23-20 @ 08:27)      -  Amikacin: S <=16      -  Amoxicillin/Clavulanic Acid: S <=8/4      -  Ampicillin: S <=8 These ampicillin results predict results for amoxicillin      -  Ampicillin/Sulbactam: S <=4/2 Enterobacter, Citrobacter, and Serratia may develop resistance during prolonged therapy (3-4 days)      -  Aztreonam: S <=4      -  Cefazolin: S <=2 (MIC_CL_COM_ENTERIC_CEFAZU) For uncomplicated UTI with K. pneumoniae, E. coli, or P. mirablis: COURTNEY <=16 is sensitive and COURTNEY >=32 is resistant. This also predicts results for oral agents cefaclor, cefdinir, cefpodoxime, cefprozil, cefuroxime axetil, cephalexin and locarbef for uncomplicated UTI. Note that some isolates may be susceptible to these agents while testing resistant to cefazolin.      -  Cefepime: S <=2      -  Cefoxitin: S <=8      -  Ceftriaxone: S <=1 Enterobacter, Citrobacter, and Serratia may develop resistance during prolonged therapy      -  Ciprofloxacin: S <=0.25      -  Ertapenem: S <=0.5      -  Gentamicin: S <=2      -  Imipenem: S <=1      -  Levofloxacin: S <=0.5      -  Meropenem: S <=1      -  Nitrofurantoin: S <=32 Should not be used to treat pyelonephritis      -  Piperacillin/Tazobactam: S <=8      -  Tigecycline: S <=2      -  Tobramycin: S <=2      -  Trimethoprim/Sulfamethoxazole: S <=0.5/9.5      Method Type: COURTNEY    Culture - Blood (collected 07-21-20 @ 08:50)  Source: .Blood Blood-Peripheral  Preliminary Report (07-22-20 @ 09:01):    No growth to date.    Culture - Blood (collected 07-21-20 @ 08:50)  Source: .Blood Blood-Peripheral  Preliminary Report (07-22-20 @ 09:01):    No growth to date.          RADIOLOGY & ADDITIONAL TESTS:  < from: CT Chest No Cont (07.20.20 @ 23:36) >    EXAM:  CT ABDOMEN AND PELVIS                          EXAM:  CT CHEST                            PROCEDURE DATE:  07/20/2020          INTERPRETATION:  CLINICAL INFORMATION: Fever. Cough. Right-sided abdominal pain.    COMPARISON: None.    PROCEDURE:   CT of the Chest, Abdomen and Pelvis was performed without intravenous contrast.   Intravenous contrast: None.  Oral contrast: None.  Sagittal and coronal reformats were performed.    FINDINGS: Evaluation is limited by lack of IV contrast.  CHEST:   LUNGS AND LARGE AIRWAYS: Patent central airways. No pulmonary nodules. Patchy areas of groundglass opacities bilaterally.  PLEURA: Small right pleural effusion. Trace left pleural fluid.  VESSELS: Within normal limits.  HEART: Cardiomegaly. Status post CABG. No pericardial effusion.  MEDIASTINUM AND URBAN: No lymphadenopathy.  CHEST WALL AND LOWER NECK: Within normal limits.    ABDOMEN AND PELVIS:  LIVER: Within normal limits.  BILE DUCTS: Normal caliber.  GALLBLADDER: Within normal limits.  SPLEEN: Within normal limits.  PANCREAS: Within normal limits.  ADRENALS: Within normal limits.  KIDNEYS/URETERS: Bilateral renal cysts.    BLADDER: Within normal limits.  REPRODUCTIVE ORGANS: Hysterectomy.    BOWEL: No bowel obstruction. Appendix is not visualized. No evidence of inflammation in the pericecal region.  PERITONEUM: No ascites.  VESSELS: Atherosclerotic changes.  RETROPERITONEUM/LYMPH NODES: No lymphadenopathy.    ABDOMINAL WALL: Within normal limits.  BONES: Degenerative changes. Grade1 anterolisthesis of L4 on L5. Mild superior endplate compression deformity of L3, age indeterminate.    IMPRESSION:   Small right pleural effusion. Groundglass opacities in the lungs may reflect areas of air trapping versus infection.    No acute abdominal pathology.                TANJA MENEZES M.D., ATTENDING RADIOLOGIST  This document has been electronically signed. Jul 21 2020 12:08AM                < end of copied text >    Imaging Personally Reviewed:  [ ] YES  [ ] NO    Consultant(s) Notes Reviewed:  [ ] YES  [ ] NO    Care Discussed with Consultants/Other Providers [ ] YES  [ ] NO    PROBLEMS:  SEPSIS / PNEUMONIA  FEVER, DIFFICULTY BREATHING  Sepsis  Leukocytosis, unspecified type  Fever due to infection  MARIUSZ (acute kidney injury)  Essential hypertension  Atrial fibrillation, unspecified type  Hypothyroidism, unspecified type  COPD exacerbation  Pneumonia of both lower lobes due to infectious organism      Care discussed with family,         [  ]   yes  [  ]  No    imp:    stable[ ]    unstable[  ]     improving [   ]       unchanged  [  ]                Plans:  Continue present plans  [  ]               New consult [  ]   specialty  .......               order sabina[  ]    test name.                  Discharge Planning  [  ]
Patient is a 89y old  Female who presents with a chief complaint of Fever and cough. (24 Jul 2020 17:51)      INTERVAL HPI/OVERNIGHT EVENTS:    MEDICATIONS  (STANDING):  albuterol/ipratropium for Nebulization 3 milliLiter(s) Nebulizer every 6 hours  clotrimazole 1% Cream 1 Application(s) Topical two times a day  diltiazem    milliGRAM(s) Oral daily  levoFLOXacin  Tablet 750 milliGRAM(s) Oral every 48 hours  levothyroxine 50 MICROGram(s) Oral daily  methylPREDNISolone sodium succinate Injectable 20 milliGRAM(s) IV Push every 8 hours  nystatin Cream 1 Application(s) Topical two times a day  pantoprazole    Tablet 40 milliGRAM(s) Oral before breakfast    MEDICATIONS  (PRN):  ALPRAZolam 0.25 milliGRAM(s) Oral at bedtime PRN restlessness, anxiety, agitation      Allergies    No Known Allergies    Intolerances        REVIEW OF SYSTEMS:  CONSTITUTIONAL: No fever, weight loss, or fatigue  EYES: No eye pain, visual disturbances, or discharge  ENMT:  No difficulty hearing, tinnitus, vertigo; No sinus or throat pain  NECK: No pain or stiffness  BREASTS: No pain, masses, or nipple discharge  RESPIRATORY: No cough, wheezing, chills or hemoptysis; No shortness of breath  CARDIOVASCULAR: No chest pain, palpitations, dizziness, or leg swelling  GASTROINTESTINAL: No abdominal or epigastric pain. No nausea, vomiting, or hematemesis; No diarrhea or constipation. No melena or hematochezia.  GENITOURINARY: No dysuria, frequency, hematuria, or incontinence  NEUROLOGICAL: No headaches, memory loss, loss of strength, numbness, or tremors  SKIN: No itching, burning, rashes, or lesions   LYMPH NODES: No enlarged glands  ENDOCRINE: No heat or cold intolerance; No hair loss  MUSCULOSKELETAL: No joint pain or swelling; No muscle, back, or extremity pain  PSYCHIATRIC: No depression, anxiety, mood swings, or difficulty sleeping  HEME/LYMPH: No easy bruising, or bleeding gums  ALLERGY AND IMMUNOLOGIC: No hives or eczema    Vital Signs Last 24 Hrs  T(C): 36.6 (25 Jul 2020 04:37), Max: 36.7 (24 Jul 2020 23:25)  T(F): 97.8 (25 Jul 2020 04:37), Max: 98 (24 Jul 2020 23:25)  HR: 89 (25 Jul 2020 05:49) (80 - 91)  BP: 132/85 (25 Jul 2020 04:37) (113/80 - 135/84)  BP(mean): --  RR: 18 (25 Jul 2020 04:37) (18 - 18)  SpO2: 97% (25 Jul 2020 05:49) (95% - 98%)    PHYSICAL EXAM:  GENERAL: NAD, well-groomed, well-developed  HEAD:  Atraumatic, Normocephalic  EYES: EOMI, PERRL, conjunctiva and sclera clear  ENMT:  Moist mucous membranes, Good dentition, No lesions  NECK: Supple, No JVD, Normal thyroid  NERVOUS SYSTEM:  Alert & Oriented X3, Good concentration; Motor Strength 5/5 B/L upper and lower extremities; DTRs 2+ intact and symmetric  CHEST/LUNG: Clear to percussion bilaterally; No rales, rhonchi, wheezing, or rubs  HEART: Regular rate and rhythm; No murmurs, rubs, or gallops  ABDOMEN: Soft, Nontender, Nondistended; Bowel sounds present  EXTREMITIES:  2+ Peripheral Pulses, No clubbing, cyanosis, or edema  LYMPH: No lymphadenopathy noted  SKIN: No rashes or lesions    LABS:                        10.2   10.52 )-----------( 295      ( 24 Jul 2020 07:24 )             30.6     07-24    137  |  101  |  52<H>  ----------------------------<  134<H>  4.1   |  29  |  1.24    Ca    8.5      24 Jul 2020 07:24    TPro  6.8  /  Alb  2.5<L>  /  TBili  0.3  /  DBili  x   /  AST  38<H>  /  ALT  40  /  AlkPhos  66  07-24    PT/INR - ( 24 Jul 2020 07:24 )   PT: 25.4 sec;   INR: 2.38 ratio             CAPILLARY BLOOD GLUCOSE              Procalcitonin, Serum: 0.14 ng/mL (07-21 @ 08:08)        Culture - Urine (collected 07-21-20 @ 10:01)  Source: .Urine Clean Catch (Midstream)  Final Report (07-24-20 @ 15:26):    10,000 - 49,000 CFU/mL Escherichia coli    10,000 - 49,000 CFU/mL Enterococcus faecalis  Organism: Escherichia coli  Enterococcus faecalis (07-24-20 @ 15:26)  Organism: Enterococcus faecalis (07-24-20 @ 15:26)      -  Ampicillin: S <=2 Predicts results to ampicillin/sulbactam, amoxacillin-clavulanate and  piperacillin-tazobactam.      -  Ciprofloxacin: R >2      -  Levofloxacin: R >4      -  Nitrofurantoin: S <=32 Should not be used to treat pyelonephritis.      -  Tetra/Doxy: R >8      -  Vancomycin: S 2      Method Type: COURTNEY  Organism: Escherichia coli (07-24-20 @ 15:26)      -  Amikacin: S <=16      -  Amoxicillin/Clavulanic Acid: S <=8/4      -  Ampicillin: S <=8 These ampicillin results predict results for amoxicillin      -  Ampicillin/Sulbactam: S <=4/2 Enterobacter, Citrobacter, and Serratia may develop resistance during prolonged therapy (3-4 days)      -  Aztreonam: S <=4      -  Cefazolin: S <=2 (MIC_CL_COM_ENTERIC_CEFAZU) For uncomplicated UTI with K. pneumoniae, E. coli, or P. mirablis: COURTNEY <=16 is sensitive and COURTNEY >=32 is resistant. This also predicts results for oral agents cefaclor, cefdinir, cefpodoxime, cefprozil, cefuroxime axetil, cephalexin and locarbef for uncomplicated UTI. Note that some isolates may be susceptible to these agents while testing resistant to cefazolin.      -  Cefepime: S <=2      -  Cefoxitin: S <=8      -  Ceftriaxone: S <=1 Enterobacter, Citrobacter, and Serratia may develop resistance during prolonged therapy      -  Ciprofloxacin: S <=0.25      -  Ertapenem: S <=0.5      -  Gentamicin: S <=2      -  Imipenem: S <=1      -  Levofloxacin: S <=0.5      -  Meropenem: S <=1      -  Nitrofurantoin: S <=32 Should not be used to treat pyelonephritis      -  Piperacillin/Tazobactam: S <=8      -  Tigecycline: S <=2      -  Tobramycin: S <=2      -  Trimethoprim/Sulfamethoxazole: S <=0.5/9.5      Method Type: COURTNEY    Culture - Blood (collected 07-21-20 @ 08:50)  Source: .Blood Blood-Peripheral  Preliminary Report (07-22-20 @ 09:01):    No growth to date.    Culture - Blood (collected 07-21-20 @ 08:50)  Source: .Blood Blood-Peripheral  Preliminary Report (07-22-20 @ 09:01):    No growth to date.          RADIOLOGY & ADDITIONAL TESTS:    Imaging Personally Reviewed:  [ ] YES  [ ] NO    Consultant(s) Notes Reviewed:  [ ] YES  [ ] NO    Care Discussed with Consultants/Other Providers [ ] YES  [ ] NO    PROBLEMS:  SEPSIS / PNEUMONIA  FEVER, DIFFICULTY BREATHING  Sepsis  Leukocytosis, unspecified type  Fever due to infection  MARIUSZ (acute kidney injury)  Essential hypertension  Atrial fibrillation, unspecified type  Hypothyroidism, unspecified type  COPD exacerbation  Pneumonia of both lower lobes due to infectious organism      Care discussed with family,         [  ]   yes  [  ]  No    imp:    stable[ ]    unstable[  ]     improving [   ]       unchanged  [  ]                Plans:  Continue present plans  [  ]               New consult [  ]   specialty  .......               order sabina[  ]    test name.                  Discharge Planning  [  ]
Patient is a 89y old  Female who presents with a chief complaint of Fever and cough. (26 Jul 2020 16:13)      INTERVAL HPI/OVERNIGHT EVENTS:    MEDICATIONS  (STANDING):  albuterol/ipratropium for Nebulization 3 milliLiter(s) Nebulizer every 6 hours  clotrimazole 1% Cream 1 Application(s) Topical two times a day  diltiazem    milliGRAM(s) Oral daily  levothyroxine 50 MICROGram(s) Oral daily  nystatin Cream 1 Application(s) Topical two times a day  pantoprazole    Tablet 40 milliGRAM(s) Oral before breakfast  predniSONE   Tablet 30 milliGRAM(s) Oral daily    MEDICATIONS  (PRN):  ALPRAZolam 0.25 milliGRAM(s) Oral at bedtime PRN restlessness, anxiety, agitation      Allergies    No Known Allergies    Intolerances        REVIEW OF SYSTEMS:  CONSTITUTIONAL: No fever, weight loss, C/O weakness  EYES: No eye pain, visual disturbances, or discharge  ENMT:  No difficulty hearing, tinnitus, vertigo; No sinus or throat pain  NECK: No pain or stiffness  BREASTS: No pain, masses, or nipple discharge  RESPIRATORY: No cough, wheezing, chills or hemoptysis; No shortness of breath  CARDIOVASCULAR: No chest pain, palpitations, dizziness, or leg swelling  GASTROINTESTINAL: No abdominal or epigastric pain. No nausea, vomiting, or hematemesis; No diarrhea or constipation. No melena or hematochezia.  GENITOURINARY: No dysuria, frequency, hematuria, or incontinence  NEUROLOGICAL: No headaches, memory loss, loss of strength, numbness, or tremors  SKIN: No itching, burning, rashes, or lesions   LYMPH NODES: No enlarged glands  ENDOCRINE: No heat or cold intolerance; No hair loss  MUSCULOSKELETAL: No joint pain or swelling; No muscle, back, or extremity pain  PSYCHIATRIC: No depression, anxiety, mood swings, or difficulty sleeping  HEME/LYMPH: No easy bruising, or bleeding gums  ALLERGY AND IMMUNOLOGIC: No hives or eczema    Vital Signs Last 24 Hrs  T(C): 36.7 (27 Jul 2020 05:08), Max: 36.8 (26 Jul 2020 10:45)  T(F): 98.1 (27 Jul 2020 05:08), Max: 98.3 (26 Jul 2020 10:45)  HR: 80 (27 Jul 2020 05:26) (76 - 93)  BP: 122/81 (27 Jul 2020 05:08) (122/81 - 144/79)  BP(mean): --  RR: 18 (27 Jul 2020 05:08) (18 - 18)  SpO2: 95% (27 Jul 2020 05:26) (90% - 97%)    PHYSICAL EXAM:  GENERAL: NAD, well-groomed, well-developed  HEAD:  Atraumatic, Normocephalic  EYES: EOMI, PERRL, conjunctiva and sclera clear  ENMT:  Moist mucous membranes, Good dentition, No lesions  NECK: Supple, No JVD, Normal thyroid  NERVOUS SYSTEM:  Alert & Oriented X3, Good concentration; Motor Strength 4/5 B/L upper and lower extremities;   CHEST/LUNG: Clear to percussion bilaterally; No rales, rhonchi, wheezing, or rubs  HEART: Regular rate and rhythm; No murmurs, rubs, or gallops  ABDOMEN: Soft, Nontender, Nondistended; Bowel sounds present  EXTREMITIES:  2+ Peripheral Pulses, No clubbing, cyanosis, or edema  LYMPH: No lymphadenopathy noted  SKIN: No rashes or lesions    LABS:          PT/INR - ( 26 Jul 2020 08:05 )   PT: 32.7 sec;   INR: 3.11 ratio             CAPILLARY BLOOD GLUCOSE              Procalcitonin, Serum: 0.14 ng/mL (07-21 @ 08:08)        Culture - Urine (collected 07-21-20 @ 10:01)  Source: .Urine Clean Catch (Midstream)  Final Report (07-24-20 @ 15:26):    10,000 - 49,000 CFU/mL Escherichia coli    10,000 - 49,000 CFU/mL Enterococcus faecalis  Organism: Escherichia coli  Enterococcus faecalis (07-24-20 @ 15:26)  Organism: Enterococcus faecalis (07-24-20 @ 15:26)      -  Ampicillin: S <=2 Predicts results to ampicillin/sulbactam, amoxacillin-clavulanate and  piperacillin-tazobactam.      -  Ciprofloxacin: R >2      -  Levofloxacin: R >4      -  Nitrofurantoin: S <=32 Should not be used to treat pyelonephritis.      -  Tetra/Doxy: R >8      -  Vancomycin: S 2      Method Type: COURTNEY  Organism: Escherichia coli (07-24-20 @ 15:26)      -  Amikacin: S <=16      -  Amoxicillin/Clavulanic Acid: S <=8/4      -  Ampicillin: S <=8 These ampicillin results predict results for amoxicillin      -  Ampicillin/Sulbactam: S <=4/2 Enterobacter, Citrobacter, and Serratia may develop resistance during prolonged therapy (3-4 days)      -  Aztreonam: S <=4      -  Cefazolin: S <=2 (MIC_CL_COM_ENTERIC_CEFAZU) For uncomplicated UTI with K. pneumoniae, E. coli, or P. mirablis: COURTNEY <=16 is sensitive and COURTNEY >=32 is resistant. This also predicts results for oral agents cefaclor, cefdinir, cefpodoxime, cefprozil, cefuroxime axetil, cephalexin and locarbef for uncomplicated UTI. Note that some isolates may be susceptible to these agents while testing resistant to cefazolin.      -  Cefepime: S <=2      -  Cefoxitin: S <=8      -  Ceftriaxone: S <=1 Enterobacter, Citrobacter, and Serratia may develop resistance during prolonged therapy      -  Ciprofloxacin: S <=0.25      -  Ertapenem: S <=0.5      -  Gentamicin: S <=2      -  Imipenem: S <=1      -  Levofloxacin: S <=0.5      -  Meropenem: S <=1      -  Nitrofurantoin: S <=32 Should not be used to treat pyelonephritis      -  Piperacillin/Tazobactam: S <=8      -  Tigecycline: S <=2      -  Tobramycin: S <=2      -  Trimethoprim/Sulfamethoxazole: S <=0.5/9.5      Method Type: COURTNEY    Culture - Blood (collected 07-21-20 @ 08:50)  Source: .Blood Blood-Peripheral  Final Report (07-26-20 @ 09:01):    No Growth Final    Culture - Blood (collected 07-21-20 @ 08:50)  Source: .Blood Blood-Peripheral  Final Report (07-26-20 @ 09:01):    No Growth Final          RADIOLOGY & ADDITIONAL TESTS:    Imaging Personally Reviewed:  [ ] YES  [ ] NO    Consultant(s) Notes Reviewed:  [ ] YES  [ ] NO    Care Discussed with Consultants/Other Providers [ ] YES  [ ] NO    PROBLEMS:  SEPSIS / PNEUMONIA  FEVER, DIFFICULTY BREATHING  Sepsis  Leukocytosis, unspecified type  Fever due to infection  MARIUSZ (acute kidney injury)  Essential hypertension  Atrial fibrillation, unspecified type  Hypothyroidism, unspecified type  COPD exacerbation  Pneumonia of both lower lobes due to infectious organism      Care discussed with family,         [  ]   yes  [  ]  No    imp:    stable[ ]    unstable[  ]     improving [   ]       unchanged  [  ]                Plans:  Continue present plans  [  ]               New consult [  ]   specialty  .......               order sabina[  ]    test name.                  Discharge Planning  [  ]
Patient is a 89y old  Female who presents with a chief complaint of Fever and cough. (25 Jul 2020 18:14)      INTERVAL HPI/OVERNIGHT EVENTS:    MEDICATIONS  (STANDING):  albuterol/ipratropium for Nebulization 3 milliLiter(s) Nebulizer every 6 hours  clotrimazole 1% Cream 1 Application(s) Topical two times a day  diltiazem    milliGRAM(s) Oral daily  levoFLOXacin  Tablet 750 milliGRAM(s) Oral every 48 hours  levothyroxine 50 MICROGram(s) Oral daily  nystatin Cream 1 Application(s) Topical two times a day  pantoprazole    Tablet 40 milliGRAM(s) Oral before breakfast  predniSONE   Tablet 30 milliGRAM(s) Oral daily    MEDICATIONS  (PRN):  ALPRAZolam 0.25 milliGRAM(s) Oral at bedtime PRN restlessness, anxiety, agitation      Allergies    No Known Allergies    Intolerances        REVIEW OF SYSTEMS:  CONSTITUTIONAL: No fever, weight loss, or fatigue  EYES: No eye pain, visual disturbances, or discharge  ENMT:  No difficulty hearing, tinnitus, vertigo; No sinus or throat pain  NECK: No pain or stiffness  BREASTS: No pain, masses, or nipple discharge  RESPIRATORY: No cough, wheezing, chills or hemoptysis; No shortness of breath  CARDIOVASCULAR: No chest pain, palpitations, dizziness, or leg swelling  GASTROINTESTINAL: No abdominal or epigastric pain. No nausea, vomiting, or hematemesis; No diarrhea or constipation. No melena or hematochezia.  GENITOURINARY: No dysuria, frequency, hematuria, or incontinence  NEUROLOGICAL: No headaches, memory loss, loss of strength, numbness, or tremors  SKIN: No itching, burning, rashes, or lesions   LYMPH NODES: No enlarged glands  ENDOCRINE: No heat or cold intolerance; No hair loss  MUSCULOSKELETAL: No joint pain or swelling; No muscle, back, or extremity pain  PSYCHIATRIC: No depression, anxiety, mood swings, or difficulty sleeping  HEME/LYMPH: No easy bruising, or bleeding gums  ALLERGY AND IMMUNOLOGIC: No hives or eczema    Vital Signs Last 24 Hrs  T(C): 36.7 (26 Jul 2020 05:13), Max: 36.9 (25 Jul 2020 17:51)  T(F): 98 (26 Jul 2020 05:13), Max: 98.4 (25 Jul 2020 17:51)  HR: 84 (26 Jul 2020 05:13) (78 - 102)  BP: 136/79 (26 Jul 2020 05:13) (113/65 - 142/76)  BP(mean): --  RR: 18 (26 Jul 2020 05:13) (18 - 18)  SpO2: 98% (26 Jul 2020 05:13) (92% - 98%)    PHYSICAL EXAM:  GENERAL: NAD, well-groomed, well-developed  HEAD:  Atraumatic, Normocephalic  EYES: EOMI, PERRL, conjunctiva and sclera clear  ENMT:  Moist mucous membranes, Good dentition, No lesions  NECK: Supple, No JVD, Normal thyroid  NERVOUS SYSTEM:  Alert & Oriented X3, Good concentration; Motor Strength 5/5 B/L upper and lower extremities; DTRs 2+ intact and symmetric  CHEST/LUNG: Clear to percussion bilaterally; No rales, rhonchi, wheezing, or rubs  HEART: A-Fib. No murmurs, rubs, or gallops  ABDOMEN: Soft, Nontender, Nondistended; Bowel sounds present  EXTREMITIES:  2+ Peripheral Pulses, No clubbing, cyanosis, or edema  LYMPH: No lymphadenopathy noted  SKIN: No rashes or lesions    LABS:          PT/INR - ( 25 Jul 2020 10:39 )   PT: 27.5 sec;   INR: 2.57 ratio             CAPILLARY BLOOD GLUCOSE              Procalcitonin, Serum: 0.14 ng/mL (07-21 @ 08:08)        Culture - Urine (collected 07-21-20 @ 10:01)  Source: .Urine Clean Catch (Midstream)  Final Report (07-24-20 @ 15:26):    10,000 - 49,000 CFU/mL Escherichia coli    10,000 - 49,000 CFU/mL Enterococcus faecalis  Organism: Escherichia coli  Enterococcus faecalis (07-24-20 @ 15:26)  Organism: Enterococcus faecalis (07-24-20 @ 15:26)      -  Ampicillin: S <=2 Predicts results to ampicillin/sulbactam, amoxacillin-clavulanate and  piperacillin-tazobactam.      -  Ciprofloxacin: R >2      -  Levofloxacin: R >4      -  Nitrofurantoin: S <=32 Should not be used to treat pyelonephritis.      -  Tetra/Doxy: R >8      -  Vancomycin: S 2      Method Type: COURTNEY  Organism: Escherichia coli (07-24-20 @ 15:26)      -  Amikacin: S <=16      -  Amoxicillin/Clavulanic Acid: S <=8/4      -  Ampicillin: S <=8 These ampicillin results predict results for amoxicillin      -  Ampicillin/Sulbactam: S <=4/2 Enterobacter, Citrobacter, and Serratia may develop resistance during prolonged therapy (3-4 days)      -  Aztreonam: S <=4      -  Cefazolin: S <=2 (MIC_CL_COM_ENTERIC_CEFAZU) For uncomplicated UTI with K. pneumoniae, E. coli, or P. mirablis: COURTNEY <=16 is sensitive and COURTNEY >=32 is resistant. This also predicts results for oral agents cefaclor, cefdinir, cefpodoxime, cefprozil, cefuroxime axetil, cephalexin and locarbef for uncomplicated UTI. Note that some isolates may be susceptible to these agents while testing resistant to cefazolin.      -  Cefepime: S <=2      -  Cefoxitin: S <=8      -  Ceftriaxone: S <=1 Enterobacter, Citrobacter, and Serratia may develop resistance during prolonged therapy      -  Ciprofloxacin: S <=0.25      -  Ertapenem: S <=0.5      -  Gentamicin: S <=2      -  Imipenem: S <=1      -  Levofloxacin: S <=0.5      -  Meropenem: S <=1      -  Nitrofurantoin: S <=32 Should not be used to treat pyelonephritis      -  Piperacillin/Tazobactam: S <=8      -  Tigecycline: S <=2      -  Tobramycin: S <=2      -  Trimethoprim/Sulfamethoxazole: S <=0.5/9.5      Method Type: COURTNEY    Culture - Blood (collected 07-21-20 @ 08:50)  Source: .Blood Blood-Peripheral  Preliminary Report (07-22-20 @ 09:01):    No growth to date.    Culture - Blood (collected 07-21-20 @ 08:50)  Source: .Blood Blood-Peripheral  Preliminary Report (07-22-20 @ 09:01):    No growth to date.          RADIOLOGY & ADDITIONAL TESTS:    Imaging Personally Reviewed:  [ ] YES  [ ] NO    Consultant(s) Notes Reviewed:  [ ] YES  [ ] NO    Care Discussed with Consultants/Other Providers [ ] YES  [ ] NO    PROBLEMS:  SEPSIS / PNEUMONIA  FEVER, DIFFICULTY BREATHING  Sepsis  Leukocytosis, unspecified type  Fever due to infection  MARIUSZ (acute kidney injury)  Essential hypertension  Atrial fibrillation, unspecified type  Hypothyroidism, unspecified type  COPD exacerbation  Pneumonia of both lower lobes due to infectious organism      Care discussed with family,         [  ]   yes  [  ]  No    imp:    stable[ ]    unstable[  ]     improving [   ]       unchanged  [  ]                Plans:  Continue present plans  [  ]               New consult [  ]   specialty  .......               order sabina[  ]    test name.                  Discharge Planning  [  ]

## 2020-07-27 NOTE — DISCHARGE NOTE PROVIDER - NSDCCPCAREPLAN_GEN_ALL_CORE_FT
PRINCIPAL DISCHARGE DIAGNOSIS  Diagnosis: Pneumonia  Assessment and Plan of Treatment:       SECONDARY DISCHARGE DIAGNOSES  Diagnosis: MARIUSZ (acute kidney injury)  Assessment and Plan of Treatment: MARIUSZ (acute kidney injury)    Diagnosis: COPD exacerbation  Assessment and Plan of Treatment: COPD exacerbation    Diagnosis: Atrial fibrillation, unspecified type  Assessment and Plan of Treatment: Atrial fibrillation, unspecified type    Diagnosis: Pneumonia  Assessment and Plan of Treatment:

## 2020-07-27 NOTE — PROGRESS NOTE ADULT - PROBLEM SELECTOR PLAN 3
-WBC up to 14K  -improved to normal   -trend daily CBC.
replace

## 2020-07-27 NOTE — PROGRESS NOTE ADULT - PROBLEM SELECTOR PROBLEM 5
R/O COVID-19
Essential hypertension
R/O COVID-19
R/O COVID-19
Essential hypertension
Essential hypertension

## 2020-07-27 NOTE — PROGRESS NOTE ADULT - PROBLEM SELECTOR PLAN 6
hold ARB, IV fluids, trend BMP

## 2020-07-27 NOTE — PROGRESS NOTE ADULT - PROBLEM SELECTOR PLAN 1
-Continue Cefepime 1g q24hrs based on CrCl (nearing transition to PO or increase in dosing)   -f/u 2 sets of blood culture negative  -received one dose of Vancomycin, gave additional dose yesterday (1g ordered)  -vanco trough 7.4 (therapeutic drug monitoring)  -MRSA/MSSA PCR nasal swab positive  -urine legionella antigen negative  -send sputum culture if possible  -COVID19 pcr nonreactive  -assuming continued improvement tomorrow, can transition to PO
-Continue Cefepime 1g q24hrs based on CrCl   - 2 sets of blood culture negative  -received one dose of Vancomycin, gave additional dose yesterday, unlikely to be MRSA pneumonia with such drastic improvement, can continue cefepime without the vanco  -MRSA/MSSA PCR nasal swab positive  -urine legionella antigen negative  -COVID19 pcr nonreactive  -assuming continued improvement tomorrow, can transition to PO
-Continue Cefepime 1g q24hrs based on CrCl  -f/u 2 sets of blood culture sent on admission  -received one dose of Vancomycin, will give additional dose today (1g ordered)  -vanco trough 7.4 (therapeutic drug monitoring)  -MRSA/MSSA PCR nasal swab positive  -f/u urine legionella antigen   -send sputum culture if possible  -COVID19 pcr nonreactive
cultures, will treat  for inpatient pneumonia, COVI PCR pending, isolation until then
initial covid test is neg but a repeat is being performed  pt ia back on home o2 and appears non-toxic  procal low but has high crp, ferritin ddimer. ct chest shows GGO  will c.w zosyn for now and ge ID   f.u cultures
cultures, will treat  for inpatient pneumonia, COVI PCR pending, isolation until then
cultures, will treat  for inpatient pneumonia, COVI PCR pending, isolation until then

## 2020-07-27 NOTE — DISCHARGE NOTE PROVIDER - HOSPITAL COURSE
89 y/o female from home with pmh HTN, COPD, ? valve surgery, hypothyroid, afib on coumadin presents with fever, SOB and cough x2 days. Tmax 103, HR up to 90s, RR 25  sat 98% on 2L NC.  Admitted for b/l PNA treated w/ antibiotics cleared for discharge.

## 2020-07-27 NOTE — DISCHARGE NOTE PROVIDER - CARE PROVIDER_API CALL
Sagar Mejía  INTERNAL MEDICINE  [address] [place] [address]  Phone: [phone]  Fax: [phone]  Follow Up Time:

## 2020-07-27 NOTE — PROGRESS NOTE ADULT - PROBLEM SELECTOR PROBLEM 2
Fever due to infection
COPD exacerbation
Fever due to infection
Fever due to infection
COPD exacerbation
COPD exacerbation

## 2020-07-27 NOTE — PROGRESS NOTE ADULT - ASSESSMENT
89 y/o female from home with pmh HTN, COPD, ? valve surgery, hypothyroid, afib on coumadin presents with fever, SOB and cough x2 days.   Tmax 103, HR up to 90s, RR 25  sat 98% on 2L NC   CXR (personally reviewed) improved compared to CXR in June, evidence of old sternotomy   CT chest (personally reviewed) GGO and a small right pleural effusions but no dense consolidation   ECHO from June 2020 found moderate AS and severe pHTN   Was given one dose of Vanco and started on cefepime   COVID in June was negative  COVID PCR negative  DDx includes viral pneumonia, bacterial pneumonia/healthcare associated PNA, copd/pHTN exacerbation, CHF exacerbation  Leukocytosis to 14k, normal lymphocytes->normalized	   MARIUSZ with creatinine 1.56 which is up from her baseline of 1.0 in June but now improving to 1.43  CRP, D-Dimer, ferritin, procalcitonin elevated   MRSA PCR positive->gave another dose of vancomycin    Given the fever, SOB, cough and leukocytosis as well as recent hospitalization will cover with cefepime rather then zosyn as she had CHF exacerbation and sodium load may confound symptoms as well as possibility of vanco/zosyn renal toxicity       Overall, 89F Fever, leukocytosis, pneumonia, MARIUSZ
89 y/o female from home with pmh HTN, COPD, ? valve surgery, hypothyroid, afib on coumadin presents with fever, SOB and cough x2 days.   Tmax 103, HR up to 90s, RR 25  sat 98% on 2L NC   CXR (personally reviewed) improved compared to CXR in June, evidence of old sternotomy   CT chest (personally reviewed) GGO and a small right pleural effusions but no dense consolidation   ECHO from June 2020 found moderate AS and severe pHTN   Was given one dose of Vanco and started on cefepime   COVID in June was negative  COVID PCR negative  DDx includes viral pneumonia, bacterial pneumonia/healthcare associated PNA, copd/pHTN exacerbation, CHF exacerbation  Leukocytosis to 14k, normal lymphocytes->normalized	   MARIUSZ with creatinine 1.56 which is up from her baseline of 1.0 in June but now improving to 1.47  CRP, D-Dimer, ferritin, procalcitonin elevated   MRSA PCR positive->will give another dose of vancomycin    Given the fever, SOB, cough and leukocytosis as well as recent hospitalization will cover with cefepime rather then zosyn as she had CHF exacerbation and sodium load may confound symptoms as well as possibility of vanco/zosyn renal toxicity       Overall, 89F Fever, leukocytosis, pneumonia, MARIUSZ
89 y/o female from home with pmh HTN, COPD, ? valve surgery, hypothyroid, afib on coumadin presents with fever.  Family reports pt was dc home ~ 1.5 weeks ago. Pt has been doing well and on O2 24hrs, however noted fever last night and tonight, pt c/o cough and sob. Given tylenol. Pt is AOX2 but answering questions appropriately. Pt states some sob and cough. She denies cp, palpitations, headache, dizziness, abd pain, N/V/D, dysuria. States she has joint pain from her arthritis. Of note, pt was recently admitted 1 month ago for CHF/PNA. Pt presents with COPD exacerbation and pneumonia, she has MARIUSZ, and in ED BP systolic decreased to 97.  IMPROVE VTE Individual Risk Assessment          RISK                                                          Points    [  ] Previous VTE                                                3    [  ] Thrombophilia                                             2    [  ] Lower limb paralysis                                    2        (unable to hold up >15 seconds)      [  ] Current Cancer                                             2         (within 6 months)    [  ] Immobilization > 24 hrs                              1    [  ] ICU/CCU stay > 24 hours                            1    [ x ] Age > 60                                                    1    IMPROVE VTE Score __1_______    07/22/2020 : Afebrile. COVID-19 negative. Alert and oriented. Continue present. Spoke to the daughter.  07/23/2020 : Afebrile. Feels tired today. Mild cough. Urine positive for E.coli, sensitive to Cefepime. ID f/u noted. Continue present antibiotics.
89 y/o female from home with pmh HTN, COPD, ? valve surgery, hypothyroid, afib on coumadin presents with fever.  Family reports pt was dc home ~ 1.5 weeks ago. Pt has been doing well and on O2 24hrs, however noted fever last night and tonight, pt c/o cough and sob. Given tylenol. Pt is AOX2 but answering questions appropriately. Pt states some sob and cough. She denies cp, palpitations, headache, dizziness, abd pain, N/V/D, dysuria. States she has joint pain from her arthritis. Of note, pt was recently admitted 1 month ago for CHF/PNA. Pt presents with COPD exacerbation and pneumonia, she has MARIUSZ, and in ED BP systolic decreased to 97.  IMPROVE VTE Individual Risk Assessment          RISK                                                          Points    [  ] Previous VTE                                                3    [  ] Thrombophilia                                             2    [  ] Lower limb paralysis                                    2        (unable to hold up >15 seconds)      [  ] Current Cancer                                             2         (within 6 months)    [  ] Immobilization > 24 hrs                              1    [  ] ICU/CCU stay > 24 hours                            1    [ x ] Age > 60                                                    1    IMPROVE VTE Score __1_______    07/22/2020 : Afebrile. COVID-19 negative. Alert and oriented. Continue present. Spoke to the daughter.  07/23/2020 : Afebrile. Feels tired today. Mild cough. Urine positive for E.coli, sensitive to Cefepime. ID f/u noted. Continue present antibiotics.  07/24/2020 : Alert. Awake. MARIUSZ resolving. Mild cough. ID f/u noted. Continue IV Cefepime.
89 y/o female from home with pmh HTN, COPD, ? valve surgery, hypothyroid, afib on coumadin presents with fever.  Family reports pt was dc home ~ 1.5 weeks ago. Pt has been doing well and on O2 24hrs, however noted fever last night and tonight, pt c/o cough and sob. Given tylenol. Pt is AOX2 but answering questions appropriately. Pt states some sob and cough. She denies cp, palpitations, headache, dizziness, abd pain, N/V/D, dysuria. States she has joint pain from her arthritis. Of note, pt was recently admitted 1 month ago for CHF/PNA. Pt presents with COPD exacerbation and pneumonia, she has MARIUSZ, and in ED BP systolic decreased to 97.  IMPROVE VTE Individual Risk Assessment          RISK                                                          Points    [  ] Previous VTE                                                3    [  ] Thrombophilia                                             2    [  ] Lower limb paralysis                                    2        (unable to hold up >15 seconds)      [  ] Current Cancer                                             2         (within 6 months)    [  ] Immobilization > 24 hrs                              1    [  ] ICU/CCU stay > 24 hours                            1    [ x ] Age > 60                                                    1    IMPROVE VTE Score __1_______    07/22/2020 : Afebrile. COVID-19 negative. Alert and oriented. Continue present. Spoke to the daughter.  07/23/2020 : Afebrile. Feels tired today. Mild cough. Urine positive for E.coli, sensitive to Cefepime. ID f/u noted. Continue present antibiotics.  07/24/2020 : Alert. Awake. MARIUSZ resolving. Mild cough. ID f/u noted. Continue IV Cefepime.   07/25/2020 : Alert, awake. Asymptomatic. On Oral Levaquin. Change steroid to PO. Monitor Temp.  07/26/2020 : Alert. Awake. Asymptomatic at rest .On Oral Levaquin. Continue steroids orally, taper as out patient. PT evaluation. Discharge planning for AM.   07/27/2020 : Alert. C/O weakness. Breathing better. Lungs clear. Plan discharge to [facility]. Complete Levaquin for 7 more days. Prednisone taper.
89 y/o female from home with pmh HTN, COPD, ? valve surgery, hypothyroid, afib on coumadin presents with fever, SOB and cough x2 days.   Tmax 103, HR up to 90s, RR 25  sat 98% on 2L NC   CXR (personally reviewed) improved compared to CXR in June, evidence of old sternotomy   CT chest (personally reviewed) GGO and a small right pleural effusions but no dense consolidation   ECHO from June 2020 found moderate AS and severe pHTN   Was given one dose of Vanco and started on cefepime   COVID in June was negative  COVID PCR negative  DDx includes viral pneumonia, bacterial pneumonia/healthcare associated PNA, copd/pHTN exacerbation, CHF exacerbation  Leukocytosis to 14k, normal lymphocytes->normalized	   MARIUSZ with creatinine 1.56 which is up from her baseline of 1.0 in June but now improving to 1.43  CRP, D-Dimer, ferritin, procalcitonin elevated   MRSA PCR positive->gave another dose of vancomycin    Given the fever, SOB, cough and leukocytosis as well as recent hospitalization will cover with cefepime rather then zosyn as she had CHF exacerbation and sodium load may confound symptoms as well as possibility of vanco/zosyn renal toxicity   She is improving on cefepime. No more doses of vanco.     Overall, 89F Fever, leukocytosis, pneumonia, MARIUSZ
89 y/o female from home with pmh HTN, COPD, ? valve surgery, hypothyroid, afib on coumadin presents with fever.  Family reports pt was dc home ~ 1.5 weeks ago. Pt has been doing well and on O2 24hrs, however noted fever last night and tonight, pt c/o cough and sob. Given tylenol. Pt is AOX2 but answering questions appropriately. Pt states some sob and cough. She denies cp, palpitations, headache, dizziness, abd pain, N/V/D, dysuria. States she has joint pain from her arthritis. Of note, pt was recently admitted 1 month ago for CHF/PNA. Pt presents with COPD exacerbation and pneumonia, she has MARIUSZ, and in ED BP systolic decreased to 97.  IMPROVE VTE Individual Risk Assessment          RISK                                                          Points    [  ] Previous VTE                                                3    [  ] Thrombophilia                                             2    [  ] Lower limb paralysis                                    2        (unable to hold up >15 seconds)      [  ] Current Cancer                                             2         (within 6 months)    [  ] Immobilization > 24 hrs                              1    [  ] ICU/CCU stay > 24 hours                            1    [ x ] Age > 60                                                    1    IMPROVE VTE Score __1_______
89 y/o female from home with pmh HTN, COPD, ? valve surgery, hypothyroid, afib on coumadin presents with fever.  Family reports pt was dc home ~ 1.5 weeks ago. Pt has been doing well and on O2 24hrs, however noted fever last night and tonight, pt c/o cough and sob. Given tylenol. Pt is AOX2 but answering questions appropriately. Pt states some sob and cough. She denies cp, palpitations, headache, dizziness, abd pain, N/V/D, dysuria. States she has joint pain from her arthritis. Of note, pt was recently admitted 1 month ago for CHF/PNA. Pt presents with COPD exacerbation and pneumonia, she has MARIUSZ, and in ED BP systolic decreased to 97.  IMPROVE VTE Individual Risk Assessment          RISK                                                          Points    [  ] Previous VTE                                                3    [  ] Thrombophilia                                             2    [  ] Lower limb paralysis                                    2        (unable to hold up >15 seconds)      [  ] Current Cancer                                             2         (within 6 months)    [  ] Immobilization > 24 hrs                              1    [  ] ICU/CCU stay > 24 hours                            1    [ x ] Age > 60                                                    1    IMPROVE VTE Score __1_______    07/22/2020 : Afebrile. COVID-19 negative. Alert and oriented. Continue present. Spoke to the daughter.
89 y/o female from home with pmh HTN, COPD, ? valve surgery, hypothyroid, afib on coumadin presents with fever.  Family reports pt was dc home ~ 1.5 weeks ago. Pt has been doing well and on O2 24hrs, however noted fever last night and tonight, pt c/o cough and sob. Given tylenol. Pt is AOX2 but answering questions appropriately. Pt states some sob and cough. She denies cp, palpitations, headache, dizziness, abd pain, N/V/D, dysuria. States she has joint pain from her arthritis. Of note, pt was recently admitted 1 month ago for CHF/PNA. Pt presents with COPD exacerbation and pneumonia, she has MARIUSZ, and in ED BP systolic decreased to 97.  IMPROVE VTE Individual Risk Assessment          RISK                                                          Points    [  ] Previous VTE                                                3    [  ] Thrombophilia                                             2    [  ] Lower limb paralysis                                    2        (unable to hold up >15 seconds)      [  ] Current Cancer                                             2         (within 6 months)    [  ] Immobilization > 24 hrs                              1    [  ] ICU/CCU stay > 24 hours                            1    [ x ] Age > 60                                                    1    IMPROVE VTE Score __1_______    07/22/2020 : Afebrile. COVID-19 negative. Alert and oriented. Continue present. Spoke to the daughter.  07/23/2020 : Afebrile. Feels tired today. Mild cough. Urine positive for E.coli, sensitive to Cefepime. ID f/u noted. Continue present antibiotics.  07/24/2020 : Alert. Awake. MARIUSZ resolving. Mild cough. ID f/u noted. Continue IV Cefepime.   07/25/2020 : Alert, awake. Asymptomatic. On Oral Levaquin. Change steroid to PO. Monitor Temp.
89 y/o female from home with pmh HTN, COPD, ? valve surgery, hypothyroid, afib on coumadin presents with fever.  Family reports pt was dc home ~ 1.5 weeks ago. Pt has been doing well and on O2 24hrs, however noted fever last night and tonight, pt c/o cough and sob. Given tylenol. Pt is AOX2 but answering questions appropriately. Pt states some sob and cough. She denies cp, palpitations, headache, dizziness, abd pain, N/V/D, dysuria. States she has joint pain from her arthritis. Of note, pt was recently admitted 1 month ago for CHF/PNA. Pt presents with COPD exacerbation and pneumonia, she has MARIUSZ, and in ED BP systolic decreased to 97.  IMPROVE VTE Individual Risk Assessment          RISK                                                          Points    [  ] Previous VTE                                                3    [  ] Thrombophilia                                             2    [  ] Lower limb paralysis                                    2        (unable to hold up >15 seconds)      [  ] Current Cancer                                             2         (within 6 months)    [  ] Immobilization > 24 hrs                              1    [  ] ICU/CCU stay > 24 hours                            1    [ x ] Age > 60                                                    1    IMPROVE VTE Score __1_______    07/22/2020 : Afebrile. COVID-19 negative. Alert and oriented. Continue present. Spoke to the daughter.  07/23/2020 : Afebrile. Feels tired today. Mild cough. Urine positive for E.coli, sensitive to Cefepime. ID f/u noted. Continue present antibiotics.  07/24/2020 : Alert. Awake. MARIUSZ resolving. Mild cough. ID f/u noted. Continue IV Cefepime.   07/25/2020 : Alert, awake. Asymptomatic. On Oral Levaquin. Change steroid to PO. Monitor Temp.  07/26/2020 : Alert. Awake. Asymptomatic at rest .On Oral Levaquin. Continue steroids orally, taper as out patient. PT evaluation. Discharge planning for AM.

## 2020-07-27 NOTE — PROGRESS NOTE ADULT - PROVIDER SPECIALTY LIST ADULT
Hospitalist
Infectious Disease
Infectious Disease
Internal Medicine
Pulmonology
Infectious Disease

## 2020-07-27 NOTE — PROGRESS NOTE ADULT - PROBLEM SELECTOR PLAN 2
-tmax of 103 remains afebrile   -continue antibiotics   -monitor fever curve   -f/u cultures
-tmax of 103 remains afebrile   -continue antibiotics   -monitor fever curve   -f/u cultures
-tmax of 103 remains afebrile   -continue antibiotics for a total of 7 days to stop Monday 7/27  -monitor fever curve   -f/u cultures
O2, steroids, bronchodilators, pulmonary consult

## 2020-07-27 NOTE — PROGRESS NOTE ADULT - PROBLEM SELECTOR PROBLEM 6
MARIUSZ (acute kidney injury)

## 2020-07-27 NOTE — PROGRESS NOTE ADULT - PROBLEM SELECTOR PROBLEM 4
MARIUSZ (acute kidney injury)
Atrial fibrillation, unspecified type
MARIUSZ (acute kidney injury)
MARIUSZ (acute kidney injury)
Atrial fibrillation, unspecified type
Atrial fibrillation, unspecified type

## 2020-07-27 NOTE — PROGRESS NOTE ADULT - PROBLEM SELECTOR PROBLEM 1
Pneumonia of both lower lobes due to infectious organism

## 2020-07-27 NOTE — PROGRESS NOTE ADULT - PROBLEM SELECTOR PLAN 4
-patient had creatinine less then 1 last month  -now has creatinine 1.56->1.47->1.43  -will dose all antibiotics based on CrCl and adjust accordingly   -rest of care per primary team.
-patient had creatinine less then 1 last month  -now has creatinine 1.56->1.47   -will dose all antibiotics based on CrCl and adjust accordingly   -rest of care per primary team.
-patient had creatinine less then 1 last month  -now has creatinine 1.56->1.47->1.43->1.24  -will dose all antibiotics based on CrCl and adjust accordingly   -rest of care per primary team.
bp low/borderline, rate currently controlled, will hold diltiazem for now, fluid bolus

## 2020-07-27 NOTE — DISCHARGE NOTE NURSING/CASE MANAGEMENT/SOCIAL WORK - PATIENT PORTAL LINK FT
You can access the FollowMyHealth Patient Portal offered by [facility] by registering at the following website: [email or web address]. By joining [employer]’s FollowMyHealth portal, you will also be able to view your health information using other applications (apps) compatible with our system.

## 2020-07-28 ENCOUNTER — TRANSCRIPTION ENCOUNTER (OUTPATIENT)
Age: 85
End: 2020-07-28

## 2020-07-30 ENCOUNTER — TRANSCRIPTION ENCOUNTER (OUTPATIENT)
Age: 85
End: 2020-07-30

## 2020-07-30 DIAGNOSIS — N17.9 ACUTE KIDNEY FAILURE, UNSPECIFIED: ICD-10-CM

## 2020-07-30 DIAGNOSIS — I50.32 CHRONIC DIASTOLIC (CONGESTIVE) HEART FAILURE: ICD-10-CM

## 2020-07-30 DIAGNOSIS — J96.11 CHRONIC RESPIRATORY FAILURE WITH HYPOXIA: ICD-10-CM

## 2020-07-30 DIAGNOSIS — I48.20 CHRONIC ATRIAL FIBRILLATION, UNSPECIFIED: ICD-10-CM

## 2020-07-30 DIAGNOSIS — J18.9 PNEUMONIA, UNSPECIFIED ORGANISM: ICD-10-CM

## 2020-07-30 DIAGNOSIS — J44.1 CHRONIC OBSTRUCTIVE PULMONARY DISEASE WITH (ACUTE) EXACERBATION: ICD-10-CM

## 2020-07-30 DIAGNOSIS — Z11.59 ENCOUNTER FOR SCREENING FOR OTHER VIRAL DISEASES: ICD-10-CM

## 2020-07-30 DIAGNOSIS — Z79.01 LONG TERM (CURRENT) USE OF ANTICOAGULANTS: ICD-10-CM

## 2020-07-30 DIAGNOSIS — A41.9 SEPSIS, UNSPECIFIED ORGANISM: ICD-10-CM

## 2020-07-30 DIAGNOSIS — I11.0 HYPERTENSIVE HEART DISEASE WITH HEART FAILURE: ICD-10-CM

## 2020-07-30 DIAGNOSIS — I27.20 PULMONARY HYPERTENSION, UNSPECIFIED: ICD-10-CM

## 2020-07-30 DIAGNOSIS — Z99.81 DEPENDENCE ON SUPPLEMENTAL OXYGEN: ICD-10-CM

## 2020-07-30 DIAGNOSIS — E03.9 HYPOTHYROIDISM, UNSPECIFIED: ICD-10-CM

## 2020-07-30 DIAGNOSIS — J44.0 CHRONIC OBSTRUCTIVE PULMONARY DISEASE WITH (ACUTE) LOWER RESPIRATORY INFECTION: ICD-10-CM

## 2020-08-03 ENCOUNTER — TRANSCRIPTION ENCOUNTER (OUTPATIENT)
Age: 85
End: 2020-08-03

## 2020-08-11 ENCOUNTER — TRANSCRIPTION ENCOUNTER (OUTPATIENT)
Age: 85
End: 2020-08-11

## 2020-08-19 ENCOUNTER — TRANSCRIPTION ENCOUNTER (OUTPATIENT)
Age: 85
End: 2020-08-19

## 2020-08-26 ENCOUNTER — TRANSCRIPTION ENCOUNTER (OUTPATIENT)
Age: 85
End: 2020-08-26

## 2020-08-27 ENCOUNTER — TRANSCRIPTION ENCOUNTER (OUTPATIENT)
Age: 85
End: 2020-08-27

## 2020-08-28 ENCOUNTER — APPOINTMENT (OUTPATIENT)
Dept: CARE COORDINATION | Facility: HOME HEALTH | Age: 85
End: 2020-08-28
Payer: MEDICARE

## 2020-08-28 DIAGNOSIS — J18.9 PNEUMONIA, UNSPECIFIED ORGANISM: ICD-10-CM

## 2020-08-28 DIAGNOSIS — S30.0XXS CONTUSION OF LOWER BACK AND PELVIS, SEQUELA: ICD-10-CM

## 2020-08-28 DIAGNOSIS — I10 ESSENTIAL (PRIMARY) HYPERTENSION: ICD-10-CM

## 2020-08-28 PROBLEM — E03.9 HYPOTHYROIDISM, UNSPECIFIED: Chronic | Status: ACTIVE | Noted: 2020-07-21

## 2020-08-28 PROBLEM — I48.91 UNSPECIFIED ATRIAL FIBRILLATION: Chronic | Status: ACTIVE | Noted: 2020-07-21

## 2020-08-28 PROCEDURE — 99348 HOME/RES VST EST LOW MDM 30: CPT | Mod: 95

## 2020-08-28 NOTE — PLAN
[FreeTextEntry1] : Daughter will CN contact information advised calling with any questions/concerns. Will continue to f/u. F/u appt with PCP DR. Heaven Vallecillo scheduled for 9/15/2020

## 2020-08-28 NOTE — HISTORY OF PRESENT ILLNESS
[Home] : at home, [unfilled] , at the time of the visit. [FreeTextEntry1] : TCM telehealth visit  - Rash to bilateral breast area and redness to sacral area [Other Location: e.g. Home (Enter Location, City,State)___] : at [unfilled] [Family Member] : family member [Verbal consent obtained from patient] : the patient, [unfilled] [FreeTextEntry3] : Daughter Lilliam Buccitti [de-identified] : Hospital Course: [facility] Admission Date	21-Jul-2020  Discharge Date	27-Jul-2020 Reason for Admission	Fever and cough. Discharge DX PNA 89 y/o female from home with pmh HTN, COPD, ? valve surgery, hypothyroid, afib on coumadin presented with fever, SOB and cough x2 days. Patient was admitted for b/l PNA treated w/ antibiotics. Patient discharged and home care services resume. Telehealth visit done today, patient’s daughter Lilliam and CHENTE Cuevas present during telehealth visit. Patient with c/o rash under bilateral breast and mild redness to sacral area. Pt is AOX2 but answering questions appropriately. Denies any fever/chills, SOB, Chest pain, palpitations, headache, dizziness, abd pain, N/V/D, or dysuria. Clinical history obtained from [facility] discharge note.

## 2020-08-28 NOTE — ASSESSMENT
[FreeTextEntry1] : 89 year old female with past medical history of  HTN, COPD, ? valve surgery, hypothyroid, Atrial on Coumadin, pulm HTN  with recent hospitalization for Pneumonia of both lower lobes due to infectious organism, and COPD exacerbation. Noted with bilateral breast with fungi rash and blanchable redness to sacral area.

## 2020-09-11 ENCOUNTER — TRANSCRIPTION ENCOUNTER (OUTPATIENT)
Age: 85
End: 2020-09-11

## 2020-09-29 ENCOUNTER — INPATIENT (INPATIENT)
Facility: HOSPITAL | Age: 85
LOS: 4 days | Discharge: HOME HEALTH | End: 2020-10-04
Attending: INTERNAL MEDICINE | Admitting: INTERNAL MEDICINE
Payer: MEDICARE

## 2020-09-29 VITALS
HEART RATE: 128 BPM | HEIGHT: 64 IN | RESPIRATION RATE: 20 BRPM | OXYGEN SATURATION: 97 % | WEIGHT: 169.98 LBS | DIASTOLIC BLOOD PRESSURE: 78 MMHG | TEMPERATURE: 98 F | SYSTOLIC BLOOD PRESSURE: 157 MMHG

## 2020-09-29 PROCEDURE — 93010 ELECTROCARDIOGRAM REPORT: CPT

## 2020-09-29 PROCEDURE — 99285 EMERGENCY DEPT VISIT HI MDM: CPT | Mod: CS

## 2020-09-29 NOTE — ED ADULT TRIAGE NOTE - CHIEF COMPLAINT QUOTE
Pt biba from home with c/o difficulty breathing. As per EMS, pt had chest xray done by her PCP, result showed PNA. H/O PNA, COPD, AFIB (Coumadin 3mg daily ), hld

## 2020-09-30 DIAGNOSIS — I27.20 PULMONARY HYPERTENSION, UNSPECIFIED: ICD-10-CM

## 2020-09-30 DIAGNOSIS — E03.9 HYPOTHYROIDISM, UNSPECIFIED: ICD-10-CM

## 2020-09-30 DIAGNOSIS — I48.91 UNSPECIFIED ATRIAL FIBRILLATION: ICD-10-CM

## 2020-09-30 DIAGNOSIS — I50.33 ACUTE ON CHRONIC DIASTOLIC (CONGESTIVE) HEART FAILURE: ICD-10-CM

## 2020-09-30 DIAGNOSIS — J44.9 CHRONIC OBSTRUCTIVE PULMONARY DISEASE, UNSPECIFIED: ICD-10-CM

## 2020-09-30 DIAGNOSIS — Z29.9 ENCOUNTER FOR PROPHYLACTIC MEASURES, UNSPECIFIED: ICD-10-CM

## 2020-09-30 LAB
ALBUMIN SERPL ELPH-MCNC: 3.3 G/DL — SIGNIFICANT CHANGE UP (ref 3.3–5)
ALP SERPL-CCNC: 58 U/L — SIGNIFICANT CHANGE UP (ref 40–120)
ALT FLD-CCNC: 17 U/L — SIGNIFICANT CHANGE UP (ref 12–78)
ANION GAP SERPL CALC-SCNC: 8 MMOL/L — SIGNIFICANT CHANGE UP (ref 5–17)
ANION GAP SERPL CALC-SCNC: 8 MMOL/L — SIGNIFICANT CHANGE UP (ref 5–17)
APTT BLD: 29.9 SEC — SIGNIFICANT CHANGE UP (ref 27.5–35.5)
AST SERPL-CCNC: 20 U/L — SIGNIFICANT CHANGE UP (ref 15–37)
BASOPHILS # BLD AUTO: 0.09 K/UL — SIGNIFICANT CHANGE UP (ref 0–0.2)
BASOPHILS NFR BLD AUTO: 1 % — SIGNIFICANT CHANGE UP (ref 0–2)
BILIRUB SERPL-MCNC: 0.5 MG/DL — SIGNIFICANT CHANGE UP (ref 0.2–1.2)
BUN SERPL-MCNC: 22 MG/DL — SIGNIFICANT CHANGE UP (ref 7–23)
BUN SERPL-MCNC: 24 MG/DL — HIGH (ref 7–23)
CALCIUM SERPL-MCNC: 9 MG/DL — SIGNIFICANT CHANGE UP (ref 8.5–10.1)
CALCIUM SERPL-MCNC: 9.1 MG/DL — SIGNIFICANT CHANGE UP (ref 8.5–10.1)
CHLORIDE SERPL-SCNC: 102 MMOL/L — SIGNIFICANT CHANGE UP (ref 96–108)
CHLORIDE SERPL-SCNC: 103 MMOL/L — SIGNIFICANT CHANGE UP (ref 96–108)
CO2 SERPL-SCNC: 27 MMOL/L — SIGNIFICANT CHANGE UP (ref 22–31)
CO2 SERPL-SCNC: 29 MMOL/L — SIGNIFICANT CHANGE UP (ref 22–31)
CREAT SERPL-MCNC: 0.96 MG/DL — SIGNIFICANT CHANGE UP (ref 0.5–1.3)
CREAT SERPL-MCNC: 1.06 MG/DL — SIGNIFICANT CHANGE UP (ref 0.5–1.3)
EOSINOPHIL # BLD AUTO: 0.32 K/UL — SIGNIFICANT CHANGE UP (ref 0–0.5)
EOSINOPHIL NFR BLD AUTO: 3.6 % — SIGNIFICANT CHANGE UP (ref 0–6)
GLUCOSE SERPL-MCNC: 111 MG/DL — HIGH (ref 70–99)
GLUCOSE SERPL-MCNC: 147 MG/DL — HIGH (ref 70–99)
HCT VFR BLD CALC: 32.5 % — LOW (ref 34.5–45)
HCT VFR BLD CALC: 34.2 % — LOW (ref 34.5–45)
HGB BLD-MCNC: 11.1 G/DL — LOW (ref 11.5–15.5)
HGB BLD-MCNC: 11.1 G/DL — LOW (ref 11.5–15.5)
IMM GRANULOCYTES NFR BLD AUTO: 0.3 % — SIGNIFICANT CHANGE UP (ref 0–1.5)
INR BLD: 2.12 RATIO — HIGH (ref 0.88–1.16)
INR BLD: 2.24 RATIO — HIGH (ref 0.88–1.16)
LACTATE SERPL-SCNC: 0.7 MMOL/L — SIGNIFICANT CHANGE UP (ref 0.7–2)
LYMPHOCYTES # BLD AUTO: 2.48 K/UL — SIGNIFICANT CHANGE UP (ref 1–3.3)
LYMPHOCYTES # BLD AUTO: 27.5 % — SIGNIFICANT CHANGE UP (ref 13–44)
MAGNESIUM SERPL-MCNC: 1.9 MG/DL — SIGNIFICANT CHANGE UP (ref 1.6–2.6)
MCHC RBC-ENTMCNC: 32.5 GM/DL — SIGNIFICANT CHANGE UP (ref 32–36)
MCHC RBC-ENTMCNC: 33.8 PG — SIGNIFICANT CHANGE UP (ref 27–34)
MCHC RBC-ENTMCNC: 34.2 GM/DL — SIGNIFICANT CHANGE UP (ref 32–36)
MCHC RBC-ENTMCNC: 34.9 PG — HIGH (ref 27–34)
MCV RBC AUTO: 102.2 FL — HIGH (ref 80–100)
MCV RBC AUTO: 104.3 FL — HIGH (ref 80–100)
MONOCYTES # BLD AUTO: 0.89 K/UL — SIGNIFICANT CHANGE UP (ref 0–0.9)
MONOCYTES NFR BLD AUTO: 9.9 % — SIGNIFICANT CHANGE UP (ref 2–14)
NEUTROPHILS # BLD AUTO: 5.2 K/UL — SIGNIFICANT CHANGE UP (ref 1.8–7.4)
NEUTROPHILS NFR BLD AUTO: 57.7 % — SIGNIFICANT CHANGE UP (ref 43–77)
NRBC # BLD: 0 /100 WBCS — SIGNIFICANT CHANGE UP (ref 0–0)
NRBC # BLD: 0 /100 WBCS — SIGNIFICANT CHANGE UP (ref 0–0)
NT-PROBNP SERPL-SCNC: 1518 PG/ML — HIGH (ref 0–450)
PHOSPHATE SERPL-MCNC: 3.5 MG/DL — SIGNIFICANT CHANGE UP (ref 2.5–4.5)
PLATELET # BLD AUTO: 180 K/UL — SIGNIFICANT CHANGE UP (ref 150–400)
PLATELET # BLD AUTO: 184 K/UL — SIGNIFICANT CHANGE UP (ref 150–400)
POTASSIUM SERPL-MCNC: 3.3 MMOL/L — LOW (ref 3.5–5.3)
POTASSIUM SERPL-MCNC: 3.9 MMOL/L — SIGNIFICANT CHANGE UP (ref 3.5–5.3)
POTASSIUM SERPL-SCNC: 3.3 MMOL/L — LOW (ref 3.5–5.3)
POTASSIUM SERPL-SCNC: 3.9 MMOL/L — SIGNIFICANT CHANGE UP (ref 3.5–5.3)
PROT SERPL-MCNC: 7.4 GM/DL — SIGNIFICANT CHANGE UP (ref 6–8.3)
PROTHROM AB SERPL-ACNC: 23.7 SEC — HIGH (ref 10.6–13.6)
PROTHROM AB SERPL-ACNC: 25 SEC — HIGH (ref 10.6–13.6)
RBC # BLD: 3.18 M/UL — LOW (ref 3.8–5.2)
RBC # BLD: 3.28 M/UL — LOW (ref 3.8–5.2)
RBC # FLD: 15.2 % — HIGH (ref 10.3–14.5)
RBC # FLD: 15.2 % — HIGH (ref 10.3–14.5)
SARS-COV-2 IGG SERPL QL IA: NEGATIVE — SIGNIFICANT CHANGE UP
SARS-COV-2 IGM SERPL IA-ACNC: <0.1 INDEX — SIGNIFICANT CHANGE UP
SARS-COV-2 RNA SPEC QL NAA+PROBE: SIGNIFICANT CHANGE UP
SODIUM SERPL-SCNC: 138 MMOL/L — SIGNIFICANT CHANGE UP (ref 135–145)
SODIUM SERPL-SCNC: 139 MMOL/L — SIGNIFICANT CHANGE UP (ref 135–145)
TROPONIN I SERPL-MCNC: <.015 NG/ML — SIGNIFICANT CHANGE UP (ref 0.01–0.04)
TROPONIN I SERPL-MCNC: <.015 NG/ML — SIGNIFICANT CHANGE UP (ref 0.01–0.04)
WBC # BLD: 7.15 K/UL — SIGNIFICANT CHANGE UP (ref 3.8–10.5)
WBC # BLD: 9.01 K/UL — SIGNIFICANT CHANGE UP (ref 3.8–10.5)
WBC # FLD AUTO: 7.15 K/UL — SIGNIFICANT CHANGE UP (ref 3.8–10.5)
WBC # FLD AUTO: 9.01 K/UL — SIGNIFICANT CHANGE UP (ref 3.8–10.5)

## 2020-09-30 PROCEDURE — 99232 SBSQ HOSP IP/OBS MODERATE 35: CPT

## 2020-09-30 PROCEDURE — 70450 CT HEAD/BRAIN W/O DYE: CPT | Mod: 26

## 2020-09-30 PROCEDURE — 93010 ELECTROCARDIOGRAM REPORT: CPT

## 2020-09-30 PROCEDURE — 71045 X-RAY EXAM CHEST 1 VIEW: CPT | Mod: 26

## 2020-09-30 PROCEDURE — 99223 1ST HOSP IP/OBS HIGH 75: CPT

## 2020-09-30 PROCEDURE — 12345: CPT | Mod: NC

## 2020-09-30 RX ORDER — ONDANSETRON 8 MG/1
4 TABLET, FILM COATED ORAL EVERY 6 HOURS
Refills: 0 | Status: DISCONTINUED | OUTPATIENT
Start: 2020-09-30 | End: 2020-10-04

## 2020-09-30 RX ORDER — WARFARIN SODIUM 2.5 MG/1
3 TABLET ORAL ONCE
Refills: 0 | Status: COMPLETED | OUTPATIENT
Start: 2020-09-30 | End: 2020-09-30

## 2020-09-30 RX ORDER — PANTOPRAZOLE SODIUM 20 MG/1
40 TABLET, DELAYED RELEASE ORAL
Refills: 0 | Status: DISCONTINUED | OUTPATIENT
Start: 2020-09-30 | End: 2020-10-04

## 2020-09-30 RX ORDER — ALBUTEROL 90 UG/1
2 AEROSOL, METERED ORAL EVERY 6 HOURS
Refills: 0 | Status: DISCONTINUED | OUTPATIENT
Start: 2020-09-30 | End: 2020-10-04

## 2020-09-30 RX ORDER — ACETAMINOPHEN 500 MG
650 TABLET ORAL EVERY 6 HOURS
Refills: 0 | Status: DISCONTINUED | OUTPATIENT
Start: 2020-09-30 | End: 2020-10-04

## 2020-09-30 RX ORDER — DILTIAZEM HCL 120 MG
360 CAPSULE, EXT RELEASE 24 HR ORAL DAILY
Refills: 0 | Status: DISCONTINUED | OUTPATIENT
Start: 2020-09-30 | End: 2020-10-04

## 2020-09-30 RX ORDER — FUROSEMIDE 40 MG
40 TABLET ORAL ONCE
Refills: 0 | Status: COMPLETED | OUTPATIENT
Start: 2020-09-30 | End: 2020-09-30

## 2020-09-30 RX ORDER — LEVOTHYROXINE SODIUM 125 MCG
50 TABLET ORAL DAILY
Refills: 0 | Status: DISCONTINUED | OUTPATIENT
Start: 2020-09-30 | End: 2020-10-04

## 2020-09-30 RX ORDER — FUROSEMIDE 40 MG
20 TABLET ORAL
Refills: 0 | Status: DISCONTINUED | OUTPATIENT
Start: 2020-09-30 | End: 2020-10-01

## 2020-09-30 RX ORDER — LOSARTAN POTASSIUM 100 MG/1
50 TABLET, FILM COATED ORAL DAILY
Refills: 0 | Status: DISCONTINUED | OUTPATIENT
Start: 2020-09-30 | End: 2020-10-01

## 2020-09-30 RX ORDER — PIPERACILLIN AND TAZOBACTAM 4; .5 G/20ML; G/20ML
3.38 INJECTION, POWDER, LYOPHILIZED, FOR SOLUTION INTRAVENOUS ONCE
Refills: 0 | Status: COMPLETED | OUTPATIENT
Start: 2020-09-30 | End: 2020-09-30

## 2020-09-30 RX ORDER — POTASSIUM CHLORIDE 20 MEQ
40 PACKET (EA) ORAL ONCE
Refills: 0 | Status: COMPLETED | OUTPATIENT
Start: 2020-09-30 | End: 2020-09-30

## 2020-09-30 RX ORDER — ALPRAZOLAM 0.25 MG
0.25 TABLET ORAL AT BEDTIME
Refills: 0 | Status: DISCONTINUED | OUTPATIENT
Start: 2020-09-30 | End: 2020-10-04

## 2020-09-30 RX ADMIN — Medication 50 MICROGRAM(S): at 08:49

## 2020-09-30 RX ADMIN — PANTOPRAZOLE SODIUM 40 MILLIGRAM(S): 20 TABLET, DELAYED RELEASE ORAL at 06:38

## 2020-09-30 RX ADMIN — Medication 650 MILLIGRAM(S): at 14:40

## 2020-09-30 RX ADMIN — Medication 20 MILLIGRAM(S): at 14:11

## 2020-09-30 RX ADMIN — Medication 650 MILLIGRAM(S): at 06:40

## 2020-09-30 RX ADMIN — Medication 20 MILLIGRAM(S): at 06:37

## 2020-09-30 RX ADMIN — Medication 0.25 MILLIGRAM(S): at 21:54

## 2020-09-30 RX ADMIN — Medication 20 MILLIGRAM(S): at 18:05

## 2020-09-30 RX ADMIN — Medication 40 MILLIEQUIVALENT(S): at 12:25

## 2020-09-30 RX ADMIN — LOSARTAN POTASSIUM 50 MILLIGRAM(S): 100 TABLET, FILM COATED ORAL at 12:25

## 2020-09-30 RX ADMIN — Medication 20 MILLIGRAM(S): at 21:54

## 2020-09-30 RX ADMIN — WARFARIN SODIUM 3 MILLIGRAM(S): 2.5 TABLET ORAL at 21:54

## 2020-09-30 RX ADMIN — Medication 650 MILLIGRAM(S): at 14:10

## 2020-09-30 RX ADMIN — Medication 40 MILLIGRAM(S): at 03:03

## 2020-09-30 RX ADMIN — PIPERACILLIN AND TAZOBACTAM 200 GRAM(S): 4; .5 INJECTION, POWDER, LYOPHILIZED, FOR SOLUTION INTRAVENOUS at 00:27

## 2020-09-30 RX ADMIN — Medication 360 MILLIGRAM(S): at 08:49

## 2020-09-30 RX ADMIN — Medication 650 MILLIGRAM(S): at 07:30

## 2020-09-30 NOTE — CONSULT NOTE ADULT - SUBJECTIVE AND OBJECTIVE BOX
CARDIOLOGY CONSULT NOTE    Patient is a 89y Female with a known history of :  Preventive measure [Z29.9]    Chronic obstructive pulmonary disease, unspecified COPD type [J44.9]    Pulmonary hypertension [I27.20]    Hypothyroidism, unspecified type [E03.9]    Atrial fibrillation, unspecified type [I48.91]    Acute on chronic diastolic (congestive) heart failure [I50.33]      HPI:  89 y/o female with PMHx of HTN, COPD on O2, chronic diastolic CHF with preserved LVEF, moderate to severe AS with MALLORIE 1.03cm2, severe pulmonary HTN, Hypothyroidism, A fib on Coumadin; recent admission in July for COPD exacerbation and B/L PNA; presents to the ED c/o SOB x 2 days, dizziness, chest tightness and general unwellness.     In the ED, pt tachy to 128, BP stable, SPO2 97% on 2L.   BNP 1518; Renetta neg x 2  Pt given lasix 40mg IV x 1 as well an Zosyn.        REVIEW OF SYSTEMS:    CONSTITUTIONAL: No fever, weight loss, or fatigue  EYES: No eye pain, visual disturbances, or discharge  ENMT:  No difficulty hearing, tinnitus, vertigo; No sinus or throat pain  NECK: No pain or stiffness  BREASTS: No pain, masses, or nipple discharge  RESPIRATORY: No cough, wheezing, chills or hemoptysis; + shortness of breath  CARDIOVASCULAR: No chest pain, palpitations, dizziness; + leg swelling  GASTROINTESTINAL: No abdominal or epigastric pain. No nausea, vomiting, or hematemesis; No diarrhea or constipation. No melena or hematochezia.  GENITOURINARY: No dysuria, frequency, hematuria, or incontinence  NEUROLOGICAL: No headaches, memory loss, loss of strength, numbness, or tremors  SKIN: No itching, burning, rashes, or lesions   LYMPH NODES: No enlarged glands  ENDOCRINE: No heat or cold intolerance; No hair loss  MUSCULOSKELETAL: No joint pain or swelling; No muscle, back, or extremity pain  PSYCHIATRIC: No depression, anxiety, mood swings, or difficulty sleeping  HEME/LYMPH: No easy bruising, or bleeding gums  ALLERGY AND IMMUNOLOGIC: No hives or eczema    MEDICATIONS  (STANDING):  diltiazem    milliGRAM(s) Oral daily  furosemide   Injectable 20 milliGRAM(s) IV Push two times a day  levothyroxine 50 MICROGram(s) Oral daily  losartan 50 milliGRAM(s) Oral daily  pantoprazole    Tablet 40 milliGRAM(s) Oral before breakfast  sildenafil (REVATIO) 20 milliGRAM(s) Oral every 8 hours  warfarin 3 milliGRAM(s) Oral once    MEDICATIONS  (PRN):  acetaminophen   Tablet .. 650 milliGRAM(s) Oral every 6 hours PRN Mild Pain (1 - 3)  ALBUTerol    90 MICROgram(s) HFA Inhaler 2 Puff(s) Inhalation every 6 hours PRN Shortness of Breath and/or Wheezing  ALPRAZolam 0.25 milliGRAM(s) Oral at bedtime PRN Anxiety  ondansetron Injectable 4 milliGRAM(s) IV Push every 6 hours PRN Nausea and/or Vomiting      ALLERGIES: No Known Allergies      FAMILY HISTORY:  No pertinent family history in first degree relatives        PHYSICAL EXAMINATION:  -----------------------------  T(C): 37.1 (09-30-20 @ 10:00), Max: 37.1 (09-30-20 @ 10:00)  HR: 79 (09-30-20 @ 10:00) (78 - 128)  BP: 149/68 (09-30-20 @ 10:00) (119/81 - 157/78)  RR: 18 (09-30-20 @ 10:00) (18 - 22)  SpO2: 100% (09-30-20 @ 10:00) (97% - 100%)      Constitutional: ill appearing but in no acute distress.   Eyes: the conjunctiva exhibited no abnormalities and the eyelids demonstrated no xanthelasmas.   HEENT: normal oral mucosa, no oral pallor and no oral cyanosis.   Neck: normal jugular venous A waves present, normal jugular venous V waves present and no jugular venous waddell A waves.   Pulmonary: +wheezing but no rales  Cardiovascular: irreg irreg 2/6 SM  Abdomen: soft, non-tender, no hepato-splenomegaly and no abdominal mass palpated.   Musculoskeletal: the gait could not be assessed..   Extremities: no clubbing of the fingernails, no localized cyanosis, no petechial hemorrhages and no ischemic changes.   Skin: normal skin color and pigmentation, no rash, no venous stasis, no skin lesions, no skin ulcer and no xanthoma was observed.   Psychiatric: oriented to person, place, and time, the affect was normal, the mood was normal and not feeling anxious.       LABS:   --------  09-30    139  |  102  |  22  ----------------------------<  147<H>  3.3<L>   |  29  |  1.06    Ca    9.1      30 Sep 2020 10:00  Phos  3.5     09-30  Mg     1.9     09-30    TPro  7.4  /  Alb  3.3  /  TBili  0.5  /  DBili  x   /  AST  20  /  ALT  17  /  AlkPhos  58  09-30                         11.1   7.15  )-----------( 184      ( 30 Sep 2020 10:00 )             34.2     PT/INR - ( 30 Sep 2020 10:00 )   PT: 23.7 sec;   INR: 2.12 ratio         PTT - ( 30 Sep 2020 00:35 )  PTT:29.9 sec  09-30 @ 00:35 BNP: 1518 pg/mL    09-30 @ 07:55 CPK total:--, CKMB --, Troponin I - <.015 ng/mL  09-30 @ 00:35 CPK total:--, CKMB --, Troponin I - <.015 ng/mL          RADIOLOGY:  -----------------      < from: TTE Echo Complete w/o contrast w/ Doppler (06.01.20 @ 10:59) >  Summary:   1. Left ventricular ejection fraction, by visual estimation, is 55 to 60%.   2. Technically suboptimal study.   3. [place] not ell visualized in any view. Consider contrast echo, if clinically warranted for further evaluation.   4. Degenerative mitral valve.   5. Mild mitral annular calcification.   6. Mild mitral valve regurgitation.   7. Degenerative tricuspid valve.   8. Moderate tricuspid regurgitation.   9. Aortic valve is sclerotic with decreased cusp excursion.  10. Mild aortic regurgitation.  11. Estimated pulmonary artery systolic pressure is 67.7 mmHg assuming a right atrial pressure of 15 mmHg, which is consistent with severe pulmonary hypertension.  12. Peak transaortic gradient equals 16.9 mmHg, mean transaortic gradient equals 8.6 mmHg, the calculated aortic valve area equals 1.03 cm² by the continuity equation consistent with moderate aortic stenosis.    < end of copied text >

## 2020-09-30 NOTE — PHYSICAL THERAPY INITIAL EVALUATION ADULT - MD/RN NOTIFIED
Yes the form will be completed with 7/10 last office visit   Mother will drop form off today   She plans to have ashish go to day care end of august , mom will be going back to work in Sept.    yes

## 2020-09-30 NOTE — PHYSICAL THERAPY INITIAL EVALUATION ADULT - ADDITIONAL COMMENTS
As per son Candelario, pt lives alone in a house with 5 steps to enter with bilateral rails. She has a HHA, home O2, RW, wheelchair, however pt was wheelchair bound for more than 6 months, have home gina lift. More than 6 months ago, pt used to ambulate with RW with SBA

## 2020-09-30 NOTE — H&P ADULT - ASSESSMENT
89 y/o female with PMHx of HTN, COPD on O2, Diastolic CHF, Severe Pulmonary HTN, Hypothyroidism ,A fib on Coumadin, s/p Valve replacement, recent admission in July for COPD exacerbation and, B/L PNA presents to the ED c/o SOB x 2 days being admitted for acute on chronic CHF exacerabtion.    IMPROVE VTE Individual Risk Assessment    RISK                                                                Points    [  ] Previous VTE                                                  3    [  ] Thrombophilia                                               2    [  ] Lower limb paralysis                                      2        (unable to hold up >15 seconds)      [  ] Current Cancer                                              2         (within 6 months)    [  ] Immobilization > 24 hrs                                1    [  ] ICU/CCU stay > 24 hours                              1    [  ] Age > 60                                                      1    IMPROVE VTE Score ___1______    IMPROVE Score 0-1: Low Risk, No VTE prophylaxis required for most patients, encourage ambulation.   IMPROVE Score 2-3: At risk, pharmacologic VTE prophylaxis is indicated for most patients (in the absence of a contraindication)  IMPROVE Score > or = 4: High Risk, pharmacologic VTE prophylaxis is indicated for most patients (in the absence of a contraindication) 89 y/o female with PMHx of HTN, COPD on O2, Diastolic CHF, Severe Pulmonary HTN, Hypothyroidism, A fib on Coumadin, recent admission in July for COPD exacerbation and, B/L PNA presents to the ED c/o SOB x 2 days being admitted for acute on chronic CHF exacerbation    IMPROVE VTE Individual Risk Assessment    RISK                                                                Points    [  ] Previous VTE                                                  3    [  ] Thrombophilia                                               2    [  ] Lower limb paralysis                                      2        (unable to hold up >15 seconds)      [  ] Current Cancer                                              2         (within 6 months)    [  ] Immobilization > 24 hrs                                1    [  ] ICU/CCU stay > 24 hours                              1    [  ] Age > 60                                                      1    IMPROVE VTE Score ___1______    IMPROVE Score 0-1: Low Risk, No VTE prophylaxis required for most patients, encourage ambulation.   IMPROVE Score 2-3: At risk, pharmacologic VTE prophylaxis is indicated for most patients (in the absence of a contraindication)  IMPROVE Score > or = 4: High Risk, pharmacologic VTE prophylaxis is indicated for most patients (in the absence of a contraindication)

## 2020-09-30 NOTE — ED PROVIDER NOTE - CLINICAL SUMMARY MEDICAL DECISION MAKING FREE TEXT BOX
Pt with SOB, elevated BNP, cxr suggestive of chf, pt having runs of rapid narrow complex arrythmia on monitor. Will admit for further cardiac monitoring.

## 2020-09-30 NOTE — PHYSICAL THERAPY INITIAL EVALUATION ADULT - DID THE PATIENT HAVE SURGERY?
Chief complaints was SOB x 2 days PMH COPD on home O2, A-fib on Coumadin, Acute on chronic CHF, DVT on Coumadin/yes

## 2020-09-30 NOTE — H&P ADULT - NSHPPHYSICALEXAM_GEN_ALL_CORE
PHYSICAL EXAM:    Vital Signs Last 24 Hrs  T(C): 36.7 (30 Sep 2020 04:25), Max: 36.8 (29 Sep 2020 23:40)  T(F): 98.1 (30 Sep 2020 04:25), Max: 98.2 (29 Sep 2020 23:40)  HR: 79 (30 Sep 2020 04:25) (79 - 128)  BP: 134/84 (30 Sep 2020 04:25) (119/81 - 157/78)  BP(mean): --  RR: 19 (30 Sep 2020 04:25) (19 - 22)  SpO2: 98% (30 Sep 2020 04:25) (97% - 98%)    GENERAL: Pt lying in bed comfortably in NAD  HEENT:  Atraumatic, EOMI, PERRL, conjunctiva and sclera clear, MMM  NECK: Supple, No JVD  CHEST/LUNG: Clear to auscultation bilaterally; No rales, rhonchi, wheezing or rubs. Unlabored respirations  HEART: Regular rate and rhythm; No murmurs, rubs, or gallops  ABDOMEN: Bowel sounds present; Soft, Nontender, Nondistended. No guarding or rigidity    EXTREMITIES:  2+ Peripheral Pulses, brisk capillary refill. No clubbing, cyanosis, or edema  NEUROLOGICAL:  Alert & Oriented X3, speech clear. Answers questions appropriately. Full and equal strength B/L upper and lower extremities. No deficits   MSK: FROM x 4 extremities   SKIN: No rashes or lesions

## 2020-09-30 NOTE — ED ADULT NURSE NOTE - OBJECTIVE STATEMENT
hx of COPD per  pt states she was sent to ED by PCP informed pneumonia present yesterday. In ED wheezing noted , ecchymosis present to PAULE. pt placed on NC saturation improved 94 %. denies chest pain, fever, sick contacts.

## 2020-09-30 NOTE — H&P ADULT - HISTORY OF PRESENT ILLNESS
89 y/o female with PMHx of HTN, COPD on O2, Diastolic CHF, Severe Pulmonary HTN, Hypothyroidism ,A fib on Coumadin, s/p Valve replacement, recent admission in July for Sepsis secondary to COVID, B/L PNA presents to the ED c/o SOB x 2. Pt reports worsening SOB as well as mild dizziness and chest tightness x 2 day. Pt denies pain, worsening cough from baseline or LE edema. Pt denies fever or chills.    In ED pt initial tachy to 128, BP stable, SPO2 97% on 2L. Labs sig for BNP 1518, for rest of abnl labs see below. EKG showed Aflutter/Afib. Pt given lasix 40mg IV x 1 as well an Zosyn 89 y/o female with PMHx of HTN, COPD on O2, Diastolic CHF, Severe Pulmonary HTN, Hypothyroidism ,A fib on Coumadin, s/p Valve replacement, recent admission in July for COPD exacerbation and, B/L PNA presents to the ED c/o SOB x 2 days. Pt reports worsening SOB as well as mild dizziness and chest tightness x 2 day. Pt denies pain, worsening cough from baseline or LE edema. Pt denies fever or chills.    In ED pt initial tachy to 128, BP stable, SPO2 97% on 2L. Labs sig for BNP 1518, for rest of abnl labs see below. EKG showed Aflutter/Afib. Pt given lasix 40mg IV x 1 as well an Zosyn 89 y/o female with PMHx of HTN, COPD on O2, Diastolic CHF, Severe Pulmonary HTN, Hypothyroidism, A fib on Coumadin, recent admission in July for COPD exacerbation and, B/L PNA presents to the ED c/o SOB x 2 days. Pt reports worsening SOB as well as mild dizziness and chest tightness x 2 day. Pt denies pain, worsening cough from baseline or LE edema. Pt denies fever or chills.    In ED pt initial tachy to 128, BP stable, SPO2 97% on 2L. Labs sig for BNP 1518, for rest of abnl labs see below. EKG showed Aflutter/Afib. Pt given lasix 40mg IV x 1 as well an Zosyn

## 2020-09-30 NOTE — CONSULT NOTE ADULT - ASSESSMENT
89 y/o female with PMHx of HTN, COPD on O2, chronic diastolic CHF with preserved LVEF, moderate to severe AS with MALLORIE 1.03cm2, severe pulmonary HTN, Hypothyroidism, A fib on Coumadin; recent admission in July for COPD exacerbation and B/L PNA; presents to the ED c/o SOB x 2 days, dizziness, chest tightness and general unwellness.     In the ED, pt tachy to 128, BP stable, SPO2 97% on 2L.   BNP 1518; Renetta neg x 2  Pt given lasix 40mg IV x 1 as well an Zosyn.    On exam, pt with wheezing but no rales.  Suspect COPD exacerbation, rather then acute on chronic dCHF, compounded by severe pulm HTN and AS.    -monitor on tele  -nebs/steroids/abx for COPD exacerbation as per primary team  -on sildenafil for pulm HTN  -on cardizem for afib; coumadin for AC  -cautious diuresis with AS; would start with daily for now.  No rales on exam.  Pt is DNR/DNI; no invasive cardiac interventions at this time.

## 2020-09-30 NOTE — PHYSICAL THERAPY INITIAL EVALUATION ADULT - CRITERIA FOR SKILLED THERAPEUTIC INTERVENTIONS
anticipated discharge recommendation/impairments found/Home with no skilled pT needs as pt baseline is wheelchair bound, [place] lift for transfers, non ambulatory , pt has wheelchair , RW, [place] lift, home O2

## 2020-09-30 NOTE — ED ADULT NURSE NOTE - ED STAT RN HANDOFF DETAILS
Handoff given to RN Obrain pt general condition remains stable.  afluter on cardiac monitor endorsed for continued care.

## 2020-09-30 NOTE — CHART NOTE - NSCHARTNOTEFT_GEN_A_CORE
seen and examined  feel better, still w some dizziness  cardio eval    Vital Signs Last 24 Hrs  T(C): 37.1 (30 Sep 2020 10:00), Max: 37.1 (30 Sep 2020 10:00)  T(F): 98.8 (30 Sep 2020 10:00), Max: 98.8 (30 Sep 2020 10:00)  HR: 79 (30 Sep 2020 10:00) (78 - 128)  BP: 149/68 (30 Sep 2020 10:00) (119/81 - 157/78)  BP(mean): --  RR: 18 (30 Sep 2020 10:00) (18 - 22)  SpO2: 100% (30 Sep 2020 10:00) (97% - 100%)                          11.1   7.15  )-----------( 184      ( 30 Sep 2020 10:00 )             34.2     09-30    139  |  102  |  22  ----------------------------<  147<H>  3.3<L>   |  29  |  1.06    Ca    9.1      30 Sep 2020 10:00  Phos  3.5     09-30  Mg     1.9     09-30    TPro  7.4  /  Alb  3.3  /  TBili  0.5  /  DBili  x   /  AST  20  /  ALT  17  /  AlkPhos  58  09-30    PT/INR - ( 30 Sep 2020 00:35 )   PT: 25.0 sec;   INR: 2.24 ratio         PTT - ( 30 Sep 2020 00:35 )  PTT:29.9 sec

## 2020-09-30 NOTE — H&P ADULT - NSHPLABSRESULTS_GEN_ALL_CORE
T(C): 36.7 (09-30-20 @ 04:25), Max: 36.8 (09-29-20 @ 23:40)  HR: 79 (09-30-20 @ 04:25) (79 - 128)  BP: 134/84 (09-30-20 @ 04:25) (119/81 - 157/78)  RR: 19 (09-30-20 @ 04:25) (19 - 22)  SpO2: 98% (09-30-20 @ 04:25) (97% - 98%)                        11.1   9.01  )-----------( 180      ( 30 Sep 2020 00:35 )             32.5     09-30    138  |  103  |  24<H>  ----------------------------<  111<H>  3.9   |  27  |  0.96    Ca    9.0      30 Sep 2020 00:35    TPro  7.4  /  Alb  3.3  /  TBili  0.5  /  DBili  x   /  AST  20  /  ALT  17  /  AlkPhos  58  09-30    LIVER FUNCTIONS - ( 30 Sep 2020 00:35 )  Alb: 3.3 g/dL / Pro: 7.4 gm/dL / ALK PHOS: 58 U/L / ALT: 17 U/L / AST: 20 U/L / GGT: x           PT/INR - ( 30 Sep 2020 00:35 )   PT: 25.0 sec;   INR: 2.24 ratio         PTT - ( 30 Sep 2020 00:35 )  PTT:29.9 sec    CXR pre-jordan b/l basilar haziness    EKG Afib/Aflutter 88 bpm, no acute ischemic changes    acetaminophen   Tablet .. 650 milliGRAM(s) Oral every 6 hours PRN  furosemide   Injectable 20 milliGRAM(s) IV Push two times a day  ondansetron Injectable 4 milliGRAM(s) IV Push every 6 hours PRN

## 2020-09-30 NOTE — ED PROVIDER NOTE - OBJECTIVE STATEMENT
Language Line  utilized. 89 year old female came to the ED because of SOB, she says this is her third ED visit in 3 months. She sometimes feels dizzy, but denies headache, no chest pain, no abd pain, no vomiting, no diarrhea, no back pain. She reports a non-productive cough, but no fever and no chills.

## 2020-09-30 NOTE — H&P ADULT - PROBLEM SELECTOR PLAN 1
- c/w Lasix 20mg IV BID  - tele monitoring  - trend trops  - monitor I/Os, daily wts  - fluid restriction  - last Echo done recently reviewed, EF preserved  - c/w Losartan - likely pulm edema related to tachyarrhythmia   - c/w Lasix 20mg IV BID  - tele monitoring  - trend trops  - monitor I/Os, daily wts  - fluid restriction  - last Echo done recently reviewed, EF preserved  - c/w Losartan

## 2020-10-01 LAB
ANION GAP SERPL CALC-SCNC: 9 MMOL/L — SIGNIFICANT CHANGE UP (ref 5–17)
BUN SERPL-MCNC: 21 MG/DL — SIGNIFICANT CHANGE UP (ref 7–23)
CALCIUM SERPL-MCNC: 8.8 MG/DL — SIGNIFICANT CHANGE UP (ref 8.5–10.1)
CHLORIDE SERPL-SCNC: 101 MMOL/L — SIGNIFICANT CHANGE UP (ref 96–108)
CO2 SERPL-SCNC: 29 MMOL/L — SIGNIFICANT CHANGE UP (ref 22–31)
CREAT SERPL-MCNC: 0.98 MG/DL — SIGNIFICANT CHANGE UP (ref 0.5–1.3)
GLUCOSE SERPL-MCNC: 83 MG/DL — SIGNIFICANT CHANGE UP (ref 70–99)
HCT VFR BLD CALC: 32.9 % — LOW (ref 34.5–45)
HGB BLD-MCNC: 10.9 G/DL — LOW (ref 11.5–15.5)
INR BLD: 2.2 RATIO — HIGH (ref 0.88–1.16)
MAGNESIUM SERPL-MCNC: 2.2 MG/DL — SIGNIFICANT CHANGE UP (ref 1.6–2.6)
MCHC RBC-ENTMCNC: 33.1 GM/DL — SIGNIFICANT CHANGE UP (ref 32–36)
MCHC RBC-ENTMCNC: 34 PG — SIGNIFICANT CHANGE UP (ref 27–34)
MCV RBC AUTO: 102.5 FL — HIGH (ref 80–100)
NRBC # BLD: 0 /100 WBCS — SIGNIFICANT CHANGE UP (ref 0–0)
PHOSPHATE SERPL-MCNC: 3.5 MG/DL — SIGNIFICANT CHANGE UP (ref 2.5–4.5)
PLATELET # BLD AUTO: 166 K/UL — SIGNIFICANT CHANGE UP (ref 150–400)
POTASSIUM SERPL-MCNC: 3.5 MMOL/L — SIGNIFICANT CHANGE UP (ref 3.5–5.3)
POTASSIUM SERPL-SCNC: 3.5 MMOL/L — SIGNIFICANT CHANGE UP (ref 3.5–5.3)
PROTHROM AB SERPL-ACNC: 24.6 SEC — HIGH (ref 10.6–13.6)
RBC # BLD: 3.21 M/UL — LOW (ref 3.8–5.2)
RBC # FLD: 15.2 % — HIGH (ref 10.3–14.5)
SODIUM SERPL-SCNC: 139 MMOL/L — SIGNIFICANT CHANGE UP (ref 135–145)
WBC # BLD: 8.18 K/UL — SIGNIFICANT CHANGE UP (ref 3.8–10.5)
WBC # FLD AUTO: 8.18 K/UL — SIGNIFICANT CHANGE UP (ref 3.8–10.5)

## 2020-10-01 PROCEDURE — 99233 SBSQ HOSP IP/OBS HIGH 50: CPT

## 2020-10-01 RX ORDER — LOSARTAN POTASSIUM 100 MG/1
50 TABLET, FILM COATED ORAL DAILY
Refills: 0 | Status: DISCONTINUED | OUTPATIENT
Start: 2020-10-01 | End: 2020-10-02

## 2020-10-01 RX ORDER — WARFARIN SODIUM 2.5 MG/1
3 TABLET ORAL ONCE
Refills: 0 | Status: COMPLETED | OUTPATIENT
Start: 2020-10-01 | End: 2020-10-01

## 2020-10-01 RX ORDER — IPRATROPIUM/ALBUTEROL SULFATE 18-103MCG
3 AEROSOL WITH ADAPTER (GRAM) INHALATION EVERY 6 HOURS
Refills: 0 | Status: COMPLETED | OUTPATIENT
Start: 2020-10-01 | End: 2020-10-02

## 2020-10-01 RX ORDER — FUROSEMIDE 40 MG
40 TABLET ORAL DAILY
Refills: 0 | Status: DISCONTINUED | OUTPATIENT
Start: 2020-10-02 | End: 2020-10-04

## 2020-10-01 RX ADMIN — Medication 20 MILLIGRAM(S): at 13:21

## 2020-10-01 RX ADMIN — Medication 20 MILLIGRAM(S): at 22:00

## 2020-10-01 RX ADMIN — Medication 40 MILLIGRAM(S): at 12:40

## 2020-10-01 RX ADMIN — Medication 50 MICROGRAM(S): at 06:27

## 2020-10-01 RX ADMIN — Medication 3 MILLILITER(S): at 17:06

## 2020-10-01 RX ADMIN — Medication 20 MILLIGRAM(S): at 06:27

## 2020-10-01 RX ADMIN — Medication 650 MILLIGRAM(S): at 23:00

## 2020-10-01 RX ADMIN — WARFARIN SODIUM 3 MILLIGRAM(S): 2.5 TABLET ORAL at 22:11

## 2020-10-01 RX ADMIN — Medication 650 MILLIGRAM(S): at 22:00

## 2020-10-01 RX ADMIN — PANTOPRAZOLE SODIUM 40 MILLIGRAM(S): 20 TABLET, DELAYED RELEASE ORAL at 06:27

## 2020-10-01 RX ADMIN — Medication 360 MILLIGRAM(S): at 06:26

## 2020-10-01 RX ADMIN — Medication 650 MILLIGRAM(S): at 08:24

## 2020-10-01 RX ADMIN — Medication 650 MILLIGRAM(S): at 09:30

## 2020-10-01 RX ADMIN — Medication 3 MILLILITER(S): at 23:47

## 2020-10-01 NOTE — PROGRESS NOTE ADULT - ASSESSMENT
89 y/o female with PMHx of HTN, COPD on O2, chronic diastolic CHF with preserved LVEF, moderate to severe AS with MALLORIE 1.03cm2, severe pulmonary HTN, Hypothyroidism, A fib on Coumadin; recent admission in July for COPD exacerbation and B/L PNA; presents to the ED c/o SOB x 2 days, dizziness, chest tightness and general unwellness.     In the ED, pt tachy to 128, BP stable, SPO2 97% on 2L.   BNP 1518; Trop neg x 2  Pt given lasix 40mg IV x 1 as well as Zosyn.    Likely COPD exacerbation, with chronic diastolic HF compounded by severe pulm HTN and AS.    plan  -nebs/steroids for COPD exacerbation as per primary team  -on sildenafil for pulm HTN  -on Cardizem for afib; coumadin for AC  -cautious diuresis with AS; would start with daily for now.  No rales on exam.  -Pt is DNR/DNI; no invasive cardiac interventions at this time.   89 y/o female with PMHx of HTN, COPD on O2, chronic diastolic CHF with preserved LVEF, moderate to severe AS with MALLORIE 1.03cm2, severe pulmonary HTN, Hypothyroidism, A fib on Coumadin; recent admission in July for COPD exacerbation and B/L PNA; presents to the ED c/o SOB x 2 days, dizziness, chest tightness and general unwellness.     In the ED, pt tachy to 128, BP stable, SPO2 97% on 2L.   BNP 1518; Trop neg x 2  Pt given lasix 40mg IV x 1 as well as Zosyn.    Likely COPD exacerbation, with chronic diastolic HF compounded by severe pulm HTN and AS.    plan  -nebs/steroids for COPD exacerbation as per primary team  -on sildenafil for pulm HTN  -on Cardizem for afib; coumadin for AC  -cautious diuresis with AS  -no invasive cardiac interventions at this time  -DNR/DNI status   89 y/o female with PMHx of HTN, COPD on O2, chronic diastolic CHF with preserved LVEF, moderate to severe AS with MALLORIE 1.03cm2, severe pulmonary HTN, Hypothyroidism, A fib on Coumadin; recent admission in July for COPD exacerbation and B/L PNA; presents to the ED c/o SOB x 2 days, dizziness, chest tightness and general unwellness.     In the ED, pt tachy to 128, BP stable, SPO2 97% on 2L.   BNP 1518; Trop neg x 2  Pt given lasix 40mg IV x 1 as well as Zosyn.    Likely COPD exacerbation, with chronic diastolic HF compounded by severe pulm HTN and AS.    plan  -nebs/steroids for COPD exacerbation as per primary team  -on sildenafil for pulm HTN  -on Cardizem for afib; coumadin for AC  -Avoid large fluid shift with AS, will change IV Lasix to PO, no overt fluid overload noted   -no invasive cardiac interventions at this time  -DNR/DNI status

## 2020-10-01 NOTE — PROGRESS NOTE ADULT - SUBJECTIVE AND OBJECTIVE BOX
Patient is a 89y old  Female who presents with a chief complaint of Acute on Chronic CHF (30 Sep 2020 15:40)      INTERVAL HPI/OVERNIGHT EVENTS: no events     MEDICATIONS  (STANDING):  diltiazem    milliGRAM(s) Oral daily  furosemide   Injectable 20 milliGRAM(s) IV Push two times a day  levothyroxine 50 MICROGram(s) Oral daily  losartan 50 milliGRAM(s) Oral daily  pantoprazole    Tablet 40 milliGRAM(s) Oral before breakfast  predniSONE   Tablet 40 milliGRAM(s) Oral every 24 hours  sildenafil (REVATIO) 20 milliGRAM(s) Oral every 8 hours  warfarin 3 milliGRAM(s) Oral once    MEDICATIONS  (PRN):  acetaminophen   Tablet .. 650 milliGRAM(s) Oral every 6 hours PRN Mild Pain (1 - 3)  ALBUTerol    90 MICROgram(s) HFA Inhaler 2 Puff(s) Inhalation every 6 hours PRN Shortness of Breath and/or Wheezing  ALPRAZolam 0.25 milliGRAM(s) Oral at bedtime PRN Anxiety  ondansetron Injectable 4 milliGRAM(s) IV Push every 6 hours PRN Nausea and/or Vomiting      Allergies    No Known Allergies    Intolerances       Vital Signs Last 24 Hrs  T(C): 37.1 (01 Oct 2020 05:03), Max: 37.1 (01 Oct 2020 05:03)  T(F): 98.7 (01 Oct 2020 05:03), Max: 98.7 (01 Oct 2020 05:03)  HR: 79 (01 Oct 2020 08:41) (75 - 90)  BP: 129/71 (01 Oct 2020 05:03) (119/62 - 141/68)  BP(mean): --  RR: 18 (01 Oct 2020 05:03) (17 - 18)  SpO2: 95% (01 Oct 2020 05:03) (93% - 96%)    PHYSICAL EXAM:  GENERAL: NAD, well-groomed, well-developed  HEAD:  Atraumatic, Normocephalic  EYES: EOMI, PERRLA, conjunctiva and sclera clear  ENMT: No tonsillar erythema, exudates, or enlargement; Moist mucous membranes, Good dentition, No lesions  NECK: Supple, No JVD, Normal thyroid  NERVOUS SYSTEM:  Alert & Oriented X3, Good concentration; Motor Strength 5/5 B/L upper and lower extremities; DTRs 2+ intact and symmetric  CHEST/LUNG: mild b/l wheezes   HEART: Regular rate and rhythm; No murmurs, rubs, or gallops  ABDOMEN: Soft, Nontender, Nondistended; Bowel sounds present  EXTREMITIES:  2+ Peripheral Pulses, No clubbing, cyanosis, or edema  LYMPH: No lymphadenopathy noted  SKIN: No rashes or lesions    LABS:                        10.9   8.18  )-----------( 166      ( 01 Oct 2020 06:31 )             32.9     10-01    139  |  101  |  21  ----------------------------<  83  3.5   |  29  |  0.98    Ca    8.8      01 Oct 2020 06:31  Phos  3.5     10-01  Mg     2.2     10-01    TPro  7.4  /  Alb  3.3  /  TBili  0.5  /  DBili  x   /  AST  20  /  ALT  17  /  AlkPhos  58  09-30    PT/INR - ( 01 Oct 2020 06:31 )   PT: 24.6 sec;   INR: 2.20 ratio         PTT - ( 30 Sep 2020 00:35 )  PTT:29.9 sec    CAPILLARY BLOOD GLUCOSE          RADIOLOGY & ADDITIONAL TESTS:    Imaging Personally Reviewed:  [ X] YES  [ ] NO    Consultant(s) Notes Reviewed:  [ X] YES  [ ] NO    Care Discussed with Consultants/Other Providers [X ] YES  [ ] NO

## 2020-10-01 NOTE — CONSULT NOTE ADULT - SUBJECTIVE AND OBJECTIVE BOX
Patient is a 89y old  Female who presents with a chief complaint of Acute on Chronic CHF (01 Oct 2020 10:26)      HPI:  89 WF with HTN, COPD on O2 & Nocturnal NIV, Diastolic CHF, Severe pHTN, Hypothyroidism ,A fib on Coumadin, Valve replacement.,  Was admitted in July 2020 with Pneumonia, leukocytosis and COPD exacerbation. Non smoker.  Presented  to the ED c/o SOB x 2 days. Reports worsening SOB, cough  as well as mild dizziness and chest tightness x 2 day. Pt denies pain, worsening cough from baseline or LE edema. Pt denies fever or chills.  In ED with tachycardia and tachypnea.    PAST MEDICAL & SURGICAL HISTORY:  Essential hypertension    Hypothyroidism    Atrial fibrillation    COPD exacerbation    No significant past surgical history    FAMILY HISTORY:  No pertinent family history in first degree relatives    SOCIAL HISTORY:  non smoker    Allergies  No Known Allergies    MEDICATIONS  (STANDING):  diltiazem    milliGRAM(s) Oral daily  furosemide   Injectable 20 milliGRAM(s) IV Push two times a day  levothyroxine 50 MICROGram(s) Oral daily  losartan 50 milliGRAM(s) Oral daily  pantoprazole    Tablet 40 milliGRAM(s) Oral before breakfast  predniSONE   Tablet 40 milliGRAM(s) Oral every 24 hours  sildenafil (REVATIO) 20 milliGRAM(s) Oral every 8 hours  warfarin 3 milliGRAM(s) Oral once    MEDICATIONS  (PRN):  acetaminophen   Tablet .. 650 milliGRAM(s) Oral every 6 hours PRN Mild Pain (1 - 3)  ALBUTerol    90 MICROgram(s) HFA Inhaler 2 Puff(s) Inhalation every 6 hours PRN Shortness of Breath and/or Wheezing  ALPRAZolam 0.25 milliGRAM(s) Oral at bedtime PRN Anxiety  ondansetron Injectable 4 milliGRAM(s) IV Push every 6 hours PRN Nausea and/or Vomiting    REVIEW OF SYSTEMS:    Constitutional:            No fever, weight loss or fatigue  HEENT:                         No difficulty hearing, tinnitus, vertigo; No sinus or throat pain  Respiratory:                 SOB, cough and chest tihgtness  Cardiovascular:           No chest pain, palpitations  Gastrointestinal:        No abdominal or epigastric pain. No N/V/diarrhea or hematemesis  Genitourinary:            No dysuria, frequency, hematuria or incontinence  SKIN:                             no rash  Musculoskeletal:        No joint pain or swelling  Extremities:                No swelling  Neurological:              No headaches  Psychiatric:                 No depression, anxiety    MACRA & MIPS:   Vaccines - Influenza: yes and Pneumovax: yes  Tobacco:  no  Blood Pressure Screening / Control of: 141/68  Current Medications Reviewed: yes    Vital Signs Last 24 Hrs  T(C): 36.4 (01 Oct 2020 10:35), Max: 37.1 (01 Oct 2020 05:03)  T(F): 97.6 (01 Oct 2020 10:35), Max: 98.7 (01 Oct 2020 05:03)  HR: 76 (01 Oct 2020 10:35) (75 - 90)  BP: 100/55 (01 Oct 2020 10:35) (100/55 - 141/68)  BP(mean): --  RR: 18 (01 Oct 2020 10:35) (17 - 18)  SpO2: 97% (01 Oct 2020 10:35) (93% - 97%)    PHYSICAL EXAM:  GEN:         Awake, responsive and comfortable.  HEENT:    Normal.    RESP:      decreased air entry  CVS:        irregular rate and rhythm.   ABD:         Soft, non-tender, non-distended;   SKIN:           Warm and dry.  EXTR:            No clubbing, cyanosis or edema  CNS:              Intact sensory and motor function.  PSYCH:        cooperative, no anxiety or depression    LABS:                        10.9   8.18  )-----------( 166      ( 01 Oct 2020 06:31 )             32.9     10-01    139  |  101  |  21  ----------------------------<  83  3.5   |  29  |  0.98    Ca    8.8      01 Oct 2020 06:31  Phos  3.5     10-01  Mg     2.2     10-01    TPro  7.4  /  Alb  3.3  /  TBili  0.5  /  DBili  x   /  AST  20  /  ALT  17  /  AlkPhos  58  09-30    PT/INR - ( 01 Oct 2020 06:31 )   PT: 24.6 sec;   INR: 2.20 ratio      PTT - ( 30 Sep 2020 00:35 )  PTT:29.9 sec    Culture - Blood (collected 09-30-20 @ 08:43)  Source: .Blood Blood-Peripheral  Preliminary Report (10-01-20 @ 09:02):    No growth to date.    Culture - Blood (collected 09-30-20 @ 08:43)  Source: .Blood Blood-Peripheral  Preliminary Report (10-01-20 @ 09:02):    No growth to date.    EKG: A Fib, flutter.    RADIOLOGY & ADDITIONAL STUDIES:  < from: Xray Chest 1 View-PORTABLE IMMEDIATE (09.30.20 @ 00:55) >  EXAM:  XR CHEST PORTABLE IMMED 1V                          PROCEDURE DATE:  09/30/2020      INTERPRETATION:  PROCEDURE: AP view of the chest.    CLINICAL INFORMATION: Sepsis.    COMPARISON: 7/20/2020.    FINDINGS:    The patient is status post sternotomy.    Lungs: There are no lung consolidations. Trace pleural effusions.  Heart: There is cardiomegaly.  Mediastinum: Atherosclerotic calcifications are seen in the aorta.    IMPRESSION:    No lung consolidations.    DARRIUS BOURGEOIS M.D., ATTENDING RADIOLOGIST  This document has been electronically signed. Sep 30 2020  7:49AM    ASSESSMENT AND PLAN:  ·	SOB.  ·	Acute COPD exacerbation.  ·	Chronic diastolic CHF,  ·	Chronic hypoxic hypercarbic Respiratory failure.  ·	Severe pHTN  ·	Moderate TR.  ·	Renal Insuffiencey.  ·	Chronic A Fib.  ·	Hypothyroidism.  ·	Anemia.  ·	Hypokalemia.    Continue steroids, will need slow taper.  Continue Lasix and Sildenafil.  On home O2 and Nocturnal NIV.  On Coumadin.  Replace potassium, follow electrolytes.

## 2020-10-01 NOTE — PROGRESS NOTE ADULT - SUBJECTIVE AND OBJECTIVE BOX
Patient is a 89y old  Female who presents with a chief complaint of sob (01 Oct 2020 11:39)    PAST MEDICAL & SURGICAL HISTORY:  Essential hypertension    Hypothyroidism    Atrial fibrillation    COPD exacerbation    CHF    INTERVAL HISTORY: Resting in bed, not in any acute distress   	  MEDICATIONS:  MEDICATIONS  (STANDING):  diltiazem    milliGRAM(s) Oral daily  furosemide   Injectable 20 milliGRAM(s) IV Push two times a day  levothyroxine 50 MICROGram(s) Oral daily  losartan 50 milliGRAM(s) Oral daily  pantoprazole    Tablet 40 milliGRAM(s) Oral before breakfast  predniSONE   Tablet 40 milliGRAM(s) Oral every 24 hours  sildenafil (REVATIO) 20 milliGRAM(s) Oral every 8 hours  warfarin 3 milliGRAM(s) Oral once    MEDICATIONS  (PRN):  acetaminophen   Tablet .. 650 milliGRAM(s) Oral every 6 hours PRN Mild Pain (1 - 3)  ALBUTerol    90 MICROgram(s) HFA Inhaler 2 Puff(s) Inhalation every 6 hours PRN Shortness of Breath and/or Wheezing  ALPRAZolam 0.25 milliGRAM(s) Oral at bedtime PRN Anxiety  ondansetron Injectable 4 milliGRAM(s) IV Push every 6 hours PRN Nausea and/or Vomiting    Vitals:  T(F): 97.6 (10-01-20 @ 10:35), Max: 98.7 (10-01-20 @ 05:03)  HR: 76 (10-01-20 @ 10:35) (75 - 90)  BP: 100/55 (10-01-20 @ 10:35) (100/55 - 141/68)  RR: 18 (10-01-20 @ 10:35) (17 - 18)  SpO2: 97% (10-01-20 @ 10:35) (93% - 97%)    09-30 @ 07:01  -  10-01 @ 07:00  --------------------------------------------------------  IN:    Oral Fluid: 720 mL  Total IN: 720 mL    OUT:  Total OUT: 0 mL    Total NET: 720 mL    10-01 @ 07:01  -  10-01 @ 12:36  --------------------------------------------------------  IN:    Oral Fluid: 240 mL  Total IN: 240 mL    OUT:    Voided (mL): 700 mL  Total OUT: 700 mL    Total NET: -460 mL    Weight (kg): 60.7 (09-30 @ 10:00)  BMI (kg/m2): 26.1 (09-30 @ 10:00)    PHYSICAL EXAM:  Neuro: Awake, responsive  CV: S1 S2 RRR + SM  Lungs: CTABL  GI: Soft, BS +, ND, NT  Extremities: No edema    TELEMETRY:      RADIOLOGY:   < from: Xray Chest 1 View-PORTABLE IMMEDIATE (09.30.20 @ 00:55) >  Lungs: There are no lung consolidations. Trace pleural effusions.  Heart: There is cardiomegaly.  Mediastinum: Atherosclerotic calcifications are seen in the aorta.    IMPRESSION:    No lung consolidations.    < end of copied text >    DIAGNOSTIC TESTING:    [x] Echocardiogram:   < from: TTE Echo Complete w/o contrast w/ Doppler (06.01.20 @ 10:59) >  Left Ventricle: Normal left ventricular size and wall thicknesses, with normal systolic and diastolic function. The left ventricle was not well visualized.  Left ventricular ejection fraction, by visual estimation, is 55 to 60%. [place] not ell visualized in any view. Consider contrast echo, if clinically warranted for further evaluation.  Right Ventricle: Normal right ventricular size and function.  Left Atrium: Mildly enlarged left atrium.  Right Atrium: The right atrium is normal in size. Mildly enlarged right atrium.  Pericardium: There is no evidence of pericardial effusion.  Mitral Valve: The mitral valve is degenerative in appearance. There is mild mitral annular calcification. Mild mitral valve regurgitation is seen.  Tricuspid Valve: The tricuspid valve is degenerative in appearance. Moderate tricuspid regurgitation is visualized. Estimated pulmonary artery systolic pressure is 67.7 mmHg assuming a right atrial pressure of 15 mmHg, which is consistent with severe pulmonary hypertension.  Aortic Valve: The aortic valve was not well visualized. The aortic valve is sclerotic with decreased cusp excursion. Peak transaortic gradient equals 16.9 mmHg, mean transaortic gradient equals 8.6 mmHg, the calculated aortic valve area equals 1.03 cm² by the continuity equation consistent with moderate aortic stenosis. Mild aortic valve regurgitation is seen.  Pulmonic Valve: Structurally normal pulmonic valve, with normal leaflet excursion. The pulmonic valve is normal. No indicationof pulmonic valve regurgitation.  Aorta: The aortic root and ascending aorta are structurally normal, with no evidence of dilitation.  Pulmonary Artery: The main pulmonary artery is normal in size.       Summary:   1. Left ventricular ejection fraction, by visual estimation, is 55 to 60%.   2. Technically suboptimal study.   3. [place] not ell visualized in any view. Consider contrast echo, if clinically warranted for further evaluation.   4. Degenerative mitral valve.   5. Mild mitral annular calcification.   6. Mild mitral valve regurgitation.   7. Degenerative tricuspid valve.   8. Moderate tricuspid regurgitation.   9. Aortic valve is sclerotic with decreased cusp excursion.  10. Mild aortic regurgitation.  11. Estimated pulmonary artery systolic pressure is 67.7 mmHg assuming a right atrial pressure of 15 mmHg, which is consistent with severe pulmonary hypertension.  12. Peak transaortic gradient equals 16.9 mmHg, mean transaortic gradient equals 8.6 mmHg, the calculated aortic valve area equals 1.03 cm² by the continuity equation consistent with moderate aortic stenosis.    < end of copied text >    LABS:	 	    CARDIAC MARKERS:  Troponin I, Serum: <.015 ng/mL (09-30 @ 07:55)  Troponin I, Serum: <.015 ng/mL (09-30 @ 00:35)    01 Oct 2020 06:31    139    |  101    |  21     ----------------------------<  83     3.5     |  29     |  0.98   30 Sep 2020 10:00    139    |  102    |  22     ----------------------------<  147    3.3     |  29     |  1.06   30 Sep 2020 00:35    138    |  103    |  24     ----------------------------<  111    3.9     |  27     |  0.96     Ca    8.8        01 Oct 2020 06:31  Phos  3.5       01 Oct 2020 06:31  Mg     2.2       01 Oct 2020 06:31    TPro  7.4    /  Alb  3.3    /  TBili  0.5    /  DBili  x      /  AST  20     /  ALT  17     /  AlkPhos  58     30 Sep 2020 00:35                          10.9   8.18  )-----------( 166      ( 01 Oct 2020 06:31 )             32.9 ,                       11.1   7.15  )-----------( 184      ( 30 Sep 2020 10:00 )             34.2 ,                       11.1   9.01  )-----------( 180      ( 30 Sep 2020 00:35 )             32.5   proBNP: Serum Pro-Brain Natriuretic Peptide: 1518 pg/mL (09-30 @ 00:35)    PT/PTT- ( 01 Oct 2020 06:31 )   PT: 24.6 sec;  PTT: x        INR: 2.20 ratio (10-01 @ 06:31)  INR: 2.12 ratio (09-30 @ 10:00)  INR: 2.24 ratio (09-30 @ 00:35)           Patient is a 89y old  Female who presents with a chief complaint of sob (01 Oct 2020 11:39)    PAST MEDICAL & SURGICAL HISTORY:  Essential hypertension    Hypothyroidism    Atrial fibrillation    COPD exacerbation    CHF    INTERVAL HISTORY: Resting in bed, not in any acute distress   	  MEDICATIONS:  MEDICATIONS  (STANDING):  diltiazem    milliGRAM(s) Oral daily  furosemide   Injectable 20 milliGRAM(s) IV Push two times a day  levothyroxine 50 MICROGram(s) Oral daily  losartan 50 milliGRAM(s) Oral daily  pantoprazole    Tablet 40 milliGRAM(s) Oral before breakfast  predniSONE   Tablet 40 milliGRAM(s) Oral every 24 hours  sildenafil (REVATIO) 20 milliGRAM(s) Oral every 8 hours  warfarin 3 milliGRAM(s) Oral once    MEDICATIONS  (PRN):  acetaminophen   Tablet .. 650 milliGRAM(s) Oral every 6 hours PRN Mild Pain (1 - 3)  ALBUTerol    90 MICROgram(s) HFA Inhaler 2 Puff(s) Inhalation every 6 hours PRN Shortness of Breath and/or Wheezing  ALPRAZolam 0.25 milliGRAM(s) Oral at bedtime PRN Anxiety  ondansetron Injectable 4 milliGRAM(s) IV Push every 6 hours PRN Nausea and/or Vomiting    Vitals:  T(F): 97.6 (10-01-20 @ 10:35), Max: 98.7 (10-01-20 @ 05:03)  HR: 76 (10-01-20 @ 10:35) (75 - 90)  BP: 100/55 (10-01-20 @ 10:35) (100/55 - 141/68)  RR: 18 (10-01-20 @ 10:35) (17 - 18)  SpO2: 97% (10-01-20 @ 10:35) (93% - 97%)    09-30 @ 07:01  -  10-01 @ 07:00  --------------------------------------------------------  IN:    Oral Fluid: 720 mL  Total IN: 720 mL    OUT:  Total OUT: 0 mL    Total NET: 720 mL    10-01 @ 07:01  -  10-01 @ 12:36  --------------------------------------------------------  IN:    Oral Fluid: 240 mL  Total IN: 240 mL    OUT:    Voided (mL): 700 mL  Total OUT: 700 mL    Total NET: -460 mL    Weight (kg): 60.7 (09-30 @ 10:00)  BMI (kg/m2): 26.1 (09-30 @ 10:00)    PHYSICAL EXAM:  Neuro: Awake, responsive  CV: S1 S2 RRR + SM  Lungs: diminished to Bases   GI: Soft, BS +, ND, NT  Extremities: No edema    TELEMETRY:      RADIOLOGY:   < from: Xray Chest 1 View-PORTABLE IMMEDIATE (09.30.20 @ 00:55) >  Lungs: There are no lung consolidations. Trace pleural effusions.  Heart: There is cardiomegaly.  Mediastinum: Atherosclerotic calcifications are seen in the aorta.    IMPRESSION:    No lung consolidations.    < end of copied text >    DIAGNOSTIC TESTING:    [x] Echocardiogram:   < from: TTE Echo Complete w/o contrast w/ Doppler (06.01.20 @ 10:59) >  Left Ventricle: Normal left ventricular size and wall thicknesses, with normal systolic and diastolic function. The left ventricle was not well visualized.  Left ventricular ejection fraction, by visual estimation, is 55 to 60%. [place] not ell visualized in any view. Consider contrast echo, if clinically warranted for further evaluation.  Right Ventricle: Normal right ventricular size and function.  Left Atrium: Mildly enlarged left atrium.  Right Atrium: The right atrium is normal in size. Mildly enlarged right atrium.  Pericardium: There is no evidence of pericardial effusion.  Mitral Valve: The mitral valve is degenerative in appearance. There is mild mitral annular calcification. Mild mitral valve regurgitation is seen.  Tricuspid Valve: The tricuspid valve is degenerative in appearance. Moderate tricuspid regurgitation is visualized. Estimated pulmonary artery systolic pressure is 67.7 mmHg assuming a right atrial pressure of 15 mmHg, which is consistent with severe pulmonary hypertension.  Aortic Valve: The aortic valve was not well visualized. The aortic valve is sclerotic with decreased cusp excursion. Peak transaortic gradient equals 16.9 mmHg, mean transaortic gradient equals 8.6 mmHg, the calculated aortic valve area equals 1.03 cm² by the continuity equation consistent with moderate aortic stenosis. Mild aortic valve regurgitation is seen.  Pulmonic Valve: Structurally normal pulmonic valve, with normal leaflet excursion. The pulmonic valve is normal. No indicationof pulmonic valve regurgitation.  Aorta: The aortic root and ascending aorta are structurally normal, with no evidence of dilitation.  Pulmonary Artery: The main pulmonary artery is normal in size.       Summary:   1. Left ventricular ejection fraction, by visual estimation, is 55 to 60%.   2. Technically suboptimal study.   3. [place] not ell visualized in any view. Consider contrast echo, if clinically warranted for further evaluation.   4. Degenerative mitral valve.   5. Mild mitral annular calcification.   6. Mild mitral valve regurgitation.   7. Degenerative tricuspid valve.   8. Moderate tricuspid regurgitation.   9. Aortic valve is sclerotic with decreased cusp excursion.  10. Mild aortic regurgitation.  11. Estimated pulmonary artery systolic pressure is 67.7 mmHg assuming a right atrial pressure of 15 mmHg, which is consistent with severe pulmonary hypertension.  12. Peak transaortic gradient equals 16.9 mmHg, mean transaortic gradient equals 8.6 mmHg, the calculated aortic valve area equals 1.03 cm² by the continuity equation consistent with moderate aortic stenosis.    < end of copied text >    LABS:	 	    CARDIAC MARKERS:  Troponin I, Serum: <.015 ng/mL (09-30 @ 07:55)  Troponin I, Serum: <.015 ng/mL (09-30 @ 00:35)    01 Oct 2020 06:31    139    |  101    |  21     ----------------------------<  83     3.5     |  29     |  0.98   30 Sep 2020 10:00    139    |  102    |  22     ----------------------------<  147    3.3     |  29     |  1.06   30 Sep 2020 00:35    138    |  103    |  24     ----------------------------<  111    3.9     |  27     |  0.96     Ca    8.8        01 Oct 2020 06:31  Phos  3.5       01 Oct 2020 06:31  Mg     2.2       01 Oct 2020 06:31    TPro  7.4    /  Alb  3.3    /  TBili  0.5    /  DBili  x      /  AST  20     /  ALT  17     /  AlkPhos  58     30 Sep 2020 00:35                          10.9   8.18  )-----------( 166      ( 01 Oct 2020 06:31 )             32.9 ,                       11.1   7.15  )-----------( 184      ( 30 Sep 2020 10:00 )             34.2 ,                       11.1   9.01  )-----------( 180      ( 30 Sep 2020 00:35 )             32.5   proBNP: Serum Pro-Brain Natriuretic Peptide: 1518 pg/mL (09-30 @ 00:35)    PT/PTT- ( 01 Oct 2020 06:31 )   PT: 24.6 sec;  PTT: x        INR: 2.20 ratio (10-01 @ 06:31)  INR: 2.12 ratio (09-30 @ 10:00)  INR: 2.24 ratio (09-30 @ 00:35)           Patient is a 89y old  Female who presents with a chief complaint of sob (01 Oct 2020 11:39)    PAST MEDICAL & SURGICAL HISTORY:  Essential hypertension    Hypothyroidism    Atrial fibrillation    COPD exacerbation    CHF    INTERVAL HISTORY: Resting in bed, not in any acute distress   	  MEDICATIONS:  MEDICATIONS  (STANDING):  diltiazem    milliGRAM(s) Oral daily  furosemide   Injectable 20 milliGRAM(s) IV Push two times a day  levothyroxine 50 MICROGram(s) Oral daily  losartan 50 milliGRAM(s) Oral daily  pantoprazole    Tablet 40 milliGRAM(s) Oral before breakfast  predniSONE   Tablet 40 milliGRAM(s) Oral every 24 hours  sildenafil (REVATIO) 20 milliGRAM(s) Oral every 8 hours  warfarin 3 milliGRAM(s) Oral once    MEDICATIONS  (PRN):  acetaminophen   Tablet .. 650 milliGRAM(s) Oral every 6 hours PRN Mild Pain (1 - 3)  ALBUTerol    90 MICROgram(s) HFA Inhaler 2 Puff(s) Inhalation every 6 hours PRN Shortness of Breath and/or Wheezing  ALPRAZolam 0.25 milliGRAM(s) Oral at bedtime PRN Anxiety  ondansetron Injectable 4 milliGRAM(s) IV Push every 6 hours PRN Nausea and/or Vomiting    Vitals:  T(F): 97.6 (10-01-20 @ 10:35), Max: 98.7 (10-01-20 @ 05:03)  HR: 76 (10-01-20 @ 10:35) (75 - 90)  BP: 100/55 (10-01-20 @ 10:35) (100/55 - 141/68)  RR: 18 (10-01-20 @ 10:35) (17 - 18)  SpO2: 97% (10-01-20 @ 10:35) (93% - 97%)    09-30 @ 07:01  -  10-01 @ 07:00  --------------------------------------------------------  IN:    Oral Fluid: 720 mL  Total IN: 720 mL    OUT:  Total OUT: 0 mL    Total NET: 720 mL    10-01 @ 07:01  -  10-01 @ 12:36  --------------------------------------------------------  IN:    Oral Fluid: 240 mL  Total IN: 240 mL    OUT:    Voided (mL): 700 mL  Total OUT: 700 mL    Total NET: -460 mL    Weight (kg): 60.7 (09-30 @ 10:00)  BMI (kg/m2): 26.1 (09-30 @ 10:00)    PHYSICAL EXAM:  Neuro: Awake, responsive  CV: S1 S2 RRR + SM  Lungs: diminished to Bases   GI: Soft, BS +, ND, NT  Extremities: No edema    TELEMETRY:  Afib with PVCs    RADIOLOGY:   < from: Xray Chest 1 View-PORTABLE IMMEDIATE (09.30.20 @ 00:55) >  Lungs: There are no lung consolidations. Trace pleural effusions.  Heart: There is cardiomegaly.  Mediastinum: Atherosclerotic calcifications are seen in the aorta.    IMPRESSION:    No lung consolidations.    < end of copied text >    DIAGNOSTIC TESTING:    [x] Echocardiogram:   < from: TTE Echo Complete w/o contrast w/ Doppler (06.01.20 @ 10:59) >  Left Ventricle: Normal left ventricular size and wall thicknesses, with normal systolic and diastolic function. The left ventricle was not well visualized.  Left ventricular ejection fraction, by visual estimation, is 55 to 60%. [place] not ell visualized in any view. Consider contrast echo, if clinically warranted for further evaluation.  Right Ventricle: Normal right ventricular size and function.  Left Atrium: Mildly enlarged left atrium.  Right Atrium: The right atrium is normal in size. Mildly enlarged right atrium.  Pericardium: There is no evidence of pericardial effusion.  Mitral Valve: The mitral valve is degenerative in appearance. There is mild mitral annular calcification. Mild mitral valve regurgitation is seen.  Tricuspid Valve: The tricuspid valve is degenerative in appearance. Moderate tricuspid regurgitation is visualized. Estimated pulmonary artery systolic pressure is 67.7 mmHg assuming a right atrial pressure of 15 mmHg, which is consistent with severe pulmonary hypertension.  Aortic Valve: The aortic valve was not well visualized. The aortic valve is sclerotic with decreased cusp excursion. Peak transaortic gradient equals 16.9 mmHg, mean transaortic gradient equals 8.6 mmHg, the calculated aortic valve area equals 1.03 cm² by the continuity equation consistent with moderate aortic stenosis. Mild aortic valve regurgitation is seen.  Pulmonic Valve: Structurally normal pulmonic valve, with normal leaflet excursion. The pulmonic valve is normal. No indicationof pulmonic valve regurgitation.  Aorta: The aortic root and ascending aorta are structurally normal, with no evidence of dilitation.  Pulmonary Artery: The main pulmonary artery is normal in size.       Summary:   1. Left ventricular ejection fraction, by visual estimation, is 55 to 60%.   2. Technically suboptimal study.   3. [place] not ell visualized in any view. Consider contrast echo, if clinically warranted for further evaluation.   4. Degenerative mitral valve.   5. Mild mitral annular calcification.   6. Mild mitral valve regurgitation.   7. Degenerative tricuspid valve.   8. Moderate tricuspid regurgitation.   9. Aortic valve is sclerotic with decreased cusp excursion.  10. Mild aortic regurgitation.  11. Estimated pulmonary artery systolic pressure is 67.7 mmHg assuming a right atrial pressure of 15 mmHg, which is consistent with severe pulmonary hypertension.  12. Peak transaortic gradient equals 16.9 mmHg, mean transaortic gradient equals 8.6 mmHg, the calculated aortic valve area equals 1.03 cm² by the continuity equation consistent with moderate aortic stenosis.    < end of copied text >    LABS:	 	    CARDIAC MARKERS:  Troponin I, Serum: <.015 ng/mL (09-30 @ 07:55)  Troponin I, Serum: <.015 ng/mL (09-30 @ 00:35)    01 Oct 2020 06:31    139    |  101    |  21     ----------------------------<  83     3.5     |  29     |  0.98   30 Sep 2020 10:00    139    |  102    |  22     ----------------------------<  147    3.3     |  29     |  1.06   30 Sep 2020 00:35    138    |  103    |  24     ----------------------------<  111    3.9     |  27     |  0.96     Ca    8.8        01 Oct 2020 06:31  Phos  3.5       01 Oct 2020 06:31  Mg     2.2       01 Oct 2020 06:31    TPro  7.4    /  Alb  3.3    /  TBili  0.5    /  DBili  x      /  AST  20     /  ALT  17     /  AlkPhos  58     30 Sep 2020 00:35                          10.9   8.18  )-----------( 166      ( 01 Oct 2020 06:31 )             32.9 ,                       11.1   7.15  )-----------( 184      ( 30 Sep 2020 10:00 )             34.2 ,                       11.1   9.01  )-----------( 180      ( 30 Sep 2020 00:35 )             32.5   proBNP: Serum Pro-Brain Natriuretic Peptide: 1518 pg/mL (09-30 @ 00:35)    PT/PTT- ( 01 Oct 2020 06:31 )   PT: 24.6 sec;  PTT: x        INR: 2.20 ratio (10-01 @ 06:31)  INR: 2.12 ratio (09-30 @ 10:00)  INR: 2.24 ratio (09-30 @ 00:35)

## 2020-10-01 NOTE — PROGRESS NOTE ADULT - ASSESSMENT
89 y/o female with PMHx of HTN, COPD on O2, Diastolic CHF, Severe Pulmonary HTN, Hypothyroidism, A fib on Coumadin, recent admission in July for COPD exacerbation and, B/L PNA presents to the ED c/o SOB x 2 days being admitted for acute on chronic CHF exacerbation

## 2020-10-02 LAB
ANION GAP SERPL CALC-SCNC: 7 MMOL/L — SIGNIFICANT CHANGE UP (ref 5–17)
BUN SERPL-MCNC: 31 MG/DL — HIGH (ref 7–23)
CALCIUM SERPL-MCNC: 8.5 MG/DL — SIGNIFICANT CHANGE UP (ref 8.5–10.1)
CHLORIDE SERPL-SCNC: 100 MMOL/L — SIGNIFICANT CHANGE UP (ref 96–108)
CO2 SERPL-SCNC: 31 MMOL/L — SIGNIFICANT CHANGE UP (ref 22–31)
CREAT SERPL-MCNC: 1.16 MG/DL — SIGNIFICANT CHANGE UP (ref 0.5–1.3)
GLUCOSE SERPL-MCNC: 123 MG/DL — HIGH (ref 70–99)
HCT VFR BLD CALC: 32 % — LOW (ref 34.5–45)
HGB BLD-MCNC: 10.6 G/DL — LOW (ref 11.5–15.5)
INR BLD: 2.62 RATIO — HIGH (ref 0.88–1.16)
MCHC RBC-ENTMCNC: 33.1 GM/DL — SIGNIFICANT CHANGE UP (ref 32–36)
MCHC RBC-ENTMCNC: 33.8 PG — SIGNIFICANT CHANGE UP (ref 27–34)
MCV RBC AUTO: 101.9 FL — HIGH (ref 80–100)
NRBC # BLD: 0 /100 WBCS — SIGNIFICANT CHANGE UP (ref 0–0)
PLATELET # BLD AUTO: 166 K/UL — SIGNIFICANT CHANGE UP (ref 150–400)
POTASSIUM SERPL-MCNC: 4.1 MMOL/L — SIGNIFICANT CHANGE UP (ref 3.5–5.3)
POTASSIUM SERPL-SCNC: 4.1 MMOL/L — SIGNIFICANT CHANGE UP (ref 3.5–5.3)
PROTHROM AB SERPL-ACNC: 29 SEC — HIGH (ref 10.6–13.6)
RBC # BLD: 3.14 M/UL — LOW (ref 3.8–5.2)
RBC # FLD: 15 % — HIGH (ref 10.3–14.5)
SODIUM SERPL-SCNC: 138 MMOL/L — SIGNIFICANT CHANGE UP (ref 135–145)
WBC # BLD: 6.41 K/UL — SIGNIFICANT CHANGE UP (ref 3.8–10.5)
WBC # FLD AUTO: 6.41 K/UL — SIGNIFICANT CHANGE UP (ref 3.8–10.5)

## 2020-10-02 PROCEDURE — 99233 SBSQ HOSP IP/OBS HIGH 50: CPT

## 2020-10-02 RX ORDER — LOSARTAN POTASSIUM 100 MG/1
25 TABLET, FILM COATED ORAL DAILY
Refills: 0 | Status: DISCONTINUED | OUTPATIENT
Start: 2020-10-03 | End: 2020-10-04

## 2020-10-02 RX ORDER — WARFARIN SODIUM 2.5 MG/1
1 TABLET ORAL ONCE
Refills: 0 | Status: COMPLETED | OUTPATIENT
Start: 2020-10-02 | End: 2020-10-02

## 2020-10-02 RX ADMIN — Medication 40 MILLIGRAM(S): at 07:40

## 2020-10-02 RX ADMIN — Medication 3 MILLILITER(S): at 05:37

## 2020-10-02 RX ADMIN — Medication 20 MILLIGRAM(S): at 13:24

## 2020-10-02 RX ADMIN — Medication 650 MILLIGRAM(S): at 21:23

## 2020-10-02 RX ADMIN — Medication 3 MILLILITER(S): at 23:45

## 2020-10-02 RX ADMIN — Medication 20 MILLIGRAM(S): at 21:22

## 2020-10-02 RX ADMIN — Medication 50 MICROGRAM(S): at 05:46

## 2020-10-02 RX ADMIN — Medication 3 MILLILITER(S): at 17:01

## 2020-10-02 RX ADMIN — PANTOPRAZOLE SODIUM 40 MILLIGRAM(S): 20 TABLET, DELAYED RELEASE ORAL at 05:41

## 2020-10-02 RX ADMIN — Medication 3 MILLILITER(S): at 11:05

## 2020-10-02 RX ADMIN — Medication 360 MILLIGRAM(S): at 07:40

## 2020-10-02 RX ADMIN — WARFARIN SODIUM 1 MILLIGRAM(S): 2.5 TABLET ORAL at 22:25

## 2020-10-02 RX ADMIN — Medication 650 MILLIGRAM(S): at 12:15

## 2020-10-02 RX ADMIN — Medication 0.25 MILLIGRAM(S): at 22:25

## 2020-10-02 RX ADMIN — Medication 40 MILLIGRAM(S): at 11:34

## 2020-10-02 RX ADMIN — Medication 650 MILLIGRAM(S): at 11:33

## 2020-10-02 RX ADMIN — Medication 20 MILLIGRAM(S): at 07:40

## 2020-10-02 RX ADMIN — Medication 650 MILLIGRAM(S): at 22:00

## 2020-10-02 NOTE — PROGRESS NOTE ADULT - SUBJECTIVE AND OBJECTIVE BOX
Patient is a 89y old  Female who presents with a chief complaint of Acute on Chronic CHF (02 Oct 2020 11:44)    PAST MEDICAL & SURGICAL HISTORY:  Essential hypertension    Hypothyroidism    Atrial fibrillation    COPD exacerbation    No significant past surgical history        INTERVAL HISTORY: Patient in bed, no c/o chest pain.  	  MEDICATIONS:  MEDICATIONS  (STANDING):  albuterol/ipratropium for Nebulization 3 milliLiter(s) Nebulizer every 6 hours  diltiazem    milliGRAM(s) Oral daily  furosemide    Tablet 40 milliGRAM(s) Oral daily  levothyroxine 50 MICROGram(s) Oral daily  pantoprazole    Tablet 40 milliGRAM(s) Oral before breakfast  predniSONE   Tablet 40 milliGRAM(s) Oral every 24 hours  sildenafil (REVATIO) 20 milliGRAM(s) Oral every 8 hours  warfarin 1 milliGRAM(s) Oral once    MEDICATIONS  (PRN):  acetaminophen   Tablet .. 650 milliGRAM(s) Oral every 6 hours PRN Mild Pain (1 - 3)  ALBUTerol    90 MICROgram(s) HFA Inhaler 2 Puff(s) Inhalation every 6 hours PRN Shortness of Breath and/or Wheezing  ALPRAZolam 0.25 milliGRAM(s) Oral at bedtime PRN Anxiety  ondansetron Injectable 4 milliGRAM(s) IV Push every 6 hours PRN Nausea and/or Vomiting      Vitals:  T(F): 98.6 (10-02-20 @ 08:24), Max: 98.6 (10-02-20 @ 08:24)  HR: 76 (10-02-20 @ 11:10) (55 - 98)  BP: 107/58 (10-02-20 @ 08:24) (100/45 - 119/60)  RR: 18 (10-02-20 @ 08:24) (16 - 18)  SpO2: 97% (10-02-20 @ 11:10) (95% - 100%)  Wt(kg): --60.4kg    10-01 @ 07:01  -  10-02 @ 07:00  --------------------------------------------------------  IN:    Oral Fluid: 1080 mL  Total IN: 1080 mL    OUT:    Voided (mL): 1000 mL  Total OUT: 1000 mL    Total NET: 80 mL      10-02 @ 07:01  -  10-02 @ 12:30  --------------------------------------------------------  IN:    Oral Fluid: 300 mL  Total IN: 300 mL    OUT:  Total OUT: 0 mL    Total NET: 300 mL      Weight (kg): 60.7 (09-30 @ 10:00)  BMI (kg/m2): 26.1 (09-30 @ 10:00)      PHYSICAL EXAM:  Neuro: Awake, responsive  CV: S1 S2 irregular   Lungs: CTABL  GI: Soft, BS +, ND, NT  Extremities: LE edema    	    ECG:  < from: 12 Lead ECG (09.30.20 @ 03:06) >  Ventricular Rate 83 BPM    Atrial Rate 394 BPM    QRS Duration 126 ms    Q-T Interval 408 ms    QTC Calculation(Bazett) 479 ms    R Axis -16 degrees    T Axis 32 degrees    Diagnosis Line Atrial fibrillation with premature ventricular or aberrantlyconducted complexes  Left bundle branch block  Abnormal ECG  When compared with ECG of 29-SEP-2020 23:56,  Atrial fibrillation has replaced Atrial flutter  T wave inversion no longer evident in Lateral leads    < end of copied text >  	    RADIOLOGY:     DIAGNOSTIC TESTING:    [ x] Echocardiogram: < from: TTE Echo Complete w/o contrast w/ Doppler (06.01.20 @ 10:59) >  Summary:   1. Left ventricular ejection fraction, by visual estimation, is 55 to 60%.   2. Technically suboptimal study.   3. [place] not ell visualized in any view. Consider contrast echo, if clinically warranted for further evaluation.   4. Degenerative mitral valve.   5. Mild mitral annular calcification.   6. Mild mitral valve regurgitation.   7. Degenerative tricuspid valve.   8. Moderate tricuspid regurgitation.   9. Aortic valve is sclerotic with decreased cusp excursion.  10. Mild aortic regurgitation.  11. Estimated pulmonary artery systolic pressure is 67.7 mmHg assuming a right atrial pressure of 15 mmHg, which is consistent with severe pulmonary hypertension.  12. Peak transaortic gradient equals 16.9 mmHg, mean transaortic gradient equals 8.6 mmHg, the calculated aortic valve area equals 1.03 cm² by the continuity equation consistent with moderate aortic stenosis.    < end of copied text >      LABS:	 	    CARDIAC MARKERS:  Troponin I, Serum: <.015 ng/mL (09-30 @ 07:55)  Troponin I, Serum: <.015 ng/mL (09-30 @ 00:35)          02 Oct 2020 06:35    138    |  100    |  31     ----------------------------<  123    4.1     |  31     |  1.16   01 Oct 2020 06:31    139    |  101    |  21     ----------------------------<  83     3.5     |  29     |  0.98   30 Sep 2020 10:00    139    |  102    |  22     ----------------------------<  147    3.3     |  29     |  1.06     Ca    8.5        02 Oct 2020 06:35  Phos  3.5       01 Oct 2020 06:31  Mg     2.2       01 Oct 2020 06:31                            10.6   6.41  )-----------( 166      ( 02 Oct 2020 06:35 )             32.0 ,                       10.9   8.18  )-----------( 166      ( 01 Oct 2020 06:31 )             32.9 ,                       11.1   7.15  )-----------( 184      ( 30 Sep 2020 10:00 )             34.2 ,                       11.1   9.01  )-----------( 180      ( 30 Sep 2020 00:35 )             32.5   proBNP: Serum Pro-Brain Natriuretic Peptide: 1518 pg/mL (09-30 @ 00:35)    Lipid Profile:   HgA1c:   TSH:     PT/PTT- ( 02 Oct 2020 06:35 )   PT: 29.0 sec;  PTT: x             INR: 2.62 ratio (10-02 @ 06:35)  INR: 2.20 ratio (10-01 @ 06:31)  INR: 2.12 ratio (09-30 @ 10:00)  INR: 2.24 ratio (09-30 @ 00:35)

## 2020-10-02 NOTE — PROGRESS NOTE ADULT - ASSESSMENT
89 y/o female with PMHx of HTN, COPD on O2, chronic diastolic CHF with preserved LVEF, moderate to severe AS with MALLORIE 1.03cm2, severe pulmonary HTN, Hypothyroidism, A fib on Coumadin; recent admission in July for COPD exacerbation and B/L PNA; presents to the ED c/o SOB x 2 days, dizziness, chest tightness and general unwellness.     In the ED, pt tachy to 128, BP stable, SPO2 97% on 2L.   BNP 1518; Trop neg x 2  Pt given lasix 40mg IV x 1 as well as Zosyn.    Likely COPD exacerbation, with chronic diastolic HF compounded by severe pulm HTN and AS.    plan  -nebs/steroids for COPD exacerbation as per primary team  -on sildenafil for pulm HTN  -on Cardizem for afib; coumadin for AC  -Avoid large fluid shift with AS, will change IV Lasix to PO, no overt fluid overload noted   -no invasive cardiac interventions at this time  -DNR/DNI status  -will sign off for now, please call if have questions   89 y/o female with PMHx of HTN, COPD on O2, chronic diastolic CHF with preserved LVEF, moderate to severe AS with MALLORIE 1.03cm2, severe pulmonary HTN, Hypothyroidism, A fib on Coumadin; recent admission in July for COPD exacerbation and B/L PNA; presents to the ED c/o SOB x 2 days, dizziness, chest tightness and general unwellness.     In the ED, pt tachy to 128, BP stable, SPO2 97% on 2L.   BNP 1518; Trop neg x 2  Pt given lasix 40mg IV x 1 as well as Zosyn.    Likely COPD exacerbation, with chronic diastolic HF compounded by severe pulm HTN and AS.    plan  -nebs/steroids for COPD exacerbation as per primary team  -on sildenafil for pulm HTN  -on Cardizem for afib; coumadin for AC  -Avoid large fluid shift with AS, will change IV Lasix to PO, no overt fluid overload noted   -no invasive cardiac interventions at this time  -DNR/DNI status  -will sign off for now, please call if have questions. Follow up with Cardiology 1 week after discharge.

## 2020-10-02 NOTE — PROGRESS NOTE ADULT - SUBJECTIVE AND OBJECTIVE BOX
Patient is a 89y old  Female who presents with a chief complaint of Acute on Chronic CHF (01 Oct 2020 12:35)      INTERVAL HPI/OVERNIGHT EVENTS:  no events   MEDICATIONS  (STANDING):  albuterol/ipratropium for Nebulization 3 milliLiter(s) Nebulizer every 6 hours  diltiazem    milliGRAM(s) Oral daily  furosemide    Tablet 40 milliGRAM(s) Oral daily  levothyroxine 50 MICROGram(s) Oral daily  pantoprazole    Tablet 40 milliGRAM(s) Oral before breakfast  predniSONE   Tablet 40 milliGRAM(s) Oral every 24 hours  sildenafil (REVATIO) 20 milliGRAM(s) Oral every 8 hours    MEDICATIONS  (PRN):  acetaminophen   Tablet .. 650 milliGRAM(s) Oral every 6 hours PRN Mild Pain (1 - 3)  ALBUTerol    90 MICROgram(s) HFA Inhaler 2 Puff(s) Inhalation every 6 hours PRN Shortness of Breath and/or Wheezing  ALPRAZolam 0.25 milliGRAM(s) Oral at bedtime PRN Anxiety  ondansetron Injectable 4 milliGRAM(s) IV Push every 6 hours PRN Nausea and/or Vomiting      Allergies    No Known Allergies    Intolerances        REVIEW OF SYSTEMS:  CONSTITUTIONAL: No fever, weight loss, or fatigue  EYES: No eye pain, visual disturbances, or discharge  ENMT:  No difficulty hearing, tinnitus, vertigo; No sinus or throat pain  NECK: No pain or stiffness  BREASTS: No pain, masses, or nipple discharge  RESPIRATORY: No cough, wheezing, chills or hemoptysis; No shortness of breath  CARDIOVASCULAR: No chest pain, palpitations, dizziness, or leg swelling  GASTROINTESTINAL: No abdominal or epigastric pain. No nausea, vomiting, or hematemesis; No diarrhea or constipation. No melena or hematochezia.  GENITOURINARY: No dysuria, frequency, hematuria, or incontinence  NEUROLOGICAL: No headaches, memory loss, loss of strength, numbness, or tremors  SKIN: No itching, burning, rashes, or lesions   LYMPH NODES: No enlarged glands  ENDOCRINE: No heat or cold intolerance; No hair loss  MUSCULOSKELETAL: No joint pain or swelling; No muscle, back, or extremity pain  PSYCHIATRIC: No depression, anxiety, mood swings, or difficulty sleeping  HEME/LYMPH: No easy bruising, or bleeding gums  ALLERGY AND IMMUNOLOGIC: No hives or eczema    Vital Signs Last 24 Hrs  T(C): 37 (02 Oct 2020 08:24), Max: 37 (02 Oct 2020 08:24)  T(F): 98.6 (02 Oct 2020 08:24), Max: 98.6 (02 Oct 2020 08:24)  HR: 76 (02 Oct 2020 11:10) (55 - 98)  BP: 107/58 (02 Oct 2020 08:24) (100/45 - 119/60)  BP(mean): --  RR: 18 (02 Oct 2020 08:24) (16 - 18)  SpO2: 97% (02 Oct 2020 11:10) (95% - 100%)    PHYSICAL EXAM:  GENERAL: NAD, well-groomed, well-developed  HEAD:  Atraumatic, Normocephalic  EYES: EOMI, PERRLA, conjunctiva and sclera clear  ENMT: No tonsillar erythema, exudates, or enlargement; Moist mucous membranes, Good dentition, No lesions  NECK: Supple, No JVD, Normal thyroid  NERVOUS SYSTEM:  Alert & Oriented X3, Good concentration; Motor Strength 5/5 B/L upper and lower extremities; DTRs 2+ intact and symmetric  CHEST/LUNG: Clear to percussion bilaterally; No rales, rhonchi, wheezing, or rubs  HEART: Regular rate and rhythm; No murmurs, rubs, or gallops  ABDOMEN: Soft, Nontender, Nondistended; Bowel sounds present  EXTREMITIES:  2+ Peripheral Pulses, No clubbing, cyanosis, or edema  LYMPH: No lymphadenopathy noted  SKIN: No rashes or lesions    LABS:                        10.6   6.41  )-----------( 166      ( 02 Oct 2020 06:35 )             32.0     10-02    138  |  100  |  31<H>  ----------------------------<  123<H>  4.1   |  31  |  1.16    Ca    8.5      02 Oct 2020 06:35  Phos  3.5     10-01  Mg     2.2     10-01      PT/INR - ( 02 Oct 2020 06:35 )   PT: 29.0 sec;   INR: 2.62 ratio             CAPILLARY BLOOD GLUCOSE          RADIOLOGY & ADDITIONAL TESTS:    Imaging Personally Reviewed:  [ X] YES  [ ] NO    Consultant(s) Notes Reviewed:  [ X] YES  [ ] NO    Care Discussed with Consultants/Other Providers [X ] YES  [ ] NO

## 2020-10-02 NOTE — PROGRESS NOTE ADULT - SUBJECTIVE AND OBJECTIVE BOX
INTERVAL HPI:  89 WF with HTN, COPD on O2 & Nocturnal NIV, Diastolic CHF, Severe pHTN, Hypothyroidism ,A fib on Coumadin, Valve replacement.,  Was admitted in July 2020 with Pneumonia, leukocytosis and COPD exacerbation. Non smoker.  Presented  to the ED c/o SOB x 2 days. Reports worsening SOB, cough  as well as mild dizziness and chest tightness x 2 day. Pt denies pain, worsening cough from baseline or LE edema. Pt denies fever or chills.  In ED with tachycardia and tachypnea.    OVERNIGHT EVENTS:  Awake,  comfortable and feels better.    Vital Signs Last 24 Hrs  T(C): 36.6 (02 Oct 2020 17:11), Max: 37 (02 Oct 2020 08:24)  T(F): 97.9 (02 Oct 2020 17:11), Max: 98.6 (02 Oct 2020 08:24)  HR: 75 (02 Oct 2020 17:39) (55 - 98)  BP: 130/66 (02 Oct 2020 17:11) (107/58 - 130/66)  BP(mean): --  RR: 18 (02 Oct 2020 17:11) (18 - 18)  SpO2: 98% (02 Oct 2020 17:39) (95% - 100%)    PHYSICAL EXAM:  GEN:         Awake, responsive and comfortable.  HEENT:    Normal.    RESP:        no wheezing  CVS:           Regular rate and rhythm.   ABD:         Soft, non-tender, non-distended;     MEDICATIONS  (STANDING):  albuterol/ipratropium for Nebulization 3 milliLiter(s) Nebulizer every 6 hours  diltiazem    milliGRAM(s) Oral daily  furosemide    Tablet 40 milliGRAM(s) Oral daily  levothyroxine 50 MICROGram(s) Oral daily  pantoprazole    Tablet 40 milliGRAM(s) Oral before breakfast  predniSONE   Tablet 40 milliGRAM(s) Oral every 24 hours  sildenafil (REVATIO) 20 milliGRAM(s) Oral every 8 hours  warfarin 1 milliGRAM(s) Oral once    MEDICATIONS  (PRN):  acetaminophen   Tablet .. 650 milliGRAM(s) Oral every 6 hours PRN Mild Pain (1 - 3)  ALBUTerol    90 MICROgram(s) HFA Inhaler 2 Puff(s) Inhalation every 6 hours PRN Shortness of Breath and/or Wheezing  ALPRAZolam 0.25 milliGRAM(s) Oral at bedtime PRN Anxiety  ondansetron Injectable 4 milliGRAM(s) IV Push every 6 hours PRN Nausea and/or Vomiting    LABS:                        10.6   6.41  )-----------( 166      ( 02 Oct 2020 06:35 )             32.0     10-02    138  |  100  |  31<H>  ----------------------------<  123<H>  4.1   |  31  |  1.16    Ca    8.5      02 Oct 2020 06:35  Phos  3.5     10-01  Mg     2.2     10-01    PT/INR - ( 02 Oct 2020 06:35 )   PT: 29.0 sec;   INR: 2.62 ratio       ASSESSMENT AND PLAN:  ·	SOB.  ·	Acute COPD exacerbation.  ·	Chronic diastolic CHF,  ·	Chronic hypoxic hypercarbic Respiratory failure.  ·	Severe pHTN  ·	Moderate TR.  ·	Renal Insuffiencey.  ·	Chronic A Fib.  ·	Hypothyroidism.  ·	Anemia.  ·	Hypokalemia.    Changed to PO Steroids, slow taper over 7-10 days.  Continue Lasix and Sildenafil.  On home O2 and Nocturnal NIV.  On Coumadin.

## 2020-10-03 LAB
INR BLD: 2.67 RATIO — HIGH (ref 0.88–1.16)
PROTHROM AB SERPL-ACNC: 29.6 SEC — HIGH (ref 10.6–13.6)

## 2020-10-03 PROCEDURE — 99233 SBSQ HOSP IP/OBS HIGH 50: CPT

## 2020-10-03 RX ORDER — MECLIZINE HCL 12.5 MG
12.5 TABLET ORAL EVERY 6 HOURS
Refills: 0 | Status: DISCONTINUED | OUTPATIENT
Start: 2020-10-03 | End: 2020-10-04

## 2020-10-03 RX ORDER — WARFARIN SODIUM 2.5 MG/1
1 TABLET ORAL ONCE
Refills: 0 | Status: COMPLETED | OUTPATIENT
Start: 2020-10-03 | End: 2020-10-03

## 2020-10-03 RX ADMIN — WARFARIN SODIUM 1 MILLIGRAM(S): 2.5 TABLET ORAL at 23:50

## 2020-10-03 RX ADMIN — Medication 360 MILLIGRAM(S): at 05:55

## 2020-10-03 RX ADMIN — Medication 650 MILLIGRAM(S): at 17:38

## 2020-10-03 RX ADMIN — Medication 0.25 MILLIGRAM(S): at 23:51

## 2020-10-03 RX ADMIN — Medication 650 MILLIGRAM(S): at 16:56

## 2020-10-03 RX ADMIN — Medication 20 MILLIGRAM(S): at 05:56

## 2020-10-03 RX ADMIN — Medication 20 MILLIGRAM(S): at 13:42

## 2020-10-03 RX ADMIN — LOSARTAN POTASSIUM 25 MILLIGRAM(S): 100 TABLET, FILM COATED ORAL at 05:56

## 2020-10-03 RX ADMIN — Medication 650 MILLIGRAM(S): at 23:50

## 2020-10-03 RX ADMIN — PANTOPRAZOLE SODIUM 40 MILLIGRAM(S): 20 TABLET, DELAYED RELEASE ORAL at 06:54

## 2020-10-03 RX ADMIN — Medication 20 MILLIGRAM(S): at 23:50

## 2020-10-03 RX ADMIN — Medication 50 MICROGRAM(S): at 05:56

## 2020-10-03 RX ADMIN — Medication 40 MILLIGRAM(S): at 11:17

## 2020-10-03 RX ADMIN — Medication 12.5 MILLIGRAM(S): at 23:51

## 2020-10-03 RX ADMIN — Medication 40 MILLIGRAM(S): at 05:55

## 2020-10-03 NOTE — PROGRESS NOTE ADULT - PROBLEM SELECTOR PLAN 6
- DVT ppx - therapeutic INR on Coumadin

## 2020-10-03 NOTE — PROGRESS NOTE ADULT - PROBLEM SELECTOR PLAN 1
- likely pulm edema related to tachyarrhythmia   - c/w Lasix    - tele monitoring  - trend trops  - monitor I/Os, daily wts  - fluid restriction  - last Echo done recently reviewed, EF preserved  - c/w Losartan  Cardio on board
- likely pulm edema related to tachyarrhythmia   - c/w Lasix 20mg IV BID  - tele monitoring  - trend trops  - monitor I/Os, daily wts  - fluid restriction  - last Echo done recently reviewed, EF preserved  - c/w Losartan  Cardio on board
- likely pulm edema related to tachyarrhythmia   - c/w Lasix    - tele monitoring  - trend trops  - monitor I/Os, daily wts  - fluid restriction  - last Echo done recently reviewed, EF preserved  - c/w Losartan  Cardio on board

## 2020-10-03 NOTE — PROGRESS NOTE ADULT - PROBLEM SELECTOR PLAN 5
- c/w supplemental oxygen  - jhonny prn  -Prednisone for acute exacerbation

## 2020-10-03 NOTE — PROGRESS NOTE ADULT - SUBJECTIVE AND OBJECTIVE BOX
INTERVAL HPI:  89 WF with HTN, COPD on O2 & Nocturnal NIV, Diastolic CHF, Severe pHTN, Hypothyroidism ,A fib on Coumadin, Valve replacement.,  Was admitted in July 2020 with Pneumonia, leukocytosis and COPD exacerbation. Non smoker.  Presented  to the ED c/o SOB x 2 days. Reports worsening SOB, cough  as well as mild dizziness and chest tightness x 2 day. Pt denies pain, worsening cough from baseline or LE edema. Pt denies fever or chills.  In ED with tachycardia and tachypnea.    OVERNIGHT EVENTS:  Reports being dizzy at times.    Vital Signs Last 24 Hrs  T(C): 36.6 (03 Oct 2020 11:00), Max: 37 (03 Oct 2020 00:25)  T(F): 97.8 (03 Oct 2020 11:00), Max: 98.6 (03 Oct 2020 00:25)  HR: 76 (03 Oct 2020 11:00) (69 - 78)  BP: 121/61 (03 Oct 2020 11:00) (113/60 - 130/66)  BP(mean): --  RR: 18 (03 Oct 2020 11:00) (18 - 18)  SpO2: 97% (03 Oct 2020 11:00) (97% - 98%)    PHYSICAL EXAM:  GEN:         Awake, responsive and comfortable.  HEENT:    Normal.    RESP:        no wheezing.  CVS:          Regular rate and rhythm.   ABD:         Soft, non-tender, non-distended;     MEDICATIONS  (STANDING):  diltiazem    milliGRAM(s) Oral daily  furosemide    Tablet 40 milliGRAM(s) Oral daily  levothyroxine 50 MICROGram(s) Oral daily  losartan 25 milliGRAM(s) Oral daily  pantoprazole    Tablet 40 milliGRAM(s) Oral before breakfast  predniSONE   Tablet 40 milliGRAM(s) Oral every 24 hours  sildenafil (REVATIO) 20 milliGRAM(s) Oral every 8 hours  warfarin 1 milliGRAM(s) Oral once    MEDICATIONS  (PRN):  acetaminophen   Tablet .. 650 milliGRAM(s) Oral every 6 hours PRN Mild Pain (1 - 3)  ALBUTerol    90 MICROgram(s) HFA Inhaler 2 Puff(s) Inhalation every 6 hours PRN Shortness of Breath and/or Wheezing  ALPRAZolam 0.25 milliGRAM(s) Oral at bedtime PRN Anxiety  meclizine 12.5 milliGRAM(s) Oral every 6 hours PRN Dizziness  ondansetron Injectable 4 milliGRAM(s) IV Push every 6 hours PRN Nausea and/or Vomiting    LABS:                        10.6   6.41  )-----------( 166      ( 02 Oct 2020 06:35 )             32.0     10-02    138  |  100  |  31<H>  ----------------------------<  123<H>  4.1   |  31  |  1.16    Ca    8.5      02 Oct 2020 06:35    PT/INR - ( 03 Oct 2020 06:51 )   PT: 29.6 sec;   INR: 2.67 ratio        ASSESSMENT AND PLAN:  ·	SOB.  ·	Acute COPD exacerbation.  ·	Chronic diastolic CHF,  ·	Chronic hypoxic hypercarbic Respiratory failure.  ·	Severe pHTN  ·	Moderate TR.  ·	Renal Insuffiencey.  ·	Chronic A Fib.  ·	Hypothyroidism.  ·	Anemia.  ·	Hypokalemia.    On PO Steroids, slow taper over 7-10 days.  Continue Lasix and Sildenafil.  On home O2 and Nocturnal NIV.  On Coumadin.  Check orthostatic BP before discharge.  Discussed with son via phone.

## 2020-10-03 NOTE — PROGRESS NOTE ADULT - PROBLEM SELECTOR PLAN 2
- c/w Diltiazem  - c/w Coumadin dose daily, INR therapeutic

## 2020-10-03 NOTE — PROGRESS NOTE ADULT - PROBLEM SELECTOR PLAN 4
- c/w Sildenafil  -Pulmonary to see patient
- c/w Sildenafil  -Pulmonary on board  family think sildenafil contributing to dizziness, pulm to eval
- c/w Sildenafil  -Pulmonary to see patient

## 2020-10-03 NOTE — PROGRESS NOTE ADULT - SUBJECTIVE AND OBJECTIVE BOX
Patient is a 89y old  Female who presents with a chief complaint of Acute on Chronic CHF (02 Oct 2020 17:54)      INTERVAL HPI/OVERNIGHT EVENTS: still w dizziness     MEDICATIONS  (STANDING):  diltiazem    milliGRAM(s) Oral daily  furosemide    Tablet 40 milliGRAM(s) Oral daily  levothyroxine 50 MICROGram(s) Oral daily  losartan 25 milliGRAM(s) Oral daily  pantoprazole    Tablet 40 milliGRAM(s) Oral before breakfast  predniSONE   Tablet 40 milliGRAM(s) Oral every 24 hours  sildenafil (REVATIO) 20 milliGRAM(s) Oral every 8 hours  warfarin 1 milliGRAM(s) Oral once    MEDICATIONS  (PRN):  acetaminophen   Tablet .. 650 milliGRAM(s) Oral every 6 hours PRN Mild Pain (1 - 3)  ALBUTerol    90 MICROgram(s) HFA Inhaler 2 Puff(s) Inhalation every 6 hours PRN Shortness of Breath and/or Wheezing  ALPRAZolam 0.25 milliGRAM(s) Oral at bedtime PRN Anxiety  meclizine 12.5 milliGRAM(s) Oral every 6 hours PRN Dizziness  ondansetron Injectable 4 milliGRAM(s) IV Push every 6 hours PRN Nausea and/or Vomiting      Allergies    No Known Allergies    Intolerances        REVIEW OF SYSTEMS:  CONSTITUTIONAL: No fever, weight loss, or fatigue  EYES: No eye pain, visual disturbances, or discharge  ENMT:  No difficulty hearing, tinnitus, vertigo; No sinus or throat pain  NECK: No pain or stiffness  BREASTS: No pain, masses, or nipple discharge  RESPIRATORY: No cough, wheezing, chills or hemoptysis; No shortness of breath  CARDIOVASCULAR: No chest pain, palpitations, dizziness, or leg swelling  GASTROINTESTINAL: No abdominal or epigastric pain. No nausea, vomiting, or hematemesis; No diarrhea or constipation. No melena or hematochezia.  GENITOURINARY: No dysuria, frequency, hematuria, or incontinence  NEUROLOGICAL: No headaches, memory loss, loss of strength, numbness, or tremors  SKIN: No itching, burning, rashes, or lesions   LYMPH NODES: No enlarged glands  ENDOCRINE: No heat or cold intolerance; No hair loss  MUSCULOSKELETAL: No joint pain or swelling; No muscle, back, or extremity pain  PSYCHIATRIC: No depression, anxiety, mood swings, or difficulty sleeping  HEME/LYMPH: No easy bruising, or bleeding gums  ALLERGY AND IMMUNOLOGIC: No hives or eczema    Vital Signs Last 24 Hrs  T(C): 36.9 (03 Oct 2020 05:25), Max: 37 (03 Oct 2020 00:25)  T(F): 98.4 (03 Oct 2020 05:25), Max: 98.6 (03 Oct 2020 00:25)  HR: 69 (03 Oct 2020 05:25) (69 - 80)  BP: 126/73 (03 Oct 2020 05:25) (113/60 - 130/66)  BP(mean): --  RR: 18 (03 Oct 2020 05:25) (18 - 18)  SpO2: 97% (03 Oct 2020 05:25) (97% - 98%)    PHYSICAL EXAM:  GENERAL: NAD, well-groomed, well-developed  HEAD:  Atraumatic, Normocephalic  EYES: EOMI, PERRLA, conjunctiva and sclera clear  ENMT: No tonsillar erythema, exudates, or enlargement; Moist mucous membranes, Good dentition, No lesions  NECK: Supple, No JVD, Normal thyroid  NERVOUS SYSTEM:  Alert & Oriented X3, Good concentration; Motor Strength 5/5 B/L upper and lower extremities; DTRs 2+ intact and symmetric  CHEST/LUNG: Clear to percussion bilaterally; No rales, rhonchi, wheezing, or rubs  HEART: Regular rate and rhythm; No murmurs, rubs, or gallops  ABDOMEN: Soft, Nontender, Nondistended; Bowel sounds present  EXTREMITIES:  2+ Peripheral Pulses, No clubbing, cyanosis, or edema  LYMPH: No lymphadenopathy noted  SKIN: No rashes or lesions    LABS:                        10.6   6.41  )-----------( 166      ( 02 Oct 2020 06:35 )             32.0     10-02    138  |  100  |  31<H>  ----------------------------<  123<H>  4.1   |  31  |  1.16    Ca    8.5      02 Oct 2020 06:35      PT/INR - ( 03 Oct 2020 06:51 )   PT: 29.6 sec;   INR: 2.67 ratio             CAPILLARY BLOOD GLUCOSE          RADIOLOGY & ADDITIONAL TESTS:    Imaging Personally Reviewed:  [ X] YES  [ ] NO    Consultant(s) Notes Reviewed:  [ X] YES  [ ] NO    Care Discussed with Consultants/Other Providers [X ] YES  [ ] NO

## 2020-10-03 NOTE — PROGRESS NOTE ADULT - PROBLEM SELECTOR PROBLEM 1
Acute on chronic diastolic (congestive) heart failure

## 2020-10-04 ENCOUNTER — TRANSCRIPTION ENCOUNTER (OUTPATIENT)
Age: 85
End: 2020-10-04

## 2020-10-04 VITALS
DIASTOLIC BLOOD PRESSURE: 62 MMHG | RESPIRATION RATE: 20 BRPM | TEMPERATURE: 98 F | SYSTOLIC BLOOD PRESSURE: 112 MMHG | OXYGEN SATURATION: 99 % | HEART RATE: 78 BPM

## 2020-10-04 LAB
ANION GAP SERPL CALC-SCNC: 4 MMOL/L — LOW (ref 5–17)
BUN SERPL-MCNC: 51 MG/DL — HIGH (ref 7–23)
CALCIUM SERPL-MCNC: 8.4 MG/DL — LOW (ref 8.5–10.1)
CHLORIDE SERPL-SCNC: 101 MMOL/L — SIGNIFICANT CHANGE UP (ref 96–108)
CO2 SERPL-SCNC: 35 MMOL/L — HIGH (ref 22–31)
CREAT SERPL-MCNC: 1.29 MG/DL — SIGNIFICANT CHANGE UP (ref 0.5–1.3)
GLUCOSE SERPL-MCNC: 127 MG/DL — HIGH (ref 70–99)
HCT VFR BLD CALC: 32.9 % — LOW (ref 34.5–45)
HGB BLD-MCNC: 10.4 G/DL — LOW (ref 11.5–15.5)
INR BLD: 1.95 RATIO — HIGH (ref 0.88–1.16)
INR BLD: 2.05 RATIO — HIGH (ref 0.88–1.16)
MCHC RBC-ENTMCNC: 31.6 GM/DL — LOW (ref 32–36)
MCHC RBC-ENTMCNC: 33.8 PG — SIGNIFICANT CHANGE UP (ref 27–34)
MCV RBC AUTO: 106.8 FL — HIGH (ref 80–100)
NRBC # BLD: 0 /100 WBCS — SIGNIFICANT CHANGE UP (ref 0–0)
PLATELET # BLD AUTO: 170 K/UL — SIGNIFICANT CHANGE UP (ref 150–400)
POTASSIUM SERPL-MCNC: 4.6 MMOL/L — SIGNIFICANT CHANGE UP (ref 3.5–5.3)
POTASSIUM SERPL-SCNC: 4.6 MMOL/L — SIGNIFICANT CHANGE UP (ref 3.5–5.3)
PROTHROM AB SERPL-ACNC: 21.9 SEC — HIGH (ref 10.6–13.6)
PROTHROM AB SERPL-ACNC: 23 SEC — HIGH (ref 10.6–13.6)
RBC # BLD: 3.08 M/UL — LOW (ref 3.8–5.2)
RBC # FLD: 15.2 % — HIGH (ref 10.3–14.5)
SODIUM SERPL-SCNC: 140 MMOL/L — SIGNIFICANT CHANGE UP (ref 135–145)
WBC # BLD: 7.47 K/UL — SIGNIFICANT CHANGE UP (ref 3.8–10.5)
WBC # FLD AUTO: 7.47 K/UL — SIGNIFICANT CHANGE UP (ref 3.8–10.5)

## 2020-10-04 PROCEDURE — 99239 HOSP IP/OBS DSCHRG MGMT >30: CPT

## 2020-10-04 RX ORDER — LOSARTAN POTASSIUM 100 MG/1
1 TABLET, FILM COATED ORAL
Qty: 0 | Refills: 0 | DISCHARGE

## 2020-10-04 RX ORDER — LOSARTAN POTASSIUM 100 MG/1
1 TABLET, FILM COATED ORAL
Qty: 30 | Refills: 0
Start: 2020-10-04 | End: 2020-11-02

## 2020-10-04 RX ORDER — MECLIZINE HCL 12.5 MG
1 TABLET ORAL
Qty: 56 | Refills: 0
Start: 2020-10-04 | End: 2020-10-17

## 2020-10-04 RX ORDER — WARFARIN SODIUM 2.5 MG/1
3 TABLET ORAL ONCE
Refills: 0 | Status: DISCONTINUED | OUTPATIENT
Start: 2020-10-04 | End: 2020-10-04

## 2020-10-04 RX ADMIN — Medication 40 MILLIGRAM(S): at 11:33

## 2020-10-04 RX ADMIN — Medication 20 MILLIGRAM(S): at 14:06

## 2020-10-04 RX ADMIN — Medication 650 MILLIGRAM(S): at 11:43

## 2020-10-04 RX ADMIN — Medication 650 MILLIGRAM(S): at 00:30

## 2020-10-04 RX ADMIN — Medication 12.5 MILLIGRAM(S): at 11:33

## 2020-10-04 RX ADMIN — PANTOPRAZOLE SODIUM 40 MILLIGRAM(S): 20 TABLET, DELAYED RELEASE ORAL at 06:34

## 2020-10-04 RX ADMIN — Medication 650 MILLIGRAM(S): at 12:40

## 2020-10-04 RX ADMIN — Medication 360 MILLIGRAM(S): at 06:34

## 2020-10-04 RX ADMIN — Medication 20 MILLIGRAM(S): at 06:34

## 2020-10-04 RX ADMIN — Medication 40 MILLIGRAM(S): at 06:34

## 2020-10-04 RX ADMIN — LOSARTAN POTASSIUM 25 MILLIGRAM(S): 100 TABLET, FILM COATED ORAL at 06:34

## 2020-10-04 RX ADMIN — Medication 50 MICROGRAM(S): at 06:34

## 2020-10-04 NOTE — DISCHARGE NOTE PROVIDER - NSDCMRMEDTOKEN_GEN_ALL_CORE_FT
ALPRAZolam 0.25 mg oral tablet: 1 tab(s) orally once a day (at bedtime), As Needed  bisacodyl 5 mg oral delayed release tablet: 1 tab(s) orally once a day, As needed, Constipation  Coumadin 3 mg oral tablet: 1 tab(s) orally once a day  DilTIAZem Hydrochloride  mg/24 hours oral capsule, extended release: 1 cap(s) orally once a day  furosemide 40 mg oral tablet: 1 tab(s) orally once a day  ipratropium-albuterol 0.5 mg-2.5 mg/3 mLinhalation solution: 3 milliliter(s) inhaled every 6 hours  losartan 25 mg oral tablet: 1 tab(s) orally once a day  meclizine 12.5 mg oral tablet: 1 tab(s) orally every 6 hours, As needed, Dizziness  Multiple Vitamins oral tablet: 1 tab(s) orally once a day  pantoprazole 40 mg oral delayed release tablet: 1 tab(s) orally once a day (before a meal)  predniSONE 10 mg oral tablet: 3 tab(s) orally once a day x 3 days, then 2 tablets x 3 days, then 1 tablet daily x 3 days.  sildenafil 20 mg oral tablet: 1 tab(s) orally every 8 hours  Synthroid: 50  orally once a day

## 2020-10-04 NOTE — PROGRESS NOTE ADULT - REASON FOR ADMISSION
Acute on Chronic CHF

## 2020-10-04 NOTE — PROGRESS NOTE ADULT - SUBJECTIVE AND OBJECTIVE BOX
INTERVAL HPI:  89 WF with HTN, COPD on O2 & Nocturnal NIV, Diastolic CHF, Severe pHTN, Hypothyroidism ,A fib on Coumadin, Valve replacement.,  Was admitted in July 2020 with Pneumonia, leukocytosis and COPD exacerbation. Non smoker.  Presented  to the ED c/o SOB x 2 days. Reports worsening SOB, cough  as well as mild dizziness and chest tightness x 2 day. Pt denies pain, worsening cough from baseline or LE edema. Pt denies fever or chills.  In ED with tachycardia and tachypnea.    OVERNIGHT EVENTS:  Breathing is better, but still complains of dizziness.    Vital Signs Last 24 Hrs  T(C): 36.4 (04 Oct 2020 11:40), Max: 37.2 (03 Oct 2020 17:00)  T(F): 97.5 (04 Oct 2020 11:40), Max: 98.9 (03 Oct 2020 17:00)  HR: 78 (04 Oct 2020 11:40) (61 - 78)  BP: 112/62 (04 Oct 2020 11:40) (112/62 - 147/54)  BP(mean): --  RR: 20 (04 Oct 2020 11:40) (18 - 20)  SpO2: 99% (04 Oct 2020 11:40) (96% - 99%)    PHYSICAL EXAM:  GEN:         Awake, responsive and comfortable.  HEENT:    Normal.    RESP:        no distress  CVS:          Regular rate and rhythm.   ABD:         Soft, non-tender, non-distended;     MEDICATIONS  (STANDING):  diltiazem    milliGRAM(s) Oral daily  furosemide    Tablet 40 milliGRAM(s) Oral daily  levothyroxine 50 MICROGram(s) Oral daily  losartan 25 milliGRAM(s) Oral daily  pantoprazole    Tablet 40 milliGRAM(s) Oral before breakfast  predniSONE   Tablet 40 milliGRAM(s) Oral every 24 hours  sildenafil (REVATIO) 20 milliGRAM(s) Oral every 8 hours  warfarin 3 milliGRAM(s) Oral once    MEDICATIONS  (PRN):  acetaminophen   Tablet .. 650 milliGRAM(s) Oral every 6 hours PRN Mild Pain (1 - 3)  ALBUTerol    90 MICROgram(s) HFA Inhaler 2 Puff(s) Inhalation every 6 hours PRN Shortness of Breath and/or Wheezing  ALPRAZolam 0.25 milliGRAM(s) Oral at bedtime PRN Anxiety  meclizine 12.5 milliGRAM(s) Oral every 6 hours PRN Dizziness  ondansetron Injectable 4 milliGRAM(s) IV Push every 6 hours PRN Nausea and/or Vomiting    LABS:                        10.4   7.47  )-----------( 170      ( 04 Oct 2020 07:29 )             32.9     10-04    140  |  101  |  51<H>  ----------------------------<  127<H>  4.6   |  35<H>  |  1.29    Ca    8.4<L>      04 Oct 2020 07:29    PT/INR - ( 04 Oct 2020 10:50 )   PT: 21.9 sec;   INR: 1.95 ratio         ASSESSMENT AND PLAN:  ·	SOB.  ·	Acute COPD exacerbation.  ·	Chronic diastolic CHF,  ·	Chronic hypoxic hypercarbic Respiratory failure.  ·	Severe pHTN  ·	Moderate TR.  ·	Renal Insuffiencey.  ·	Chronic A Fib.  ·	Hypothyroidism.  ·	Anemia.  ·	Hypokalemia.    On PO Steroids, slow taper over 7-10 days.  Continue Lasix and Sildenafil.  On home O2 and Nocturnal NIV.  On Coumadin.  Check orthostatic BP before discharge.

## 2020-10-04 NOTE — DISCHARGE NOTE NURSING/CASE MANAGEMENT/SOCIAL WORK - PATIENT PORTAL LINK FT
You can access the FollowMyHealth Patient Portal offered by  by registering at the following website: [email or web address]. By joining [employer]’s FollowMyHealth portal, you will also be able to view your health information using other applications (apps) compatible with our system.

## 2020-10-04 NOTE — DISCHARGE NOTE PROVIDER - HOSPITAL COURSE
89 y/o female with PMHx of HTN, COPD on O2, Diastolic CHF, Severe Pulmonary HTN, Hypothyroidism, A fib on Coumadin, recent admission in July for COPD exacerbation and, B/L PNA presents to the ED c/o SOB x 2 days being admitted for acute on chronic CHF exacerbation       Problem/Plan - 1:  ·  Problem: Acute on chronic diastolic (congestive) heart failure.  Plan: - likely pulm edema related to tachyarrhythmia   -stable for outpt management      Problem/Plan - 2:  ·  Problem: Atrial fibrillation, unspecified type.  Plan: - c/w Diltiazem  - c/w Coumadin dose daily, INR therapeutic.     Problem/Plan - 3:  ·  Problem: Hypothyroidism, unspecified type.  Plan: - c/w Synthroid.     Problem/Plan - 4:  ·  Problem: Pulmonary hypertension.  Plan: - c/w Sildenafil  -Pulmonary f./u        Problem/Plan - 5:  ·  Problem: Chronic obstructive pulmonary disease, unspecified COPD type.  Plan: - c/w supplemental oxygen  - duonebs prn  -Prednisone for acute exacerbation.     Problem/Plan - 6:  Problem: Preventive measure. Plan: - DVT ppx - therapeutic INR on Coumadin.    Attending Attestation:   45 minutes spent on total dc encounter; more than 50% of the visit was spent counseling and/or coordinating care by the attending physician.

## 2020-10-04 NOTE — DISCHARGE NOTE PROVIDER - CARE PROVIDER_API CALL
your primary doctor,   Phone: (   )    -  Fax: (   )    -  Follow Up Time:     Salvador Kidd  [address], [address]  [place] [address]  Phone: [phone]  Fax: [phone]  Follow Up Time:

## 2020-10-04 NOTE — DISCHARGE NOTE PROVIDER - PROVIDER TOKENS
FREE:[LAST:[your primary doctor],PHONE:[(   )    -],FAX:[(   )    -]],PROVIDER:[TOKEN:[[id number]]]

## 2020-10-06 DIAGNOSIS — I48.92 UNSPECIFIED ATRIAL FLUTTER: ICD-10-CM

## 2020-10-06 DIAGNOSIS — Z79.01 LONG TERM (CURRENT) USE OF ANTICOAGULANTS: ICD-10-CM

## 2020-10-06 DIAGNOSIS — N28.9 DISORDER OF KIDNEY AND URETER, UNSPECIFIED: ICD-10-CM

## 2020-10-06 DIAGNOSIS — I27.20 PULMONARY HYPERTENSION, UNSPECIFIED: ICD-10-CM

## 2020-10-06 DIAGNOSIS — I35.0 NONRHEUMATIC AORTIC (VALVE) STENOSIS: ICD-10-CM

## 2020-10-06 DIAGNOSIS — F41.9 ANXIETY DISORDER, UNSPECIFIED: ICD-10-CM

## 2020-10-06 DIAGNOSIS — I50.33 ACUTE ON CHRONIC DIASTOLIC (CONGESTIVE) HEART FAILURE: ICD-10-CM

## 2020-10-06 DIAGNOSIS — I49.9 CARDIAC ARRHYTHMIA, UNSPECIFIED: ICD-10-CM

## 2020-10-06 DIAGNOSIS — J96.11 CHRONIC RESPIRATORY FAILURE WITH HYPOXIA: ICD-10-CM

## 2020-10-06 DIAGNOSIS — I48.91 UNSPECIFIED ATRIAL FIBRILLATION: ICD-10-CM

## 2020-10-06 DIAGNOSIS — K59.00 CONSTIPATION, UNSPECIFIED: ICD-10-CM

## 2020-10-06 DIAGNOSIS — I07.1 RHEUMATIC TRICUSPID INSUFFICIENCY: ICD-10-CM

## 2020-10-06 DIAGNOSIS — J44.1 CHRONIC OBSTRUCTIVE PULMONARY DISEASE WITH (ACUTE) EXACERBATION: ICD-10-CM

## 2020-10-06 DIAGNOSIS — Z66 DO NOT RESUSCITATE: ICD-10-CM

## 2020-10-06 DIAGNOSIS — K21.9 GASTRO-ESOPHAGEAL REFLUX DISEASE WITHOUT ESOPHAGITIS: ICD-10-CM

## 2020-10-06 DIAGNOSIS — D64.9 ANEMIA, UNSPECIFIED: ICD-10-CM

## 2020-10-06 DIAGNOSIS — J96.12 CHRONIC RESPIRATORY FAILURE WITH HYPERCAPNIA: ICD-10-CM

## 2020-10-06 DIAGNOSIS — R00.0 TACHYCARDIA, UNSPECIFIED: ICD-10-CM

## 2020-10-06 DIAGNOSIS — E03.9 HYPOTHYROIDISM, UNSPECIFIED: ICD-10-CM

## 2020-10-06 DIAGNOSIS — Z99.81 DEPENDENCE ON SUPPLEMENTAL OXYGEN: ICD-10-CM

## 2020-10-06 DIAGNOSIS — Z79.52 LONG TERM (CURRENT) USE OF SYSTEMIC STEROIDS: ICD-10-CM

## 2020-10-06 DIAGNOSIS — I11.0 HYPERTENSIVE HEART DISEASE WITH HEART FAILURE: ICD-10-CM

## 2020-10-06 DIAGNOSIS — E87.6 HYPOKALEMIA: ICD-10-CM

## 2020-10-18 ENCOUNTER — INPATIENT (INPATIENT)
Facility: HOSPITAL | Age: 85
LOS: 5 days | Discharge: HOME HEALTH | End: 2020-10-24
Attending: FAMILY MEDICINE | Admitting: FAMILY MEDICINE
Payer: MEDICARE

## 2020-10-18 VITALS
HEART RATE: 80 BPM | RESPIRATION RATE: 22 BRPM | TEMPERATURE: 101 F | DIASTOLIC BLOOD PRESSURE: 68 MMHG | SYSTOLIC BLOOD PRESSURE: 120 MMHG | HEIGHT: 60 IN | WEIGHT: 145.06 LBS | OXYGEN SATURATION: 96 %

## 2020-10-18 LAB
ALBUMIN SERPL ELPH-MCNC: 3 G/DL — LOW (ref 3.3–5)
ALP SERPL-CCNC: 60 U/L — SIGNIFICANT CHANGE UP (ref 40–120)
ALT FLD-CCNC: 25 U/L — SIGNIFICANT CHANGE UP (ref 12–78)
ANION GAP SERPL CALC-SCNC: 5 MMOL/L — SIGNIFICANT CHANGE UP (ref 5–17)
APPEARANCE UR: CLEAR — SIGNIFICANT CHANGE UP
APTT BLD: 35.7 SEC — HIGH (ref 27.5–35.5)
AST SERPL-CCNC: 29 U/L — SIGNIFICANT CHANGE UP (ref 15–37)
BILIRUB SERPL-MCNC: 1 MG/DL — SIGNIFICANT CHANGE UP (ref 0.2–1.2)
BILIRUB UR-MCNC: NEGATIVE — SIGNIFICANT CHANGE UP
BUN SERPL-MCNC: 67 MG/DL — HIGH (ref 7–23)
CALCIUM SERPL-MCNC: 8.4 MG/DL — LOW (ref 8.5–10.1)
CHLORIDE SERPL-SCNC: 98 MMOL/L — SIGNIFICANT CHANGE UP (ref 96–108)
CO2 SERPL-SCNC: 32 MMOL/L — HIGH (ref 22–31)
COLOR SPEC: YELLOW — SIGNIFICANT CHANGE UP
CREAT SERPL-MCNC: 1.58 MG/DL — HIGH (ref 0.5–1.3)
DIFF PNL FLD: NEGATIVE — SIGNIFICANT CHANGE UP
GLUCOSE SERPL-MCNC: 134 MG/DL — HIGH (ref 70–99)
GLUCOSE UR QL: NEGATIVE MG/DL — SIGNIFICANT CHANGE UP
HCT VFR BLD CALC: 32.3 % — LOW (ref 34.5–45)
HGB BLD-MCNC: 10.5 G/DL — LOW (ref 11.5–15.5)
INR BLD: 3.29 RATIO — HIGH (ref 0.88–1.16)
KETONES UR-MCNC: NEGATIVE — SIGNIFICANT CHANGE UP
LACTATE SERPL-SCNC: 1.2 MMOL/L — SIGNIFICANT CHANGE UP (ref 0.7–2)
LEUKOCYTE ESTERASE UR-ACNC: NEGATIVE — SIGNIFICANT CHANGE UP
MCHC RBC-ENTMCNC: 32.5 GM/DL — SIGNIFICANT CHANGE UP (ref 32–36)
MCHC RBC-ENTMCNC: 33.8 PG — SIGNIFICANT CHANGE UP (ref 27–34)
MCV RBC AUTO: 103.9 FL — HIGH (ref 80–100)
NITRITE UR-MCNC: NEGATIVE — SIGNIFICANT CHANGE UP
PH UR: 5 — SIGNIFICANT CHANGE UP (ref 5–8)
POTASSIUM SERPL-MCNC: 4.7 MMOL/L — SIGNIFICANT CHANGE UP (ref 3.5–5.3)
POTASSIUM SERPL-SCNC: 4.7 MMOL/L — SIGNIFICANT CHANGE UP (ref 3.5–5.3)
PROT SERPL-MCNC: 7 GM/DL — SIGNIFICANT CHANGE UP (ref 6–8.3)
PROT UR-MCNC: NEGATIVE MG/DL — SIGNIFICANT CHANGE UP
PROTHROM AB SERPL-ACNC: 36.1 SEC — HIGH (ref 10.6–13.6)
RBC # BLD: 3.11 M/UL — LOW (ref 3.8–5.2)
RBC # FLD: 15.3 % — HIGH (ref 10.3–14.5)
SARS-COV-2 RNA SPEC QL NAA+PROBE: SIGNIFICANT CHANGE UP
SODIUM SERPL-SCNC: 135 MMOL/L — SIGNIFICANT CHANGE UP (ref 135–145)
SP GR SPEC: 1.01 — SIGNIFICANT CHANGE UP (ref 1.01–1.02)
UROBILINOGEN FLD QL: NEGATIVE MG/DL — SIGNIFICANT CHANGE UP

## 2020-10-18 PROCEDURE — 71045 X-RAY EXAM CHEST 1 VIEW: CPT | Mod: 26

## 2020-10-18 PROCEDURE — 99285 EMERGENCY DEPT VISIT HI MDM: CPT | Mod: CS

## 2020-10-18 PROCEDURE — 99223 1ST HOSP IP/OBS HIGH 75: CPT

## 2020-10-18 PROCEDURE — 93010 ELECTROCARDIOGRAM REPORT: CPT

## 2020-10-18 RX ORDER — SODIUM CHLORIDE 9 MG/ML
1000 INJECTION INTRAMUSCULAR; INTRAVENOUS; SUBCUTANEOUS
Refills: 0 | Status: DISCONTINUED | OUTPATIENT
Start: 2020-10-18 | End: 2020-10-19

## 2020-10-18 RX ORDER — ACETAMINOPHEN 500 MG
650 TABLET ORAL EVERY 6 HOURS
Refills: 0 | Status: DISCONTINUED | OUTPATIENT
Start: 2020-10-18 | End: 2020-10-24

## 2020-10-18 RX ORDER — ONDANSETRON 8 MG/1
4 TABLET, FILM COATED ORAL EVERY 6 HOURS
Refills: 0 | Status: DISCONTINUED | OUTPATIENT
Start: 2020-10-18 | End: 2020-10-24

## 2020-10-18 RX ORDER — SODIUM CHLORIDE 9 MG/ML
1000 INJECTION INTRAMUSCULAR; INTRAVENOUS; SUBCUTANEOUS ONCE
Refills: 0 | Status: COMPLETED | OUTPATIENT
Start: 2020-10-18 | End: 2020-10-18

## 2020-10-18 RX ORDER — ACETAMINOPHEN 500 MG
975 TABLET ORAL ONCE
Refills: 0 | Status: COMPLETED | OUTPATIENT
Start: 2020-10-18 | End: 2020-10-18

## 2020-10-18 RX ORDER — PIPERACILLIN AND TAZOBACTAM 4; .5 G/20ML; G/20ML
3.38 INJECTION, POWDER, LYOPHILIZED, FOR SOLUTION INTRAVENOUS ONCE
Refills: 0 | Status: COMPLETED | OUTPATIENT
Start: 2020-10-18 | End: 2020-10-18

## 2020-10-18 RX ORDER — VANCOMYCIN HCL 1 G
1000 VIAL (EA) INTRAVENOUS ONCE
Refills: 0 | Status: COMPLETED | OUTPATIENT
Start: 2020-10-18 | End: 2020-10-19

## 2020-10-18 RX ADMIN — SODIUM CHLORIDE 1000 MILLILITER(S): 9 INJECTION INTRAMUSCULAR; INTRAVENOUS; SUBCUTANEOUS at 21:50

## 2020-10-18 RX ADMIN — Medication 975 MILLIGRAM(S): at 22:20

## 2020-10-18 RX ADMIN — Medication 975 MILLIGRAM(S): at 21:50

## 2020-10-18 RX ADMIN — PIPERACILLIN AND TAZOBACTAM 200 GRAM(S): 4; .5 INJECTION, POWDER, LYOPHILIZED, FOR SOLUTION INTRAVENOUS at 21:56

## 2020-10-18 RX ADMIN — PIPERACILLIN AND TAZOBACTAM 3.38 GRAM(S): 4; .5 INJECTION, POWDER, LYOPHILIZED, FOR SOLUTION INTRAVENOUS at 23:00

## 2020-10-18 NOTE — H&P ADULT - PROBLEM SELECTOR PLAN 5
- Pt champ to the 40s, at times to 30s while asleep, mostly in 50s while awake  - c/w Cardizem w/ hold parameters - Pt champ to the 40s, at times to 30s while asleep, mostly in 50s while awake  - c/w Cardizem w/ hold parameters  - INR mildly supratherapeutic, f/u rpt in am, dose Coumadin daily

## 2020-10-18 NOTE — H&P ADULT - NSHPPHYSICALEXAM_GEN_ALL_CORE
PHYSICAL EXAM:    Vital Signs Last 24 Hrs  T(C): 36.8 (19 Oct 2020 01:30), Max: 38.9 (18 Oct 2020 21:30)  T(F): 98.3 (19 Oct 2020 01:30), Max: 102 (18 Oct 2020 21:30)  HR: 51 (19 Oct 2020 02:59) (46 - 80)  BP: 89/48 (19 Oct 2020 02:59) (87/39 - 120/68)  BP(mean): --  RR: 25 (19 Oct 2020 02:59) (15 - 29)  SpO2: 100% (19 Oct 2020 02:59) (96% - 100%)    GENERAL: Pt lying in bed comfortably in NAD  HEENT:  Atraumatic, EOMI, PERRL, conjunctiva and sclera clear, MMM  NECK: Supple, No JVD  CHEST/LUNG: bibasilar rhonchi and some rales as well,   HEART: irregular rhythm pt champ  ABDOMEN: Bowel sounds present; Soft, Nontender, Nondistended.   EXTREMITIES:  2+ Peripheral Pulses,. trace pedal edema  NEUROLOGICAL: No focal deficits   MSK: FROM x 4 extremities   SKIN: No rashes or lesions PHYSICAL EXAM:    Vital Signs Last 24 Hrs  T(C): 36.8 (19 Oct 2020 01:30), Max: 38.9 (18 Oct 2020 21:30)  T(F): 98.3 (19 Oct 2020 01:30), Max: 102 (18 Oct 2020 21:30)  HR: 51 (19 Oct 2020 02:59) (46 - 80)  BP: 89/48 (19 Oct 2020 02:59) (87/39 - 120/68)  BP(mean): --  RR: 25 (19 Oct 2020 02:59) (15 - 29)  SpO2: 100% (19 Oct 2020 02:59) (96% - 100%)    GENERAL: Pt lying in bed comfortably in NAD  HEENT:  Atraumatic, MMM  NECK: Supple,  CHEST/LUNG: bibasilar rhonchi and some rales as well  HEART: irregular rhythm pt champ  ABDOMEN: Bowel sounds present; Soft, Nontender, Nondistended.   EXTREMITIES:  2+ Peripheral Pulses,. trace pedal edema  NEUROLOGICAL: No focal deficits, pt w/ some confusion  MSK: FROM x 4 extremities   SKIN: No rashes or lesions

## 2020-10-18 NOTE — ED ADULT NURSE NOTE - ED STAT RN HANDOFF DETAILS
Report received from RN at this time with robyn Murphy. Assessment available on KB. will continue to monitor

## 2020-10-18 NOTE — H&P ADULT - ASSESSMENT
89 y/o female with PMHx of HTN, COPD on O2, Diastolic CHF, Severe Pulmonary HTN, Hypothyroidism, Atrial fibrillation on Coumadin, recent admission in July for COPD exacerbation and, B/L PNA and again on Sept 30 - Oct 4th for acute on chronic CHF as well as Atrial fib w/ RVR presents to the ED c/o subjective fever and nonproductive cough, pt being admitted for sepsis secondary to possible HCAP    IMPROVE VTE Individual Risk Assessment    RISK                                                                Points    [  ] Previous VTE                                                  3    [  ] Thrombophilia                                               2    [  ] Lower limb paralysis                                      2        (unable to hold up >15 seconds)      [  ] Current Cancer                                              2         (within 6 months)    [  ] Immobilization > 24 hrs                                1    [  ] ICU/CCU stay > 24 hours                              1    [  ] Age > 60                                                      1    IMPROVE VTE Score _____3____    IMPROVE Score 0-1: Low Risk, No VTE prophylaxis required for most patients, encourage ambulation.   IMPROVE Score 2-3: At risk, pharmacologic VTE prophylaxis is indicated for most patients (in the absence of a contraindication)  IMPROVE Score > or = 4: High Risk, pharmacologic VTE prophylaxis is indicated for most patients (in the absence of a contraindication) 89 y/o female with PMHx of HTN, COPD on O2, Diastolic CHF, Severe Pulmonary HTN, Hypothyroidism, Atrial fibrillation on Coumadin, recent admission in July for COPD exacerbation and, B/L PNA and again on Sept 30 - Oct 4th for acute on chronic CHF as well as Atrial fib w/ RVR presents to the ED c/o subjective fever and nonproductive cough, pt being admitted for sepsis of unclear etiology    IMPROVE VTE Individual Risk Assessment    RISK                                                                Points    [  ] Previous VTE                                                  3    [  ] Thrombophilia                                               2    [  ] Lower limb paralysis                                      2        (unable to hold up >15 seconds)      [  ] Current Cancer                                              2         (within 6 months)    [  ] Immobilization > 24 hrs                                1    [  ] ICU/CCU stay > 24 hours                              1    [  ] Age > 60                                                      1    IMPROVE VTE Score _____3____    IMPROVE Score 0-1: Low Risk, No VTE prophylaxis required for most patients, encourage ambulation.   IMPROVE Score 2-3: At risk, pharmacologic VTE prophylaxis is indicated for most patients (in the absence of a contraindication)  IMPROVE Score > or = 4: High Risk, pharmacologic VTE prophylaxis is indicated for most patients (in the absence of a contraindication)

## 2020-10-18 NOTE — ED ADULT NURSE NOTE - ED STAT RN HANDOFF DETAILS 3
handoff given MAGDALENA laird pt condition remains same SB on cardiac monitor, however asymptomatic . vancomycin in progress pending albumin endorsed for continued care. handoff given MAGDALENA lares pt condition remains same SB on cardiac monitor, however asymptomatic . vancomycin in progress pending albumin endorsed for continued care.

## 2020-10-18 NOTE — H&P ADULT - HISTORY OF PRESENT ILLNESS
89 y/o female with PMHx of HTN, COPD on O2, Diastolic CHF, Severe Pulmonary HTN, Hypothyroidism, A fib on Coumadin, recent admission in July for COPD exacerbation and, B/L PNA and again on Sept 30 - Oct 4th for acute on chronic CHF as well as Atrial fib w/ RVRV presents to the ED c/o fever and cough.  In ED pt initial tachy to 128, BP stable, SPO2 97% on 2L. Labs sig for BNP 1518, for rest of abnl labs see below. EKG showed Aflutter/Afib. Pt given lasix 40mg IV x 1 as well an Zosyn   89 y/o female with PMHx of HTN, COPD on O2, Diastolic CHF, Severe Pulmonary HTN, Hypothyroidism, A fib on Coumadin, recent admission in July for COPD exacerbation and, B/L PNA and again on Sept 30 - Oct 4th for acute on chronic CHF as well as Atrial fib w/ RVRV presents to the ED c/o subjective fever and nonproductive cough. Pt denies cp, palpitations or dizziness    In ED Vitals BP initially 120/68, dropped to 87/39, T max 102. Labs sig for WBC 12.03 w/ left shift, BUN/Cr 67/1.58, COVID and RVP neg. Pt given 1 dose of Zosyn and 1L IV bolus 89 y/o female with PMHx of HTN, COPD on O2, Diastolic CHF, Severe Pulmonary HTN, Hypothyroidism, Atrial fibrillation on Coumadin, recent admission in July for COPD exacerbation and, B/L PNA and again on Sept 30 - Oct 4th for acute on chronic CHF as well as Atrial fib w/ RVR presents to the ED c/o subjective fever and nonproductive cough. Pt denies cp, palpitations or dizziness    In ED Vitals BP initially 120/68, dropped to 87/39, T max 102. Labs sig for WBC 12.03 w/ left shift, BUN/Cr 67/1.58, COVID and RVP neg. Pt given 1 dose of Zosyn and 1L IV bolus 89 y/o female with PMHx of HTN, COPD on O2, Diastolic CHF, Severe Pulmonary HTN, Hypothyroidism, Atrial fibrillation on Coumadin, recent admission in July for COPD exacerbation and, B/L PNA and again on Sept 30 - Oct 4th for acute on chronic CHF as well as Atrial fib w/ RVR presents to the ED c/o subjective fever and nonproductive cough. Pt denies cp, palpitations or dizziness. Per son, pt had fever and was confused thus brought her to the ED. No other symptoms reported per son    In ED Vitals BP initially 120/68, dropped to 87/39, T max 102. Labs sig for WBC 12.03 w/ left shift, BUN/Cr 67/1.58, COVID and RVP neg. Pt given 1 dose of Zosyn and 1L IV bolus

## 2020-10-18 NOTE — H&P ADULT - PROBLEM SELECTOR PLAN 2
- pt recently admitted CHF, likely due to over-diuresis  - pt s/p 1L bolus in ED  - pt w/ faint rales on exam, hold further fluids for now  - hold nephrotoxins

## 2020-10-18 NOTE — ED PROVIDER NOTE - OBJECTIVE STATEMENT
89 y/o f w/pmhx HTN, COPD on home O2, CHF, pulmonary HTN, hypothyroidism, afib on coumadin came to ER because of fever. Pt says she has the flu. She denies fevers or any other complaints at this time.

## 2020-10-18 NOTE — H&P ADULT - NSHPLABSRESULTS_GEN_ALL_CORE
T(C): 36.8 (10-19-20 @ 01:30), Max: 38.9 (10-18-20 @ 21:30)  HR: 51 (10-19-20 @ 02:59) (46 - 80)  BP: 89/48 (10-19-20 @ 02:59) (87/39 - 120/68)  RR: 25 (10-19-20 @ 02:59) (15 - 29)  SpO2: 100% (10-19-20 @ 02:59) (96% - 100%)                        10.5   12.03 )-----------( 122      ( 18 Oct 2020 21:55 )             32.3     10-18    135  |  98  |  67<H>  ----------------------------<  134<H>  4.7   |  32<H>  |  1.58<H>    Ca    8.4<L>      18 Oct 2020 21:55    TPro  7.0  /  Alb  3.0<L>  /  TBili  1.0  /  DBili  x   /  AST  29  /  ALT  25  /  AlkPhos  60  10-18    LIVER FUNCTIONS - ( 18 Oct 2020 21:55 )  Alb: 3.0 g/dL / Pro: 7.0 gm/dL / ALK PHOS: 60 U/L / ALT: 25 U/L / AST: 29 U/L / GGT: x           PT/INR - ( 18 Oct 2020 21:55 )   PT: 36.1 sec;   INR: 3.29 ratio         PTT - ( 18 Oct 2020 21:55 )  PTT:35.7 sec  Urinalysis Basic - ( 18 Oct 2020 21:59 )    Color: Yellow / Appearance: Clear / S.015 / pH: x  Gluc: x / Ketone: Negative  / Bili: Negative / Urobili: Negative mg/dL   Blood: x / Protein: Negative mg/dL / Nitrite: Negative   Leuk Esterase: Negative / RBC: x / WBC x   Sq Epi: x / Non Sq Epi: x / Bacteria: x          acetaminophen   Tablet .. 650 milliGRAM(s) Oral every 6 hours PRN  albumin human 25% IVPB 50 milliLiter(s) IV Intermittent every 1 hour  ondansetron Injectable 4 milliGRAM(s) IV Push every 6 hours PRN

## 2020-10-18 NOTE — ED ADULT NURSE REASSESSMENT NOTE - NS ED NURSE REASSESS COMMENT FT1
Safety checks compld + maintd. T+P Q encouraged. IV sites checked Q2+remains WDL. meds given as ord with no s/s of adverse RXNs. skin care and complete care rendered at this time and diaper changed. Fall +skin precs in place. Call bell within reach and safety measures in place. Dr. Ibarra/Dr Cardona aware of vitals. Pt still [place] with rales, o2 liters in place, awaiting further orders and results.

## 2020-10-18 NOTE — H&P ADULT - PROBLEM SELECTOR PLAN 6
- INR therapeutic on Coumadin - INR therapeutic on Coumadin      Pt DNR/DNI MOLST in chart, confirmed w/ son over phone

## 2020-10-18 NOTE — ED ADULT TRIAGE NOTE - MODE OF ARRIVAL
[facility]                      Advance Directive    Advance directives are legal documents that let you make choices ahead of time about your health care and medical treatment in case you become unable to communicate for yourself. Advance directives are a way for you to communicate your wishes to family, friends, and health care providers. This can help convey your decisions about end-of-life care if you become unable to communicate.  Discussing and writing advance directives should happen over time rather than all at once. Advance directives can be changed depending on your situation and what you want, even after you have signed the advance directives.  If you do not have an advance directive, some states assign family decision makers to act on your behalf based on how closely you are related to them. Each state has its own laws regarding advance directives. You may want to check with your health care provider, , or state representative about the laws in your state. There are different types of advance directives, such as:  · Medical power of .  · Living will.  · Do not resuscitate (DNR) or do not attempt resuscitation (DNAR) order.  Health care proxy and medical power of   A health care proxy, also called a health care agent, is a person who is appointed to make medical decisions for you in cases in which you are unable to make the decisions yourself. Generally, people choose someone they know well and trust to represent their preferences. Make sure to ask this person for an agreement to act as your proxy. A proxy may have to exercise judgment in the event of a medical decision for which your wishes are not known.  A medical power of  is a legal document that names your health care proxy. Depending on the laws in your state, after the document is written, it may also need to be:  · Signed.  · Notarized.  · Dated.  · Copied.  · Witnessed.  · Incorporated into your medical record.  You  may also want to appoint someone to manage your financial affairs in a situation in which you are unable to do so. This is called a durable power of  for finances. It is a separate legal document from the durable power of  for health care. You may choose the same person or someone different from your health care proxy to act as your agent in financial matters.  If you do not appoint a proxy, or if there is a concern that the proxy is not acting in your best interests, a court-appointed guardian may be designated to act on your behalf.  Living will  A living will is a set of instructions documenting your wishes about medical care when you cannot express them yourself. Health care providers should keep a copy of your living will in your medical record. You may want to give a copy to family members or friends. To alert caregivers in case of an emergency, you can place a card in your wallet to let them know that you have a living will and where they can find it. A living will is used if you become:  · Terminally ill.  · Incapacitated.  · Unable to communicate or make decisions.  Items to consider in your living will include:  · The use or non-use of life-sustaining equipment, such as dialysis machines and breathing machines (ventilators).  · A DNR or DNAR order, which is the instruction not to use cardiopulmonary resuscitation (CPR) if breathing or heartbeat stops.  · The use or non-use of tube feeding.  · Withholding of food and fluids.  · Comfort (palliative) care when the goal becomes comfort rather than a cure.  · Organ and tissue donation.  A living will does not give instructions for distributing your money and property if you should pass away. It is recommended that you seek the advice of a  when writing a will. Decisions about taxes, beneficiaries, and asset distribution will be legally binding. This process can relieve your family and friends of any concerns surrounding disputes or  questions that may come up about the distribution of your assets.  DNR or DNAR  A DNR or DNAR order is a request not to have CPR in the event that your heart stops beating or you stop breathing. If a DNR or DNAR order has not been made and shared, a health care provider will try to help any patient whose heart has stopped or who has stopped breathing. If you plan to have surgery, talk with your health care provider about how your DNR or DNAR order will be followed if problems occur.  Summary  · Advance directives are the legal documents that allow you to make choices ahead of time about your health care and medical treatment in case you become unable to communicate for yourself.  · The process of discussing and writing advance directives should happen over time. You can change the advance directives, even after you have signed them.  · Advance directives include DNR or DNAR orders, living fox, and designating an agent as your medical power of .  This information is not intended to replace advice given to you by your health care provider. Make sure you discuss any questions you have with your health care provider.  Document Released: 03/26/2009 Document Revised: 11/06/2017 Document Reviewed: 11/06/2017  [employer] Interactive Patient Education © 2019 [employer]  Zoster Vaccine, Recombinant injection  What is this medicine?  ZOSTER VACCINE (ZOS ter vak SEEN) is used to prevent shingles in adults 50 years old and over. This vaccine is not used to treat shingles or nerve pain from shingles.  This medicine may be used for other purposes; ask your health care provider or pharmacist if you have questions.  COMMON BRAND NAME(S): SHINGRIX  What should I tell my health care provider before I take this medicine?  They need to know if you have any of these conditions:  -blood disorders or disease  -cancer like leukemia or lymphoma  -immune system problems or therapy  -an unusual or allergic reaction to vaccines,  other medications, foods, dyes, or preservatives  -pregnant or trying to get pregnant  -breast-feeding  How should I use this medicine?  This vaccine is for injection in a muscle. It is given by a health care professional.  Talk to your pediatrician regarding the use of this medicine in children. This medicine is not approved for use in children.  Overdosage: If you think you have taken too much of this medicine contact a poison control center or emergency room at once.  NOTE: This medicine is only for you. Do not share this medicine with others.  What if I miss a dose?  Keep appointments for follow-up (booster) doses as directed. It is important not to miss your dose. Call your doctor or health care professional if you are unable to keep an appointment.  What may interact with this medicine?  -medicines that suppress your immune system  -medicines to treat cancer  -steroid medicines like prednisone or cortisone  This list may not describe all possible interactions. Give your health care provider a list of all the medicines, herbs, non-prescription drugs, or dietary supplements you use. Also tell them if you smoke, drink alcohol, or use illegal drugs. Some items may interact with your medicine.  What should I watch for while using this medicine?  Visit your doctor for regular check ups.  This vaccine, like all vaccines, may not fully protect everyone.  What side effects may I notice from receiving this medicine?  Side effects that you should report to your doctor or health care professional as soon as possible:  -allergic reactions like skin rash, itching or hives, swelling of the face, lips, or tongue  -breathing problems  Side effects that usually do not require medical attention (report these to your doctor or health care professional if they continue or are bothersome):  -chills  -headache  -fever  -nausea, vomiting  -redness, warmth, pain, swelling or itching at site where injected  -tiredness  This list may  not describe all possible side effects. Call your doctor for medical advice about side effects. You may report side effects to FDA at 5-161-LVS-4586.  Where should I keep my medicine?  This vaccine is only given in a clinic, pharmacy, doctor's office, or other health care setting and will not be stored at home.  NOTE: This sheet is a summary. It may not cover all possible information. If you have questions about this medicine, talk to your doctor, pharmacist, or health care provider.  © 2019 Else[employer]/Gold Standard (2018-07-30 13:20:30)     [facility]

## 2020-10-18 NOTE — ED ADULT TRIAGE NOTE - WEIGHT IN LBS
[facility] Subsequent Visit Note    Date of Service: 2/14/2019  Location seen at: [place] [address] [address]  Subacute / Skilled Need:  Rehabilitation    PCP: Juan Kearney MD   Patient Care Team:  Juan Kearney MD as PCP - General (Internal Medicine)  ATTENDING SNF PHYSICIAN: Dr Retana    History of Present Illness: Ashanti Lane is a 89 year old female: Today patient is up and about sitting in chair at bedside.  Ambulating 125 feet + with a 2 wheel walker and contact-guard assist; contact-guard to standby assist for all ADLs.  Patient states she is anxious to get back to her apartment at [facility].  Multiple bruises on bilateral lower extremities resolving.  Rib pain slowly resolving.  Continue to monitor      ALLERGIES:  ALLERGIES:   Allergen Reactions   • Codeine Other (See Comments)     unknown   • Penicillins Other (See Comments)     unknown   • Sulfa Antibiotics Other (See Comments)     unknown   • Sulfonylureas Other (See Comments)       HOME MEDICATIONS:   Current Outpatient Medications   Medication Sig Dispense Refill   • hydrOXYzine (ATARAX) 25 MG tablet Take 25 mg by mouth every 6 hours as needed.     • oxyCODONE-acetaminophen (PERCOCET) 5-325 MG per tablet Take 1 tablet by mouth every 6 hours as needed for Pain.     • aspirin 81 MG chewable tablet Chew 81 mg by mouth daily.     • bisacodyl (DULCOLAX) 10 MG suppository Place 10 mg rectally. Every 72 hours prn     • lacosamide (VIMPAT) 100 MG Tab Take 100 mg by mouth 2 times daily.     • levothyroxine (SYNTHROID, LEVOTHROID) 100 MCG tablet Take 100 mcg by mouth daily.     • metoPROLOL succinate (TOPROL-XL) 50 MG 24 hr tablet Take 50 mg by mouth daily.     • omeprazole (PRILOSEC) 20 MG capsule Take 20 mg by mouth daily.     • polyethylene glycol (GLYCOLAX, MIRALAX) packet Take 17 g by mouth daily.       No current facility-administered medications for this visit.      SNF MEDICATIONS: Same as above;  no new orders this visit      REVIEW OF SYSTEMS:  Review of Systems   Constitutional: Negative for chills, fatigue and fever.   Respiratory: Negative for cough, shortness of breath and wheezing.    Cardiovascular: Negative for chest pain and leg swelling.   Gastrointestinal: Negative for constipation, diarrhea, nausea and vomiting.   Genitourinary: Negative for dysuria, frequency and urgency.   Musculoskeletal: Negative for gait problem.   Skin: Negative for color change and rash.   Neurological: Negative for dizziness, syncope, weakness, light-headedness and headaches.   Psychiatric/Behavioral: Negative for agitation, behavioral problems and confusion.         PHYSICAL ASSESSMENT:  Physical Exam   Constitutional: She is oriented to person, place, and time. She appears well-developed and well-nourished.   Cardiovascular: Normal rate and regular rhythm.   Pulmonary/Chest: Effort normal and breath sounds normal.   Abdominal: Soft. Bowel sounds are normal.   Neurological: She is alert and oriented to person, place, and time.   Skin: Skin is warm and dry.   Psychiatric: She has a normal mood and affect. Her behavior is normal.   Vitals reviewed.      VITALS:  Visit Vitals  /65 ([place] [facility] [place])   Pulse 66   Temp 97.9 °F (36.6 °C) (Temporal)   Resp 18   Wt 59.6 kg (131 lb 4.8 oz)   SpO2 99% Comment: RA   BMI 25.64 kg/m²       Pain Location 2-3/10 ribs    LABS:  Labs drawn 2/11/2019 reviewed: WBC 6.0, RBC 3.90, hemoglobin 11.7, hematocrit 35.4, neutrophils 58.4, glucose 78, sodium 142, potassium 4.1, chloride 102, CO2 27, BUN 11, creatinine 0.8, GFR 66, total protein 6.9, albumin 4.3, phosphorus 3.3, magnesium 2.2    ASSESSMENT AND PLAN  1. Closed fracture of multiple ribs with flail chest with routine healing  2. Multiple bruises  Ambulating 125 feet + with a 2 wheel walker and contact-guard assist; contact-guard to standby assist for all ADLs.  Patient states she is anxious to get back  to her apartment at [facility].  Multiple bruises on bilateral lower extremities resolving.  Rib pain slowly resolving.  Continue to monitor        Total time spent is more than 20 minutes, with more than 50% of the time spent in coordination of care, counseling, review of records and discussion of plan of care with the patient /staff /family.    Priti Nuno, CNP   145

## 2020-10-18 NOTE — H&P ADULT - PROBLEM SELECTOR PLAN 1
- CXR pre-jordan similar to prior, possible mildly worse patchiness, f/u official reports  - c/w Zosyn  - add Vanco  - f/u blood cultures  - BP borderline however pt w/ faint rales on exam, hold further fluids for now, will given Albumin - CXR pre-jordan similar to prior, possible mildly worse patchiness, f/u official reports  - c/w Zosyn  - add Vanco  - f/u blood cultures  - BP borderline however pt w/ faint rales on exam, hold further fluids for now, will given Albumin  - pt discharged on Prednisone taper, will give 1 dose of Hydrocortef as well

## 2020-10-18 NOTE — H&P ADULT - PROBLEM SELECTOR PROBLEM 1
Sepsis with acute renal failure, due to unspecified organism, unspecified acute renal failure type, unspecified whether septic shock present

## 2020-10-18 NOTE — ED PROVIDER NOTE - CLINICAL SUMMARY MEDICAL DECISION MAKING FREE TEXT BOX
elderly female presents with fever. code sepsis initiated. will check labs, xray, covid status, and likely admit because of risk factors and pt's age.

## 2020-10-19 DIAGNOSIS — I48.91 UNSPECIFIED ATRIAL FIBRILLATION: ICD-10-CM

## 2020-10-19 DIAGNOSIS — A41.9 SEPSIS, UNSPECIFIED ORGANISM: ICD-10-CM

## 2020-10-19 DIAGNOSIS — E03.9 HYPOTHYROIDISM, UNSPECIFIED: ICD-10-CM

## 2020-10-19 DIAGNOSIS — Z29.9 ENCOUNTER FOR PROPHYLACTIC MEASURES, UNSPECIFIED: ICD-10-CM

## 2020-10-19 DIAGNOSIS — I10 ESSENTIAL (PRIMARY) HYPERTENSION: ICD-10-CM

## 2020-10-19 DIAGNOSIS — N17.9 ACUTE KIDNEY FAILURE, UNSPECIFIED: ICD-10-CM

## 2020-10-19 LAB
ALBUMIN SERPL ELPH-MCNC: 2.9 G/DL — LOW (ref 3.3–5)
ALP SERPL-CCNC: 48 U/L — SIGNIFICANT CHANGE UP (ref 40–120)
ALT FLD-CCNC: 21 U/L — SIGNIFICANT CHANGE UP (ref 12–78)
ANION GAP SERPL CALC-SCNC: 8 MMOL/L — SIGNIFICANT CHANGE UP (ref 5–17)
ANISOCYTOSIS BLD QL: SLIGHT — SIGNIFICANT CHANGE UP
AST SERPL-CCNC: 18 U/L — SIGNIFICANT CHANGE UP (ref 15–37)
B PERT DNA SPEC QL NAA+PROBE: SIGNIFICANT CHANGE UP
BASOPHILS # BLD AUTO: 0.02 K/UL — SIGNIFICANT CHANGE UP (ref 0–0.2)
BASOPHILS # BLD AUTO: 0.24 K/UL — HIGH (ref 0–0.2)
BASOPHILS NFR BLD AUTO: 0.2 % — SIGNIFICANT CHANGE UP (ref 0–2)
BASOPHILS NFR BLD AUTO: 2 % — SIGNIFICANT CHANGE UP (ref 0–2)
BILIRUB SERPL-MCNC: 1 MG/DL — SIGNIFICANT CHANGE UP (ref 0.2–1.2)
BUN SERPL-MCNC: 63 MG/DL — HIGH (ref 7–23)
C PNEUM DNA SPEC QL NAA+PROBE: SIGNIFICANT CHANGE UP
CALCIUM SERPL-MCNC: 7.7 MG/DL — LOW (ref 8.5–10.1)
CHLORIDE SERPL-SCNC: 102 MMOL/L — SIGNIFICANT CHANGE UP (ref 96–108)
CO2 SERPL-SCNC: 27 MMOL/L — SIGNIFICANT CHANGE UP (ref 22–31)
CREAT SERPL-MCNC: 1.28 MG/DL — SIGNIFICANT CHANGE UP (ref 0.5–1.3)
EOSINOPHIL # BLD AUTO: 0.16 K/UL — SIGNIFICANT CHANGE UP (ref 0–0.5)
EOSINOPHIL # BLD AUTO: 0.48 K/UL — SIGNIFICANT CHANGE UP (ref 0–0.5)
EOSINOPHIL NFR BLD AUTO: 1.6 % — SIGNIFICANT CHANGE UP (ref 0–6)
EOSINOPHIL NFR BLD AUTO: 4 % — SIGNIFICANT CHANGE UP (ref 0–6)
FLUAV H1 2009 PAND RNA SPEC QL NAA+PROBE: SIGNIFICANT CHANGE UP
FLUAV H1 RNA SPEC QL NAA+PROBE: SIGNIFICANT CHANGE UP
FLUAV H3 RNA SPEC QL NAA+PROBE: SIGNIFICANT CHANGE UP
FLUAV SUBTYP SPEC NAA+PROBE: SIGNIFICANT CHANGE UP
FLUBV RNA SPEC QL NAA+PROBE: SIGNIFICANT CHANGE UP
GLUCOSE SERPL-MCNC: 107 MG/DL — HIGH (ref 70–99)
HADV DNA SPEC QL NAA+PROBE: SIGNIFICANT CHANGE UP
HCOV PNL SPEC NAA+PROBE: SIGNIFICANT CHANGE UP
HCT VFR BLD CALC: 26 % — LOW (ref 34.5–45)
HGB BLD-MCNC: 8.7 G/DL — LOW (ref 11.5–15.5)
HMPV RNA SPEC QL NAA+PROBE: SIGNIFICANT CHANGE UP
HPIV1 RNA SPEC QL NAA+PROBE: SIGNIFICANT CHANGE UP
HPIV2 RNA SPEC QL NAA+PROBE: SIGNIFICANT CHANGE UP
HPIV3 RNA SPEC QL NAA+PROBE: SIGNIFICANT CHANGE UP
HPIV4 RNA SPEC QL NAA+PROBE: SIGNIFICANT CHANGE UP
HYPOCHROMIA BLD QL: SLIGHT — SIGNIFICANT CHANGE UP
IMM GRANULOCYTES NFR BLD AUTO: 0.3 % — SIGNIFICANT CHANGE UP (ref 0–1.5)
INR BLD: 5.35 RATIO — CRITICAL HIGH (ref 0.88–1.16)
LYMPHOCYTES # BLD AUTO: 1.6 K/UL — SIGNIFICANT CHANGE UP (ref 1–3.3)
LYMPHOCYTES # BLD AUTO: 1.68 K/UL — SIGNIFICANT CHANGE UP (ref 1–3.3)
LYMPHOCYTES # BLD AUTO: 14 % — SIGNIFICANT CHANGE UP (ref 13–44)
LYMPHOCYTES # BLD AUTO: 16.2 % — SIGNIFICANT CHANGE UP (ref 13–44)
MACROCYTES BLD QL: SLIGHT — SIGNIFICANT CHANGE UP
MAGNESIUM SERPL-MCNC: 2.4 MG/DL — SIGNIFICANT CHANGE UP (ref 1.6–2.6)
MANUAL SMEAR VERIFICATION: SIGNIFICANT CHANGE UP
MCHC RBC-ENTMCNC: 33.5 GM/DL — SIGNIFICANT CHANGE UP (ref 32–36)
MCHC RBC-ENTMCNC: 34.1 PG — HIGH (ref 27–34)
MCV RBC AUTO: 102 FL — HIGH (ref 80–100)
MONOCYTES # BLD AUTO: 0.96 K/UL — HIGH (ref 0–0.9)
MONOCYTES # BLD AUTO: 1.03 K/UL — HIGH (ref 0–0.9)
MONOCYTES NFR BLD AUTO: 10.4 % — SIGNIFICANT CHANGE UP (ref 2–14)
MONOCYTES NFR BLD AUTO: 8 % — SIGNIFICANT CHANGE UP (ref 2–14)
NEUTROPHILS # BLD AUTO: 7.06 K/UL — SIGNIFICANT CHANGE UP (ref 1.8–7.4)
NEUTROPHILS # BLD AUTO: 8.66 K/UL — HIGH (ref 1.8–7.4)
NEUTROPHILS NFR BLD AUTO: 71.3 % — SIGNIFICANT CHANGE UP (ref 43–77)
NEUTROPHILS NFR BLD AUTO: 72 % — SIGNIFICANT CHANGE UP (ref 43–77)
NRBC # BLD: 0 /100 WBCS — SIGNIFICANT CHANGE UP (ref 0–0)
NRBC # BLD: 0 /100 — SIGNIFICANT CHANGE UP (ref 0–0)
NRBC # BLD: SIGNIFICANT CHANGE UP /100 WBCS (ref 0–0)
NT-PROBNP SERPL-SCNC: 1373 PG/ML — HIGH (ref 0–450)
OVALOCYTES BLD QL SMEAR: SLIGHT — SIGNIFICANT CHANGE UP
PHOSPHATE SERPL-MCNC: 3.2 MG/DL — SIGNIFICANT CHANGE UP (ref 2.5–4.5)
PLAT MORPH BLD: NORMAL — SIGNIFICANT CHANGE UP
PLATELET # BLD AUTO: 106 K/UL — LOW (ref 150–400)
PLATELET # BLD AUTO: 122 K/UL — LOW (ref 150–400)
POIKILOCYTOSIS BLD QL AUTO: SLIGHT — SIGNIFICANT CHANGE UP
POTASSIUM SERPL-MCNC: 3.6 MMOL/L — SIGNIFICANT CHANGE UP (ref 3.5–5.3)
POTASSIUM SERPL-SCNC: 3.6 MMOL/L — SIGNIFICANT CHANGE UP (ref 3.5–5.3)
PROCALCITONIN SERPL-MCNC: 0.25 NG/ML — HIGH (ref 0.02–0.1)
PROT SERPL-MCNC: 6.1 GM/DL — SIGNIFICANT CHANGE UP (ref 6–8.3)
PROTHROM AB SERPL-ACNC: 57.3 SEC — HIGH (ref 10.6–13.6)
RAPID RVP RESULT: SIGNIFICANT CHANGE UP
RBC # BLD: 2.55 M/UL — LOW (ref 3.8–5.2)
RBC # FLD: 15.2 % — HIGH (ref 10.3–14.5)
RBC BLD AUTO: ABNORMAL
RV+EV RNA SPEC QL NAA+PROBE: SIGNIFICANT CHANGE UP
SARS-COV-2 IGG SERPL QL IA: NEGATIVE — SIGNIFICANT CHANGE UP
SARS-COV-2 IGM SERPL IA-ACNC: <0.1 INDEX — SIGNIFICANT CHANGE UP
SODIUM SERPL-SCNC: 137 MMOL/L — SIGNIFICANT CHANGE UP (ref 135–145)
VANCOMYCIN TROUGH SERPL-MCNC: 15.8 UG/ML — SIGNIFICANT CHANGE UP (ref 10–20)
WBC # BLD: 12.03 K/UL — HIGH (ref 3.8–10.5)
WBC # BLD: 9.68 K/UL — SIGNIFICANT CHANGE UP (ref 3.8–10.5)
WBC # FLD AUTO: 12.03 K/UL — HIGH (ref 3.8–10.5)
WBC # FLD AUTO: 9.68 K/UL — SIGNIFICANT CHANGE UP (ref 3.8–10.5)

## 2020-10-19 PROCEDURE — 99232 SBSQ HOSP IP/OBS MODERATE 35: CPT

## 2020-10-19 RX ORDER — PIPERACILLIN AND TAZOBACTAM 4; .5 G/20ML; G/20ML
3.38 INJECTION, POWDER, LYOPHILIZED, FOR SOLUTION INTRAVENOUS EVERY 8 HOURS
Refills: 0 | Status: DISCONTINUED | OUTPATIENT
Start: 2020-10-19 | End: 2020-10-22

## 2020-10-19 RX ORDER — PANTOPRAZOLE SODIUM 20 MG/1
40 TABLET, DELAYED RELEASE ORAL
Refills: 0 | Status: DISCONTINUED | OUTPATIENT
Start: 2020-10-19 | End: 2020-10-24

## 2020-10-19 RX ORDER — MECLIZINE HCL 12.5 MG
12.5 TABLET ORAL EVERY 6 HOURS
Refills: 0 | Status: DISCONTINUED | OUTPATIENT
Start: 2020-10-19 | End: 2020-10-24

## 2020-10-19 RX ORDER — ALBUMIN HUMAN 25 %
50 VIAL (ML) INTRAVENOUS
Refills: 0 | Status: COMPLETED | OUTPATIENT
Start: 2020-10-19 | End: 2020-10-19

## 2020-10-19 RX ORDER — ALBUTEROL 90 UG/1
2 AEROSOL, METERED ORAL EVERY 6 HOURS
Refills: 0 | Status: DISCONTINUED | OUTPATIENT
Start: 2020-10-19 | End: 2020-10-24

## 2020-10-19 RX ORDER — LEVOTHYROXINE SODIUM 125 MCG
50 TABLET ORAL
Qty: 0 | Refills: 0 | DISCHARGE

## 2020-10-19 RX ORDER — HYDROCORTISONE 20 MG
100 TABLET ORAL ONCE
Refills: 0 | Status: COMPLETED | OUTPATIENT
Start: 2020-10-19 | End: 2020-10-19

## 2020-10-19 RX ORDER — ERGOCALCIFEROL 1.25 MG/1
1 CAPSULE ORAL
Qty: 0 | Refills: 0 | DISCHARGE

## 2020-10-19 RX ORDER — LEVOTHYROXINE SODIUM 125 MCG
50 TABLET ORAL DAILY
Refills: 0 | Status: DISCONTINUED | OUTPATIENT
Start: 2020-10-19 | End: 2020-10-24

## 2020-10-19 RX ORDER — ALPRAZOLAM 0.25 MG
0.25 TABLET ORAL AT BEDTIME
Refills: 0 | Status: DISCONTINUED | OUTPATIENT
Start: 2020-10-19 | End: 2020-10-24

## 2020-10-19 RX ORDER — VANCOMYCIN HCL 1 G
750 VIAL (EA) INTRAVENOUS EVERY 24 HOURS
Refills: 0 | Status: DISCONTINUED | OUTPATIENT
Start: 2020-10-19 | End: 2020-10-19

## 2020-10-19 RX ORDER — PANTOPRAZOLE SODIUM 20 MG/1
1 TABLET, DELAYED RELEASE ORAL
Qty: 0 | Refills: 0 | DISCHARGE

## 2020-10-19 RX ORDER — DILTIAZEM HCL 120 MG
360 CAPSULE, EXT RELEASE 24 HR ORAL DAILY
Refills: 0 | Status: DISCONTINUED | OUTPATIENT
Start: 2020-10-19 | End: 2020-10-24

## 2020-10-19 RX ORDER — LEVOTHYROXINE SODIUM 125 MCG
1 TABLET ORAL
Qty: 0 | Refills: 0 | DISCHARGE

## 2020-10-19 RX ADMIN — Medication 50 MICROGRAM(S): at 05:28

## 2020-10-19 RX ADMIN — Medication 50 MILLILITER(S): at 03:15

## 2020-10-19 RX ADMIN — Medication 100 MILLIGRAM(S): at 03:47

## 2020-10-19 RX ADMIN — PIPERACILLIN AND TAZOBACTAM 25 GRAM(S): 4; .5 INJECTION, POWDER, LYOPHILIZED, FOR SOLUTION INTRAVENOUS at 13:51

## 2020-10-19 RX ADMIN — Medication 50 MILLILITER(S): at 04:07

## 2020-10-19 RX ADMIN — Medication 20 MILLIGRAM(S): at 22:13

## 2020-10-19 RX ADMIN — Medication 250 MILLIGRAM(S): at 01:34

## 2020-10-19 RX ADMIN — PIPERACILLIN AND TAZOBACTAM 25 GRAM(S): 4; .5 INJECTION, POWDER, LYOPHILIZED, FOR SOLUTION INTRAVENOUS at 22:13

## 2020-10-19 RX ADMIN — Medication 20 MILLIGRAM(S): at 05:29

## 2020-10-19 RX ADMIN — Medication 20 MILLIGRAM(S): at 14:18

## 2020-10-19 RX ADMIN — Medication 650 MILLIGRAM(S): at 16:50

## 2020-10-19 RX ADMIN — Medication 0.25 MILLIGRAM(S): at 22:13

## 2020-10-19 RX ADMIN — PANTOPRAZOLE SODIUM 40 MILLIGRAM(S): 20 TABLET, DELAYED RELEASE ORAL at 05:28

## 2020-10-19 RX ADMIN — PIPERACILLIN AND TAZOBACTAM 25 GRAM(S): 4; .5 INJECTION, POWDER, LYOPHILIZED, FOR SOLUTION INTRAVENOUS at 07:23

## 2020-10-19 NOTE — ED ADULT NURSE REASSESSMENT NOTE - NS ED NURSE REASSESS COMMENT FT1
Safety checks compld + maintd. T+P Q encouraged. IV sites checked Q2+remains WDL. meds given as ord with no s/s of adverse RXNs. skin care and complete care rendered. Fall +skin precs in place. Call bell within reach and safety measures in place.

## 2020-10-19 NOTE — ED ADULT NURSE REASSESSMENT NOTE - NS ED NURSE REASSESS COMMENT FT1
No change in vitals noted. Safety checks compld + maintd. T+P Q encouraged. IV sites checked Q2+remains WDL.  skin care and complete care rendered. Fall +skin precs in place. Call bell within reach and safety measures in place.

## 2020-10-19 NOTE — PROGRESS NOTE ADULT - ASSESSMENT
89 yo female with history of HTN, COPD on O2, Chronic diastolic CHF, Severe Pulmonary HTN, Hypothyroidism, Atrial fibrillation on Coumadin, recent admission in July for COPD exacerbation and, B/L PNA and again on Sept 30 - Oct 4th for acute on chronic CHF as well as Atrial fib w/ RVR presented w/ fever and nonproductive cough.     Fever  - unclear source as procal, CXR and UA are negative  - c/w Zosyn and hold Vanco  - f/u blood cultures     MARIUSZ on CKD III  - likely prerenal  - resolved w/ IVF  - patient given 1L NS in ED, will hold off on giving any more given history of CHF    Essential hypertension  - hold home losartan due to MARIUSZ    Hypothyroidism  - c/w Synthroid    Atrial fibrillation  - c/w Cardizem w/ hold parameters  - Coumadin on hold given supratherapeutic INR    Pulmonary HTN  - c/w Revatio    Prophylaxis:   DVT: Coumadin on hold given high INR  GI: Protonix

## 2020-10-19 NOTE — PROGRESS NOTE ADULT - SUBJECTIVE AND OBJECTIVE BOX
89 yo female with history of HTN, COPD on O2, Chronic diastolic CHF, Severe Pulmonary HTN, Hypothyroidism, Atrial fibrillation on Coumadin, recent admission in July for COPD exacerbation and, B/L PNA and again on  - Oct 4th for acute on chronic CHF as well as Atrial fib w/ RVR presented w/ fever and nonproductive cough. She is lying in bed in NAD.    MEDICATIONS  (STANDING):  diltiazem    milliGRAM(s) Oral daily  levothyroxine 50 MICROGram(s) Oral daily  pantoprazole    Tablet 40 milliGRAM(s) Oral before breakfast  piperacillin/tazobactam IVPB.. 3.375 Gram(s) IV Intermittent every 8 hours  sildenafil (REVATIO) 20 milliGRAM(s) Oral every 8 hours  vancomycin  IVPB 750 milliGRAM(s) IV Intermittent every 24 hours    MEDICATIONS  (PRN):  acetaminophen   Tablet .. 650 milliGRAM(s) Oral every 6 hours PRN Temp greater or equal to 38C (100.4F), Mild Pain (1 - 3)  ALBUTerol    90 MICROgram(s) HFA Inhaler 2 Puff(s) Inhalation every 6 hours PRN Shortness of Breath and/or Wheezing  ALPRAZolam 0.25 milliGRAM(s) Oral at bedtime PRN anxiety  meclizine 12.5 milliGRAM(s) Oral every 6 hours PRN Dizziness  ondansetron Injectable 4 milliGRAM(s) IV Push every 6 hours PRN Nausea and/or Vomiting      Allergies    No Known Allergies       Vital Signs Last 24 Hrs  T(C): 36.7 (19 Oct 2020 17:30), Max: 37.2 (18 Oct 2020 22:30)  T(F): 98 (19 Oct 2020 17:30), Max: 99 (18 Oct 2020 22:30)  HR: 77 (19 Oct 2020 17:30) (46 - 77)  BP: 147/78 (19 Oct 2020 17:30) (87/39 - 147/78)  BP(mean): --  RR: 17 (19 Oct 2020 17:30) (15 - 29)  SpO2: 96% (19 Oct 2020 17:30) (95% - 100%)    PHYSICAL EXAM:  GENERAL: NAD, well-groomed, well-developed  HEAD:  Atraumatic, Normocephalic  EYES: EOMI, PERRLA   NECK: Supple   NERVOUS SYSTEM: Alert   CHEST/LUNG: Clear to auscultation bilaterally; No rales, rhonchi, wheezing, or rubs  HEART: Regular rate and rhythm; No murmurs, rubs, or gallops  ABDOMEN: Soft, Nontender, Nondistended; Bowel sounds present  EXTREMITIES: No clubbing, cyanosis, or edema       LABS:                        8.7    9.68  )-----------( 106      ( 19 Oct 2020 06:40 )             26.0     10    137  |  102  |  63<H>  ----------------------------<  107<H>  3.6   |  27  |  1.28    Ca    7.7<L>      19 Oct 2020 06:40  Phos  3.2     10-19  Mg     2.4     10-19    TPro  6.1  /  Alb  2.9<L>  /  TBili  1.0  /  DBili  x   /  AST  18  /  ALT  21  /  AlkPhos  48  10-    PT/INR - ( 19 Oct 2020 06:40 )   PT: 57.3 sec;   INR: 5.35 ratio         PTT - ( 18 Oct 2020 21:55 )  PTT:35.7 sec  Urinalysis Basic - ( 18 Oct 2020 21:59 )    Color: Yellow / Appearance: Clear / S.015 / pH: x  Gluc: x / Ketone: Negative  / Bili: Negative / Urobili: Negative mg/dL   Blood: x / Protein: Negative mg/dL / Nitrite: Negative   Leuk Esterase: Negative / RBC: x / WBC x   Sq Epi: x / Non Sq Epi: x / Bacteria: x             RADIOLOGY & ADDITIONAL TESTS:

## 2020-10-19 NOTE — ED ADULT NURSE REASSESSMENT NOTE - NS ED NURSE REASSESS COMMENT FT1
Received pt @ 130 pt is hypotensive, tachypneic, bradycardic and afebrile Dr. choudhury and Dr. Barrera informed at bedside. Covering Primary nurse Received pt @ 130 pt is hypotensive, tachypneic, bradycardic and afebrile, Rales present . Dr. choudhury and Dr. Barrera informed at bedside.

## 2020-10-20 LAB
-  COAGULASE NEGATIVE STAPHYLOCOCCUS: SIGNIFICANT CHANGE UP
ANION GAP SERPL CALC-SCNC: 7 MMOL/L — SIGNIFICANT CHANGE UP (ref 5–17)
BLD GP AB SCN SERPL QL: SIGNIFICANT CHANGE UP
BUN SERPL-MCNC: 48 MG/DL — HIGH (ref 7–23)
CALCIUM SERPL-MCNC: 8.7 MG/DL — SIGNIFICANT CHANGE UP (ref 8.5–10.1)
CHLORIDE SERPL-SCNC: 110 MMOL/L — HIGH (ref 96–108)
CO2 SERPL-SCNC: 28 MMOL/L — SIGNIFICANT CHANGE UP (ref 22–31)
CREAT SERPL-MCNC: 1.22 MG/DL — SIGNIFICANT CHANGE UP (ref 0.5–1.3)
CRP SERPL-MCNC: 6.68 MG/DL — HIGH (ref 0–0.4)
CULTURE RESULTS: NO GROWTH — SIGNIFICANT CHANGE UP
GLUCOSE SERPL-MCNC: 85 MG/DL — SIGNIFICANT CHANGE UP (ref 70–99)
HCT VFR BLD CALC: 29.4 % — LOW (ref 34.5–45)
HGB BLD-MCNC: 9.7 G/DL — LOW (ref 11.5–15.5)
INR BLD: 4.64 RATIO — HIGH (ref 0.88–1.16)
MAGNESIUM SERPL-MCNC: 2.3 MG/DL — SIGNIFICANT CHANGE UP (ref 1.6–2.6)
MCHC RBC-ENTMCNC: 33 GM/DL — SIGNIFICANT CHANGE UP (ref 32–36)
MCHC RBC-ENTMCNC: 33.8 PG — SIGNIFICANT CHANGE UP (ref 27–34)
MCV RBC AUTO: 102.4 FL — HIGH (ref 80–100)
METHOD TYPE: SIGNIFICANT CHANGE UP
NRBC # BLD: 0 /100 WBCS — SIGNIFICANT CHANGE UP (ref 0–0)
PHOSPHATE SERPL-MCNC: 2.8 MG/DL — SIGNIFICANT CHANGE UP (ref 2.5–4.5)
PLATELET # BLD AUTO: 118 K/UL — LOW (ref 150–400)
POTASSIUM SERPL-MCNC: 3.3 MMOL/L — LOW (ref 3.5–5.3)
POTASSIUM SERPL-SCNC: 3.3 MMOL/L — LOW (ref 3.5–5.3)
PROTHROM AB SERPL-ACNC: 50 SEC — HIGH (ref 10.6–13.6)
RBC # BLD: 2.87 M/UL — LOW (ref 3.8–5.2)
RBC # FLD: 14.6 % — HIGH (ref 10.3–14.5)
SODIUM SERPL-SCNC: 145 MMOL/L — SIGNIFICANT CHANGE UP (ref 135–145)
SPECIMEN SOURCE: SIGNIFICANT CHANGE UP
T4 FREE SERPL-MCNC: 1 NG/DL — SIGNIFICANT CHANGE UP (ref 0.9–1.8)
TSH SERPL-MCNC: 3.99 UU/ML — HIGH (ref 0.36–3.74)
VANCOMYCIN FLD-MCNC: 8.4 UG/ML — SIGNIFICANT CHANGE UP
WBC # BLD: 8.74 K/UL — SIGNIFICANT CHANGE UP (ref 3.8–10.5)
WBC # FLD AUTO: 8.74 K/UL — SIGNIFICANT CHANGE UP (ref 3.8–10.5)

## 2020-10-20 PROCEDURE — 99232 SBSQ HOSP IP/OBS MODERATE 35: CPT

## 2020-10-20 PROCEDURE — 99223 1ST HOSP IP/OBS HIGH 75: CPT

## 2020-10-20 RX ORDER — VANCOMYCIN HCL 1 G
750 VIAL (EA) INTRAVENOUS EVERY 24 HOURS
Refills: 0 | Status: DISCONTINUED | OUTPATIENT
Start: 2020-10-20 | End: 2020-10-22

## 2020-10-20 RX ORDER — ALPRAZOLAM 0.25 MG
0.25 TABLET ORAL ONCE
Refills: 0 | Status: DISCONTINUED | OUTPATIENT
Start: 2020-10-20 | End: 2020-10-20

## 2020-10-20 RX ORDER — POTASSIUM CHLORIDE 20 MEQ
40 PACKET (EA) ORAL ONCE
Refills: 0 | Status: COMPLETED | OUTPATIENT
Start: 2020-10-20 | End: 2020-10-20

## 2020-10-20 RX ADMIN — PIPERACILLIN AND TAZOBACTAM 25 GRAM(S): 4; .5 INJECTION, POWDER, LYOPHILIZED, FOR SOLUTION INTRAVENOUS at 16:02

## 2020-10-20 RX ADMIN — Medication 50 MICROGRAM(S): at 06:02

## 2020-10-20 RX ADMIN — Medication 20 MILLIGRAM(S): at 21:33

## 2020-10-20 RX ADMIN — PIPERACILLIN AND TAZOBACTAM 25 GRAM(S): 4; .5 INJECTION, POWDER, LYOPHILIZED, FOR SOLUTION INTRAVENOUS at 06:04

## 2020-10-20 RX ADMIN — ALBUTEROL 2 PUFF(S): 90 AEROSOL, METERED ORAL at 19:03

## 2020-10-20 RX ADMIN — Medication 0.25 MILLIGRAM(S): at 19:23

## 2020-10-20 RX ADMIN — PIPERACILLIN AND TAZOBACTAM 25 GRAM(S): 4; .5 INJECTION, POWDER, LYOPHILIZED, FOR SOLUTION INTRAVENOUS at 23:01

## 2020-10-20 RX ADMIN — PANTOPRAZOLE SODIUM 40 MILLIGRAM(S): 20 TABLET, DELAYED RELEASE ORAL at 06:02

## 2020-10-20 RX ADMIN — Medication 20 MILLIGRAM(S): at 16:03

## 2020-10-20 RX ADMIN — Medication 0.25 MILLIGRAM(S): at 23:01

## 2020-10-20 RX ADMIN — Medication 360 MILLIGRAM(S): at 06:02

## 2020-10-20 RX ADMIN — Medication 250 MILLIGRAM(S): at 21:34

## 2020-10-20 RX ADMIN — Medication 40 MILLIEQUIVALENT(S): at 16:10

## 2020-10-20 RX ADMIN — Medication 20 MILLIGRAM(S): at 06:02

## 2020-10-20 NOTE — PROGRESS NOTE ADULT - SUBJECTIVE AND OBJECTIVE BOX
89 yo female with history of HTN, COPD on O2, Chronic diastolic CHF, Severe Pulmonary HTN, Hypothyroidism, Atrial fibrillation on Coumadin, recent admission in July for COPD exacerbation and, B/L PNA and again on  - Oct 4th for acute on chronic CHF as well as Atrial fib w/ RVR presented w/ fever and nonproductive cough. She is lying in bed in NAD.     MEDICATIONS  (STANDING):  diltiazem    milliGRAM(s) Oral daily  levothyroxine 50 MICROGram(s) Oral daily  pantoprazole    Tablet 40 milliGRAM(s) Oral before breakfast  piperacillin/tazobactam IVPB.. 3.375 Gram(s) IV Intermittent every 8 hours  sildenafil (REVATIO) 20 milliGRAM(s) Oral every 8 hours  vancomycin  IVPB 750 milliGRAM(s) IV Intermittent every 24 hours    MEDICATIONS  (PRN):  acetaminophen   Tablet .. 650 milliGRAM(s) Oral every 6 hours PRN Temp greater or equal to 38C (100.4F), Mild Pain (1 - 3)  ALBUTerol    90 MICROgram(s) HFA Inhaler 2 Puff(s) Inhalation every 6 hours PRN Shortness of Breath and/or Wheezing  ALPRAZolam 0.25 milliGRAM(s) Oral at bedtime PRN anxiety  meclizine 12.5 milliGRAM(s) Oral every 6 hours PRN Dizziness  ondansetron Injectable 4 milliGRAM(s) IV Push every 6 hours PRN Nausea and/or Vomiting      Allergies    No Known Allergies       Vital Signs Last 24 Hrs  T(C): 36.7 (20 Oct 2020 15:21), Max: 37.1 (20 Oct 2020 00:12)  T(F): 98 (20 Oct 2020 15:21), Max: 98.7 (20 Oct 2020 00:12)  HR: 64 (20 Oct 2020 21:22) (64 - 82)  BP: 129/67 (20 Oct 2020 21:22) (119/90 - 152/68)  BP(mean): --  RR: 18 (20 Oct 2020 15:21) (17 - 18)  SpO2: 96% (20 Oct 2020 15:21) (92% - 96%)    PHYSICAL EXAM:  GENERAL: NAD, well-groomed, well-developed  HEAD:  Atraumatic, Normocephalic  EYES: EOMI, PERRLA   NECK: Supple   NERVOUS SYSTEM: Alert   CHEST/LUNG: Clear to auscultation bilaterally; No rales, rhonchi, wheezing, or rubs  HEART: Regular rate and rhythm; No murmurs, rubs, or gallops  ABDOMEN: Soft, Nontender, Nondistended; Bowel sounds present  EXTREMITIES: No clubbing, cyanosis, or edema    LABS:                        9.7    8.74  )-----------( 118      ( 20 Oct 2020 06:33 )             29.4     10-20    145  |  110<H>  |  48<H>  ----------------------------<  85  3.3<L>   |  28  |  1.22    Ca    8.7      20 Oct 2020 06:33  Phos  2.8     10-20  Mg     2.3     10-20    TPro  6.1  /  Alb  2.9<L>  /  TBili  1.0  /  DBili  x   /  AST  18  /  ALT  21  /  AlkPhos  48  10-19    PT/INR - ( 20 Oct 2020 06:33 )   PT: 50.0 sec;   INR: 4.64 ratio         PTT - ( 18 Oct 2020 21:55 )  PTT:35.7 sec  Urinalysis Basic - ( 18 Oct 2020 21:59 )    Color: Yellow / Appearance: Clear / S.015 / pH: x  Gluc: x / Ketone: Negative  / Bili: Negative / Urobili: Negative mg/dL   Blood: x / Protein: Negative mg/dL / Nitrite: Negative   Leuk Esterase: Negative / RBC: x / WBC x   Sq Epi: x / Non Sq Epi: x / Bacteria: x       Culture - Urine (collected 19 Oct 2020 09:24)  Source: .Urine Clean Catch (Midstream)  Final Report (20 Oct 2020 07:44):    No growth    Culture - Blood (collected 19 Oct 2020 09:18)  Source: .Blood Blood-Peripheral  Gram Stain (prelim) (20 Oct 2020 16:35):    Growth in anaerobic bottle: Gram Positive Cocci in Clusters  Preliminary Report (20 Oct 2020 16:36):    Growth in anaerobic bottle: Gram Positive Cocci in Clusters    Culture - Blood (collected 19 Oct 2020 09:18)  Source: .Blood Blood-Peripheral  Gram Stain (prelim) (20 Oct 2020 12:25):    Growth in anaerobic bottle: Gram Positive Cocci in Clusters  Preliminary Report (20 Oct 2020 12:26):    Growth in anaerobic bottle: Gram Positive Cocci in Clusters    "Due to technical problems, Proteus sp. will Not be reported as part of    the BCID panel until further notice"    ***Blood Panel PCR results on this specimen are available    approximately 3 hours after the Gram stain result.***    Gram stain, PCR, and/or culture results may not always    correspond due to difference in methodologies.    ************************************************************    This PCR assay was performed using [employer].    The following targets are tested for: Enterococcus,    vancomycin resistant enterococci, Listeria monocytogenes,    coagulase negative staphylococci, S. aureus,    methicillin resistant S. aureus, Streptococcus agalactiae    (Group B), S. pneumoniae, S. pyogenes (Group A),    Acinetobacter baumannii, Enterobacter cloacae, E. coli,    Klebsiella oxytoca, K. pneumoniae, Proteus sp.,    Serratia marcescens, Haemophilus influenzae,    Neisseria meningitidis, Pseudomonas aeruginosa, Candida    albicans, C. glabrata, C krusei, C parapsilosis,    C. tropicalis and the KPC resistance gene.  Organism: Blood Culture PCR (20 Oct 2020 13:43)  Organism: Blood Culture PCR (20 Oct 2020 13:43)      RADIOLOGY & ADDITIONAL TESTS:    10-19-20 @ 07:01  -  10-20-20 @ 07:00  --------------------------------------------------------  IN:    IV PiggyBack: 200 mL    Oral Fluid: 574 mL  Total IN: 774 mL    OUT:    Incontinent per Collection Bag (mL): 1600 mL  Total OUT: 1600 mL    Total NET: -826 mL

## 2020-10-20 NOTE — CONSULT NOTE ADULT - SUBJECTIVE AND OBJECTIVE BOX
[facility]  Division of Infectious Diseases  [phone]    FREDERICK MA  89y, Female  [phone]    HPI--  89 years old [place] speaking female, speaks some English, with PMH of HTN, COPD on home O2, CHF, severe pulmonary HTN, Hypothyroidism, Afib on coumadin, s/p recent hospitalizations, one in July 2020 with COPD exacerbation and bilateral pneumonia, and September 30- October 4th with acute CHF, Afib with RVR. As per H&P, the pt came to the ED with complains of fevers, non-productive cough, altered mental status. The patient spiked a fever of       PMH/PSH--  Essential hypertension    Hypothyroidism    Atrial fibrillation    COPD exacerbation    No significant past surgical history        Allergies--  No Known Allergies      Medications--  Antibiotics: piperacillin/tazobactam IVPB.. 3.375 Gram(s) IV Intermittent every 8 hours    Immunologic:   Other: acetaminophen   Tablet .. PRN  ALBUTerol    90 MICROgram(s) HFA Inhaler PRN  ALPRAZolam PRN  diltiazem   CD  levothyroxine  meclizine PRN  ondansetron Injectable PRN  pantoprazole    Tablet  potassium chloride   Powder  sildenafil (REVATIO)    Antimicrobials last 90 days per EMR: MEDICATIONS  (STANDING):  piperacillin/tazobactam IVPB.   200 mL/Hr IV Intermittent (10-18-20 @ 21:56)    piperacillin/tazobactam IVPB..   25 mL/Hr IV Intermittent (10-20-20 @ 06:04)   25 mL/Hr IV Intermittent (10-19-20 @ 22:13)   25 mL/Hr IV Intermittent (10-19-20 @ 13:51)   25 mL/Hr IV Intermittent (10-19-20 @ 07:23)    vancomycin  IVPB   250 mL/Hr IV Intermittent (10-19-20 @ 01:34)        Social History--  EtOH: denies   Tobacco: denies   Drug Use: denies     Family/Marital History--  No pertinent family history in first degree relatives          Travel/Environmental/Occupational History:      Review of Systems:  A >=10-point review of systems was obtained.     Pertinent positives and negatives--  Constitutional: No fevers. No Chills. No Rigors.   Eyes:  ENMT:  Cardiovascular: No chest pain. No palpitations.  Respiratory: No shortness of breath. No cough.  Gastrointestinal: No nausea or vomiting. No diarrhea or constipation.   Genitourinary:  Musculoskeletal:  Skin:  Neurologic:  Psychiatric: Pleasant. Appropriate affect.  Endocrine:  Heme/Lymphatic:  Allergy/Immunologic:    Review of systems otherwise negative except as previously noted.    Physical Exam--  Vital Signs: T(F): 98 (10-20-20 @ 15:21), Max: 98.7 (10-20-20 @ 00:12)  HR: 66 (10-20-20 @ 15:21)  BP: 119/90 (10-20-20 @ 15:21)  RR: 18 (10-20-20 @ 15:21)  SpO2: 96% (10-20-20 @ 15:21)  Wt(kg): --  General: Nontoxic-appearing Female in no acute distress.  HEENT: AT/NC. PERRL. EOMI. Anicteric. Conjunctiva pink and moist. Oropharynx clear. Dentition fair.  Neck: Not rigid. No sense of mass.  Nodes: None palpable.  Lungs: Clear bilaterally without rales, wheezing or rhonchi  Heart: Regular rate and rhythm. No Murmur. No rub. No gallop. No palpable thrill.  Abdomen: Bowel sounds present and normoactive. Soft. Nondistended. Nontender. No sense of mass. No organomegaly.  Back: No spinal tenderness. No costovertebral angle tenderness.   Extremities: No cyanosis or clubbing. No edema.   Skin: Warm. Dry. Good turgor. No rash. No vasculitic stigmata.  Psychiatric: Appropriate affect and mood for situation.         Laboratory & Imaging Data--  CBC                        9.7    8.74  )-----------( 118      ( 20 Oct 2020 06:33 )             29.4       Chemistries  10-20    145  |  110<H>  |  48<H>  ----------------------------<  85  3.3<L>   |  28  |  1.22    Ca    8.7      20 Oct 2020 06:33  Phos  2.8     10-20  Mg     2.3     10-20    TPro  6.1  /  Alb  2.9<L>  /  TBili  1.0  /  DBili  x   /  AST  18  /  ALT  21  /  AlkPhos  48  10-19      Culture Data    Culture - Urine (collected 19 Oct 2020 09:24)  Source: .Urine Clean Catch (Midstream)  Final Report (20 Oct 2020 07:44):    No growth    Culture - Blood (collected 19 Oct 2020 09:18)  Source: .Blood Blood-Peripheral  Preliminary Report (20 Oct 2020 10:01):    No growth to date.    Culture - Blood (collected 19 Oct 2020 09:18)  Source: .Blood Blood-Peripheral  Gram Stain (prelim) (20 Oct 2020 12:25):    Growth in anaerobic bottle: Gram Positive Cocci in Clusters  Preliminary Report (20 Oct 2020 12:26):    Growth in anaerobic bottle: Gram Positive Cocci in Clusters    "Due to technical problems, Proteus sp. will Not be reported as part of    the BCID panel until further notice"    ***Blood Panel PCR results on this specimen are available    approximately 3 hours after the Gram stain result.***    Gram stain, PCR, and/or culture results may not always    correspond due to difference in methodologies.    ************************************************************    This PCR assay was performed using [employer].    The following targets are tested for: Enterococcus,    vancomycin resistant enterococci, Listeria monocytogenes,    coagulase negative staphylococci, S. aureus,    methicillin resistant S. aureus, Streptococcus agalactiae    (Group B), S. pneumoniae, S. pyogenes (Group A),    Acinetobacter baumannii, Enterobacter cloacae, E. coli,    Klebsiella oxytoca, K. pneumoniae, Proteus sp.,    Serratia marcescens, Haemophilus influenzae,    Neisseria meningitidis, Pseudomonas aeruginosa, Candida    albicans, C. glabrata, C krusei, C parapsilosis,    C. tropicalis and the KPC resistance gene.  Organism: Blood Culture PCR (20 Oct 2020 13:43)  Organism: Blood Culture PCR (20 Oct 2020 13:43)      WBC Count: 8.74 K/uL (10-20 @ 06:33)  WBC Count: 9.68 K/uL (10-19 @ 06:40)  WBC Count: 12.03 K/uL (10-18 @ 21:55)     [facility]  Division of Infectious Diseases  [phone]    FREDERICK MA  89y, Female  [phone]    HPI--  89 years old Dutch speaking female, speaks some English, attempted to use  [id number], the patient was answering questions asked. As per pt's daughter, the pt speaks Dutch dialect and does not understand traditional Dutch, the pt is A&Ox 3 at the baseline.   The pt is with PMH of HTN, COPD on home O2, CHF, severe pulmonary HTN, Hypothyroidism, Afib on coumadin, s/p recent hospitalizations, one in July 2020 with COPD exacerbation and bilateral pneumonia, and September 30- October 4th with acute CHF, Afib with RVR. As per H&P, the pt came to the ED with complains of fevers, non-productive cough, altered mental status. Spoke with pt's daughter, reports that the patient was lethargic last Friday and Saturday, spiked a fever at home, temp unknown, had right index finger swelling, was seen by home care physician who recommended to go to the hospital due to fevers.   The patient spiked a fever 102 on the day of the admission, afebrile since then, + leukocytosis normalized, UA was negative, COVID 19 testing and RVP negative, blood culture 1/4 anaerobic bottle grew G+ cocci in clusters, coag negative staph, chest Xray is negative.  At the moment the pt is in bed, no distress, reports mild shortness of breath, no supplemental O2, no chest pain, no abdominal pain, no nausea or vomiting, no diarrhea, no dysuria.         PMH/PSH--  Essential hypertension    Hypothyroidism    Atrial fibrillation    COPD exacerbation    No significant past surgical history        Allergies--  No Known Allergies      Medications--  Antibiotics: piperacillin/tazobactam IVPB.. 3.375 Gram(s) IV Intermittent every 8 hours    Immunologic:   Other: acetaminophen   Tablet .. PRN  ALBUTerol    90 MICROgram(s) HFA Inhaler PRN  ALPRAZolam PRN  diltiazem   CD  levothyroxine  meclizine PRN  ondansetron Injectable PRN  pantoprazole    Tablet  potassium chloride   Powder  sildenafil (REVATIO)    Antimicrobials last 90 days per EMR: MEDICATIONS  (STANDING):  piperacillin/tazobactam IVPB.   200 mL/Hr IV Intermittent (10-18-20 @ 21:56)    piperacillin/tazobactam IVPB..   25 mL/Hr IV Intermittent (10-20-20 @ 06:04)   25 mL/Hr IV Intermittent (10-19-20 @ 22:13)   25 mL/Hr IV Intermittent (10-19-20 @ 13:51)   25 mL/Hr IV Intermittent (10-19-20 @ 07:23)    vancomycin  IVPB   250 mL/Hr IV Intermittent (10-19-20 @ 01:34)        Social History--  EtOH: denies   Tobacco: denies   Drug Use: denies     Family/Marital History--  No pertinent family history in first degree relatives          Travel/Environmental/Occupational History:      Review of Systems: limited     Pertinent positives and negatives--  Constitutional: fevers, No Chills. No Rigors.   Eyes:  ENMT:  Cardiovascular: No chest pain. No palpitations.  Respiratory: + shortness of breath. No cough.  Gastrointestinal: No nausea or vomiting. No diarrhea or constipation.   Genitourinary: no dysuria   Musculoskeletal:  Skin:  Neurologic:  Psychiatric: Pleasant. Appropriate affect.  Endocrine:  Heme/Lymphatic:  Allergy/Immunologic:    Review of systems otherwise negative except as previously noted.    Physical Exam--  Vital Signs: T(F): 98 (10-20-20 @ 15:21), Max: 98.7 (10-20-20 @ 00:12)  HR: 66 (10-20-20 @ 15:21)  BP: 119/90 (10-20-20 @ 15:21)  RR: 18 (10-20-20 @ 15:21)  SpO2: 96% (10-20-20 @ 15:21)  Wt(kg): --    General: Nontoxic-appearing Female in no acute distress.  HEENT: AT/NC. PERRL. EOMI. Anicteric. Conjunctiva pink and moist. Oropharynx clear. Dentition fair.  Neck: Not rigid. No sense of mass.  Nodes: None palpable.  Lungs: mild wheeze bilaterally, no rhonchi  Heart: Irregular, No Murmur. No rub. No gallop. No palpable thrill.  Abdomen: Bowel sounds present and normoactive. Soft. Nondistended. Nontender. No sense of mass. No organomegaly.  Back: No spinal tenderness. No costovertebral angle tenderness.   Extremities: No cyanosis or clubbing. No edema.   Skin: Warm. Dry. Fragile, no rash  Psychiatric: Appropriate affect and mood for situation, forgetful         Laboratory & Imaging Data--  CBC                        9.7    8.74  )-----------( 118      ( 20 Oct 2020 06:33 )             29.4     WBC Count: 8.74 K/uL (10-20 @ 06:33)  WBC Count: 9.68 K/uL (10-19 @ 06:40)  WBC Count: 12.03 K/uL (10-18 @ 21:55)    Chemistries  10-20    145  |  110<H>  |  48<H>  ----------------------------<  85  3.3<L>   |  28  |  1.22    Creatinine Trend: 1.24<--, 1.43<--, 1.47<--, 1.56<--    Ca    8.7      20 Oct 2020 06:33  Phos  2.8     10-20  Mg     2.3     10-20    TPro  6.1  /  Alb  2.9<L>  /  TBili  1.0  /  DBili  x   /  AST  18  /  ALT  21  /  AlkPhos  48  10-19      Culture Data    Culture - Urine (collected 19 Oct 2020 09:24)  Source: .Urine Clean Catch (Midstream)  Final Report (20 Oct 2020 07:44):    No growth    Culture - Blood (collected 19 Oct 2020 09:18)  Source: .Blood Blood-Peripheral  Preliminary Report (20 Oct 2020 10:01):    No growth to date.    Culture - Blood (collected 19 Oct 2020 09:18)  Source: .Blood Blood-Peripheral  Gram Stain (prelim) (20 Oct 2020 12:25):    Growth in anaerobic bottle: Gram Positive Cocci in Clusters  Preliminary Report (20 Oct 2020 12:26):    Growth in anaerobic bottle: Gram Positive Cocci in Clusters    "Due to technical problems, Proteus sp. will Not be reported as part of    the BCID panel until further notice"    ***Blood Panel PCR results on this specimen are available    approximately 3 hours after the Gram stain result.***    Gram stain, PCR, and/or culture results may not always    correspond due to difference in methodologies.    ************************************************************    This PCR assay was performed using [employer].    The following targets are tested for: Enterococcus,    vancomycin resistant enterococci, Listeria monocytogenes,    coagulase negative staphylococci, S. aureus,    methicillin resistant S. aureus, Streptococcus agalactiae    (Group B), S. pneumoniae, S. pyogenes (Group A),    Acinetobacter baumannii, Enterobacter cloacae, E. coli,    Klebsiella oxytoca, K. pneumoniae, Proteus sp.,    Serratia marcescens, Haemophilus influenzae,    Neisseria meningitidis, Pseudomonas aeruginosa, Candida    albicans, C. glabrata, C krusei, C parapsilosis,    C. tropicalis and the KPC resistance gene.  Organism: Blood Culture PCR (20 Oct 2020 13:43)  Organism: Blood Culture PCR (20 Oct 2020 13:43)    < from: Xray Chest 1 View-PORTABLE IMMEDIATE (10.18.20 @ 22:13) >  EXAM:  XR CHEST PORTABLE IMMED 1V                            PROCEDURE DATE:  10/18/2020          INTERPRETATION:  AP chest on October 18, 2020 9:56 PM. Patient has sepsis.    Heart is enlarged and the aorta is tortuous and uncoiled. Sternotomy again noted.    There is no sense of active lung or pleural finding.    Slight blunting of the lateral costophrenic angles again seen likely chronic.    Chest is similar to September 30.    IMPRESSION: No acute finding or change.      MCKENNA RANDOLPH M.D., ATTENDING RADIOLOGIST  This document has been electronically signed. Oct 19 2020  1:29PM    < end of copied text >   [facility]  Division of Infectious Diseases  [phone]    FREDERICK MA  89y, Female  [phone]    HPI--  89 years old Burkinan speaking female, speaks some English, attempted to use  [id number], the patient was answering questions asked. As per pt's daughter, the pt speaks Burkinan dialect and does not understand traditional Burkinan, the pt is A&Ox 3 at the baseline.   The pt is with PMH of HTN, COPD on home O2, CHF, severe pulmonary HTN, Hypothyroidism, Afib on coumadin, s/p recent hospitalizations, one in July 2020 with COPD exacerbation and bilateral pneumonia, and September 30- October 4th with acute CHF, Afib with RVR. As per H&P, the pt came to the ED with complains of fevers, non-productive cough, altered mental status. Spoke with pt's daughter, reports that the patient was lethargic last Friday and Saturday, spiked a fever at home, temp unknown, had right index finger swelling, was seen by home care physician who recommended to go to the hospital due to fevers.   The patient spiked a fever 102 on the day of the admission, afebrile since then, + leukocytosis normalized, UA was negative, COVID 19 testing and RVP negative, blood cultures grew G+ cocci in clusters, coag negative staph, chest Xray is negative.  At the moment the pt is in bed, no distress, reports mild shortness of breath, no supplemental O2, no chest pain, no abdominal pain, no nausea or vomiting, no diarrhea, no dysuria.         PMH/PSH--  Essential hypertension    Hypothyroidism    Atrial fibrillation    COPD exacerbation    No significant past surgical history        Allergies--  No Known Allergies      Medications--  Antibiotics: piperacillin/tazobactam IVPB.. 3.375 Gram(s) IV Intermittent every 8 hours    Immunologic:   Other: acetaminophen   Tablet .. PRN  ALBUTerol    90 MICROgram(s) HFA Inhaler PRN  ALPRAZolam PRN  diltiazem   CD  levothyroxine  meclizine PRN  ondansetron Injectable PRN  pantoprazole    Tablet  potassium chloride   Powder  sildenafil (REVATIO)    Antimicrobials last 90 days per EMR: MEDICATIONS  (STANDING):  piperacillin/tazobactam IVPB.   200 mL/Hr IV Intermittent (10-18-20 @ 21:56)    piperacillin/tazobactam IVPB..   25 mL/Hr IV Intermittent (10-20-20 @ 06:04)   25 mL/Hr IV Intermittent (10-19-20 @ 22:13)   25 mL/Hr IV Intermittent (10-19-20 @ 13:51)   25 mL/Hr IV Intermittent (10-19-20 @ 07:23)    vancomycin  IVPB   250 mL/Hr IV Intermittent (10-19-20 @ 01:34)        Social History--  EtOH: denies   Tobacco: denies   Drug Use: denies     Family/Marital History--  No pertinent family history in first degree relatives          Travel/Environmental/Occupational History:      Review of Systems: limited     Pertinent positives and negatives--  Constitutional: fevers, No Chills. No Rigors.   Eyes:  ENMT:  Cardiovascular: No chest pain. No palpitations.  Respiratory: + shortness of breath. No cough.  Gastrointestinal: No nausea or vomiting. No diarrhea or constipation.   Genitourinary: no dysuria   Musculoskeletal:  Skin:  Neurologic:  Psychiatric: Pleasant. Appropriate affect.  Endocrine:  Heme/Lymphatic:  Allergy/Immunologic:    Review of systems otherwise negative except as previously noted.    Physical Exam--  Vital Signs: T(F): 98 (10-20-20 @ 15:21), Max: 98.7 (10-20-20 @ 00:12)  HR: 66 (10-20-20 @ 15:21)  BP: 119/90 (10-20-20 @ 15:21)  RR: 18 (10-20-20 @ 15:21)  SpO2: 96% (10-20-20 @ 15:21)  Wt(kg): --    General: Nontoxic-appearing Female in no acute distress.  HEENT: AT/NC. PERRL. EOMI. Anicteric. Conjunctiva pink and moist. Oropharynx clear. Dentition fair.  Neck: Not rigid. No sense of mass.  Nodes: None palpable.  Lungs: mild wheeze bilaterally, no rhonchi  Heart: Irregular, No Murmur. No rub. No gallop. No palpable thrill.  Abdomen: Bowel sounds present and normoactive. Soft. Nondistended. Nontender. No sense of mass. No organomegaly.  Back: No spinal tenderness. No costovertebral angle tenderness.   Extremities: No cyanosis or clubbing. No edema.   Skin: Warm. Dry. Fragile, no rash  Psychiatric: Appropriate affect and mood for situation, forgetful         Laboratory & Imaging Data--  CBC                        9.7    8.74  )-----------( 118      ( 20 Oct 2020 06:33 )             29.4     WBC Count: 8.74 K/uL (10-20 @ 06:33)  WBC Count: 9.68 K/uL (10-19 @ 06:40)  WBC Count: 12.03 K/uL (10-18 @ 21:55)    Chemistries  10-20    145  |  110<H>  |  48<H>  ----------------------------<  85  3.3<L>   |  28  |  1.22    Creatinine Trend: 1.24<--, 1.43<--, 1.47<--, 1.56<--    Ca    8.7      20 Oct 2020 06:33  Phos  2.8     10-20  Mg     2.3     10-20    TPro  6.1  /  Alb  2.9<L>  /  TBili  1.0  /  DBili  x   /  AST  18  /  ALT  21  /  AlkPhos  48  10-19      Culture Data    Culture - Urine (collected 19 Oct 2020 09:24)  Source: .Urine Clean Catch (Midstream)  Final Report (20 Oct 2020 07:44):    No growth    Culture - Blood (collected 19 Oct 2020 09:18) - updated + growth gram positive cocci in clusters   Source: .Blood Blood-Peripheral  Preliminary Report (20 Oct 2020 10:01):    No growth to date.    Culture - Blood (collected 19 Oct 2020 09:18)  Source: .Blood Blood-Peripheral  Gram Stain (prelim) (20 Oct 2020 12:25):    Growth in anaerobic bottle: Gram Positive Cocci in Clusters  Preliminary Report (20 Oct 2020 12:26):    Growth in anaerobic bottle: Gram Positive Cocci in Clusters    "Due to technical problems, Proteus sp. will Not be reported as part of    the BCID panel until further notice"    ***Blood Panel PCR results on this specimen are available    approximately 3 hours after the Gram stain result.***    Gram stain, PCR, and/or culture results may not always    correspond due to difference in methodologies.    ************************************************************    This PCR assay was performed using [employer].    The following targets are tested for: Enterococcus,    vancomycin resistant enterococci, Listeria monocytogenes,    coagulase negative staphylococci, S. aureus,    methicillin resistant S. aureus, Streptococcus agalactiae    (Group B), S. pneumoniae, S. pyogenes (Group A),    Acinetobacter baumannii, Enterobacter cloacae, E. coli,    Klebsiella oxytoca, K. pneumoniae, Proteus sp.,    Serratia marcescens, Haemophilus influenzae,    Neisseria meningitidis, Pseudomonas aeruginosa, Candida    albicans, C. glabrata, C krusei, C parapsilosis,    C. tropicalis and the KPC resistance gene.  Organism: Blood Culture PCR (20 Oct 2020 13:43)  Organism: Blood Culture PCR (20 Oct 2020 13:43)    < from: Xray Chest 1 View-PORTABLE IMMEDIATE (10.18.20 @ 22:13) >  EXAM:  XR CHEST PORTABLE IMMED 1V                            PROCEDURE DATE:  10/18/2020          INTERPRETATION:  AP chest on October 18, 2020 9:56 PM. Patient has sepsis.    Heart is enlarged and the aorta is tortuous and uncoiled. Sternotomy again noted.    There is no sense of active lung or pleural finding.    Slight blunting of the lateral costophrenic angles again seen likely chronic.    Chest is similar to September 30.    IMPRESSION: No acute finding or change.      MCKENNA RANDOLPH M.D., ATTENDING RADIOLOGIST  This document has been electronically signed. Oct 19 2020  1:29PM    < end of copied text >   [facility]  Division of Infectious Diseases  [phone]    FREDERICK MA  89y, Female  [phone]    HPI--  89 years old Brazilian speaking female, speaks some English, attempted to use  [id number], the patient was not answering questions asked. As per pt's daughter, the pt speaks Brazilian dialect and does not understand traditional Brazilian, the pt is A&Ox 3 at the baseline.   The pt is with PMH of HTN, COPD on home O2, CHF, severe pulmonary HTN, Hypothyroidism, Afib on coumadin, s/p recent hospitalizations, one in July 2020 with COPD exacerbation and bilateral pneumonia, and September 30- October 4th with acute CHF, Afib with RVR. As per H&P, the pt came to the ED with complains of fevers, non-productive cough, altered mental status. Spoke with pt's daughter, reports that the patient was lethargic last Friday and Saturday, spiked a fever at home, temp unknown, had right index finger swelling, was seen by home care physician who recommended to go to the hospital due to fevers.   The patient spiked a fever 102 on the day of the admission, afebrile since then, + leukocytosis normalized, UA was negative, COVID 19 testing and RVP negative, blood cultures grew G+ cocci in clusters, coag negative staph, chest Xray is negative.  At the moment the pt is in bed, no distress, reports mild shortness of breath, no supplemental O2, no chest pain, no abdominal pain, no nausea or vomiting, no diarrhea, no dysuria.         PMH/PSH--  Essential hypertension    Hypothyroidism    Atrial fibrillation    COPD exacerbation    No significant past surgical history        Allergies--  No Known Allergies      Medications--  Antibiotics: piperacillin/tazobactam IVPB.. 3.375 Gram(s) IV Intermittent every 8 hours    Immunologic:   Other: acetaminophen   Tablet .. PRN  ALBUTerol    90 MICROgram(s) HFA Inhaler PRN  ALPRAZolam PRN  diltiazem   CD  levothyroxine  meclizine PRN  ondansetron Injectable PRN  pantoprazole    Tablet  potassium chloride   Powder  sildenafil (REVATIO)    Antimicrobials last 90 days per EMR: MEDICATIONS  (STANDING):  piperacillin/tazobactam IVPB.   200 mL/Hr IV Intermittent (10-18-20 @ 21:56)    piperacillin/tazobactam IVPB..   25 mL/Hr IV Intermittent (10-20-20 @ 06:04)   25 mL/Hr IV Intermittent (10-19-20 @ 22:13)   25 mL/Hr IV Intermittent (10-19-20 @ 13:51)   25 mL/Hr IV Intermittent (10-19-20 @ 07:23)    vancomycin  IVPB   250 mL/Hr IV Intermittent (10-19-20 @ 01:34)        Social History--  EtOH: denies   Tobacco: denies   Drug Use: denies     Family/Marital History--  No pertinent family history in first degree relatives          Review of Systems: limited     Pertinent positives and negatives--  Constitutional: fevers, No Chills. No Rigors.   Eyes:  ENMT:  Cardiovascular: No chest pain. No palpitations.  Respiratory: + shortness of breath. No cough.  Gastrointestinal: No nausea or vomiting. No diarrhea or constipation.   Genitourinary: no dysuria   Musculoskeletal:  Skin:  Neurologic:  Psychiatric: Pleasant. Appropriate affect.  Endocrine:  Heme/Lymphatic:  Allergy/Immunologic:    Review of systems otherwise negative except as previously noted.    Physical Exam--  Vital Signs: T(F): 98 (10-20-20 @ 15:21), Max: 98.7 (10-20-20 @ 00:12)  HR: 66 (10-20-20 @ 15:21)  BP: 119/90 (10-20-20 @ 15:21)  RR: 18 (10-20-20 @ 15:21)  SpO2: 96% (10-20-20 @ 15:21)  Wt(kg): --    General: Nontoxic-appearing Female in no acute distress.  HEENT: AT/NC. PERRL. EOMI. Anicteric. Conjunctiva pink and moist. Oropharynx clear. Dentition fair.  Neck: Not rigid. No sense of mass.  Nodes: None palpable.  Lungs: mild wheeze bilaterally, no rhonchi  Heart: Irregular, No Murmur. No rub. No gallop. No palpable thrill.  Abdomen: Bowel sounds present and normoactive. Soft. Nondistended. Nontender. No sense of mass. No organomegaly.  Back: No spinal tenderness. No costovertebral angle tenderness.   Extremities: No cyanosis or clubbing. No edema.   Skin: Warm. Dry. Fragile, no rash  Psychiatric: Appropriate affect and mood for situation, forgetful         Laboratory & Imaging Data--  CBC                        9.7    8.74  )-----------( 118      ( 20 Oct 2020 06:33 )             29.4     WBC Count: 8.74 K/uL (10-20 @ 06:33)  WBC Count: 9.68 K/uL (10-19 @ 06:40)  WBC Count: 12.03 K/uL (10-18 @ 21:55)    Chemistries  10-20    145  |  110<H>  |  48<H>  ----------------------------<  85  3.3<L>   |  28  |  1.22    Creatinine Trend: 1.24<--, 1.43<--, 1.47<--, 1.56<--    Ca    8.7      20 Oct 2020 06:33  Phos  2.8     10-20  Mg     2.3     10-20    TPro  6.1  /  Alb  2.9<L>  /  TBili  1.0  /  DBili  x   /  AST  18  /  ALT  21  /  AlkPhos  48  10-19      Culture Data    Culture - Urine (collected 19 Oct 2020 09:24)  Source: .Urine Clean Catch (Midstream)  Final Report (20 Oct 2020 07:44):    No growth    Culture - Blood (collected 19 Oct 2020 09:18) - updated + growth gram positive cocci in clusters   Source: .Blood Blood-Peripheral  Preliminary Report (20 Oct 2020 10:01):    No growth to date.    Culture - Blood (collected 19 Oct 2020 09:18)  Source: .Blood Blood-Peripheral  Gram Stain (prelim) (20 Oct 2020 12:25):    Growth in anaerobic bottle: Gram Positive Cocci in Clusters  Preliminary Report (20 Oct 2020 12:26):    Growth in anaerobic bottle: Gram Positive Cocci in Clusters    "Due to technical problems, Proteus sp. will Not be reported as part of    the BCID panel until further notice"    ***Blood Panel PCR results on this specimen are available    approximately 3 hours after the Gram stain result.***    Gram stain, PCR, and/or culture results may not always    correspond due to difference in methodologies.    ************************************************************    This PCR assay was performed using [employer].    The following targets are tested for: Enterococcus,    vancomycin resistant enterococci, Listeria monocytogenes,    coagulase negative staphylococci, S. aureus,    methicillin resistant S. aureus, Streptococcus agalactiae    (Group B), S. pneumoniae, S. pyogenes (Group A),    Acinetobacter baumannii, Enterobacter cloacae, E. coli,    Klebsiella oxytoca, K. pneumoniae, Proteus sp.,    Serratia marcescens, Haemophilus influenzae,    Neisseria meningitidis, Pseudomonas aeruginosa, Candida    albicans, C. glabrata, C krusei, C parapsilosis,    C. tropicalis and the KPC resistance gene.  Organism: Blood Culture PCR (20 Oct 2020 13:43)  Organism: Blood Culture PCR (20 Oct 2020 13:43)    < from: Xray Chest 1 View-PORTABLE IMMEDIATE (10.18.20 @ 22:13) >  EXAM:  XR CHEST PORTABLE IMMED 1V                            PROCEDURE DATE:  10/18/2020          INTERPRETATION:  AP chest on October 18, 2020 9:56 PM. Patient has sepsis.    Heart is enlarged and the aorta is tortuous and uncoiled. Sternotomy again noted.    There is no sense of active lung or pleural finding.    Slight blunting of the lateral costophrenic angles again seen likely chronic.    Chest is similar to September 30.    IMPRESSION: No acute finding or change.      MCKENNA RANDOLPH M.D., ATTENDING RADIOLOGIST  This document has been electronically signed. Oct 19 2020  1:29PM    < end of copied text >

## 2020-10-20 NOTE — CONSULT NOTE ADULT - ATTENDING COMMENTS
Patient's only complaints on my subsequent assessment were dry cough and "gas". Chest exam without rales, and imaging without concern of pneumonia. Abdominal exam mike distended, mild increased resonance, but completely nontender.   +BC, for CNS in 4/4 bottles. Protocol is for 2 phlebotomies for 2 sets of blood cultures but adherence is imperfect. No phlebitis. No port/catheter. UC&S negative. ECG with conduction delay but does not appear different to invoke endocarditis. I don't appreciate a murmur but there is some VHD on prior echo 6/2020. Not clear that CNS represents a true pathogen. Mild MARIUSZ, improved. WBC normalized, BP better, no further fevers.  F/U repeat cx  Would check echo.    Osmel Mckeon MD  Attending Physician  [facility]  Division of Infectious Diseases  [phone]

## 2020-10-20 NOTE — PROGRESS NOTE ADULT - ASSESSMENT
89 yo female with history of HTN, COPD on O2, Chronic diastolic CHF, Severe Pulmonary HTN, Hypothyroidism, Atrial fibrillation on Coumadin, recent admission in July for COPD exacerbation and, B/L PNA and again on Sept 30 - Oct 4th for acute on chronic CHF as well as Atrial fib w/ RVR presented w/ fever and nonproductive cough.     Fever  - unclear source as procal, CXR and UA are negative  - c/w Zosyn and hold Vanco  - f/u blood cultures     MARIUSZ on CKD III  - likely prerenal  - resolved w/ IVF  - patient given 1L NS in ED, will hold off on giving any more given history of CHF    Essential hypertension  - hold home losartan due to MARIUSZ    Hypothyroidism  - c/w Synthroid    Atrial fibrillation  - c/w Cardizem w/ hold parameters  - Coumadin on hold given supratherapeutic INR    Pulmonary HTN  - c/w Revatio    Prophylaxis:   DVT: Coumadin on hold given high INR  GI: Protonix   89 yo female with history of HTN, COPD on O2, Chronic diastolic CHF, Severe Pulmonary HTN, Hypothyroidism, Atrial fibrillation on Coumadin, recent admission in July for COPD exacerbation and, B/L PNA and again on Sept 30 - Oct 4th for acute on chronic CHF as well as Atrial fib w/ RVR presented w/ fever and nonproductive cough.     Fever  - unclear source as procal, CXR and UA are negative  - c/w Zosyn and Vanco  - blood cultures growing GPC - will get Echo    MARIUSZ on CKD III  - likely prerenal  - resolved w/ IVF  - patient given 1L NS in ED, will hold off on giving any more given history of CHF    Hypokalemia  - replace w/ KCl    Essential hypertension  - hold home losartan due to MARIUSZ    Hypothyroidism  - c/w Synthroid    Atrial fibrillation  - c/w Cardizem w/ hold parameters  - Coumadin on hold given supratherapeutic INR    Pulmonary HTN  - c/w Revatio    Prophylaxis:   DVT: Coumadin on hold given high INR  GI: Protonix    Spoke with the pt's son, Tommy ([phone]) and informed him about the pt's status and plan of care. He would likely to be updated periodically.

## 2020-10-20 NOTE — CONSULT NOTE ADULT - ASSESSMENT
89 years old [place]ian speaking female, speaks some English, attempted to use  [id number], the patient was answering questions asked. As per pt's daughter, the pt speaks [place]ian dialect and does not understand traditional [place]ian, the pt is A&Ox 3 at the baseline.   The pt is with PMH of HTN, COPD on home O2, CHF, severe pulmonary HTN, Hypothyroidism, Afib on coumadin, s/p recent hospitalizations, one in July 2020 with COPD exacerbation and bilateral pneumonia, and September 30- October 4th with acute CHF, Afib with RVR. As per H&P, the pt came to the ED with complains of fevers, non-productive cough, altered mental status. Spoke with pt's daughter, reports that the patient was lethargic last Friday and Saturday, spiked a fever at home, temp unknown, had right index finger swelling, was seen by home care physician who recommended to go to the hospital due to fevers.   The patient spiked a fever 102 on the day of the admission, afebrile since then, + leukocytosis normalized, UA was negative, COVID 19 testing and RVP negative, blood culture 1/4 anaerobic bottle grew G+ cocci in clusters, coag negative staph, chest Xray is negative.  At the moment the pt is in bed, no distress, reports mild shortness of breath, no supplemental O2, no chest pain, no abdominal pain, no nausea or vomiting, no diarrhea, no dysuria.   The patient was given Vancomycin 1 gram IV one time dose in the ED and then started on Vancomycin 750 mg IV q 24 hours and Zosyn 3.375 g IV q 8 hours. Vancomycin trough was drawn yesterday in the morning - 15.8, and today in the morning 8.4. Vancomycin was discontinued.     1. Fever - improved  2. Leukocytosis - improved   3. Bacteremia (coag negative staph)      Plan:  1. Continue Zosyn 3.375 g IV q 8h  2. awaiting for repeat blood cultures  3. monitor patient's fevers and leukocytosis 89 years old Norwegian speaking female, speaks some English, attempted to use  [id number], the patient was answering questions asked. As per pt's daughter, the pt speaks Norwegian dialect and does not understand traditional Norwegian, the pt is A&Ox 3 at the baseline.   The pt is with PMH of HTN, COPD on home O2, CHF, severe pulmonary HTN, Hypothyroidism, Afib on coumadin, s/p recent hospitalizations, one in July 2020 with COPD exacerbation and bilateral pneumonia, and September 30- October 4th with acute CHF, Afib with RVR. As per H&P, the pt came to the ED with complains of fevers, non-productive cough, altered mental status. Spoke with pt's daughter, reports that the patient was lethargic last Friday and Saturday, spiked a fever at home, temp unknown, had right index finger swelling, was seen by home care physician who recommended to go to the hospital due to fevers.   The patient spiked a fever 102 on the day of the admission, afebrile since then, + leukocytosis normalized, UA was negative, COVID 19 testing and RVP negative, blood cultures grew G+ cocci in clusters, coag negative staph, chest Xray is negative.  At the moment the pt is in bed, no distress, reports mild shortness of breath, no supplemental O2, no chest pain, no abdominal pain, no nausea or vomiting, no diarrhea, no dysuria.   The patient was given Vancomycin 1 gram IV one time dose in the ED and then started on Vancomycin 750 mg IV q 24 hours and Zosyn 3.375 g IV q 8 hours. Vancomycin trough was drawn yesterday in the morning - 15.8, and today in the morning 8.4. Vancomycin was discontinued.     1. Fever - improved  2. Leukocytosis - improved   3. Bacteremia       Plan:  1. Continue Zosyn 3.375 g IV q 8h  2. awaiting for repeat blood cultures  3. monitor patient's fevers and leukocytosis 89 years old Brazilian speaking female, speaks some English, attempted to use  [id number], the patient was answering questions asked. As per pt's daughter, the pt speaks Brazilian dialect and does not understand traditional Brazilian, the pt is A&Ox 3 at the baseline.   The pt is with PMH of HTN, COPD on home O2, CHF, severe pulmonary HTN, Hypothyroidism, Afib on coumadin, s/p recent hospitalizations, one in July 2020 with COPD exacerbation and bilateral pneumonia, and September 30- October 4th with acute CHF, Afib with RVR. As per H&P, the pt came to the ED with complains of fevers, non-productive cough, altered mental status. Spoke with pt's daughter, reports that the patient was lethargic last Friday and Saturday, spiked a fever at home, temp unknown, had right index finger swelling, was seen by home care physician who recommended to go to the hospital due to fevers.   The patient spiked a fever 102 on the day of the admission, afebrile since then, + leukocytosis normalized, UA was negative, COVID 19 testing and RVP negative, blood cultures grew G+ cocci in clusters, coag negative staph, chest Xray is negative.  At the moment the pt is in bed, no distress, reports mild shortness of breath, no supplemental O2, no chest pain, no abdominal pain, no nausea or vomiting, no diarrhea, no dysuria.   The patient was given Vancomycin 1 gram IV one time dose in the ED and then started on Vancomycin 750 mg IV q 24 hours and Zosyn 3.375 g IV q 8 hours. Vancomycin trough was drawn yesterday in the morning - 15.8, and today in the morning 8.4. Vancomycin was discontinued.     1. Fever - improved  2. Leukocytosis - improved   3. Bacteremia       Plan:  1. Continue Zosyn 3.375 g IV q 8h  2. resume Vancomycin 750 mg IV q 24 h  3. awaiting for repeat blood cultures  4. monitor patient's fevers and leukocytosis  5. monitor patient's renal function   6. follow up on Vancomycin trough

## 2020-10-21 DIAGNOSIS — J96.21 ACUTE AND CHRONIC RESPIRATORY FAILURE WITH HYPOXIA: ICD-10-CM

## 2020-10-21 DIAGNOSIS — J44.9 CHRONIC OBSTRUCTIVE PULMONARY DISEASE, UNSPECIFIED: ICD-10-CM

## 2020-10-21 LAB
ALBUMIN SERPL ELPH-MCNC: 2.8 G/DL — LOW (ref 3.3–5)
ALP SERPL-CCNC: 53 U/L — SIGNIFICANT CHANGE UP (ref 40–120)
ALT FLD-CCNC: 25 U/L — SIGNIFICANT CHANGE UP (ref 12–78)
ANION GAP SERPL CALC-SCNC: 4 MMOL/L — LOW (ref 5–17)
AST SERPL-CCNC: 21 U/L — SIGNIFICANT CHANGE UP (ref 15–37)
BILIRUB SERPL-MCNC: 0.6 MG/DL — SIGNIFICANT CHANGE UP (ref 0.2–1.2)
BUN SERPL-MCNC: 35 MG/DL — HIGH (ref 7–23)
CALCIUM SERPL-MCNC: 8.5 MG/DL — SIGNIFICANT CHANGE UP (ref 8.5–10.1)
CHLORIDE SERPL-SCNC: 109 MMOL/L — HIGH (ref 96–108)
CO2 SERPL-SCNC: 30 MMOL/L — SIGNIFICANT CHANGE UP (ref 22–31)
CREAT SERPL-MCNC: 1 MG/DL — SIGNIFICANT CHANGE UP (ref 0.5–1.3)
CRP SERPL-MCNC: 5.32 MG/DL — HIGH (ref 0–0.4)
CULTURE RESULTS: SIGNIFICANT CHANGE UP
GLUCOSE SERPL-MCNC: 96 MG/DL — SIGNIFICANT CHANGE UP (ref 70–99)
GRAM STN FLD: SIGNIFICANT CHANGE UP
HCT VFR BLD CALC: 32.7 % — LOW (ref 34.5–45)
HGB BLD-MCNC: 10.2 G/DL — LOW (ref 11.5–15.5)
INR BLD: 3.27 RATIO — HIGH (ref 0.88–1.16)
MAGNESIUM SERPL-MCNC: 2.2 MG/DL — SIGNIFICANT CHANGE UP (ref 1.6–2.6)
MCHC RBC-ENTMCNC: 31.2 GM/DL — LOW (ref 32–36)
MCHC RBC-ENTMCNC: 33 PG — SIGNIFICANT CHANGE UP (ref 27–34)
MCV RBC AUTO: 105.8 FL — HIGH (ref 80–100)
NRBC # BLD: 0 /100 WBCS — SIGNIFICANT CHANGE UP (ref 0–0)
ORGANISM # SPEC MICROSCOPIC CNT: SIGNIFICANT CHANGE UP
ORGANISM # SPEC MICROSCOPIC CNT: SIGNIFICANT CHANGE UP
PHOSPHATE SERPL-MCNC: 2.4 MG/DL — LOW (ref 2.5–4.5)
PLATELET # BLD AUTO: 136 K/UL — LOW (ref 150–400)
POTASSIUM SERPL-MCNC: 3.8 MMOL/L — SIGNIFICANT CHANGE UP (ref 3.5–5.3)
POTASSIUM SERPL-SCNC: 3.8 MMOL/L — SIGNIFICANT CHANGE UP (ref 3.5–5.3)
PROT SERPL-MCNC: 6.5 GM/DL — SIGNIFICANT CHANGE UP (ref 6–8.3)
PROTHROM AB SERPL-ACNC: 35.8 SEC — HIGH (ref 10.6–13.6)
RBC # BLD: 3.09 M/UL — LOW (ref 3.8–5.2)
RBC # FLD: 14.9 % — HIGH (ref 10.3–14.5)
SODIUM SERPL-SCNC: 143 MMOL/L — SIGNIFICANT CHANGE UP (ref 135–145)
SPECIMEN SOURCE: SIGNIFICANT CHANGE UP
WBC # BLD: 9.3 K/UL — SIGNIFICANT CHANGE UP (ref 3.8–10.5)
WBC # FLD AUTO: 9.3 K/UL — SIGNIFICANT CHANGE UP (ref 3.8–10.5)

## 2020-10-21 PROCEDURE — 99233 SBSQ HOSP IP/OBS HIGH 50: CPT

## 2020-10-21 RX ORDER — SODIUM,POTASSIUM PHOSPHATES 278-250MG
1 POWDER IN PACKET (EA) ORAL
Refills: 0 | Status: COMPLETED | OUTPATIENT
Start: 2020-10-21 | End: 2020-10-22

## 2020-10-21 RX ADMIN — Medication 360 MILLIGRAM(S): at 06:22

## 2020-10-21 RX ADMIN — Medication 20 MILLIGRAM(S): at 06:23

## 2020-10-21 RX ADMIN — Medication 1 TABLET(S): at 13:06

## 2020-10-21 RX ADMIN — PIPERACILLIN AND TAZOBACTAM 25 GRAM(S): 4; .5 INJECTION, POWDER, LYOPHILIZED, FOR SOLUTION INTRAVENOUS at 06:23

## 2020-10-21 RX ADMIN — Medication 20 MILLIGRAM(S): at 13:06

## 2020-10-21 RX ADMIN — PANTOPRAZOLE SODIUM 40 MILLIGRAM(S): 20 TABLET, DELAYED RELEASE ORAL at 06:22

## 2020-10-21 RX ADMIN — PIPERACILLIN AND TAZOBACTAM 25 GRAM(S): 4; .5 INJECTION, POWDER, LYOPHILIZED, FOR SOLUTION INTRAVENOUS at 13:10

## 2020-10-21 RX ADMIN — PIPERACILLIN AND TAZOBACTAM 25 GRAM(S): 4; .5 INJECTION, POWDER, LYOPHILIZED, FOR SOLUTION INTRAVENOUS at 21:51

## 2020-10-21 RX ADMIN — Medication 50 MICROGRAM(S): at 06:22

## 2020-10-21 RX ADMIN — Medication 1 TABLET(S): at 17:33

## 2020-10-21 RX ADMIN — Medication 20 MILLIGRAM(S): at 21:35

## 2020-10-21 RX ADMIN — Medication 1 TABLET(S): at 21:35

## 2020-10-21 RX ADMIN — Medication 250 MILLIGRAM(S): at 20:44

## 2020-10-21 NOTE — PROGRESS NOTE ADULT - PROBLEM SELECTOR PLAN 1
- blood cultures are growing staph epidermidis which is likely a contaminant   - also one isolate of coagulase negative staph  - pending repeat blood cultures, agree with ID reccs to deescalate antimicrobial treatment.   - would obtain CT chest to r/o pulmonary component.

## 2020-10-21 NOTE — PHYSICAL THERAPY INITIAL EVALUATION ADULT - ADDITIONAL COMMENTS
As per EMR on Sep 30th P.T. note: Pt lives alone in a house with 5 steps to enter with bilateral rails. She has a HHA, home O2, RW, wheelchair, however pt was wheelchair bound for more than 6 months, have home gina lift.

## 2020-10-21 NOTE — PROGRESS NOTE ADULT - SUBJECTIVE AND OBJECTIVE BOX
Patient is a 89y old  Female who presents with a chief complaint of Sepsis, AMS (21 Oct 2020 09:27)    HPI:     89 y/o female with PMHx of HTN, COPD on O2, Diastolic CHF, Severe Pulmonary HTN, Hypothyroidism, Atrial fibrillation on Coumadin, recent admission in July for COPD exacerbation and, B/L PNA and again on  - Oct 4th for acute on chronic CHF as well as Atrial fib w/ RVR presents to the ED c/o subjective fever and nonproductive cough. Pt denies cp, palpitations or dizziness. Per son, pt had fever and was confused thus brought her to the ED. No other symptoms reported per son    In ED Vitals BP initially 120/68, dropped to 87/39, T max 102. Labs sig for WBC 12.03 w/ left shift, BUN/Cr 67/1.58, COVID and RVP neg. Pt given 1 dose of Zosyn and 1L IV bolus (18 Oct 2020 23:50)    SUBJECTIVE & OBJECTIVE: Pt seen and examined at bedside.   PHYSICAL EXAM:  ICU Vital Signs Last 24 Hrs  T(C): 36.8 (21 Oct 2020 10:44), Max: 36.8 (21 Oct 2020 10:44)  T(F): 98.2 (21 Oct 2020 10:44), Max: 98.2 (21 Oct 2020 10:44)  HR: 79 (21 Oct 2020 12:20) (64 - 99)  BP: 153/79 (21 Oct 2020 12:20) (119/90 - 155/90)  RR: 18 (21 Oct 2020 12:20) (18 - 18)  SpO2: 96% (21 Oct 2020 12:20) (92% - 96%)  Daily     Daily Weight in k.4 (21 Oct 2020 05:34)  I&O's Detail    20 Oct 2020 07:01  -  21 Oct 2020 07:00  --------------------------------------------------------  IN:    IV PiggyBack: 250 mL    IV PiggyBack: 200 mL    Oral Fluid: 418 mL  Total IN: 868 mL    OUT:    Incontinent per Collection Bag (mL): 600 mL    Voided (mL): 500 mL  Total OUT: 1100 mL    Total NET: -232 mL        GENERAL: appears dry and short of breath  HEAD:  Atraumatic, Normocephalic  EYES: EOMI, PERRLA, conjunctiva and sclera clear  ENMT: dry mucous membranes  NECK: Supple, No JVD  NERVOUS SYSTEM:  Alert & Oriented X3, Motor Strength 5/5 B/L upper and lower extremities; DTRs 2+ intact and symmetric  CHEST/LUNG: poor air entry and crackles to bilateral bases  HEART: Regular rate and rhythm; No murmurs, rubs, or gallops  ABDOMEN: Soft, Nontender, Nondistended; Bowel sounds present  EXTREMITIES:  2+ Peripheral Pulses, No clubbing, cyanosis, or edema  MEDICATIONS  (STANDING):  diltiazem    milliGRAM(s) Oral daily  levothyroxine 50 MICROGram(s) Oral daily  pantoprazole    Tablet 40 milliGRAM(s) Oral before breakfast  piperacillin/tazobactam IVPB.. 3.375 Gram(s) IV Intermittent every 8 hours  potassium phosphate / sodium phosphate Tablet (K-PHOS No. 2) 1 Tablet(s) Oral four times a day with meals  sildenafil (REVATIO) 20 milliGRAM(s) Oral every 8 hours  vancomycin  IVPB 750 milliGRAM(s) IV Intermittent every 24 hours    MEDICATIONS  (PRN):  acetaminophen   Tablet .. 650 milliGRAM(s) Oral every 6 hours PRN Temp greater or equal to 38C (100.4F), Mild Pain (1 - 3)  ALBUTerol    90 MICROgram(s) HFA Inhaler 2 Puff(s) Inhalation every 6 hours PRN Shortness of Breath and/or Wheezing  ALPRAZolam 0.25 milliGRAM(s) Oral at bedtime PRN anxiety  meclizine 12.5 milliGRAM(s) Oral every 6 hours PRN Dizziness  ondansetron Injectable 4 milliGRAM(s) IV Push every 6 hours PRN Nausea and/or Vomiting    LABS:                        10.2   9.30  )-----------( 136      ( 21 Oct 2020 07:57 )             32.7     10-    143  |  109<H>  |  35<H>  ----------------------------<  96  3.8   |  30  |  1.00    Ca    8.5      21 Oct 2020 07:57  Phos  2.4     10-  Mg     2.2     10-    TPro  6.5  /  Alb  2.8<L>  /  TBili  0.6  /  DBili  x   /  AST  21  /  ALT  25  /  AlkPhos  53  10-    PT/INR - ( 21 Oct 2020 07:57 )   PT: 35.8 sec;   INR: 3.27 ratio       RECENT CULTURES: repeat blood cultures are pending    RADIOLOGY & ADDITIONAL TESTS: v    DVT/GI ppx  Discussed with pt @ bedside

## 2020-10-21 NOTE — PHYSICAL THERAPY INITIAL EVALUATION ADULT - MANUAL MUSCLE TESTING RESULTS, REHAB EVAL
shannon UE : 3-/5 in shoulder and elbow and 3/5 in wrist and hand; shannon LE: 3-/5 ( tested in supine)

## 2020-10-21 NOTE — PROGRESS NOTE ADULT - PROBLEM SELECTOR PROBLEM 1
R/O Sepsis with acute renal failure, due to unspecified organism, unspecified acute renal failure type, unspecified whether septic shock present

## 2020-10-21 NOTE — PHYSICAL THERAPY INITIAL EVALUATION ADULT - PREDICTED DURATION OF THERAPY (DAYS/WKS), PT EVAL
Pt is at her baseline performance at present. Pt at this time is not a rehab candidate, therefore d/c from PT program at this time.

## 2020-10-21 NOTE — PHYSICAL THERAPY INITIAL EVALUATION ADULT - GENERAL OBSERVATIONS, REHAB EVAL
Pt was seen in supine c O2 at 2l/min  through nasal cannula, alert and oriented to name, place and able to follow simple command and express her needs.

## 2020-10-21 NOTE — PROGRESS NOTE ADULT - ASSESSMENT
89 yo female with history of HTN, COPD on O2, Chronic diastolic CHF, Severe Pulmonary HTN, Hypothyroidism, Atrial fibrillation on Coumadin, recent admission in July for COPD exacerbation and, B/L PNA and again on Sept 30 - Oct 4th for acute on chronic CHF as well as Atrial fib w/ RVR presented w/ fever and nonproductive cough.

## 2020-10-21 NOTE — PROGRESS NOTE ADULT - ASSESSMENT
89 years old [place]ian speaking female, speaks some English, attempted to use  [id number], the patient was answering questions asked. As per pt's daughter, the pt speaks [place]ian dialect and does not understand traditional [place]ian, the pt is A&Ox 3 at the baseline.   The pt is with PMH of HTN, COPD on home O2, CHF, severe pulmonary HTN, Hypothyroidism, Afib on coumadin, s/p recent hospitalizations, one in July 2020 with COPD exacerbation and bilateral pneumonia, and September 30- October 4th with acute CHF, Afib with RVR. As per H&P, the pt came to the ED with complains of fevers, non-productive cough, altered mental status. Spoke with pt's daughter, reports that the patient was lethargic last Friday and Saturday, spiked a fever at home, temp unknown, had right index finger swelling, was seen by home care physician who recommended to go to the hospital due to fevers.   The patient spiked a fever 102 on the day of the admission, afebrile since then, + leukocytosis normalized, UA was negative, COVID 19 testing and RVP negative, blood cultures grew G+ cocci in clusters, coag negative staph, chest Xray is negative.  At the moment the pt is in bed, no distress, reports mild shortness of breath, no supplemental O2, no chest pain, no abdominal pain, no nausea or vomiting, no diarrhea, no dysuria.   The patient was given Vancomycin 1 gram IV one time dose in the ED and then started on Vancomycin 750 mg IV q 24 hours and Zosyn 3.375 g IV q 8 hours. Vancomycin trough was drawn yesterday in the morning - 15.8, and today in the morning 8.4. Vancomycin was discontinued.     10/21: no further fevers. WBC normal. Exam unrevealing. Follow up blood cultures pending. I do not see a role to alter antibiotics at present, but their role is TBD. Not 100% clear to me that these +Cx represent true pathogens      Suggestios--  Continue Zosyn 3.375 g IV q 8h and Vancomycin 750 mg IV q 24 h for now  F/U repeat blood cultures  Await vanco level  ECHO    Osmel Mckeon MD  Attending Physician  [facility]  Division of Infectious Diseases  [phone]

## 2020-10-21 NOTE — PROGRESS NOTE ADULT - SUBJECTIVE AND OBJECTIVE BOX
[facility]  Division of Infectious Diseases  [phone]    Name: FREDERICK MA  Age: 89y  Gender: Female  MRN: [id number]    Interval History--  Notes reviewed. No specific complaints. Just does not feel well. On ROS appetite not at baseline. No fevers, chills, or rigors.. No SOB or CP. Denies cough. No N/V. Denies abdominal pain. No urinary symptoms.       Past Medical History--  Essential hypertension    Hypothyroidism    Atrial fibrillation    COPD exacerbation    No significant past surgical history        For details regarding the patient's social history, family history, and other miscellaneous elements, please refer the initial infectious diseases consultation and/or the admitting history and physical examination for this admission.    Allergies    No Known Allergies    Intolerances        Medications--  Antibiotics:  piperacillin/tazobactam IVPB.. 3.375 Gram(s) IV Intermittent every 8 hours  vancomycin  IVPB 750 milliGRAM(s) IV Intermittent every 24 hours    Immunologic:    Other:  acetaminophen   Tablet .. PRN  ALBUTerol    90 MICROgram(s) HFA Inhaler PRN  ALPRAZolam PRN  diltiazem   CD  levothyroxine  meclizine PRN  ondansetron Injectable PRN  pantoprazole    Tablet  potassium phosphate / sodium phosphate Tablet (K-PHOS No. 2)  sildenafil (REVATIO)      Review of Systems--  A 10-point review of systems was obtained.   Review of systems otherwise negative except as previously noted.    Physical Examination--  Vital Signs: T(F): 97.4 (10-21-20 @ 05:34), Max: 98.6 (10-20-20 @ 10:42)  HR: 99 (10-21-20 @ 05:34)  BP: 155/90 (10-21-20 @ 05:34)  RR: 18 (10-21-20 @ 05:34)  SpO2: 92% (10-21-20 @ 05:34)  Wt(kg): --  General: Nontoxic-appearing Female in no acute distress.  HEENT: AT/NC. Anicteric. Conjunctiva pink and moist. Oropharynx clear.  Neck: Not rigid. No sense of mass.  Nodes: None palpable.  Lungs: Diminished breath sounds bilaterally No R/W?R  Heart: Irregularly irregular, No Murmur. No rub. No gallop. No palpable thrill.  Abdomen: Bowel sounds present and normoactive. Soft. Nondistended. Nontender. No sense of mass. No organomegaly.  Extremities: No cyanosis or clubbing. No edema.   Skin: Warm. Dry. Fragile, no rash  Psychiatric: Appropriate affect and mood for situation      Laboratory Studies--  CBC                        10.2   9.30  )-----------( 136      ( 21 Oct 2020 07:57 )             32.7       Chemistries  10-21    143  |  109<H>  |  35<H>  ----------------------------<  96  3.8   |  30  |  1.00    Ca    8.5      21 Oct 2020 07:57  Phos  2.4     10-21  Mg     2.2     10-21    TPro  6.5  /  Alb  2.8<L>  /  TBili  0.6  /  DBili  x   /  AST  21  /  ALT  25  /  AlkPhos  53  10-21      Culture Data    Culture - Urine (collected 19 Oct 2020 09:24)  Source: .Urine Clean Catch (Midstream)  Final Report (20 Oct 2020 07:44):    No growth    Culture - Blood (collected 19 Oct 2020 09:18)  Source: .Blood Blood-Peripheral  Gram Stain (prelim) (20 Oct 2020 16:35):    Growth in anaerobic bottle: Gram Positive Cocci in Clusters  Preliminary Report (20 Oct 2020 16:36):    Growth in anaerobic bottle: Gram Positive Cocci in Clusters    Culture - Blood (collected 19 Oct 2020 09:18)  Source: .Blood Blood-Peripheral  Gram Stain (prelim) (20 Oct 2020 12:25):    Growth in anaerobic bottle: Gram Positive Cocci in Clusters  Preliminary Report (20 Oct 2020 12:26):    Growth in anaerobic bottle: Gram Positive Cocci in Clusters    "Due to technical problems, Proteus sp. will Not be reported as part of    the BCID panel until further notice"    ***Blood Panel PCR results on this specimen are available    approximately 3 hours after the Gram stain result.***    Gram stain, PCR, and/or culture results may not always    correspond due to difference in methodologies.    ************************************************************    This PCR assay was performed using [employer] (acquired by [employer] in 2014).    The following targets are tested for: Enterococcus,    vancomycin resistant enterococci, Listeria monocytogenes,    coagulase negative staphylococci, S. aureus,    methicillin resistant S. aureus, Streptococcus agalactiae    (Group B), S. pneumoniae, S. pyogenes (Group A),    Acinetobacter baumannii, Enterobacter cloacae, E. coli,    Klebsiella oxytoca, K. pneumoniae, Proteus sp.,    Serratia marcescens, Haemophilus influenzae,    Neisseria meningitidis, Pseudomonas aeruginosa, Candida    albicans, C. glabrata, C krusei, C parapsilosis,    C. tropicalis and the KPC resistance gene.  Organism: Blood Culture PCR (20 Oct 2020 13:43)  Organism: Blood Culture PCR (20 Oct 2020 13:43)

## 2020-10-21 NOTE — PROGRESS NOTE ADULT - PROBLEM SELECTOR PLAN 5
- will reach out to family to ascertain chronicity of 02 needs  - change albuterol to duonebs  - OOB to chair daily!

## 2020-10-21 NOTE — PROGRESS NOTE ADULT - PROBLEM SELECTOR PLAN 2
- INR is persistently elevated  - would hold coumadin again tonight with plan to resume tomorrow  - serial INR

## 2020-10-22 LAB
-  AMPICILLIN/SULBACTAM: SIGNIFICANT CHANGE UP
-  CEFAZOLIN: SIGNIFICANT CHANGE UP
-  CLINDAMYCIN: SIGNIFICANT CHANGE UP
-  ERYTHROMYCIN: SIGNIFICANT CHANGE UP
-  GENTAMICIN: SIGNIFICANT CHANGE UP
-  OXACILLIN: SIGNIFICANT CHANGE UP
-  PENICILLIN: SIGNIFICANT CHANGE UP
-  RIFAMPIN: SIGNIFICANT CHANGE UP
-  TETRACYCLINE: SIGNIFICANT CHANGE UP
-  TRIMETHOPRIM/SULFAMETHOXAZOLE: SIGNIFICANT CHANGE UP
-  VANCOMYCIN: SIGNIFICANT CHANGE UP
ANION GAP SERPL CALC-SCNC: 4 MMOL/L — LOW (ref 5–17)
BASE EXCESS BLDA CALC-SCNC: 7.3 MMOL/L — HIGH (ref -2–2)
BASOPHILS # BLD AUTO: 0.03 K/UL — SIGNIFICANT CHANGE UP (ref 0–0.2)
BASOPHILS NFR BLD AUTO: 0.5 % — SIGNIFICANT CHANGE UP (ref 0–2)
BLOOD GAS COMMENTS: SIGNIFICANT CHANGE UP
BLOOD GAS COMMENTS: SIGNIFICANT CHANGE UP
BLOOD GAS SOURCE: SIGNIFICANT CHANGE UP
BUN SERPL-MCNC: 22 MG/DL — SIGNIFICANT CHANGE UP (ref 7–23)
CALCIUM SERPL-MCNC: 8.4 MG/DL — LOW (ref 8.5–10.1)
CHLORIDE SERPL-SCNC: 109 MMOL/L — HIGH (ref 96–108)
CO2 SERPL-SCNC: 32 MMOL/L — HIGH (ref 22–31)
CREAT SERPL-MCNC: 0.9 MG/DL — SIGNIFICANT CHANGE UP (ref 0.5–1.3)
CULTURE RESULTS: SIGNIFICANT CHANGE UP
CULTURE RESULTS: SIGNIFICANT CHANGE UP
EOSINOPHIL # BLD AUTO: 0.24 K/UL — SIGNIFICANT CHANGE UP (ref 0–0.5)
EOSINOPHIL NFR BLD AUTO: 4.1 % — SIGNIFICANT CHANGE UP (ref 0–6)
GLUCOSE BLDC GLUCOMTR-MCNC: 113 MG/DL — HIGH (ref 70–99)
GLUCOSE SERPL-MCNC: 107 MG/DL — HIGH (ref 70–99)
GRAM STN FLD: SIGNIFICANT CHANGE UP
HCO3 BLDA-SCNC: 33 MMOL/L — HIGH (ref 21–29)
HCT VFR BLD CALC: 30.2 % — LOW (ref 34.5–45)
HGB BLD-MCNC: 9.8 G/DL — LOW (ref 11.5–15.5)
HOROWITZ INDEX BLDA+IHG-RTO: 40 — SIGNIFICANT CHANGE UP
IMM GRANULOCYTES NFR BLD AUTO: 0.3 % — SIGNIFICANT CHANGE UP (ref 0–1.5)
INR BLD: 2.54 RATIO — HIGH (ref 0.88–1.16)
LYMPHOCYTES # BLD AUTO: 1 K/UL — SIGNIFICANT CHANGE UP (ref 1–3.3)
LYMPHOCYTES # BLD AUTO: 17 % — SIGNIFICANT CHANGE UP (ref 13–44)
MAGNESIUM SERPL-MCNC: 2.3 MG/DL — SIGNIFICANT CHANGE UP (ref 1.6–2.6)
MCHC RBC-ENTMCNC: 32.5 GM/DL — SIGNIFICANT CHANGE UP (ref 32–36)
MCHC RBC-ENTMCNC: 34.3 PG — HIGH (ref 27–34)
MCV RBC AUTO: 105.6 FL — HIGH (ref 80–100)
METHOD TYPE: SIGNIFICANT CHANGE UP
MONOCYTES # BLD AUTO: 0.58 K/UL — SIGNIFICANT CHANGE UP (ref 0–0.9)
MONOCYTES NFR BLD AUTO: 9.9 % — SIGNIFICANT CHANGE UP (ref 2–14)
NEUTROPHILS # BLD AUTO: 4.01 K/UL — SIGNIFICANT CHANGE UP (ref 1.8–7.4)
NEUTROPHILS NFR BLD AUTO: 68.2 % — SIGNIFICANT CHANGE UP (ref 43–77)
NRBC # BLD: 0 /100 WBCS — SIGNIFICANT CHANGE UP (ref 0–0)
ORGANISM # SPEC MICROSCOPIC CNT: SIGNIFICANT CHANGE UP
ORGANISM # SPEC MICROSCOPIC CNT: SIGNIFICANT CHANGE UP
PCO2 BLDA: 54 MMHG — HIGH (ref 32–46)
PH BLD: 7.4 — SIGNIFICANT CHANGE UP (ref 7.35–7.45)
PHOSPHATE SERPL-MCNC: 2.9 MG/DL — SIGNIFICANT CHANGE UP (ref 2.5–4.5)
PLATELET # BLD AUTO: 126 K/UL — LOW (ref 150–400)
PO2 BLDA: 142 MMHG — HIGH (ref 74–108)
POTASSIUM SERPL-MCNC: 3.5 MMOL/L — SIGNIFICANT CHANGE UP (ref 3.5–5.3)
POTASSIUM SERPL-SCNC: 3.5 MMOL/L — SIGNIFICANT CHANGE UP (ref 3.5–5.3)
PROTHROM AB SERPL-ACNC: 28.2 SEC — HIGH (ref 10.6–13.6)
RBC # BLD: 2.86 M/UL — LOW (ref 3.8–5.2)
RBC # FLD: 14.8 % — HIGH (ref 10.3–14.5)
SAO2 % BLDA: 99 % — HIGH (ref 92–96)
SODIUM SERPL-SCNC: 145 MMOL/L — SIGNIFICANT CHANGE UP (ref 135–145)
SPECIMEN SOURCE: SIGNIFICANT CHANGE UP
WBC # BLD: 5.88 K/UL — SIGNIFICANT CHANGE UP (ref 3.8–10.5)
WBC # FLD AUTO: 5.88 K/UL — SIGNIFICANT CHANGE UP (ref 3.8–10.5)

## 2020-10-22 PROCEDURE — 71045 X-RAY EXAM CHEST 1 VIEW: CPT | Mod: 26

## 2020-10-22 PROCEDURE — 99233 SBSQ HOSP IP/OBS HIGH 50: CPT

## 2020-10-22 PROCEDURE — 71275 CT ANGIOGRAPHY CHEST: CPT | Mod: 26

## 2020-10-22 PROCEDURE — 99232 SBSQ HOSP IP/OBS MODERATE 35: CPT

## 2020-10-22 RX ORDER — WARFARIN SODIUM 2.5 MG/1
3 TABLET ORAL ONCE
Refills: 0 | Status: COMPLETED | OUTPATIENT
Start: 2020-10-22 | End: 2020-10-22

## 2020-10-22 RX ORDER — FUROSEMIDE 40 MG
40 TABLET ORAL ONCE
Refills: 0 | Status: COMPLETED | OUTPATIENT
Start: 2020-10-22 | End: 2020-10-22

## 2020-10-22 RX ORDER — FUROSEMIDE 40 MG
20 TABLET ORAL ONCE
Refills: 0 | Status: COMPLETED | OUTPATIENT
Start: 2020-10-22 | End: 2020-10-22

## 2020-10-22 RX ORDER — IPRATROPIUM/ALBUTEROL SULFATE 18-103MCG
3 AEROSOL WITH ADAPTER (GRAM) INHALATION ONCE
Refills: 0 | Status: COMPLETED | OUTPATIENT
Start: 2020-10-22 | End: 2020-10-22

## 2020-10-22 RX ADMIN — Medication 3 MILLILITER(S): at 05:45

## 2020-10-22 RX ADMIN — Medication 20 MILLIGRAM(S): at 05:07

## 2020-10-22 RX ADMIN — PANTOPRAZOLE SODIUM 40 MILLIGRAM(S): 20 TABLET, DELAYED RELEASE ORAL at 05:16

## 2020-10-22 RX ADMIN — PIPERACILLIN AND TAZOBACTAM 25 GRAM(S): 4; .5 INJECTION, POWDER, LYOPHILIZED, FOR SOLUTION INTRAVENOUS at 05:07

## 2020-10-22 RX ADMIN — Medication 1 TABLET(S): at 10:38

## 2020-10-22 RX ADMIN — WARFARIN SODIUM 3 MILLIGRAM(S): 2.5 TABLET ORAL at 21:40

## 2020-10-22 RX ADMIN — Medication 20 MILLIGRAM(S): at 13:19

## 2020-10-22 RX ADMIN — Medication 360 MILLIGRAM(S): at 05:59

## 2020-10-22 RX ADMIN — Medication 40 MILLIGRAM(S): at 18:11

## 2020-10-22 RX ADMIN — Medication 50 MICROGRAM(S): at 05:07

## 2020-10-22 RX ADMIN — Medication 20 MILLIGRAM(S): at 05:59

## 2020-10-22 RX ADMIN — Medication 20 MILLIGRAM(S): at 21:40

## 2020-10-22 NOTE — PROGRESS NOTE ADULT - ASSESSMENT
89 years old Malian speaking female, speaks some English, attempted to use  [id number], the patient was answering questions asked. As per pt's daughter, the pt speaks Malian dialect and does not understand traditional Malian, the pt is A&Ox 3 at the baseline.   The pt is with PMH of HTN, COPD on home O2, CHF, severe pulmonary HTN, Hypothyroidism, Afib on coumadin, s/p recent hospitalizations, one in July 2020 with COPD exacerbation and bilateral pneumonia, and September 30- October 4th with acute CHF, Afib with RVR. As per H&P, the pt came to the ED with complains of fevers, non-productive cough, altered mental status. Spoke with pt's daughter, reports that the patient was lethargic last Friday and Saturday, spiked a fever at home, temp unknown, had right index finger swelling, was seen by home care physician who recommended to go to the hospital due to fevers.   The patient spiked a fever 102 on the day of the admission, afebrile since then, + leukocytosis normalized, UA was negative, COVID 19 testing and RVP negative, blood cultures grew G+ cocci in clusters, coag negative staph, chest Xray is negative.  At the moment the pt is in bed, no distress, reports mild shortness of breath, no supplemental O2, no chest pain, no abdominal pain, no nausea or vomiting, no diarrhea, no dysuria.   The patient was given Vancomycin 1 gram IV one time dose in the ED and then started on Vancomycin 750 mg IV q 24 hours and Zosyn 3.375 g IV q 8 hours. Vancomycin trough was drawn yesterday in the morning - 15.8, and today in the morning 8.4. Vancomycin was discontinued.     10/21: no further fevers. WBC normal. Exam unrevealing. Follow up blood cultures pending. I do not see a role to alter antibiotics at present, but their role is TBD. Not 100% clear to me that these +Cx represent true pathogens  10/22: F/U Cx NTD, remain unconvinced that BC are pathogens. Awaiting ECHO. Has COPD hx,m for CT chest today to exclude other pathology. No ABG. CXR unchanged. Does not look grossly volume overloaded to implicate CHF, either.       Suggestios--  Stop abx  F/U repeat blood cultures  ECHO  CTA  ?Steroids if CT and ECHO unrevealing    Osmel Mckeon MD  Attending Physician  [facility]  Division of Infectious Diseases  [phone]

## 2020-10-22 NOTE — PROGRESS NOTE ADULT - SUBJECTIVE AND OBJECTIVE BOX
Patient is a 89y old  Female who presents with a chief complaint of Sepsis, AMS (22 Oct 2020 11:30)    HPI:     89 y/o female with PMHx of HTN, COPD on O2, Diastolic CHF, Severe Pulmonary HTN, Hypothyroidism, Atrial fibrillation on Coumadin, recent admission in July for COPD exacerbation and, B/L PNA and again on Sept 30 - Oct 4th for acute on chronic CHF as well as Atrial fib w/ RVR presents to the ED c/o subjective fever and nonproductive cough. Pt denies cp, palpitations or dizziness. Per son, pt had fever and was confused thus brought her to the ED. No other symptoms reported per son    In ED Vitals BP initially 120/68, dropped to 87/39, T max 102. Labs sig for WBC 12.03 w/ left shift, BUN/Cr 67/1.58, COVID and RVP neg. Pt given 1 dose of Zosyn and 1L IV bolus (18 Oct 2020 23:50)    SUBJECTIVE & OBJECTIVE: Pt seen and examined at bedside. Hypoxic this am and placed on BIPAP  PHYSICAL EXAM:  Vital Signs Last 24 Hrs  T(C): 36.8 (22 Oct 2020 11:07), Max: 36.9 (21 Oct 2020 15:23)  T(F): 98.3 (22 Oct 2020 11:07), Max: 98.4 (21 Oct 2020 15:23)  HR: 74 (22 Oct 2020 12:04) (65 - 102)  BP: 104/64 (22 Oct 2020 11:07) (104/64 - 152/70)  RR: 20 (22 Oct 2020 11:07) (18 - 22)  SpO2: 100% (22 Oct 2020 12:04) (81% - 100%)  Daily     Daily   I&O's Detail    21 Oct 2020 07:01  -  22 Oct 2020 07:00  --------------------------------------------------------  IN:    IV PiggyBack: 100 mL    IV PiggyBack: 250 mL    Oral Fluid: 150 mL  Total IN: 500 mL    OUT:    Incontinent per Collection Bag (mL): 600 mL  Total OUT: 600 mL    Total NET: -100 mL        GENERAL: dyspneic, on BIPAP  HEAD:  Atraumatic, Normocephalic  EYES: EOMI, PERRLA, conjunctiva and sclera clear  ENMT: Moist mucous membranes  NECK: Supple, No JVD  NERVOUS SYSTEM:  Alert & Oriented X3, Motor Strength 5/5 B/L upper and lower extremities; DTRs 2+ intact and symmetric  CHEST/LUNG: poor air entry to bases  HEART: Regular rate and rhythm; No murmurs, rubs, or gallops  ABDOMEN: Soft, Nontender, Nondistended; Bowel sounds present  EXTREMITIES:  2+ Peripheral Pulses, No clubbing, cyanosis, or edema  MEDICATIONS  (STANDING):  diltiazem    milliGRAM(s) Oral daily  levothyroxine 50 MICROGram(s) Oral daily  pantoprazole    Tablet 40 milliGRAM(s) Oral before breakfast  sildenafil (REVATIO) 20 milliGRAM(s) Oral every 8 hours    MEDICATIONS  (PRN):  acetaminophen   Tablet .. 650 milliGRAM(s) Oral every 6 hours PRN Temp greater or equal to 38C (100.4F), Mild Pain (1 - 3)  ALBUTerol    90 MICROgram(s) HFA Inhaler 2 Puff(s) Inhalation every 6 hours PRN Shortness of Breath and/or Wheezing  ALPRAZolam 0.25 milliGRAM(s) Oral at bedtime PRN anxiety  meclizine 12.5 milliGRAM(s) Oral every 6 hours PRN Dizziness  ondansetron Injectable 4 milliGRAM(s) IV Push every 6 hours PRN Nausea and/or Vomiting    LABS:                        9.8    5.88  )-----------( 126      ( 22 Oct 2020 08:02 )             30.2     10-22    145  |  109<H>  |  22  ----------------------------<  107<H>  3.5   |  32<H>  |  0.90    Ca    8.4<L>      22 Oct 2020 07:27  Phos  2.9     10-22  Mg     2.3     10-22    TPro  6.5  /  Alb  2.8<L>  /  TBili  0.6  /  DBili  x   /  AST  21  /  ALT  25  /  AlkPhos  53  10-21    PT/INR - ( 22 Oct 2020 07:27 )   PT: 28.2 sec;   INR: 2.54 ratio             CAPILLARY BLOOD GLUCOSE      POCT Blood Glucose.: 113 mg/dL (22 Oct 2020 05:37)    ABG - ( 22 Oct 2020 12:30 )  pH, Arterial: x     pH, Blood: 7.40  /  pCO2: 54    /  pO2: 142   / HCO3: 33    / Base Excess: 7.3   /  SaO2: 99                      RECENT CULTURES:    RADIOLOGY & ADDITIONAL TESTS:    DVT/GI ppx  Discussed with pt @ bedside

## 2020-10-22 NOTE — PROGRESS NOTE ADULT - ASSESSMENT
89 yo female with history of HTN, COPD on O2, Chronic diastolic CHF, Severe Pulmonary HTN, Hypothyroidism, Atrial fibrillation on Coumadin, recent admission in July for COPD exacerbation and, B/L PNA and again on Sept 30 - Oct 4th for acute on chronic CHF as well as Atrial fib w/ RVR presented w/ fever and nonproductive cough.    Problem/Plan - 1:  ·  Problem: R/O Sepsis with acute renal failure, due to unspecified organism, unspecified acute renal failure type, unspecified whether septic shock present.  Plan: - blood cultures are growing staph epidermidis which is likely a contaminant   - also one isolate of coagulase negative staph  - pending repeat blood cultures, agree with ID reccs to deescalate antimicrobial treatment.   - CT chest has been ordered for today.     Problem/Plan - 2:  ·  Problem: Atrial fibrillation, unspecified type.  Plan: - INR is persistently elevated  - will resume Coumadin at home dose 3 mg  - am INR    Problem/Plan - 3:  ·  Problem: Hypothyroidism, unspecified type.  Plan: - resume home medications.     Problem/Plan - 4:  ·  Problem: Essential hypertension.  Plan: - resume home medications.     Problem/Plan - 5:  ·  Problem: COPD (chronic obstructive pulmonary disease).    Plan: - patient is known to Dr. Kidd and confirms that she is on home 02 and NIV (Trilogy)    - Pulmonary consult today  - OOB to chair daily!     Problem/Plan - 6:  Problem: Acute on chronic respiratory failure with hypoxia. Plan: - plan as above.

## 2020-10-22 NOTE — CONSULT NOTE ADULT - SUBJECTIVE AND OBJECTIVE BOX
Patient is a 89y old  Female who presents with a chief complaint of Sepsis, AMS (22 Oct 2020 13:03)    HPI:     89 WF with HTN, COPD on O2 & Nocturnal NIV, Diastolic CHF, Severe pHTN, Hypothyroidism ,A fib on Coumadin, Valve replacement. Non smoker.  Recent admission with COPD exacerbation, acute on chronic CHF, and Rapid A Fib.  Presented  to the ED c/o subjective fever and nonproductive cough. Son said , pt had fever and was confused thus brought her to the ED. No other symptoms reported per son  In ED Vitals BP initially 120/68, dropped to 87/39, T max 102. Labs sig for WBC 12.03 w/ left shift, BUN/Cr 67/1.58, COVID and RVP neg. Pt given 1 dose of Zosyn and 1L IV bolus (18 Oct 2020 23:50).  Admitted with COPD exacerbation, metabolic Encephalopathy and possible sepsis.    PAST MEDICAL & SURGICAL HISTORY:  Essential hypertension    Hypothyroidism    Atrial fibrillation    COPD exacerbation    No significant past surgical history    FAMILY HISTORY:  No pertinent family history in first degree relatives    SOCIAL HISTORY:  non smoker    Allergies  No Known Allergies    MEDICATIONS  (STANDING):  diltiazem    milliGRAM(s) Oral daily  levothyroxine 50 MICROGram(s) Oral daily  pantoprazole    Tablet 40 milliGRAM(s) Oral before breakfast  sildenafil (REVATIO) 20 milliGRAM(s) Oral every 8 hours  warfarin 3 milliGRAM(s) Oral once    MEDICATIONS  (PRN):  acetaminophen   Tablet .. 650 milliGRAM(s) Oral every 6 hours PRN Temp greater or equal to 38C (100.4F), Mild Pain (1 - 3)  ALBUTerol    90 MICROgram(s) HFA Inhaler 2 Puff(s) Inhalation every 6 hours PRN Shortness of Breath and/or Wheezing  ALPRAZolam 0.25 milliGRAM(s) Oral at bedtime PRN anxiety  meclizine 12.5 milliGRAM(s) Oral every 6 hours PRN Dizziness  ondansetron Injectable 4 milliGRAM(s) IV Push every 6 hours PRN Nausea and/or Vomiting    REVIEW OF SYSTEMS:    Constitutional:           fever, fatigue  HEENT:                      No difficulty hearing, tinnitus, vertigo; No sinus or throat pain  Respiratory:              sob  Cardiovascular:           No chest pain, palpitations  Gastrointestinal:        No abdominal or epigastric pain. No N/V/diarrhea or hematemesis  Genitourinary:            No dysuria, frequency, hematuria or incontinence  SKIN:                             no rash  Musculoskeletal:        No joint pain or swelling  Extremities:                No swelling  Neurological:              No headaches  Psychiatric:                 No depression, anxiety  MACRA & MIPS:     Vaccines - Influenza: yes and Pneumovax: yes  Tobacco:  no  Blood Pressure Screening / Control of: 152/70  Current Medications Reviewed: yes    Vital Signs Last 24 Hrs  T(C): 36.8 (22 Oct 2020 11:07), Max: 36.9 (21 Oct 2020 15:23)  T(F): 98.3 (22 Oct 2020 11:07), Max: 98.4 (21 Oct 2020 15:23)  HR: 74 (22 Oct 2020 12:04) (65 - 102)  BP: 104/64 (22 Oct 2020 11:07) (104/64 - 152/70)  BP(mean): --  RR: 20 (22 Oct 2020 11:07) (18 - 22)  SpO2: 100% (22 Oct 2020 12:04) (81% - 100%)    PHYSICAL EXAM:  GEN:         Awake, responsive and comfortable.  HEENT:    Normal.    RESP:        decreased air entry.  CVS:           Regular rate and rhythm.   ABD:         Soft, non-tender, non-distended;   SKIN:           Warm and dry.  EXTR:            No clubbing, cyanosis or edema  CNS:              Intact sensory and motor function.  PSYCH:        cooperative, no anxiety or depression    LABS:                        9.8    5.88  )-----------( 126      ( 22 Oct 2020 08:02 )             30.2     10-22    145  |  109<H>  |  22  ----------------------------<  107<H>  3.5   |  32<H>  |  0.90    Ca    8.4<L>      22 Oct 2020 07:27  Phos  2.9     10-22  Mg     2.3     10-22    TPro  6.5  /  Alb  2.8<L>  /  TBili  0.6  /  DBili  x   /  AST  21  /  ALT  25  /  AlkPhos  53  10-21    PT/INR - ( 22 Oct 2020 07:27 )   PT: 28.2 sec;   INR: 2.54 ratio      10-22 @ 12:30  pH: 7.40  pCO2: 54  pO2: 142  SaO2: 99    Culture - Blood (collected 10-20-20 @ 23:03)  Source: .Blood Blood  Preliminary Report (10-22-20 @ 01:02):    No growth to date.    Culture - Blood (collected 10-20-20 @ 23:03)  Source: .Blood Blood  Preliminary Report (10-22-20 @ 01:02):    No growth to date.    Culture - Urine (collected 10-19-20 @ 09:24)  Source: .Urine Clean Catch (Midstream)  Final Report (10-20-20 @ 07:44):    No growth    Culture - Blood (collected 10-19-20 @ 09:18)  Source: .Blood Blood-Peripheral  Gram Stain (10-22-20 @ 12:16):    Growth in anaerobic bottle: Gram Positive Cocci in Clusters  Final Report (10-22-20 @ 12:16):    Growth in anaerobic bottle: Staphylococcus epidermidis  Organism: Staphylococcus epidermidis (10-22-20 @ 12:16)  Organism: Staphylococcus epidermidis (10-22-20 @ 12:16)      -  Ampicillin/Sulbactam: R <=8/4      -  Cefazolin: R <=4      -  Clindamycin: R >4      -  Erythromycin: R >4      -  Gentamicin: R >8 Should not be used as monotherapy      -  Oxacillin: R >2      -  Penicillin: R 8      -  RIF- Rifampin: S <=1 Should not be used as monotherapy      -  Tetra/Doxy: S <=1      -  Trimethoprim/Sulfamethoxazole: R >2/38      -  Vancomycin: S 2      Method Type: COURTNEY    Culture - Blood (collected 10-19-20 @ 09:18)  Source: .Blood Blood-Peripheral  Gram Stain (10-21-20 @ 10:55):    Growth in anaerobic bottle: Gram Positive Cocci in Clusters  Final Report (10-22-20 @ 12:17):    Growth in anaerobic bottle: Staphylococcus epidermidis    "Susceptibilities not performed"    "Due to technical problems, Proteus sp. will Not be reported as part of    the BCID panel until further notice"    ***Blood Panel PCR results on this specimen areavailable    approximately 3 hours after the Gram stain result.***    Gram stain, PCR, and/or culture results may not always    correspond due to difference in methodologies.    ************************************************************    This PCR assay wasperformed using [employer].    The following targets are tested for: Enterococcus,    vancomycin resistant enterococci, Listeria monocytogenes,    coagulase negative staphylococci, S. aureus,    methicillin resistant S. aureus, Streptococcus agalactiae    (Group B), S. pneumoniae, S. pyogenes (Group A),    Acinetobacter baumannii, Enterobacter cloacae, E. coli,    Klebsiella oxytoca, K. pneumoniae, Proteus sp.,    Serratia marcescens, Haemophilus influenzae,    Neisseria meningitidis, Pseudomonas aeruginosa, Candida    albicans, C. glabrata, C krusei, C parapsilosis,    C. tropicalis and the KPC resistance gene.  Organism: Blood Culture PCR (10-21-20 @ 10:55)  Organism: Blood Culture PCR (10-21-20 @ 10:55)      -  Coagulase negative Staphylococcus: Detec      Method Type: PCR    EKG: sinus rhythm    RADIOLOGY & ADDITIONAL STUDIES:  < from: Xray Chest 1 View-PORTABLE IMMEDIATE (Xray Chest 1 View-PORTABLE IMMEDIATE .) (10.22.20 @ 06:33) >  EXAM:  XR CHEST PORTABLE IMMED 1V                          PROCEDURE DATE:  10/22/2020      INTERPRETATION:  AP semierect chest on October 22, 2020 at 6:04 AM. Patient is hypoxic.    Heart is magnified by technique. The aorta is prominent anduncoiled.    Sternotomy again noted.    There is no sense of active lung or pleural finding.    Chest is similar to October 18.    IMPRESSION: No acute finding or change.    MCKENNA RANDOLPH M.D., ATTENDING RADIOLOGIST  This document has been electronically signed. Oct 22 2020 10:15AM    ASSESSMENT AND PLAN:  ·	Acute metabolic Encephalopathy.  ·	Staph Epi Bacteremia.  ·	Acute COPD exacerbation.  ·	Chronic diastolic CHF,  ·	Chronic hypoxic hypercarbic Respiratory failure.  ·	Severe pHTN  ·	Moderate TR.  ·	Renal Insuffiencey.  ·	Chronic A Fib.  ·	Hypothyroidism.  ·	Anemia.  ·	Hypokalemia.    Continue O2 with nocturnal and PRN BIPAP.  Nebulizer as needed.  Replace potassium, follow electrolytes.  Discussed with daughter via phone.

## 2020-10-22 NOTE — PROGRESS NOTE ADULT - SUBJECTIVE AND OBJECTIVE BOX
[facility]  Division of Infectious Diseases  [phone]    Name: FREDERICK MA  Age: 89y  Gender: Female  MRN: [id number]    Interval History--  Notes reviewed. On NIPPV.  Reviewed with Dr. Tello, for CT chest and ECHO.  Patient somewhat lethargic, not hungry.     Past Medical History--  Essential hypertension    Hypothyroidism    Atrial fibrillation    COPD exacerbation    No significant past surgical history        For details regarding the patient's social history, family history, and other miscellaneous elements, please refer the initial infectious diseases consultation and/or the admitting history and physical examination for this admission.    Allergies    No Known Allergies    Intolerances        Medications--  Antibiotics:  piperacillin/tazobactam IVPB.. 3.375 Gram(s) IV Intermittent every 8 hours  vancomycin  IVPB 750 milliGRAM(s) IV Intermittent every 24 hours    Immunologic:    Other:  acetaminophen   Tablet .. PRN  ALBUTerol    90 MICROgram(s) HFA Inhaler PRN  ALPRAZolam PRN  diltiazem   CD  levothyroxine  meclizine PRN  ondansetron Injectable PRN  pantoprazole    Tablet  sildenafil (REVATIO)      Review of Systems--  Review of systems unable secondary to clinical condition.     Physical Examination--  Vital Signs: T(F): 98.3 (10-22-20 @ 11:07), Max: 98.4 (10-21-20 @ 15:23)  HR: 73 (10-22-20 @ 11:07)  BP: 104/64 (10-22-20 @ 11:07)  RR: 20 (10-22-20 @ 11:07)  SpO2: 97% (10-22-20 @ 11:07)  Wt(kg): --  General: Nontoxic-appearing Female in no acute distress.  HEENT: AT/NC. Anicteric. Conjunctiva pink and moist. Cris unable due to NIPPV  Neck: Not rigid. No sense of mass.  Nodes: None palpable.  Lungs: Diminished breath sounds bilaterally, scant bibasliar rales  Heart: Irregularly irregular, No Murmur. No rub. No gallop. No palpable thrill.  Abdomen: Bowel sounds present and normoactive. Soft. Nondistended. Nontender. No sense of mass. No organomegaly.  Extremities: No cyanosis or clubbing. No edema.   Skin: Warm. Dry. Fragile, no rash  Psychiatric: Appropriate affect and mood for situation      Laboratory Studies--  CBC                        9.8    5.88  )-----------( 126      ( 22 Oct 2020 08:02 )             30.2       Chemistries  10-22    145  |  109<H>  |  22  ----------------------------<  107<H>  3.5   |  32<H>  |  0.90    Ca    8.4<L>      22 Oct 2020 07:27  Phos  2.9     10-22  Mg     2.3     10-22    TPro  6.5  /  Alb  2.8<L>  /  TBili  0.6  /  DBili  x   /  AST  21  /  ALT  25  /  AlkPhos  53  10-21      Culture Data    Culture - Blood (collected 20 Oct 2020 23:03)  Source: .Blood Blood  Preliminary Report (22 Oct 2020 01:02):    No growth to date.    Culture - Blood (collected 20 Oct 2020 23:03)  Source: .Blood Blood  Preliminary Report (22 Oct 2020 01:02):    No growth to date.    Culture - Urine (collected 19 Oct 2020 09:24)  Source: .Urine Clean Catch (Midstream)  Final Report (20 Oct 2020 07:44):    No growth    Culture - Blood (collected 19 Oct 2020 09:18)  Source: .Blood Blood-Peripheral  Gram Stain (prelim) (20 Oct 2020 16:35):    Growth in anaerobic bottle: Gram Positive Cocci in Clusters  Preliminary Report (21 Oct 2020 17:55):    Growth in anaerobic bottle: Staphylococcus epidermidis    Susceptibility to follow.    Culture - Blood (collected 19 Oct 2020 09:18)  Source: .Blood Blood-Peripheral  Gram Stain (21 Oct 2020 10:55):    Growth in anaerobic bottle: Gram Positive Cocci in Clusters  Final Report (21 Oct 2020 10:55):    Growth in anaerobic bottle: Staphylococcus epidermidis    Coag Negative Staphylococcus    Single set isolate, possible contaminant. Contact    Microbiology if susceptibility testing clinically    indicated.    "Due to technical problems, Proteus sp. will Notbe reported as part of    the BCID panel until further notice"    ***Blood Panel PCR results on this specimen are available    approximately 3 hours after the Gram stain result.***    Gram stain, PCR, and/or culture results may not always    correspond due to difference in methodologies.    ************************************************************    This PCR assay was performed using [employer].    The following targets are tested for: Enterococcus,    vancomycin resistant enterococci, Listeria monocytogenes,    coagulase negative staphylococci, S. aureus,    methicillin resistant S. aureus, Streptococcus agalactiae    (Group B), S. pneumoniae, S. pyogenes (Group A),    Acinetobacter baumannii, Enterobacter cloacae, E. coli,    Klebsiella oxytoca, K. pneumoniae, Proteus sp.,    Serratia marcescens, Haemophilus influenzae,    Neisseria meningitidis, Pseudomonas aeruginosa, Candida    albicans, C. glabrata, C krusei, C parapsilosis,    C. tropicalis and the KPC resistance gene.  Organism: Blood Culture PCR (21 Oct 2020 10:55)  Organism: Blood Culture PCR (21 Oct 2020 10:55)    < from: Xray Chest 1 View-PORTABLE IMMEDIATE (Xray Chest 1 View-PORTABLE IMMEDIATE .) (10.22.20 @ 06:33) >  EXAM:  XR CHEST PORTABLE IMMED 1V                            PROCEDURE DATE:  10/22/2020          INTERPRETATION:  AP semierect chest on October 22, 2020 at 6:04 AM. Patient is hypoxic.    Heart is magnified by technique. The aorta is prominent anduncoiled.    Sternotomy again noted.    There is no sense of active lung or pleural finding.    Chest is similar to October 18.    IMPRESSION: No acute finding or change.    < end of copied text >

## 2020-10-22 NOTE — PROGRESS NOTE ADULT - SUBJECTIVE AND OBJECTIVE BOX
House PA Note    Patient is a 89y old  Female who presents with a chief complaint of Sepsis, AMS (21 Oct 2020 13:21). RRT was called due to pt became hypoxic on NC, 2 LPM. Pt sc/o SOB. pt denies any CP/Dizziness/other complaints.       Review of symptoms:   Cardiac:  + dyspnea. No chest pain. No palpitations.  Respiratory: + SOB, No cough.   Gastrointestinal: No diarrhea. No abdominal pain. No bleeding.       MEDICATIONS  (STANDING):  albuterol/ipratropium for Nebulization. 3 milliLiter(s) Nebulizer once  diltiazem    milliGRAM(s) Oral daily  furosemide   Injectable 20 milliGRAM(s) IV Push once  levothyroxine 50 MICROGram(s) Oral daily  pantoprazole    Tablet 40 milliGRAM(s) Oral before breakfast  piperacillin/tazobactam IVPB.. 3.375 Gram(s) IV Intermittent every 8 hours  potassium phosphate / sodium phosphate Tablet (K-PHOS No. 2) 1 Tablet(s) Oral four times a day with meals  sildenafil (REVATIO) 20 milliGRAM(s) Oral every 8 hours  vancomycin  IVPB 750 milliGRAM(s) IV Intermittent every 24 hours    MEDICATIONS  (PRN):  acetaminophen   Tablet .. 650 milliGRAM(s) Oral every 6 hours PRN Temp greater or equal to 38C (100.4F), Mild Pain (1 - 3)  ALBUTerol    90 MICROgram(s) HFA Inhaler 2 Puff(s) Inhalation every 6 hours PRN Shortness of Breath and/or Wheezing  ALPRAZolam 0.25 milliGRAM(s) Oral at bedtime PRN anxiety  meclizine 12.5 milliGRAM(s) Oral every 6 hours PRN Dizziness  ondansetron Injectable 4 milliGRAM(s) IV Push every 6 hours PRN Nausea and/or Vomiting            Vital Signs Last 24 Hrs  T(C): 36.5 (22 Oct 2020 00:30), Max: 36.9 (21 Oct 2020 15:23)  T(F): 97.7 (22 Oct 2020 00:30), Max: 98.4 (21 Oct 2020 15:23)  HR: 95 (22 Oct 2020 00:30) (68 - 95)  BP: 151/71 (22 Oct 2020 00:30) (133/64 - 153/79)  BP(mean): --  RR: 18 (22 Oct 2020 00:30) (18 - 18)  SpO2: 94% (22 Oct 2020 00:30) (93% - 96%)              LABS:                        10.2   9.30  )-----------( 136      ( 21 Oct 2020 07:57 )             32.7     10-21    143  |  109<H>  |  35<H>  ----------------------------<  96  3.8   |  30  |  1.00    Ca    8.5      21 Oct 2020 07:57  Phos  2.4     10-21  Mg     2.2     10-21    TPro  6.5  /  Alb  2.8<L>  /  TBili  0.6  /  DBili  x   /  AST  21  /  ALT  25  /  AlkPhos  53  10-21        PT/INR - ( 21 Oct 2020 07:57 )   PT: 35.8 sec;   INR: 3.27 ratio             I&O's Summary    20 Oct 2020 07:01  -  21 Oct 2020 07:00  --------------------------------------------------------  IN: 868 mL / OUT: 1100 mL / NET: -232 mL    21 Oct 2020 07:01  -  22 Oct 2020 05:51  --------------------------------------------------------  IN: 350 mL / OUT: 0 mL / NET: 350 mL      BNP  LIVER FUNCTIONS - ( 21 Oct 2020 07:57 )  Alb: 2.8 g/dL / Pro: 6.5 gm/dL / ALK PHOS: 53 U/L / ALT: 25 U/L / AST: 21 U/L / GGT: x           RADIOLOGY & ADDITIONAL STUDIES:        Physical Exam:  General; Pt with increased WOB,   HEENT:   Normal oral mucosa, PERRL, EOMI	  Lymphatic: No lymphadenopathy , no edema  Cardiovascular: Normal S1 S2, + systolic murmur- 2/6, No JVD,  Peripheral pulses palpable 1+ bilaterally  Respiratory: + bibasilar rales, increased effort 	  Gastrointestinal:  Soft, Non-tender, + BS	  Skin: No rashes, No ecchymoses, No cyanosis, warm to touch  Musculoskeletal: Normal range of motion, normal strength  Neurology: awake and alert, A&O x 0, cranial nerves intact, motor and sensory strenghts are intact, no focal weakness, no drift, no dysarthria, no dysmetria,   Psychiatry:  Mood & affect appropriate              Time spend-- History and Physical, Telehealth, History of Present Illness, Allergies/Medications, Patient History, Risk Assessment, Physical Exam, Labs and Results, Assessment and Plan, discussing with patient and Attending              House PA Note    Patient is a 89y old  Female who presents with a chief complaint of Sepsis, AMS (21 Oct 2020 13:21). RRT was called due to pt became hypoxic on NC, 2 LPM__ SpO2-- 81 % Pt sc/o SOB. pt denies any CP/Dizziness/other complaints.       Review of symptoms:   Cardiac:  + dyspnea. No chest pain. No palpitations.  Respiratory: + SOB, No cough.   Gastrointestinal: No diarrhea. No abdominal pain. No bleeding.       MEDICATIONS  (STANDING):  albuterol/ipratropium for Nebulization. 3 milliLiter(s) Nebulizer once  diltiazem    milliGRAM(s) Oral daily  furosemide   Injectable 20 milliGRAM(s) IV Push once  levothyroxine 50 MICROGram(s) Oral daily  pantoprazole    Tablet 40 milliGRAM(s) Oral before breakfast  piperacillin/tazobactam IVPB.. 3.375 Gram(s) IV Intermittent every 8 hours  potassium phosphate / sodium phosphate Tablet (K-PHOS No. 2) 1 Tablet(s) Oral four times a day with meals  sildenafil (REVATIO) 20 milliGRAM(s) Oral every 8 hours  vancomycin  IVPB 750 milliGRAM(s) IV Intermittent every 24 hours    MEDICATIONS  (PRN):  acetaminophen   Tablet .. 650 milliGRAM(s) Oral every 6 hours PRN Temp greater or equal to 38C (100.4F), Mild Pain (1 - 3)  ALBUTerol    90 MICROgram(s) HFA Inhaler 2 Puff(s) Inhalation every 6 hours PRN Shortness of Breath and/or Wheezing  ALPRAZolam 0.25 milliGRAM(s) Oral at bedtime PRN anxiety  meclizine 12.5 milliGRAM(s) Oral every 6 hours PRN Dizziness  ondansetron Injectable 4 milliGRAM(s) IV Push every 6 hours PRN Nausea and/or Vomiting            Vital Signs Last 24 Hrs  T(C): 36.5 (22 Oct 2020 00:30), Max: 36.9 (21 Oct 2020 15:23)  T(F): 97.7 (22 Oct 2020 00:30), Max: 98.4 (21 Oct 2020 15:23)  HR: 95 (22 Oct 2020 00:30) (68 - 95)  BP: 151/71 (22 Oct 2020 00:30) (133/64 - 153/79)  BP(mean): --  RR: 18 (22 Oct 2020 00:30) (18 - 18)  SpO2: 94% (22 Oct 2020 00:30) (93% - 96%)              LABS:                        10.2   9.30  )-----------( 136      ( 21 Oct 2020 07:57 )             32.7     10-21    143  |  109<H>  |  35<H>  ----------------------------<  96  3.8   |  30  |  1.00    Ca    8.5      21 Oct 2020 07:57  Phos  2.4     10-21  Mg     2.2     10-21    TPro  6.5  /  Alb  2.8<L>  /  TBili  0.6  /  DBili  x   /  AST  21  /  ALT  25  /  AlkPhos  53  10-21        PT/INR - ( 21 Oct 2020 07:57 )   PT: 35.8 sec;   INR: 3.27 ratio             I&O's Summary    20 Oct 2020 07:01  -  21 Oct 2020 07:00  --------------------------------------------------------  IN: 868 mL / OUT: 1100 mL / NET: -232 mL    21 Oct 2020 07:01  -  22 Oct 2020 05:51  --------------------------------------------------------  IN: 350 mL / OUT: 0 mL / NET: 350 mL      BNP  LIVER FUNCTIONS - ( 21 Oct 2020 07:57 )  Alb: 2.8 g/dL / Pro: 6.5 gm/dL / ALK PHOS: 53 U/L / ALT: 25 U/L / AST: 21 U/L / GGT: x           RADIOLOGY & ADDITIONAL STUDIES:        Physical Exam:  General; Pt with increased WOB,   HEENT:   Normal oral mucosa, PERRL, EOMI	  Lymphatic: No lymphadenopathy , no edema  Cardiovascular: Normal S1 S2, + systolic murmur- 2/6, No JVD,  Peripheral pulses palpable 1+ bilaterally  Respiratory: + bibasilar rales, increased effort 	  Gastrointestinal:  Soft, Non-tender, + BS	  Skin: No rashes, No ecchymoses, No cyanosis, warm to touch  Musculoskeletal: Normal range of motion, normal strength  Neurology: awake and alert, A&O x 0, cranial nerves intact, motor and sensory strenghts are intact, no focal weakness, no drift, no dysarthria, no dysmetria,   Psychiatry:  Mood & affect appropriate                           House PA Note    Patient is a 89y old  Female who presents with a chief complaint of Sepsis, AMS (21 Oct 2020 13:21). RRT was called due to pt became hypoxic on NC, 2 LPM__ SpO2-- 81 % Pt sc/o SOB. pt denies any CP/Dizziness/other complaints.       Review of symptoms:   Cardiac:  + dyspnea. No chest pain. No palpitations.  Respiratory: + SOB, No cough.   Gastrointestinal: No diarrhea. No abdominal pain. No bleeding.       MEDICATIONS  (STANDING):  albuterol/ipratropium for Nebulization. 3 milliLiter(s) Nebulizer once  diltiazem    milliGRAM(s) Oral daily  furosemide   Injectable 20 milliGRAM(s) IV Push once  levothyroxine 50 MICROGram(s) Oral daily  pantoprazole    Tablet 40 milliGRAM(s) Oral before breakfast  piperacillin/tazobactam IVPB.. 3.375 Gram(s) IV Intermittent every 8 hours  potassium phosphate / sodium phosphate Tablet (K-PHOS No. 2) 1 Tablet(s) Oral four times a day with meals  sildenafil (REVATIO) 20 milliGRAM(s) Oral every 8 hours  vancomycin  IVPB 750 milliGRAM(s) IV Intermittent every 24 hours    MEDICATIONS  (PRN):  acetaminophen   Tablet .. 650 milliGRAM(s) Oral every 6 hours PRN Temp greater or equal to 38C (100.4F), Mild Pain (1 - 3)  ALBUTerol    90 MICROgram(s) HFA Inhaler 2 Puff(s) Inhalation every 6 hours PRN Shortness of Breath and/or Wheezing  ALPRAZolam 0.25 milliGRAM(s) Oral at bedtime PRN anxiety  meclizine 12.5 milliGRAM(s) Oral every 6 hours PRN Dizziness  ondansetron Injectable 4 milliGRAM(s) IV Push every 6 hours PRN Nausea and/or Vomiting            Vital Signs Last 24 Hrs  T(C): 36.5 (22 Oct 2020 00:30), Max: 36.9 (21 Oct 2020 15:23)  T(F): 97.7 (22 Oct 2020 00:30), Max: 98.4 (21 Oct 2020 15:23)  HR: 95 (22 Oct 2020 00:30) (68 - 95)  BP: 151/71 (22 Oct 2020 00:30) (133/64 - 153/79)  BP(mean): --  RR: 18 (22 Oct 2020 00:30) (18 - 18)  SpO2: 94% (22 Oct 2020 00:30) (93% - 96%)              LABS:                        10.2   9.30  )-----------( 136      ( 21 Oct 2020 07:57 )             32.7     10-21    143  |  109<H>  |  35<H>  ----------------------------<  96  3.8   |  30  |  1.00    Ca    8.5      21 Oct 2020 07:57  Phos  2.4     10-21  Mg     2.2     10-21    TPro  6.5  /  Alb  2.8<L>  /  TBili  0.6  /  DBili  x   /  AST  21  /  ALT  25  /  AlkPhos  53  10-21        PT/INR - ( 21 Oct 2020 07:57 )   PT: 35.8 sec;   INR: 3.27 ratio             I&O's Summary    20 Oct 2020 07:01  -  21 Oct 2020 07:00  --------------------------------------------------------  IN: 868 mL / OUT: 1100 mL / NET: -232 mL    21 Oct 2020 07:01  -  22 Oct 2020 05:51  --------------------------------------------------------  IN: 350 mL / OUT: 0 mL / NET: 350 mL      BNP  LIVER FUNCTIONS - ( 21 Oct 2020 07:57 )  Alb: 2.8 g/dL / Pro: 6.5 gm/dL / ALK PHOS: 53 U/L / ALT: 25 U/L / AST: 21 U/L / GGT: x           RADIOLOGY & ADDITIONAL STUDIES:  -- CXR from 10/22/20-- mildly increased interstitial markings,         Physical Exam:  General; Pt with increased WOB,   HEENT:   Normal oral mucosa, PERRL, EOMI	  Lymphatic: No lymphadenopathy , no edema  Cardiovascular: Normal S1 S2, + systolic murmur- 2/6, No JVD,  Peripheral pulses palpable 1+ bilaterally  Respiratory: + bibasilar rales, increased effort 	  Gastrointestinal:  Soft, Non-tender, + BS	  Skin: No rashes, No ecchymoses, No cyanosis, warm to touch  Musculoskeletal: Normal range of motion, normal strength  Neurology: awake and alert, A&O x 0, cranial nerves intact, motor and sensory strenghts are intact, no focal weakness, no drift, no dysarthria, no dysmetria,   Psychiatry:  Mood & affect appropriate

## 2020-10-22 NOTE — PROGRESS NOTE ADULT - ASSESSMENT
acute on chronic CHF Hypoxic respiratory failure  Acute on diastolic chronic CHF   Sepsis, R/o PNA   Moderate AS  COPD on O2  severe pulmonary HTN  hypothyroidism  a fib on Coumadin  PMH/ PNA    # Hypoxic respiratory failure possibly in the setting of acute on chronic CHF,   -- Lasix 20 mg IVP given, pt has god UOP O/N- 400 cc over 11 hrs,   -- last TTE from 06/01/2020 -- moderate AS and TR, preserved EF, repeat TTE pending   -- pt O2 requirements increased, pt was placed on 40 % venti mask, SpO2 improved to 89-91 %   -- Bipap qhs, (pt has been using BIpap at night last admissions,   -- continue monitoring    -- Time spend-- 45 min, History and Physical, Allergies/Medications, Patient History, Risk Assessment, Physical Exam, Labs and Results, Assessment and Plan, discussing with Dr Macedo and nursing staff,      Hypoxic respiratory failure  Acute on chronic diastolic CHF   Sepsis, R/o PNA   Moderate AS  COPD on O2  severe pulmonary HTN  hypothyroidism  a fib on Coumadin  PMH/ PNA    # Hypoxic respiratory failure possibly in the setting of acute on chronic CHF,   -- Lasix 20 mg IVP given, pt has god UOP O/N- 400 cc over 11 hrs,   -- last TTE from 06/01/2020 -- moderate AS and TR, preserved EF, repeat TTE pending   -- pt O2 requirements increased, pt was placed on 40 % venti mask, SpO2 improved to 89-91 %   -- Bipap qhs, (pt has been using BIpap at night last admissions,   -- continue monitoring    -- Time spend-- 45 min, History and Physical, Allergies/Medications, Patient History, Risk Assessment, Physical Exam, Labs and Results, Assessment and Plan, discussing with Dr Macedo and nursing staff,      Hypoxic respiratory failure  Acute on chronic diastolic CHF   Sepsis, R/o PNA   Moderate AS  COPD on O2  severe pulmonary HTN  hypothyroidism  a fib on Coumadin  PMH/ PNA    # Hypoxic respiratory failure possibly in the setting of acute on chronic CHF,   -- CXR with mildly increased interstitial markings,   -- Lasix 20 mg IVP given, pt has god UOP O/N- 400 cc over 11 hrs,   -- last TTE from 06/01/2020 -- moderate AS and TR, preserved EF, repeat TTE pending   -- pt O2 requirements increased, pt was placed on 40 % venti mask, SpO2 improved to 89-91 %   -- Bipap qhs, (pt has been using BIpap at night last admissions,   -- continue monitoring    -- Time spend-- 45 min, History and Physical, Allergies/Medications, Patient History, Risk Assessment, Physical Exam, Labs and Results, Assessment and Plan, discussing with Dr Macedo and nursing staff,

## 2020-10-23 LAB
ANION GAP SERPL CALC-SCNC: 3 MMOL/L — LOW (ref 5–17)
BUN SERPL-MCNC: 20 MG/DL — SIGNIFICANT CHANGE UP (ref 7–23)
CALCIUM SERPL-MCNC: 8.5 MG/DL — SIGNIFICANT CHANGE UP (ref 8.5–10.1)
CHLORIDE SERPL-SCNC: 104 MMOL/L — SIGNIFICANT CHANGE UP (ref 96–108)
CO2 SERPL-SCNC: 36 MMOL/L — HIGH (ref 22–31)
CREAT SERPL-MCNC: 1.09 MG/DL — SIGNIFICANT CHANGE UP (ref 0.5–1.3)
GLUCOSE SERPL-MCNC: 88 MG/DL — SIGNIFICANT CHANGE UP (ref 70–99)
INR BLD: 1.94 RATIO — HIGH (ref 0.88–1.16)
MAGNESIUM SERPL-MCNC: 1.9 MG/DL — SIGNIFICANT CHANGE UP (ref 1.6–2.6)
POTASSIUM SERPL-MCNC: 3.3 MMOL/L — LOW (ref 3.5–5.3)
POTASSIUM SERPL-SCNC: 3.3 MMOL/L — LOW (ref 3.5–5.3)
PROTHROM AB SERPL-ACNC: 21.8 SEC — HIGH (ref 10.6–13.6)
SODIUM SERPL-SCNC: 143 MMOL/L — SIGNIFICANT CHANGE UP (ref 135–145)

## 2020-10-23 PROCEDURE — 99233 SBSQ HOSP IP/OBS HIGH 50: CPT

## 2020-10-23 RX ORDER — FUROSEMIDE 40 MG
40 TABLET ORAL DAILY
Refills: 0 | Status: DISCONTINUED | OUTPATIENT
Start: 2020-10-23 | End: 2020-10-24

## 2020-10-23 RX ORDER — POTASSIUM CHLORIDE 20 MEQ
40 PACKET (EA) ORAL EVERY 4 HOURS
Refills: 0 | Status: COMPLETED | OUTPATIENT
Start: 2020-10-23 | End: 2020-10-23

## 2020-10-23 RX ADMIN — Medication 40 MILLIGRAM(S): at 18:00

## 2020-10-23 RX ADMIN — Medication 360 MILLIGRAM(S): at 05:01

## 2020-10-23 RX ADMIN — Medication 20 MILLIGRAM(S): at 12:48

## 2020-10-23 RX ADMIN — Medication 40 MILLIEQUIVALENT(S): at 18:00

## 2020-10-23 RX ADMIN — Medication 40 MILLIEQUIVALENT(S): at 22:34

## 2020-10-23 RX ADMIN — Medication 650 MILLIGRAM(S): at 13:15

## 2020-10-23 RX ADMIN — Medication 50 MICROGRAM(S): at 05:01

## 2020-10-23 RX ADMIN — PANTOPRAZOLE SODIUM 40 MILLIGRAM(S): 20 TABLET, DELAYED RELEASE ORAL at 05:01

## 2020-10-23 RX ADMIN — Medication 20 MILLIGRAM(S): at 22:34

## 2020-10-23 RX ADMIN — Medication 650 MILLIGRAM(S): at 12:48

## 2020-10-23 RX ADMIN — Medication 20 MILLIGRAM(S): at 05:01

## 2020-10-23 NOTE — PROGRESS NOTE ADULT - SUBJECTIVE AND OBJECTIVE BOX
[facility]  Division of Infectious Diseases  [phone]    Name: FREDERICK MA  Age: 89y  Gender: Female  MRN: [id number]    Interval History--  Notes reviewed. Seen earlier today. No specific complaints. Comfortable on NC. Being fed dinner, per staff doesn't want to do it herself (???). Vague complaints of weakness and fatigue. Does not elaborate.     Past Medical History--  Essential hypertension    Hypothyroidism    Atrial fibrillation    COPD exacerbation    No significant past surgical history        For details regarding the patient's social history, family history, and other miscellaneous elements, please refer the initial infectious diseases consultation and/or the admitting history and physical examination for this admission.    Allergies    No Known Allergies    Intolerances        Medications--  Antibiotics:    Immunologic:    Other:  acetaminophen   Tablet .. PRN  ALBUTerol    90 MICROgram(s) HFA Inhaler PRN  ALPRAZolam PRN  diltiazem   CD  furosemide    Tablet  levothyroxine  meclizine PRN  ondansetron Injectable PRN  pantoprazole    Tablet  sildenafil (REVATIO)      Review of Systems--  A 10-point review of systems was obtained.   Review of systems otherwise negative except as previously noted.    Physical Examination--  Vital Signs: T(F): 97.9 (10-23-20 @ 10:55), Max: 98.3 (10-22-20 @ 23:47)  HR: 80 (10-23-20 @ 12:15)  BP: 130/67 (10-23-20 @ 10:55)  RR: 18 (10-23-20 @ 10:55)  SpO2: 96% (10-23-20 @ 12:15)  Wt(kg): --  General: Nontoxic-appearing Female in no acute distress.  HEENT: AT/NC. Anicteric. Conjunctiva pink and moist. Cris clear.   Neck: Not rigid. No sense of mass.  Nodes: None palpable.  Lungs: Diminished breath sounds bilaterally, scant bibasliar rales  Heart: Irregularly irregular, No Murmur. No rub. No gallop. No palpable thrill.  Abdomen: Bowel sounds present and normoactive. Soft. Nondistended. Nontender. No sense of mass. No organomegaly.  Extremities: No cyanosis or clubbing. No edema.   Skin: Warm. Dry. Fragile, no rash  Psychiatric: Appropriate affect and mood for situation        Laboratory Studies--  CBC                        9.8    5.88  )-----------( 126      ( 22 Oct 2020 08:02 )             30.2       Chemistries  10-23    143  |  104  |  20  ----------------------------<  88  3.3<L>   |  36<H>  |  1.09    Ca    8.5      23 Oct 2020 07:31  Phos  2.9     10-22  Mg     1.9     10-23        Culture Data    Culture - Blood (collected 20 Oct 2020 23:03)  Source: .Blood Blood  Preliminary Report (22 Oct 2020 01:02):    No growth to date.    Culture - Blood (collected 20 Oct 2020 23:03)  Source: .Blood Blood  Preliminary Report (22 Oct 2020 01:02):    No growth to date.    Culture - Urine (collected 19 Oct 2020 09:24)  Source: .Urine Clean Catch (Midstream)  Final Report (20 Oct 2020 07:44):    No growth    Culture - Blood (collected 19 Oct 2020 09:18)  Source: .Blood Blood-Peripheral  Gram Stain (22 Oct 2020 12:16):    Growth in anaerobic bottle: Gram Positive Cocci in Clusters  Final Report (22 Oct 2020 12:16):    Growth in anaerobic bottle: Staphylococcus epidermidis  Organism: Staphylococcus epidermidis (22 Oct 2020 12:16)  Organism: Staphylococcus epidermidis (22 Oct 2020 12:16)    Culture - Blood (collected 19 Oct 2020 09:18)  Source: .Blood Blood-Peripheral  Gram Stain (21 Oct 2020 10:55):    Growth in anaerobic bottle: Gram Positive Cocci in Clusters  Final Report (22 Oct 2020 12:17):    Growth in anaerobic bottle: Staphylococcus epidermidis    "Susceptibilities not performed"    "Due to technical problems, Proteus sp. will Not be reported as part of    the BCID panel until further notice"    ***Blood Panel PCR results on this specimen areavailable    approximately 3 hours after the Gram stain result.***    Gram stain, PCR, and/or culture results may not always    correspond due to difference in methodologies.    ************************************************************    This PCR assay wasperformed using [employer].    The following targets are tested for: Enterococcus,    vancomycin resistant enterococci, Listeria monocytogenes,    coagulase negative staphylococci, S. aureus,    methicillin resistant S. aureus, Streptococcus agalactiae    (Group B), S. pneumoniae, S. pyogenes (Group A),    Acinetobacter baumannii, Enterobacter cloacae, E. coli,    Klebsiella oxytoca, K. pneumoniae, Proteus sp.,    Serratia marcescens, Haemophilus influenzae,    Neisseria meningitidis, Pseudomonas aeruginosa, Candida    albicans, C. glabrata, C krusei, C parapsilosis,    C. tropicalis and the KPC resistance gene.  Organism: Blood Culture PCR (21 Oct 2020 10:55)  Organism: Blood Culture PCR (21 Oct 2020 10:55)    < from: CT Angio Chest w/ IV Cont (10.22.20 @ 14:00) >    EXAM:  CT ANGIO CHEST (W)AW IC                            PROCEDURE DATE:  10/22/2020          INTERPRETATION:  CLINICAL INFORMATION: Short of breath    COMPARISON: 7/20/2020    PROCEDURE:  CT Angiography of the Chest.  90 ml of Omnipaque 350 was injected intravenously. 10 ml were discarded.  Sagittal and coronal reformats were performed as well as 3D (MIP) reconstructions.    FINDINGS:    LUNGS AND AIRWAYS: Patent central airways.  Bilateral moderate layering nearly symmetric pleural effusions. Prominent subpleural interlobular septa suggests interstitial edema. Mild scattered bilateral groundglass opacity likely congestive. Findings suggest an element of CHF/fluid overload. No focal consolidation  PLEURA: As above  MEDIASTINUM AND URBAN: Scattered nonspecific subcentimeter mediastinal lymph nodes..  VESSELS: Negative for pulmonary emboli. Nonaneurysmal thoracic aorta. Prominent pulmonary artery may reflect pulmonary hypertension. Reflux of contrast into IVC and hepatic veins suggests an element of right heart.  HEART: Enlarged with coronary artery calcification. No pericardial effusion.  CHEST WALL AND LOWER NECK: Within normal limits.  VISUALIZED UPPER ABDOMEN: Small 3.0 cm infrarenal abdominal aortic aneurysm.  BONES: Degenerative changes.    IMPRESSION:  Negative for pulmonary emboli.  Constellation of findings suggesting a component of CHF/fluid overload with bilateral pleural effusions.  Findings as discussed              NAVA ZELAYA MD; Attending Radiologist  This document has been electronically signed. Oct 22 2020  2:31PM    < end of copied text >      < from: TTE Echo Complete w/o Contrast w/ Doppler (10.22.20 @ 09:39) >  Summary:   1. Left ventricular ejection fraction, by visual estimation, is 60 to 65%.   2. Mildly enlarged leftatrium.   3. Mildly enlarged right atrium.   4. Mild mitral valve regurgitation.   5. Mild thickening and calcification of the anterior and posterior mitral valve leaflets.   6. Moderate tricuspid regurgitation.   7. Mild aortic valve stenosis.   8. Mild to moderate pulmonic valve regurgitation.   9. Estimated pulmonary artery systolic pressure is 48.9 mmHg assuming a right atrial pressure of 8 mmHg, which is consistent with mild pulmonary hypertension.  10. No vegetation is seen, a finding whichdoes not exclude endocarditis.    < end of copied text >

## 2020-10-23 NOTE — PROGRESS NOTE ADULT - REASON FOR ADMISSION
Sepsis, AMS

## 2020-10-23 NOTE — PROGRESS NOTE ADULT - ASSESSMENT
89 years old Slovak speaking female, speaks some English, attempted to use  [id number], the patient was answering questions asked. As per pt's daughter, the pt speaks Slovak dialect and does not understand traditional Slovak, the pt is A&Ox 3 at the baseline.   The pt is with PMH of HTN, COPD on home O2, CHF, severe pulmonary HTN, Hypothyroidism, Afib on coumadin, s/p recent hospitalizations, one in July 2020 with COPD exacerbation and bilateral pneumonia, and September 30- October 4th with acute CHF, Afib with RVR. As per H&P, the pt came to the ED with complains of fevers, non-productive cough, altered mental status. Spoke with pt's daughter, reports that the patient was lethargic last Friday and Saturday, spiked a fever at home, temp unknown, had right index finger swelling, was seen by home care physician who recommended to go to the hospital due to fevers.   The patient spiked a fever 102 on the day of the admission, afebrile since then, + leukocytosis normalized, UA was negative, COVID 19 testing and RVP negative, blood cultures grew G+ cocci in clusters, coag negative staph, chest Xray is negative.  At the moment the pt is in bed, no distress, reports mild shortness of breath, no supplemental O2, no chest pain, no abdominal pain, no nausea or vomiting, no diarrhea, no dysuria.   The patient was given Vancomycin 1 gram IV one time dose in the ED and then started on Vancomycin 750 mg IV q 24 hours and Zosyn 3.375 g IV q 8 hours. Vancomycin trough was drawn yesterday in the morning - 15.8, and today in the morning 8.4. Vancomycin was discontinued.     10/21: no further fevers. WBC normal. Exam unrevealing. Follow up blood cultures pending. I do not see a role to alter antibiotics at present, but their role is TBD. Not 100% clear to me that these +Cx represent true pathogens  10/22: F/U Cx NTD, remain unconvinced that BC are pathogens. Awaiting ECHO. Has COPD hx,m for CT chest today to exclude other pathology. No ABG. CXR unchanged. Does not look grossly volume overloaded to implicate CHF, either.   10/23: No concern for active infection on the basis of exam. ECHO unrevealing, CT C.W CHF. Doubt blood isolates represent pathogens.       Suggestios--  Defer antibiotics  I will sign off at this juliocesar  Thank you for the courtesy of this referral.      Osmel Mckeon MD  Attending Physician  [facility]  Division of Infectious Diseases  [phone]

## 2020-10-23 NOTE — PROGRESS NOTE ADULT - SUBJECTIVE AND OBJECTIVE BOX
INTERVAL HPI:  89 WF with HTN, COPD on O2 & Nocturnal NIV, Diastolic CHF, Severe pHTN, Hypothyroidism ,A fib on Coumadin, Valve replacement. Non smoker.  Recent admission with COPD exacerbation, acute on chronic CHF, and Rapid A Fib.  Presented  to the ED c/o subjective fever and nonproductive cough. Son said , pt had fever and was confused thus brought her to the ED. No other symptoms reported per son  In ED Vitals BP initially 120/68, dropped to 87/39, T max 102. Labs sig for WBC 12.03 w/ left shift, BUN/Cr 67/1.58, COVID and RVP neg. Pt given 1 dose of Zosyn and 1L IV bolus (18 Oct 2020 23:50).  Admitted with COPD exacerbation, metabolic Encephalopathy and possible sepsis.    OVERNIGHT EVENTS:  Awake, Responsive    Vital Signs Last 24 Hrs  T(C): 36.6 (23 Oct 2020 16:45), Max: 36.8 (22 Oct 2020 23:47)  T(F): 97.9 (23 Oct 2020 16:45), Max: 98.3 (22 Oct 2020 23:47)  HR: 90 (23 Oct 2020 16:45) (72 - 90)  BP: 120/60 (23 Oct 2020 16:45) (120/60 - 152/79)  BP(mean): --  RR: 18 (23 Oct 2020 16:45) (18 - 18)  SpO2: 99% (23 Oct 2020 16:45) (91% - 99%)    PHYSICAL EXAM:  GEN:         Awake, responsive and comfortable.  HEENT:    Normal.    RESP:         no wheezing.  CVS:          Regular rate and rhythm.   ABD:         Soft, non-tender, non-distended;     MEDICATIONS  (STANDING):  diltiazem    milliGRAM(s) Oral daily  furosemide    Tablet 40 milliGRAM(s) Oral daily  levothyroxine 50 MICROGram(s) Oral daily  pantoprazole    Tablet 40 milliGRAM(s) Oral before breakfast  potassium chloride   Powder 40 milliEquivalent(s) Oral every 4 hours  sildenafil (REVATIO) 20 milliGRAM(s) Oral every 8 hours    MEDICATIONS  (PRN):  acetaminophen   Tablet .. 650 milliGRAM(s) Oral every 6 hours PRN Temp greater or equal to 38C (100.4F), Mild Pain (1 - 3)  ALBUTerol    90 MICROgram(s) HFA Inhaler 2 Puff(s) Inhalation every 6 hours PRN Shortness of Breath and/or Wheezing  ALPRAZolam 0.25 milliGRAM(s) Oral at bedtime PRN anxiety  meclizine 12.5 milliGRAM(s) Oral every 6 hours PRN Dizziness  ondansetron Injectable 4 milliGRAM(s) IV Push every 6 hours PRN Nausea and/or Vomiting    LABS:                        9.8    5.88  )-----------( 126      ( 22 Oct 2020 08:02 )             30.2     10-23    143  |  104  |  20  ----------------------------<  88  3.3<L>   |  36<H>  |  1.09    Ca    8.5      23 Oct 2020 07:31  Phos  2.9     10-22  Mg     1.9     10-23    PT/INR - ( 23 Oct 2020 17:05 )   PT: 21.8 sec;   INR: 1.94 ratio      10-22 @ 12:30  pH: 7.40  pCO2: 54  pO2: 142  SaO2: 99    ASSESSMENT AND PLAN:  ·	Acute metabolic Encephalopathy.  ·	Staph Epi Bacteremia.  ·	Acute COPD exacerbation.  ·	Chronic diastolic CHF,  ·	Chronic hypoxic hypercarbic Respiratory failure.  ·	Severe pHTN  ·	Moderate TR.  ·	Renal Insuffiencey.  ·	Chronic A Fib.  ·	Hypothyroidism.  ·	Anemia.  ·	Hypokalemia.    Fever resolved, ID follow up noted, off antibiotics.  Continue O2 with nocturnal and PRN BIPAP, on home O2 and Trilogy.  Nebulizer as needed.  Replace potassium, follow electrolytes.

## 2020-10-23 NOTE — PROGRESS NOTE ADULT - SUBJECTIVE AND OBJECTIVE BOX
Patient is a 89y old  Female who presents with a chief complaint of Sepsis, AMS (22 Oct 2020 13:29)    HPI:     89 y/o female with PMHx of HTN, COPD on O2, Diastolic CHF, Severe Pulmonary HTN, Hypothyroidism, Atrial fibrillation on Coumadin, recent admission in July for COPD exacerbation and, B/L PNA and again on  - Oct 4th for acute on chronic CHF as well as Atrial fib w/ RVR presents to the ED c/o subjective fever and nonproductive cough. Pt denies cp, palpitations or dizziness. Per son, pt had fever and was confused thus brought her to the ED. No other symptoms reported per son    In ED Vitals BP initially 120/68, dropped to 87/39, T max 102. Labs sig for WBC 12.03 w/ left shift, BUN/Cr 67/1.58, COVID and RVP neg. Pt given 1 dose of Zosyn and 1L IV bolus (18 Oct 2020 23:50)    SUBJECTIVE & OBJECTIVE: Pt seen and examined at bedside.   PHYSICAL EXAM:  ICU Vital Signs Last 24 Hrs  T(C): 36.6 (23 Oct 2020 10:55), Max: 36.8 (22 Oct 2020 23:47)  T(F): 97.9 (23 Oct 2020 10:55), Max: 98.3 (22 Oct 2020 23:47)  HR: 80 (23 Oct 2020 12:15) (70 - 102)  BP: 130/67 (23 Oct 2020 10:55) (128/68 - 152/79)  RR: 18 (23 Oct 2020 10:55) (18 - 18)  SpO2: 96% (23 Oct 2020 12:15) (91% - 98%)  Daily     Daily Weight in k (23 Oct 2020 05:04)  I&O's Detail    22 Oct 2020 07:01  -  23 Oct 2020 07:00  --------------------------------------------------------  IN:    Oral Fluid: 220 mL  Total IN: 220 mL    OUT:    Incontinent per Collection Bag (mL): 700 mL    Voided (mL): 600 mL  Total OUT: 1300 mL    Total NET: -1080 mL        GENERAL: chronically ill appearing  HEAD:  Atraumatic, Normocephalic  EYES: EOMI, PERRLA, conjunctiva and sclera clear  ENMT: Moist mucous membranes  NECK: Supple, No JVD  NERVOUS SYSTEM:  Alert & Oriented X2, Motor Strength 4/5 B/L upper and lower extremities; DTRs 2+ intact and symmetric  CHEST/LUNG: poor air entry to bases  HEART: Regular rate and rhythm; No murmurs, rubs, or gallops  ABDOMEN: Soft, Nontender, Nondistended; Bowel sounds present  EXTREMITIES:  2+ Peripheral Pulses, No clubbing, cyanosis, or edema  MEDICATIONS  (STANDING):  diltiazem    milliGRAM(s) Oral daily  furosemide    Tablet 40 milliGRAM(s) Oral daily  levothyroxine 50 MICROGram(s) Oral daily  pantoprazole    Tablet 40 milliGRAM(s) Oral before breakfast  sildenafil (REVATIO) 20 milliGRAM(s) Oral every 8 hours    MEDICATIONS  (PRN):  acetaminophen   Tablet .. 650 milliGRAM(s) Oral every 6 hours PRN Temp greater or equal to 38C (100.4F), Mild Pain (1 - 3)  ALBUTerol    90 MICROgram(s) HFA Inhaler 2 Puff(s) Inhalation every 6 hours PRN Shortness of Breath and/or Wheezing  ALPRAZolam 0.25 milliGRAM(s) Oral at bedtime PRN anxiety  meclizine 12.5 milliGRAM(s) Oral every 6 hours PRN Dizziness  ondansetron Injectable 4 milliGRAM(s) IV Push every 6 hours PRN Nausea and/or Vomiting    LABS:                        9.8    5.88  )-----------( 126      ( 22 Oct 2020 08:02 )             30.2     10-23    143  |  104  |  20  ----------------------------<  88  3.3<L>   |  36<H>  |  1.09    Ca    8.5      23 Oct 2020 07:31  Phos  2.9     10-22  Mg     1.9     10-23      PT/INR - ( 22 Oct 2020 07:27 )   PT: 28.2 sec;   INR: 2.54 ratio             CAPILLARY BLOOD GLUCOSE        ABG - ( 22 Oct 2020 12:30 )  pH, Arterial: x     pH, Blood: 7.40  /  pCO2: 54    /  pO2: 142   / HCO3: 33    / Base Excess: 7.3   /  SaO2: 99                      RECENT CULTURES:    RADIOLOGY & ADDITIONAL TESTS:    DVT/GI ppx  Discussed with pt @ bedside

## 2020-10-23 NOTE — PROGRESS NOTE ADULT - ASSESSMENT
prog89 yo female with history of HTN, COPD on O2, Chronic diastolic CHF, Severe Pulmonary HTN, Hypothyroidism, Atrial fibrillation on Coumadin, recent admission in July for COPD exacerbation and, B/L PNA and again on Sept 30 - Oct 4th for acute on chronic CHF as well as Atrial fib w/ RVR presented w/ fever and nonproductive cough.    Problem/Plan - 1:  ·  Problem: R/O Sepsis with acute renal failure, due to unspecified organism, unspecified acute renal failure type, unspecified whether septic shock present.  Plan: - blood cultures are growing staph epidermidis which is likely a contaminant   - also one isolate of coagulase negative staph  - pending repeat blood cultures, agree with ID reccs to deescalate antimicrobial treatment.   - CT chest demonstrates pulmonary vascular congestion.  Patient received Lasix 40 mgIVP x 1 and had 700 cc output    Problem/Plan - 2:  ·  Problem: Atrial fibrillation, unspecified type.  Plan: - INR is 2.54   - check INR today and Rx 3 mg Coumadin    Problem/Plan - 3:  ·  Problem: Hypothyroidism, unspecified type.  Plan: - resume home medications.     Problem/Plan - 4:  ·  Problem: Essential hypertension.  Plan: - resume home medications.     Problem/Plan - 5:  ·  Problem: COPD (chronic obstructive pulmonary disease).    Plan: - patient is known to Dr. Kidd and confirms that she is on home 02 and NIV (Trilogy)    - Pulmonary consult appreciated  - OOB to chair daily!     Problem/Plan - 6:  Problem: Acute on chronic respiratory failure with hypoxia. Plan: - plan as above.

## 2020-10-24 ENCOUNTER — TRANSCRIPTION ENCOUNTER (OUTPATIENT)
Age: 85
End: 2020-10-24

## 2020-10-24 VITALS
TEMPERATURE: 98 F | SYSTOLIC BLOOD PRESSURE: 135 MMHG | RESPIRATION RATE: 18 BRPM | DIASTOLIC BLOOD PRESSURE: 62 MMHG | OXYGEN SATURATION: 96 % | HEART RATE: 63 BPM

## 2020-10-24 LAB
ANION GAP SERPL CALC-SCNC: 3 MMOL/L — LOW (ref 5–17)
BUN SERPL-MCNC: 17 MG/DL — SIGNIFICANT CHANGE UP (ref 7–23)
CALCIUM SERPL-MCNC: 8.6 MG/DL — SIGNIFICANT CHANGE UP (ref 8.5–10.1)
CHLORIDE SERPL-SCNC: 103 MMOL/L — SIGNIFICANT CHANGE UP (ref 96–108)
CO2 SERPL-SCNC: 38 MMOL/L — HIGH (ref 22–31)
CREAT SERPL-MCNC: 0.79 MG/DL — SIGNIFICANT CHANGE UP (ref 0.5–1.3)
GLUCOSE SERPL-MCNC: 93 MG/DL — SIGNIFICANT CHANGE UP (ref 70–99)
INR BLD: 1.85 RATIO — HIGH (ref 0.88–1.16)
MAGNESIUM SERPL-MCNC: 1.9 MG/DL — SIGNIFICANT CHANGE UP (ref 1.6–2.6)
PHOSPHATE SERPL-MCNC: 2.4 MG/DL — LOW (ref 2.5–4.5)
POTASSIUM SERPL-MCNC: 3.9 MMOL/L — SIGNIFICANT CHANGE UP (ref 3.5–5.3)
POTASSIUM SERPL-SCNC: 3.9 MMOL/L — SIGNIFICANT CHANGE UP (ref 3.5–5.3)
PROTHROM AB SERPL-ACNC: 20.8 SEC — HIGH (ref 10.6–13.6)
SODIUM SERPL-SCNC: 144 MMOL/L — SIGNIFICANT CHANGE UP (ref 135–145)

## 2020-10-24 PROCEDURE — 99239 HOSP IP/OBS DSCHRG MGMT >30: CPT

## 2020-10-24 RX ORDER — WARFARIN SODIUM 2.5 MG/1
3 TABLET ORAL ONCE
Refills: 0 | Status: DISCONTINUED | OUTPATIENT
Start: 2020-10-24 | End: 2020-10-24

## 2020-10-24 RX ADMIN — PANTOPRAZOLE SODIUM 40 MILLIGRAM(S): 20 TABLET, DELAYED RELEASE ORAL at 06:29

## 2020-10-24 RX ADMIN — Medication 360 MILLIGRAM(S): at 05:30

## 2020-10-24 RX ADMIN — Medication 40 MILLIGRAM(S): at 05:30

## 2020-10-24 RX ADMIN — Medication 20 MILLIGRAM(S): at 05:30

## 2020-10-24 RX ADMIN — Medication 50 MICROGRAM(S): at 05:30

## 2020-10-24 NOTE — DISCHARGE NOTE PROVIDER - CARE PROVIDER_API CALL
[facility]  [address], [facility] [address]  [place] [address]  Phone: [phone]  Fax: [phone]  Follow Up Time:

## 2020-10-24 NOTE — DISCHARGE NOTE PROVIDER - NSDCCPCAREPLAN_GEN_ALL_CORE_FT
PRINCIPAL DISCHARGE DIAGNOSIS  Diagnosis: Sepsis  Assessment and Plan of Treatment: - likely viral Sepsis on admission.  No true bacterial infection on admission as blood cultures were likely contaminated.    - No need for oral antibiotics  - Utilize Telehealth services for changes in status      SECONDARY DISCHARGE DIAGNOSES  Diagnosis: Systolic CHF, acute on chronic  Assessment and Plan of Treatment: - You received 2 doses of IV Lasix (diuretics) during your hospital stay  - continue to use home dose of BP medications and LAsix  - follow up with house call MD    Diagnosis: COPD (chronic obstructive pulmonary disease)  Assessment and Plan of Treatment: Resume home BIPAP  Take all nebulizers as prescribed    Diagnosis: Atrial fibrillation, unspecified type  Assessment and Plan of Treatment: - Resume Coumadin as prescribed    Diagnosis: Hypothyroidism, unspecified type  Assessment and Plan of Treatment: Take Levothyroxine as prescribed    Diagnosis: Essential hypertension  Assessment and Plan of Treatment: Take all medications at prescirbed    Diagnosis: Acute on chronic respiratory failure with hypoxia  Assessment and Plan of Treatment: - This was likely due to Volume overloard  - You should continue Lasix and other medications as prescribed

## 2020-10-24 NOTE — DISCHARGE NOTE PROVIDER - NSDCHHENCOUNTER_GEN_ALL_CORE
Problem: Goal Outcome Summary  Goal: Goal Outcome Summary  1. Pt will have fevers well controlled  2. Pt will be hemodynamically stable   Edema 6C: Recommend continued use of size L, 20-30mmHg, thigh-high compression garments to be worn during the day time. Remove compression stockings at night and apply velcro compression garments - see patient care order for details. 2+ pitting remains around knees and thighs with 1+ pitting in distal legs; skin intact.        24-Oct-2020

## 2020-10-24 NOTE — DISCHARGE NOTE NURSING/CASE MANAGEMENT/SOCIAL WORK - PATIENT PORTAL LINK FT
You can access the FollowMyHealth Patient Portal offered by [facility] by registering at the following website: [email or web address]. By joining [facility]’s FollowMyHealth portal, you will also be able to view your health information using other applications (apps) compatible with our system.

## 2020-10-24 NOTE — DISCHARGE NOTE PROVIDER - HOSPITAL COURSE
Patient is a 89y old  Female who presents with a chief complaint of Sepsis, AMS (23 Oct 2020 18:09)    HPI:     89 y/o female with PMHx of HTN, COPD on O2, Diastolic CHF, Severe Pulmonary HTN, Hypothyroidism, Atrial fibrillation on Coumadin, recent admission in July for COPD exacerbation and, B/L PNA and again on  - Oct 4th for acute on chronic CHF as well as Atrial fib w/ RVR presents to the ED c/o subjective fever and nonproductive cough. Pt denies cp, palpitations or dizziness. Per son, pt had fever and was confused thus brought her to the ED. No other symptoms reported per son  In ED Vitals BP initially 120/68, dropped to 87/39, T max 102. Labs sig for WBC 12.03 w/ left shift, BUN/Cr 67/1.58, COVID and RVP neg. Pt given 1 dose of Zosyn and 1L IV bolus (18 Oct 2020 23:50)  SUBJECTIVE & OBJECTIVE: Pt seen and examined at bedside. Patient screened negative for COVID-19, RPR was unrevealing, and CT chest did not demonstrate pulmonary infection. U/A was negative.  Initial set of blood cultures were positive in one botttle for Staph Epiderimidis and Coag Neg Staph.  Repeat blood cultures were negative and patient demonstrated clinical inprovement and defervescene on day 1 of her hosptial stay.  She was acutely hypoxic and dyspneic due to fluid overload which required the use of IV LASIX x 2 doses.  She is seen and evaluated by Pulmonology and is doing well at time of discharge.   PHYSICAL EXAM:  Vital Signs Last 24 Hrs  T(C): 36.5 (24 Oct 2020 05:14), Max: 36.6 (23 Oct 2020 10:55)  T(F): 97.7 (24 Oct 2020 05:14), Max: 97.9 (23 Oct 2020 10:55)  HR: 81 (24 Oct 2020 05:14) (72 - 90)  BP: 167/89 (24 Oct 2020 05:14) (120/60 - 167/89)  RR: 18 (24 Oct 2020 05:14) (18 - 18)  SpO2: 95% (24 Oct 2020 05:14) (93% - 99%)  Daily     Daily Weight in k.3 (24 Oct 2020 05:14)  I&O's Detail    23 Oct 2020 07:01  -  24 Oct 2020 07:00  --------------------------------------------------------  IN:  Total IN: 0 mL    OUT:    Incontinent per Collection Bag (mL): 1400 mL  Total OUT: 1400 mL    Total NET: -1400 mL        GENERAL: NAD, well-groomed, well-developed  HEAD:  Atraumatic, Normocephalic  EYES: EOMI, PERRLA, conjunctiva and sclera clear  ENMT: Moist mucous membranes  NECK: Supple, No JVD  NERVOUS SYSTEM:  Alert & Oriented X3, Motor Strength 5/5 B/L upper and lower extremities; DTRs 2+ intact and symmetric  CHEST/LUNG: Clear to auscultation bilaterally; No rales, rhonchi, wheezing, or rubs  HEART: Regular rate and rhythm; No murmurs, rubs, or gallops  ABDOMEN: Soft, Nontender, Nondistended; Bowel sounds present  EXTREMITIES:  2+ Peripheral Pulses, No clubbing, cyanosis, or edema  MEDICATIONS  (STANDING):  diltiazem    milliGRAM(s) Oral daily  furosemide    Tablet 40 milliGRAM(s) Oral daily  levothyroxine 50 MICROGram(s) Oral daily  pantoprazole    Tablet 40 milliGRAM(s) Oral before breakfast  sildenafil (REVATIO) 20 milliGRAM(s) Oral every 8 hours  warfarin 3 milliGRAM(s) Oral once    LABS:                        9.8    5.88  )-----------( 126      ( 22 Oct 2020 08:02 )             30.2     10-23    143  |  104  |  20  ----------------------------<  88  3.3<L>   |  36<H>  |  1.09    Ca    8.5      23 Oct 2020 07:31  Mg     1.9     10-23  PT/INR - ( 23 Oct 2020 17:05 )   PT: 21.8 sec;   INR: 1.94 ratio      ABG - ( 22 Oct 2020 12:30 )  pH, Arterial: x     pH, Blood: 7.40  /  pCO2: 54    /  pO2: 142   / HCO3: 33    / Base Excess: 7.3   /  SaO2: 99    mCulture - Blood (10..20 @ 23:03)   Specimen Source: .Blood Blood   Culture Results:   No growth to date.

## 2020-10-24 NOTE — DISCHARGE NOTE PROVIDER - NSDCMRMEDTOKEN_GEN_ALL_CORE_FT
ALPRAZolam 0.25 mg oral tablet: 1 tab(s) orally 2 times a day, As Needed  Coumadin 3 mg oral tablet: 1 tab(s) orally once a day as directed  DilTIAZem Hydrochloride  mg/24 hours oral capsule, extended release: 1 cap(s) orally once a day  furosemide 40 mg oral tablet: 1 tab(s) orally once a day  ipratropium-albuterol 0.5 mg-2.5 mg/3 mLinhalation solution: 3 milliliter(s) inhaled every 6 hours, As Needed  losartan 25 mg oral tablet: 1 tab(s) orally once a day  Multiple Vitamins oral tablet: 1 tab(s) orally once a day  pantoprazole 40 mg oral delayed release tablet: 1 tab(s) orally once a day (before a meal)  potassium chloride 10 mEq oral tablet, extended release: 1 tab(s) orally once a day  sildenafil 20 mg oral tablet: 1 tab(s) orally every 8 hours  Synthroid 100 mcg (0.1 mg) oral tablet: 1 tab(s) orally once a day  Vitamin D2 50,000 intl units (1.25 mg) oral capsule: 1 cap(s) orally once a week

## 2020-10-27 NOTE — DISCHARGE NOTE PROVIDER - CARE PROVIDERS DIRECT ADDRESSES
[facility]EP [facility]IC[facility]  [address]  [address]RD FLO[address]  [place] [address]  [phone]    Referring Physician: Jaja  PCP: Chelsea Mcelroy PA-C    Reason for consult:  Sleep disorders of MIRI    Guy Plascencia is a 70 y.o.male was seen in the [facility] today. New to this clinic. He last had a study 15-20 yrs ago. His wife reports snoring even with the mask on in past few months. He sleeps from 11pm to 7am. He wakes up rested and refreshed. He changes supplies regularly. He uses [place]. Current device is 3 yrs old. He drinks regular coffee upto 5 pm.  Manderson Sleepiness Score: 6. Caffeine intake 8-12 per day. Alcohol intake 0 per week.    Guy Plascencia  has a past medical history of Arthritis, Diabetes mellitus (CMS/[facility]), H/O  Multiple facial fractures (11/1968), H/O Broken femur (11/1968), History of transfusion, Hyperlipidemia, Hypertension, Kidney stone, Neoplasm of uncertain behavior, Peptic ulceration, Plantar fasciitis, Sleep apnea, and Thrombocytosis (CMS/[facility]).     Current Medications:    Current Outpatient Medications:   •  amLODIPine (NORVASC) 10 MG tablet, TAKE ONE TABLET BY MOUTH EVERY MORNING . NEED APPOINTMENT., Disp: 27 tablet, Rfl: 0  •  Aspirin (ASPIR-81 PO), Take  by mouth Daily., Disp: , Rfl:   •  Blood Glucose Monitoring Suppl (ONETOUCH VERIO FLEX SYSTEM) w/Device kit, 1 Device 2 (Two) Times a Day., Disp: 1 kit, Rfl: 0  •  Cholecalciferol (D3 ADULT PO), Take 2,000 Units by mouth Daily., Disp: , Rfl:   •  Farxiga 10 MG tablet, TAKE ONE TABLET BY MOUTH EVERY MORNING, Disp: 27 tablet, Rfl: 0  •  glipiZIDE-metFORMIN (METAGLIP) 5-500 MG per tablet, TAKE TWO TABLETS BY MOUTH TWICE A DAY BEFORE MEALS, Disp: 108 tablet, Rfl: 0  •  glucose blood (Accu-Chek Vika Plus) test strip, Use to test blood glucose 2 times daily Dx: e11.9, Disp: 200 each, Rfl: 3  •  glucose blood (AGAMATRIX PRESTO TEST) test strip, Used to check twice daily for type 2 diabetes, Disp: 100 each, Rfl: 9  •  " hydroCHLOROthiazide (MICROZIDE) 12.5 MG capsule, TAKE ONE CAPSULE BY MOUTH DAILY, Disp: 90 capsule, Rfl: 3  •  lisinopril (PRINIVIL,ZESTRIL) 40 MG tablet, TAKE ONE TABLET BY MOUTH DAILY, Disp: 20 tablet, Rfl: 0  •  metoprolol tartrate (LOPRESSOR) 25 MG tablet, Take 1 tablet by mouth 2 (Two) Times a Day., Disp: 54 tablet, Rfl: 0  •  omeprazole (priLOSEC) 20 MG capsule, TAKE ONE CAPSULE BY MOUTH DAILY, Disp: 90 capsule, Rfl: 3  •  pravastatin (PRAVACHOL) 80 MG tablet, Take 1 tablet by mouth Daily., Disp: 90 tablet, Rfl: 1  •  vitamin B-12 (CYANOCOBALAMIN) 1000 MCG tablet, Take 1 tablet by mouth Daily., Disp: , Rfl:    also listed in Sleep Questionnaire.    FH: family history includes Cancer in his brother and another family member; Heart disease in his father; Skin cancer (age of onset: 50) in his brother.  SH:  reports that he quit smoking about 48 years ago. His smoking use included cigarettes. He quit after 14.00 years of use. He has never used smokeless tobacco. He reports that he does not drink alcohol or use drugs.    Pertinent Positive Review of Systems of pnd, arthritis, diarrhea  Rest of Review of Systems was negative as recorded in Sleep Questionnaire.        Vital Signs: /72   Pulse 69   Ht 175.3 cm (69\")   Wt 79.8 kg (176 lb)   BMI 25.99 kg/m²     Body mass index is 25.99 kg/m².       Tongue: Normal       Dentition: good       Pharynx: Posterior pharyngeal pillars are narrow   Mallampatti: III (soft and hard palate and base of uvula visible)        General: Alert. Cooperative. Well developed. No acute distress.             Head:  Normocephalic. Symmetrical. Atraumatic.              Eyes: Sclera clear. No icterus. PERRLA. Normal EOM.             Ears: No deformities. Normal hearing.             Nose: No septal deviation. No drainage.          Throat: No oral lesions. No thrush. Moist mucous membranes.    Chest Wall:  Normal shape. Symmetric expansion with respiration. No tenderness.             " Neck:  Trachea midline.           Lungs:  Clear to auscultation bilaterally. No wheezes. No rhonchi. No rales. Respirations regular, even and unlabored.            Heart:  Regular rhythm and normal rate. Normal S1 and S2. No murmur.     Abdomen:  Soft, non-tender and non-distended. Normal bowel sounds. No masses.  Extremities:  Moves all extremities well. No edema.           Pulses: Pulses palpable and equal bilaterally.               Skin: Dry. Intact. No bleeding. No rash.           Neuro: Moves all 4 extremities and cranial nerves grossly intact.  Psychiatric: Normal mood and affect.    Impression:  1. Obstructive sleep apnea    2. Snoring        Plan:  Guy reports snoring on current pressures.  He does seem to wake up mostly rested.  I reviewed his download which is 100% compliance average usage 8 hours.  His AHI is 2.7 set pressure 12 cm.  His machine is not an auto device.  It was set up on 7/24/2017.  I will increase pr to 14. If no noticeable air leaks and persistent snoring then do titration study. May need split. Try to get old study from [place].  We will also order HST for documentation purposes to have sleep apnea on file for purpose of new device or titration study in the future.    Patient will follow up in this clinic 3 months.    Thank you for allowing me to participate in your patient's care.    Electronically signed by Ariel Augustin MD, 10/27/20, 10:42 AM EDT.    Part of this note may be an electronic transcription/translation of spoken language to printed text using the Dragon Dictation System.   ,DirectAddress_Unknown,DirectAddress_Unknown

## 2020-10-28 DIAGNOSIS — I50.32 CHRONIC DIASTOLIC (CONGESTIVE) HEART FAILURE: ICD-10-CM

## 2020-10-28 DIAGNOSIS — J44.9 CHRONIC OBSTRUCTIVE PULMONARY DISEASE, UNSPECIFIED: ICD-10-CM

## 2020-10-28 DIAGNOSIS — Z79.01 LONG TERM (CURRENT) USE OF ANTICOAGULANTS: ICD-10-CM

## 2020-10-28 DIAGNOSIS — J96.01 ACUTE RESPIRATORY FAILURE WITH HYPOXIA: ICD-10-CM

## 2020-10-28 DIAGNOSIS — Z99.81 DEPENDENCE ON SUPPLEMENTAL OXYGEN: ICD-10-CM

## 2020-10-28 DIAGNOSIS — I48.91 UNSPECIFIED ATRIAL FIBRILLATION: ICD-10-CM

## 2020-10-28 DIAGNOSIS — I10 ESSENTIAL (PRIMARY) HYPERTENSION: ICD-10-CM

## 2020-10-28 DIAGNOSIS — I27.20 PULMONARY HYPERTENSION, UNSPECIFIED: ICD-10-CM

## 2020-10-28 DIAGNOSIS — R50.9 FEVER, UNSPECIFIED: ICD-10-CM

## 2020-10-28 DIAGNOSIS — J96.11 CHRONIC RESPIRATORY FAILURE WITH HYPOXIA: ICD-10-CM

## 2020-10-28 DIAGNOSIS — I11.0 HYPERTENSIVE HEART DISEASE WITH HEART FAILURE: ICD-10-CM

## 2020-10-28 DIAGNOSIS — A41.9 SEPSIS, UNSPECIFIED ORGANISM: ICD-10-CM

## 2020-10-28 DIAGNOSIS — E03.9 HYPOTHYROIDISM, UNSPECIFIED: ICD-10-CM

## 2020-10-28 DIAGNOSIS — N17.9 ACUTE KIDNEY FAILURE, UNSPECIFIED: ICD-10-CM

## 2020-10-28 DIAGNOSIS — R65.20 SEVERE SEPSIS WITHOUT SEPTIC SHOCK: ICD-10-CM

## 2020-11-10 NOTE — ED ADULT NURSE NOTE - NSFALLRSKOUTCOME_ED_ALL_ED
Detail Level: Generalized Quality 110: Preventive Care And Screening: Influenza Immunization: Influenza Immunization not Administered for Documented Reasons. Quality 130: Documentation Of Current Medications In The Medical Record: Current Medications Documented Quality 226: Preventive Care And Screening: Tobacco Use: Screening And Cessation Intervention: Patient screened for tobacco use and is an ex/non-smoker Fall Risk

## 2020-11-12 NOTE — ED ADULT TRIAGE NOTE - O2 FLOW (L/MIN)
4 Detail Level: Simple Quality 130: Documentation Of Current Medications In The Medical Record: Eligible clinician attests to documenting in the medical record the patient is not eligible for a current list of medications being obtained, updated, or reviewed by the eligible clinician Quality 111:Pneumonia Vaccination Status For Older Adults: Pneumococcal Vaccination Previously Received

## 2021-05-11 ENCOUNTER — INPATIENT (INPATIENT)
Facility: HOSPITAL | Age: 86
LOS: 7 days | Discharge: HOME | End: 2021-05-19
Attending: INTERNAL MEDICINE | Admitting: INTERNAL MEDICINE
Payer: MEDICARE

## 2021-05-11 VITALS
RESPIRATION RATE: 33 BRPM | TEMPERATURE: 97 F | SYSTOLIC BLOOD PRESSURE: 147 MMHG | OXYGEN SATURATION: 100 % | WEIGHT: 139.99 LBS | HEART RATE: 80 BPM | DIASTOLIC BLOOD PRESSURE: 68 MMHG | HEIGHT: 60 IN

## 2021-05-11 LAB
ALBUMIN SERPL ELPH-MCNC: 3.4 G/DL — SIGNIFICANT CHANGE UP (ref 3.3–5)
ALP SERPL-CCNC: 74 U/L — SIGNIFICANT CHANGE UP (ref 40–120)
ALT FLD-CCNC: 25 U/L — SIGNIFICANT CHANGE UP (ref 12–78)
ANION GAP SERPL CALC-SCNC: 6 MMOL/L — SIGNIFICANT CHANGE UP (ref 5–17)
APTT BLD: 36.4 SEC — HIGH (ref 27.5–35.5)
AST SERPL-CCNC: 28 U/L — SIGNIFICANT CHANGE UP (ref 15–37)
BASE EXCESS BLDA CALC-SCNC: 2.7 MMOL/L — HIGH (ref -2–2)
BASOPHILS # BLD AUTO: 0.07 K/UL — SIGNIFICANT CHANGE UP (ref 0–0.2)
BASOPHILS NFR BLD AUTO: 0.8 % — SIGNIFICANT CHANGE UP (ref 0–2)
BILIRUB SERPL-MCNC: 0.4 MG/DL — SIGNIFICANT CHANGE UP (ref 0.2–1.2)
BLOOD GAS COMMENTS: SIGNIFICANT CHANGE UP
BLOOD GAS SOURCE: SIGNIFICANT CHANGE UP
BUN SERPL-MCNC: 52 MG/DL — HIGH (ref 7–23)
CALCIUM SERPL-MCNC: 9.1 MG/DL — SIGNIFICANT CHANGE UP (ref 8.5–10.1)
CHLORIDE SERPL-SCNC: 103 MMOL/L — SIGNIFICANT CHANGE UP (ref 96–108)
CO2 SERPL-SCNC: 29 MMOL/L — SIGNIFICANT CHANGE UP (ref 22–31)
CREAT SERPL-MCNC: 1.24 MG/DL — SIGNIFICANT CHANGE UP (ref 0.5–1.3)
EOSINOPHIL # BLD AUTO: 0.44 K/UL — SIGNIFICANT CHANGE UP (ref 0–0.5)
EOSINOPHIL NFR BLD AUTO: 5 % — SIGNIFICANT CHANGE UP (ref 0–6)
GLUCOSE SERPL-MCNC: 102 MG/DL — HIGH (ref 70–99)
HCO3 BLDA-SCNC: 28 MMOL/L — SIGNIFICANT CHANGE UP (ref 21–29)
HCT VFR BLD CALC: 32.3 % — LOW (ref 34.5–45)
HGB BLD-MCNC: 10.8 G/DL — LOW (ref 11.5–15.5)
HOROWITZ INDEX BLDA+IHG-RTO: 0.5 — SIGNIFICANT CHANGE UP
IMM GRANULOCYTES NFR BLD AUTO: 0.2 % — SIGNIFICANT CHANGE UP (ref 0–1.5)
INR BLD: 1.86 RATIO — HIGH (ref 0.88–1.16)
LACTATE SERPL-SCNC: 0.8 MMOL/L — SIGNIFICANT CHANGE UP (ref 0.7–2)
LYMPHOCYTES # BLD AUTO: 3 K/UL — SIGNIFICANT CHANGE UP (ref 1–3.3)
LYMPHOCYTES # BLD AUTO: 34.1 % — SIGNIFICANT CHANGE UP (ref 13–44)
MCHC RBC-ENTMCNC: 33.4 GM/DL — SIGNIFICANT CHANGE UP (ref 32–36)
MCHC RBC-ENTMCNC: 35 PG — HIGH (ref 27–34)
MCV RBC AUTO: 104.5 FL — HIGH (ref 80–100)
MONOCYTES # BLD AUTO: 0.89 K/UL — SIGNIFICANT CHANGE UP (ref 0–0.9)
MONOCYTES NFR BLD AUTO: 10.1 % — SIGNIFICANT CHANGE UP (ref 2–14)
NEUTROPHILS # BLD AUTO: 4.38 K/UL — SIGNIFICANT CHANGE UP (ref 1.8–7.4)
NEUTROPHILS NFR BLD AUTO: 49.8 % — SIGNIFICANT CHANGE UP (ref 43–77)
NRBC # BLD: 0 /100 WBCS — SIGNIFICANT CHANGE UP (ref 0–0)
NT-PROBNP SERPL-SCNC: 2464 PG/ML — HIGH (ref 0–450)
PCO2 BLDA: 49 MMHG — HIGH (ref 32–46)
PH BLD: 7.38 — SIGNIFICANT CHANGE UP (ref 7.35–7.45)
PLATELET # BLD AUTO: 196 K/UL — SIGNIFICANT CHANGE UP (ref 150–400)
PO2 BLDA: 118 MMHG — HIGH (ref 74–108)
POTASSIUM SERPL-MCNC: 4.7 MMOL/L — SIGNIFICANT CHANGE UP (ref 3.5–5.3)
POTASSIUM SERPL-SCNC: 4.7 MMOL/L — SIGNIFICANT CHANGE UP (ref 3.5–5.3)
PROT SERPL-MCNC: 8.3 GM/DL — SIGNIFICANT CHANGE UP (ref 6–8.3)
PROTHROM AB SERPL-ACNC: 21 SEC — HIGH (ref 10.6–13.6)
RAPID RVP RESULT: SIGNIFICANT CHANGE UP
RBC # BLD: 3.09 M/UL — LOW (ref 3.8–5.2)
RBC # FLD: 14.9 % — HIGH (ref 10.3–14.5)
SAO2 % BLDA: 98 % — HIGH (ref 92–96)
SARS-COV-2 RNA SPEC QL NAA+PROBE: SIGNIFICANT CHANGE UP
SODIUM SERPL-SCNC: 138 MMOL/L — SIGNIFICANT CHANGE UP (ref 135–145)
TROPONIN I SERPL-MCNC: <.015 NG/ML — SIGNIFICANT CHANGE UP (ref 0.01–0.04)
WBC # BLD: 8.8 K/UL — SIGNIFICANT CHANGE UP (ref 3.8–10.5)
WBC # FLD AUTO: 8.8 K/UL — SIGNIFICANT CHANGE UP (ref 3.8–10.5)

## 2021-05-11 PROCEDURE — 93010 ELECTROCARDIOGRAM REPORT: CPT

## 2021-05-11 PROCEDURE — 71045 X-RAY EXAM CHEST 1 VIEW: CPT | Mod: 26

## 2021-05-11 PROCEDURE — 99291 CRITICAL CARE FIRST HOUR: CPT | Mod: CS

## 2021-05-11 PROCEDURE — 99222 1ST HOSP IP/OBS MODERATE 55: CPT

## 2021-05-11 RX ORDER — AZITHROMYCIN 500 MG/1
500 TABLET, FILM COATED ORAL ONCE
Refills: 0 | Status: COMPLETED | OUTPATIENT
Start: 2021-05-11 | End: 2021-05-11

## 2021-05-11 RX ORDER — IPRATROPIUM/ALBUTEROL SULFATE 18-103MCG
3 AEROSOL WITH ADAPTER (GRAM) INHALATION ONCE
Refills: 0 | Status: COMPLETED | OUTPATIENT
Start: 2021-05-11 | End: 2021-05-11

## 2021-05-11 RX ORDER — FUROSEMIDE 40 MG
80 TABLET ORAL ONCE
Refills: 0 | Status: COMPLETED | OUTPATIENT
Start: 2021-05-11 | End: 2021-05-11

## 2021-05-11 RX ORDER — CEFTRIAXONE 500 MG/1
1000 INJECTION, POWDER, FOR SOLUTION INTRAMUSCULAR; INTRAVENOUS ONCE
Refills: 0 | Status: COMPLETED | OUTPATIENT
Start: 2021-05-11 | End: 2021-05-11

## 2021-05-11 RX ADMIN — AZITHROMYCIN 255 MILLIGRAM(S): 500 TABLET, FILM COATED ORAL at 21:57

## 2021-05-11 RX ADMIN — CEFTRIAXONE 100 MILLIGRAM(S): 500 INJECTION, POWDER, FOR SOLUTION INTRAMUSCULAR; INTRAVENOUS at 21:00

## 2021-05-11 RX ADMIN — Medication 3 MILLILITER(S): at 21:01

## 2021-05-11 RX ADMIN — Medication 40 MILLIGRAM(S): at 20:50

## 2021-05-11 RX ADMIN — Medication 80 MILLIGRAM(S): at 20:55

## 2021-05-11 NOTE — ED PROVIDER NOTE - CLINICAL SUMMARY MEDICAL DECISION MAKING FREE TEXT BOX
pt w/ multiple cardiopulmonary issues on home o2 pt w/ multiple cardiopulmonary issues on home o2. Placed on HFNC on arrival to ED for work of breathing. Given mixed cardiopulmonary picture, given IV lasix, in addition to solumedrol and antibiotics. Stable at time of admission.

## 2021-05-11 NOTE — ED PROVIDER NOTE - OBJECTIVE STATEMENT
89F hx HTN, COPD on home O2, diastolic CHF, severe pulmonary htn, hypothyroidism, AFib (on coumadin as of 10/2020), multiple past admissions for COPD exacerbations / pneumonia, presenting to ED for difficulty breathing. Pt states symptoms became acutely worse today prompting her visit to the ED.

## 2021-05-11 NOTE — ED ADULT NURSE NOTE - OBJECTIVE STATEMENT
Patient is A&o x2 came in with complaints of difficulty breathing. Patient came on NRB and was put on high flow nasal cannula. on cardiac monitor at bedside. Patient uses oxygen at home as well. Patient has had multiple admissions for COPD exacerbations/ Pnuemonia, has CHF, htn.

## 2021-05-12 DIAGNOSIS — E03.9 HYPOTHYROIDISM, UNSPECIFIED: ICD-10-CM

## 2021-05-12 DIAGNOSIS — I10 ESSENTIAL (PRIMARY) HYPERTENSION: ICD-10-CM

## 2021-05-12 DIAGNOSIS — I50.9 HEART FAILURE, UNSPECIFIED: ICD-10-CM

## 2021-05-12 DIAGNOSIS — I48.91 UNSPECIFIED ATRIAL FIBRILLATION: ICD-10-CM

## 2021-05-12 DIAGNOSIS — J96.01 ACUTE RESPIRATORY FAILURE WITH HYPOXIA: ICD-10-CM

## 2021-05-12 DIAGNOSIS — I27.20 PULMONARY HYPERTENSION, UNSPECIFIED: ICD-10-CM

## 2021-05-12 DIAGNOSIS — J44.9 CHRONIC OBSTRUCTIVE PULMONARY DISEASE, UNSPECIFIED: ICD-10-CM

## 2021-05-12 LAB
ALBUMIN SERPL ELPH-MCNC: 3.2 G/DL — LOW (ref 3.3–5)
ALP SERPL-CCNC: 71 U/L — SIGNIFICANT CHANGE UP (ref 40–120)
ALT FLD-CCNC: 15 U/L — SIGNIFICANT CHANGE UP (ref 12–78)
ANION GAP SERPL CALC-SCNC: 5 MMOL/L — SIGNIFICANT CHANGE UP (ref 5–17)
AST SERPL-CCNC: 21 U/L — SIGNIFICANT CHANGE UP (ref 15–37)
BILIRUB SERPL-MCNC: 0.4 MG/DL — SIGNIFICANT CHANGE UP (ref 0.2–1.2)
BUN SERPL-MCNC: 54 MG/DL — HIGH (ref 7–23)
CALCIUM SERPL-MCNC: 8.9 MG/DL — SIGNIFICANT CHANGE UP (ref 8.5–10.1)
CHLORIDE SERPL-SCNC: 102 MMOL/L — SIGNIFICANT CHANGE UP (ref 96–108)
CO2 SERPL-SCNC: 30 MMOL/L — SIGNIFICANT CHANGE UP (ref 22–31)
CREAT SERPL-MCNC: 1.19 MG/DL — SIGNIFICANT CHANGE UP (ref 0.5–1.3)
GLUCOSE SERPL-MCNC: 178 MG/DL — HIGH (ref 70–99)
HCT VFR BLD CALC: 31 % — LOW (ref 34.5–45)
HGB BLD-MCNC: 10 G/DL — LOW (ref 11.5–15.5)
INR BLD: 1.74 RATIO — HIGH (ref 0.88–1.16)
MCHC RBC-ENTMCNC: 32.3 GM/DL — SIGNIFICANT CHANGE UP (ref 32–36)
MCHC RBC-ENTMCNC: 33.8 PG — SIGNIFICANT CHANGE UP (ref 27–34)
MCV RBC AUTO: 104.7 FL — HIGH (ref 80–100)
NRBC # BLD: 0 /100 WBCS — SIGNIFICANT CHANGE UP (ref 0–0)
PLATELET # BLD AUTO: 181 K/UL — SIGNIFICANT CHANGE UP (ref 150–400)
POTASSIUM SERPL-MCNC: 4 MMOL/L — SIGNIFICANT CHANGE UP (ref 3.5–5.3)
POTASSIUM SERPL-SCNC: 4 MMOL/L — SIGNIFICANT CHANGE UP (ref 3.5–5.3)
PROT SERPL-MCNC: 8.1 GM/DL — SIGNIFICANT CHANGE UP (ref 6–8.3)
PROTHROM AB SERPL-ACNC: 19.7 SEC — HIGH (ref 10.6–13.6)
RBC # BLD: 2.96 M/UL — LOW (ref 3.8–5.2)
RBC # FLD: 14.6 % — HIGH (ref 10.3–14.5)
SODIUM SERPL-SCNC: 137 MMOL/L — SIGNIFICANT CHANGE UP (ref 135–145)
WBC # BLD: 5.73 K/UL — SIGNIFICANT CHANGE UP (ref 3.8–10.5)
WBC # FLD AUTO: 5.73 K/UL — SIGNIFICANT CHANGE UP (ref 3.8–10.5)

## 2021-05-12 PROCEDURE — 99223 1ST HOSP IP/OBS HIGH 75: CPT

## 2021-05-12 PROCEDURE — 71250 CT THORAX DX C-: CPT | Mod: 26

## 2021-05-12 PROCEDURE — 99233 SBSQ HOSP IP/OBS HIGH 50: CPT

## 2021-05-12 RX ORDER — ALPRAZOLAM 0.25 MG
0.25 TABLET ORAL AT BEDTIME
Refills: 0 | Status: DISCONTINUED | OUTPATIENT
Start: 2021-05-12 | End: 2021-05-16

## 2021-05-12 RX ORDER — LOSARTAN POTASSIUM 100 MG/1
25 TABLET, FILM COATED ORAL DAILY
Refills: 0 | Status: DISCONTINUED | OUTPATIENT
Start: 2021-05-12 | End: 2021-05-13

## 2021-05-12 RX ORDER — CEFTRIAXONE 500 MG/1
1000 INJECTION, POWDER, FOR SOLUTION INTRAMUSCULAR; INTRAVENOUS EVERY 24 HOURS
Refills: 0 | Status: COMPLETED | OUTPATIENT
Start: 2021-05-12 | End: 2021-05-16

## 2021-05-12 RX ORDER — AZITHROMYCIN 500 MG/1
500 TABLET, FILM COATED ORAL ONCE
Refills: 0 | Status: COMPLETED | OUTPATIENT
Start: 2021-05-12 | End: 2021-05-12

## 2021-05-12 RX ORDER — PANTOPRAZOLE SODIUM 20 MG/1
40 TABLET, DELAYED RELEASE ORAL
Refills: 0 | Status: DISCONTINUED | OUTPATIENT
Start: 2021-05-12 | End: 2021-05-19

## 2021-05-12 RX ORDER — LEVOTHYROXINE SODIUM 125 MCG
100 TABLET ORAL DAILY
Refills: 0 | Status: DISCONTINUED | OUTPATIENT
Start: 2021-05-12 | End: 2021-05-19

## 2021-05-12 RX ORDER — AZITHROMYCIN 500 MG/1
500 TABLET, FILM COATED ORAL EVERY 24 HOURS
Refills: 0 | Status: DISCONTINUED | OUTPATIENT
Start: 2021-05-13 | End: 2021-05-18

## 2021-05-12 RX ORDER — ACETAMINOPHEN 500 MG
650 TABLET ORAL EVERY 6 HOURS
Refills: 0 | Status: DISCONTINUED | OUTPATIENT
Start: 2021-05-12 | End: 2021-05-19

## 2021-05-12 RX ORDER — DILTIAZEM HCL 120 MG
360 CAPSULE, EXT RELEASE 24 HR ORAL DAILY
Refills: 0 | Status: DISCONTINUED | OUTPATIENT
Start: 2021-05-12 | End: 2021-05-13

## 2021-05-12 RX ORDER — WARFARIN SODIUM 2.5 MG/1
5 TABLET ORAL ONCE
Refills: 0 | Status: COMPLETED | OUTPATIENT
Start: 2021-05-12 | End: 2021-05-12

## 2021-05-12 RX ORDER — POTASSIUM CHLORIDE 20 MEQ
20 PACKET (EA) ORAL DAILY
Refills: 0 | Status: DISCONTINUED | OUTPATIENT
Start: 2021-05-12 | End: 2021-05-19

## 2021-05-12 RX ORDER — FUROSEMIDE 40 MG
40 TABLET ORAL
Refills: 0 | Status: DISCONTINUED | OUTPATIENT
Start: 2021-05-12 | End: 2021-05-14

## 2021-05-12 RX ORDER — IPRATROPIUM/ALBUTEROL SULFATE 18-103MCG
3 AEROSOL WITH ADAPTER (GRAM) INHALATION EVERY 6 HOURS
Refills: 0 | Status: DISCONTINUED | OUTPATIENT
Start: 2021-05-12 | End: 2021-05-19

## 2021-05-12 RX ORDER — AZITHROMYCIN 500 MG/1
TABLET, FILM COATED ORAL
Refills: 0 | Status: DISCONTINUED | OUTPATIENT
Start: 2021-05-12 | End: 2021-05-18

## 2021-05-12 RX ADMIN — AZITHROMYCIN 255 MILLIGRAM(S): 500 TABLET, FILM COATED ORAL at 16:22

## 2021-05-12 RX ADMIN — Medication 100 MICROGRAM(S): at 05:58

## 2021-05-12 RX ADMIN — PANTOPRAZOLE SODIUM 40 MILLIGRAM(S): 20 TABLET, DELAYED RELEASE ORAL at 05:58

## 2021-05-12 RX ADMIN — CEFTRIAXONE 100 MILLIGRAM(S): 500 INJECTION, POWDER, FOR SOLUTION INTRAMUSCULAR; INTRAVENOUS at 16:22

## 2021-05-12 RX ADMIN — Medication 20 MILLIGRAM(S): at 05:58

## 2021-05-12 RX ADMIN — Medication 20 MILLIGRAM(S): at 21:09

## 2021-05-12 RX ADMIN — Medication 650 MILLIGRAM(S): at 19:57

## 2021-05-12 RX ADMIN — WARFARIN SODIUM 5 MILLIGRAM(S): 2.5 TABLET ORAL at 21:09

## 2021-05-12 RX ADMIN — Medication 0.25 MILLIGRAM(S): at 21:08

## 2021-05-12 RX ADMIN — Medication 40 MILLIGRAM(S): at 17:06

## 2021-05-12 RX ADMIN — Medication 360 MILLIGRAM(S): at 05:58

## 2021-05-12 RX ADMIN — Medication 20 MILLIGRAM(S): at 14:38

## 2021-05-12 RX ADMIN — Medication 650 MILLIGRAM(S): at 06:06

## 2021-05-12 RX ADMIN — Medication 20 MILLIEQUIVALENT(S): at 12:04

## 2021-05-12 RX ADMIN — Medication 40 MILLIGRAM(S): at 05:58

## 2021-05-12 RX ADMIN — LOSARTAN POTASSIUM 25 MILLIGRAM(S): 100 TABLET, FILM COATED ORAL at 05:58

## 2021-05-12 RX ADMIN — Medication 650 MILLIGRAM(S): at 20:38

## 2021-05-12 NOTE — H&P ADULT - ASSESSMENT
Patient is a 89F with a PMH of HTN, COPD on O2, Diastolic CHF, Severe Pulmonary HTN, Hypothyroidism, Atrial fibrillation on Coumadin, hx of multiple admissions for COPD and CHF exacerbation presents to the ED for dyspnea.  Patient awake and alert however is a poor historian.  Patient states that her breathing became significantly worse today.  No other complaints.  Patient requiring high flow nasal cannula in ED.  SpO2 currently 99% on 30L w/ FiO2 of 50%.  Will admit patient to tele.

## 2021-05-12 NOTE — PROGRESS NOTE ADULT - ASSESSMENT
89F with a PMH of HTN, COPD on O2, Diastolic CHF, Severe Pulmonary HTN, Hypothyroidism, Atrial fibrillation on Coumadin, hx of multiple admissions for COPD and CHF exacerbation presents to the ED for dyspnea.  Patient awake and alert however is a poor historian.  Patient states that her breathing became significantly worse today.  No other complaints.  Patient requiring high flow nasal cannula in ED.  SpO2 currently 99% on 30L w/ FiO2 of 50%.  Will admit patient to tele.      ·  Problem: Acute respiratory failure with hypoxia.  Plan: Patient currently requiring high flow nasal cannula.  Dyspnea likely multifactorial (pHTN + CHF + COPD).  IV lasix 80 given in ED.  Continue diuretics.  CT Chest noted R > L pleural effusion.  ·  Problem: RLL ? Pneumonia - consolidation noted on CT.  Start on Rocephin, Zithromax.   ·  Problem: Atrial fibrillation, unspecified type.  Plan: Continue diltiazem.  INR low.  Will redose Coumadin.   ·  Problem: Acute congestive heart failure, unspecified heart failure type.  Plan: Diuresis as per above.  TTE in 10/20 showed mild pulm HTN and no CHF - inconsistent with patient's hx.  Rpt TTE, Cardio eval appreciated.   ·  Problem: Chronic obstructive pulmonary disease, unspecified COPD type.  Plan: no current wheezing.   ·  Problem: Pulmonary hypertension.  Plan: continue sildenafil.  Pulm eval in am - Bernabe.   ·  Problem: Hypothyroidism, unspecified type. Plan: synthroid.  ·  Problem: Essential hypertension.  Plan: losartan.   DVT Proph - on AC

## 2021-05-12 NOTE — H&P ADULT - PROBLEM SELECTOR PLAN 1
Patient currently requiring high flow nasal cannula.  Dyspnea likely multifactorial (pHTN + CHF + COPD)  IV lasix 80 given in ED.  Continue 40 BID  Will send for CT chest noncon.  CXR shows pulm edema and possible effusion

## 2021-05-12 NOTE — PHYSICAL THERAPY INITIAL EVALUATION ADULT - PERTINENT HX OF CURRENT PROBLEM, REHAB EVAL
This is the hx of S. S. a 89 y/o female patient who was admitted to [facility] due to complications of COPD exacerbation affecting medical condition and with subsequent affection on functional mobility

## 2021-05-12 NOTE — PHYSICAL THERAPY INITIAL EVALUATION ADULT - CRITERIA FOR SKILLED THERAPEUTIC INTERVENTIONS
home with no PT services/impairments found/functional limitations in following categories/risk reduction/prevention/rehab potential/therapy frequency/predicted duration of therapy intervention/anticipated discharge recommendation

## 2021-05-12 NOTE — PROGRESS NOTE ADULT - SUBJECTIVE AND OBJECTIVE BOX
Patient: FREDERICK MA [id number] 89y Female                            Hospitalist Attending Note    Seen on high flow.   Reports SOB is better.   No chest pain / palpitations.     ____________________PHYSICAL EXAM:  GENERAL:  NAD Alert and Oriented x 3   HEENT: NCAT  CARDIOVASCULAR:  S1, S2  LUNGS: coarse BS b/l, decreased BS R base.   ABDOMEN:  soft, (-) tenderness, (-) distension, (+) bowel sounds, (-) guarding, (-) rebound (-) rigidity  EXTREMITIES:  no cyanosis / clubbing / edema.   ____________________     VITALS:  Vital Signs Last 24 Hrs  T(C): 36.8 (12 May 2021 11:16), Max: 37.3 (12 May 2021 03:50)  T(F): 98.3 (12 May 2021 11:16), Max: 99.2 (12 May 2021 03:50)  HR: 74 (12 May 2021 12:08) (71 - 109)  BP: 117/59 (12 May 2021 11:16) (117/59 - 147/68)  BP(mean): --  RR: 18 (12 May 2021 12:08) (16 - 33)  SpO2: 100% (12 May 2021 12:08) (95% - 100%) Daily Height in cm: 152.4 (11 May 2021 19:46)    Daily Weight in k.5 (12 May 2021 04:15)  CAPILLARY BLOOD GLUCOSE        I&O's Summary    12 May 2021 07:01  -  12 May 2021 15:38  --------------------------------------------------------  IN: 480 mL / OUT: 0 mL / NET: 480 mL        HISTORY:  PAST MEDICAL & SURGICAL HISTORY:  COPD exacerbation    Atrial fibrillation    Hypothyroidism    Essential hypertension    No significant past surgical history    Allergies    No Known Allergies    Intolerances       LABS:                        10.0   5.73  )-----------( 181      ( 12 May 2021 06:17 )             31.0     05-12    137  |  102  |  54<H>  ----------------------------<  178<H>  4.0   |  30  |  1.19    Ca    8.9      12 May 2021 06:17    TPro  8.1  /  Alb  3.2<L>  /  TBili  0.4  /  DBili  x   /  AST  21  /  ALT  15  /  AlkPhos  71  05-12    PT/INR - ( 12 May 2021 06:17 )   PT: 19.7 sec;   INR: 1.74 ratio         PTT - ( 11 May 2021 20:47 )  PTT:36.4 sec  LIVER FUNCTIONS - ( 12 May 2021 06:17 )  Alb: 3.2 g/dL / Pro: 8.1 gm/dL / ALK PHOS: 71 U/L / ALT: 15 U/L / AST: 21 U/L / GGT: x             CARDIAC MARKERS ( 11 May 2021 20:47 )  <.015 ng/mL / x     / x     / x     / x              MEDICATIONS:  MEDICATIONS  (STANDING):  diltiazem    milliGRAM(s) Oral daily  furosemide   Injectable 40 milliGRAM(s) IV Push two times a day  levothyroxine 100 MICROGram(s) Oral daily  losartan 25 milliGRAM(s) Oral daily  pantoprazole    Tablet 40 milliGRAM(s) Oral before breakfast  potassium chloride    Tablet ER 20 milliEquivalent(s) Oral daily  sildenafil (REVATIO) 20 milliGRAM(s) Oral every 8 hours  warfarin 5 milliGRAM(s) Oral once    MEDICATIONS  (PRN):  acetaminophen   Tablet .. 650 milliGRAM(s) Oral every 6 hours PRN Temp greater or equal to 38C (100.4F), Moderate Pain (4 - 6)  ALPRAZolam 0.25 milliGRAM(s) Oral at bedtime PRN sleep            < from: CT Chest No Cont (21 @ 02:14) >  IMPRESSION:  Bilateral pleural effusions right larger than left.  Right lower lobe dependent consolidation/atelectasis. Correlate clinically for infection  Additional findings as discussed      < end of copied text >

## 2021-05-12 NOTE — H&P ADULT - PROBLEM SELECTOR PLAN 3
Diuresis as per above.  TTE in 10/20 showed mild pulm HTN and no CHF - inconsistent with patient's hx  Rpt TTE, Cardio eval in am

## 2021-05-12 NOTE — PHYSICAL THERAPY INITIAL EVALUATION ADULT - DID THE PATIENT HAVE SURGERY?
This is the hx of S. S. a 89 y/o female patient who was admitted to [facility] due to complications of COPD exacerbation affecting medical condition and with subsequent affection on functional mobility./n/a

## 2021-05-12 NOTE — PHYSICAL THERAPY INITIAL EVALUATION ADULT - ADDITIONAL COMMENTS
Pt lives alone in a house with 5 steps to enter with bilateral rails. usually negotiate that steps dependent, as per son Ambulance people take her upstairs.  She has a HHA, home O2, RW, wheelchair, however pt was wheelchair bound for more than 6 months, have home gina lift to help during transfers.

## 2021-05-12 NOTE — PHYSICAL THERAPY INITIAL EVALUATION ADULT - STRENGTHENING, PT EVAL
Improve strength in B UE/LE 5/5 and be able to perform functional tasks-bed mobility, sitting, standing, transfers and ambulate in a safe manner with or without  assistive device and prevent falls.

## 2021-05-12 NOTE — H&P ADULT - NSHPPHYSICALEXAM_GEN_ALL_CORE
Physical exam:  General: patient in no acute distress, resting comfortably  Head:  Atraumatic, Normocephalic  Eyes: EOMI, PERRLA, clear sclera  Neck: Supple, thyroid nontender, non enlarged  Cardio: S1/S2 +ve, regular rate and rhythm, no M/G/R  Resp: bilateral rales, poor air entry (decreased inspiration and expiration phase)  GI: abdomen soft, nontender, non distended, no guarding, BS +ve x 4  Ext: no significant pedal edema  Neuro: CN 2-12 intact, no significant motor or sensory deficits.  Skin: No rashes or lesions

## 2021-05-12 NOTE — CONSULT NOTE ADULT - SUBJECTIVE AND OBJECTIVE BOX
Patient is a 89y old  Female who presents with a chief complaint of acute dyspnea (12 May 2021 15:38)    HPI:    89 WF with HTN, COPD on O2 & Nocturnal NIV, Diastolic CHF, Severe pHTN, Hypothyroidism ,A fib on Coumadin, Valve replacement. Non smoker. Multiple admissions for COPD exacerbation and decompensated CHF.  Presented  to the ED for progressive  dyspnea that became significantly worse today.  Reports having cough scant phlegm also. Not able to provide more details to history.  Admitted with acute on chronic hypoxic hypercarbic Respiratory failure.    PAST MEDICAL & SURGICAL HISTORY:  COPD exacerbation    Atrial fibrillation    Hypothyroidism    Essential hypertension    No significant past surgical history    FAMILY HISTORY:  not able to provide    SOCIAL HISTORY: BMI (kg/m2): 27.3 (05-11 @ 19:46)    Allergies  No Known Allergies    MEDICATIONS  (STANDING):  azithromycin  IVPB      cefTRIAXone   IVPB 1000 milliGRAM(s) IV Intermittent every 24 hours  diltiazem    milliGRAM(s) Oral daily  furosemide   Injectable 40 milliGRAM(s) IV Push two times a day  levothyroxine 100 MICROGram(s) Oral daily  losartan 25 milliGRAM(s) Oral daily  pantoprazole    Tablet 40 milliGRAM(s) Oral before breakfast  potassium chloride    Tablet ER 20 milliEquivalent(s) Oral daily  sildenafil (REVATIO) 20 milliGRAM(s) Oral every 8 hours  warfarin 5 milliGRAM(s) Oral once    MEDICATIONS  (PRN):  acetaminophen   Tablet .. 650 milliGRAM(s) Oral every 6 hours PRN Temp greater or equal to 38C (100.4F), Moderate Pain (4 - 6)  ALPRAZolam 0.25 milliGRAM(s) Oral at bedtime PRN sleep    REVIEW OF SYSTEMS: not able to provide.    MACRA & MIPS:  Vaccines - Influenza:  yes and Pneumovax: yes  Tobacco: no  Blood Pressure Screening / Control of: 149/82  Current Medications Reviewed: yes    Vital Signs Last 24 Hrs  T(C): 36.8 (12 May 2021 16:35), Max: 37.3 (12 May 2021 03:50)  T(F): 98.2 (12 May 2021 16:35), Max: 99.2 (12 May 2021 03:50)  HR: 76 (12 May 2021 16:35) (71 - 109)  BP: 149/82 (12 May 2021 16:35) (117/59 - 149/82)  BP(mean): --  RR: 18 (12 May 2021 16:35) (16 - 33)  SpO2: 99% (12 May 2021 16:35) (95% - 100%)    PHYSICAL EXAM:  GEN:         Awake, responsive and comfortable.  HEENT:     high flow   RESP:        decreased air entry.  CVS:          Regular rate and rhythm.   ABD:         Soft, non-tender, non-distended;   SKIN:           Warm and dry.  EXTR:            No clubbing, cyanosis or edema  CNS:              Intact sensory and motor function.  PSYCH:        cooperative, no anxiety or depression    LABS:                        10.0   5.73  )-----------( 181      ( 12 May 2021 06:17 )             31.0     05-12    137  |  102  |  54<H>  ----------------------------<  178<H>  4.0   |  30  |  1.19    Ca    8.9      12 May 2021 06:17    TPro  8.1  /  Alb  3.2<L>  /  TBili  0.4  /  DBili  x   /  AST  21  /  ALT  15  /  AlkPhos  71  05-12    PT/INR - ( 12 May 2021 06:17 )   PT: 19.7 sec;   INR: 1.74 ratio      PTT - ( 11 May 2021 20:47 )  PTT:36.4 sec  05-11 @ 22:20  pH: 7.38  pCO2: 49  pO2: 118  SaO2: 98    EKG:  A Fib    RADIOLOGY & ADDITIONAL STUDIES:  < from: CT Chest No Cont (05.12.21 @ 02:14) >  EXAM:  CT CHEST                          PROCEDURE DATE:  05/12/2021      INTERPRETATION:  CLINICAL INFORMATION: Dyspnea    COMPARISON: 10/22/2020.    CONTRAST/COMPLICATIONS:  IV Contrast:  Oral Contrast:  Complications:    PROCEDURE:  CT of the Chest was performed.  Sagittal and coronal reformats were performed.    FINDINGS:    LUNGS AND AIRWAYS: Patent central airways.  Small left pleural effusion slightly decreased. Moderate right pleural effusion slightly increased. Right basilar dependent consolidation/atelectasis. Correlate clinically for infection.  PLEURA: As above  MEDIASTINUM AND URBAN: Scattered nonspecific subcentimeter mediastinal lymph nodes likely reactive. Hilar evaluation limited without IV contrast  VESSELS: Ectatic ascending thoracic aorta up to 4.5 cm similar to prior. Prominent main pulmonary artery concerning for pulmonary hypertension.  HEART: Cardiomegaly with cardiac vascular calcification.. No pericardial effusion.  CHEST WALL AND LOWER NECK: Median sternotomy.  VISUALIZED UPPER ABDOMEN: 3.1 cm fusiform infrarenal abdominal aortic aneurysm. No leakage  BONES: Stable.    IMPRESSION:  Bilateral pleural effusions right larger than left.  Right lower lobe dependent consolidation/atelectasis. Correlate clinically for infection  Additional findings as discussed    NAVA ZELAYA MD; Attending Radiologist  This document has been electronically signed. May 12 2021  9:16AM    ASSESSMENT AND PLAN:  ·	Acute on chronic hypoxic hypercarbic Respiratory failure.  ·	Right pleural effusion.  ·	Acute on chronic diastolic CHF.  ·	Acute COPD exacerbation.  ·	Severe pHTN.  ·	Moderate TR.  ·	Renal  Insuffiencey.  ·	Chronic A Fib.  ·	Hypothyroidism.  ·	Anemia.    SPO2 98% on 30 litre/50% FiO2, will titrate down as tolerated.  Nocturnal NIV.  Continue diuretics and Sildenafil.  On empiric antibiotics, will get procalcitonin  am.  Add PRN nebulizer.  On Coumadin.

## 2021-05-12 NOTE — H&P ADULT - NSHPLABSRESULTS_GEN_ALL_CORE
Recent Vitals  T(C): 37.1 (05-12-21 @ 00:26), Max: 37.1 (05-12-21 @ 00:26)  HR: 83 (05-12-21 @ 00:26) (71 - 84)  BP: 128/56 (05-12-21 @ 00:26) (118/51 - 147/68)  RR: 16 (05-12-21 @ 00:26) (16 - 33)  SpO2: 100% (05-12-21 @ 00:26) (100% - 100%)                        10.8   8.80  )-----------( 196      ( 11 May 2021 20:47 )             32.3     05-11    138  |  103  |  52<H>  ----------------------------<  102<H>  4.7   |  29  |  1.24    Ca    9.1      11 May 2021 20:47    TPro  8.3  /  Alb  3.4  /  TBili  0.4  /  DBili  x   /  AST  28  /  ALT  25  /  AlkPhos  74  05-11    PT/INR - ( 11 May 2021 20:47 )   PT: 21.0 sec;   INR: 1.86 ratio         PTT - ( 11 May 2021 20:47 )  PTT:36.4 sec  LIVER FUNCTIONS - ( 11 May 2021 20:47 )  Alb: 3.4 g/dL / Pro: 8.3 gm/dL / ALK PHOS: 74 U/L / ALT: 25 U/L / AST: 28 U/L / GGT: x               Home Medications:  ALPRAZolam 0.25 mg oral tablet: 1 tab(s) orally 2 times a day, As Needed (19 Oct 2020 12:47)  DilTIAZem Hydrochloride  mg/24 hours oral capsule, extended release: 1 cap(s) orally once a day (19 Oct 2020 12:47)  furosemide 40 mg oral tablet: 1 tab(s) orally once a day (19 Oct 2020 12:47)  ipratropium-albuterol 0.5 mg-2.5 mg/3 mLinhalation solution: 3 milliliter(s) inhaled every 6 hours, As Needed (19 Oct 2020 12:47)  Multiple Vitamins oral tablet: 1 tab(s) orally once a day (19 Oct 2020 12:47)  pantoprazole 40 mg oral delayed release tablet: 1 tab(s) orally once a day (before a meal) (19 Oct 2020 12:47)  potassium chloride 10 mEq oral tablet, extended release: 1 tab(s) orally once a day (19 Oct 2020 12:47)  sildenafil 20 mg oral tablet: 1 tab(s) orally every 8 hours (19 Oct 2020 12:47)  Synthroid 100 mcg (0.1 mg) oral tablet: 1 tab(s) orally once a day (19 Oct 2020 12:47)  Vitamin D2 50,000 intl units (1.25 mg) oral capsule: 1 cap(s) orally once a week (19 Oct 2020 12:47)

## 2021-05-12 NOTE — PHYSICAL THERAPY INITIAL EVALUATION ADULT - IMPAIRMENTS FOUND, PT EVAL
aerobic capacity/endurance/muscle strength/poor safety awareness/posture/ROM/ventilation and respiration/gas exchange

## 2021-05-12 NOTE — H&P ADULT - HISTORY OF PRESENT ILLNESS
Patient is a 89F with a PMH of HTN, COPD on O2, Diastolic CHF, Severe Pulmonary HTN, Hypothyroidism, Atrial fibrillation on Coumadin, hx of multiple admissions for COPD and CHF exacerbation presents to the ED for dyspnea.  Patient awake and alert however is a poor historian.  Patient states that her breathing became significantly worse today.  No other complaints.  Patient requiring high flow nasal cannula in ED.  SpO2 currently 99% on 30L w/ FiO2 of 50%.  Will admit patient to tele.     Patient is a 89F with a PMH of HTN, COPD on O2, Diastolic CHF, Severe Pulmonary HTN, Hypothyroidism, Atrial fibrillation on Coumadin, hx of multiple admissions for COPD and CHF exacerbation presents to the ED for dyspnea.  Patient awake and alert however is a poor historian.  Patient states that her breathing became significantly worse today.  No other complaints.  Patient requiring high flow nasal cannula in ED.  SpO2 currently 99% on 30L w/ FiO2 of 50%.  Will admit patient to tele.      Code status: DNR/DNI (MOLST signed in chart)

## 2021-05-12 NOTE — CONSULT NOTE ADULT - SUBJECTIVE AND OBJECTIVE BOX
FREDERICK MA  [id number]    Date of Consult:  5/21/21  CHIEF COMPLAINT:  sob  HISTORY OF PRESENT ILLNESS:  89F with a PMH of HTN, COPD on O2, Diastolic CHF, Pulmonary HTN, Atrial fibrillation on Coumadin, hx of multiple admissions for COPD and CHF exacerbation presents to the ED for dyspnea.  Patient awake and alert however is a poor historian.  Patient states that over the past day, her breathing became significantly worse today.  denies cp, dizziness, or syncope. Patient requiring high flow nasal cannula in ED and still currently on it. per chart, pt is on revatio at home. and is +DNR/DNI.  pt currently in bed resting, comfortable appearing, asking to lay more flat so she can sleep, on high flow nasal cannula, does not appear to be in resp distress. prev TTE from 10/2020 showing LVEF 55%, +mod TR, mild AS, mod OR with +mild pHTN (prev tte from 6/2020 showing mod AS and severe pHTN).  CT chest showing b/l effusions, R >>> L.       Allergies    No Known Allergies    Intolerances    	    MEDICATIONS:  diltiazem    milliGRAM(s) Oral daily  furosemide   Injectable 40 milliGRAM(s) IV Push two times a day  losartan 25 milliGRAM(s) Oral daily  sildenafil (REVATIO) 20 milliGRAM(s) Oral every 8 hours        acetaminophen   Tablet .. 650 milliGRAM(s) Oral every 6 hours PRN  ALPRAZolam 0.25 milliGRAM(s) Oral at bedtime PRN    pantoprazole    Tablet 40 milliGRAM(s) Oral before breakfast    levothyroxine 100 MICROGram(s) Oral daily    potassium chloride    Tablet ER 20 milliEquivalent(s) Oral daily      PAST MEDICAL & SURGICAL HISTORY:  COPD exacerbation    Atrial fibrillation    Hypothyroidism    Essential hypertension    No significant past surgical history        FAMILY HISTORY:  no known cardiac hx    SOCIAL HISTORY:    denies tob, etoh, drugs      REVIEW OF SYSTEMS:  See HPI. Otherwise, 10 point ROS done and otherwise negative.    PHYSICAL EXAM:  T(C): 36.6 (05-12-21 @ 05:44), Max: 37.3 (05-12-21 @ 03:50)  HR: 84 (05-12-21 @ 09:19) (71 - 109)  BP: 130/83 (05-12-21 @ 05:44) (118/51 - 147/68)  RR: 18 (05-12-21 @ 09:19) (16 - 33)  SpO2: 96% (05-12-21 @ 09:19) (95% - 100%)  Wt(kg): --  I&O's Summary      Appearance: Normal	  HEENT:   Normal oral mucosa, PERRL, EOMI	  Lymphatic: No lymphadenopathy  Cardiovascular: Normal S1 S2, No JVD, No murmurs, No edema  Respiratory: rhonchi b/l, on High flow NC. decreased bs b/l bases  Psychiatry: A & O x 3, Mood & affect appropriate  Gastrointestinal:  Soft, Non-tender, + BS	  Skin: No rashes, No ecchymoses, No cyanosis	  Neurologic: Non-focal  Extremities: Normal range of motion, No clubbing, cyanosis       LABS:	 	    CBC Full  -  ( 12 May 2021 06:17 )  WBC Count : 5.73 K/uL  Hemoglobin : 10.0 g/dL  Hematocrit : 31.0 %  Platelet Count - Automated : 181 K/uL  Mean Cell Volume : 104.7 fl  Mean Cell Hemoglobin : 33.8 pg  Mean Cell Hemoglobin Concentration : 32.3 gm/dL  Auto Neutrophil # : x  Auto Lymphocyte # : x  Auto Monocyte # : x  Auto Eosinophil # : x  Auto Basophil # : x  Auto Neutrophil % : x  Auto Lymphocyte % : x  Auto Monocyte % : x  Auto Eosinophil % : x  Auto Basophil % : x    05-12    137  |  102  |  54<H>  ----------------------------<  178<H>  4.0   |  30  |  1.19  05-11    138  |  103  |  52<H>  ----------------------------<  102<H>  4.7   |  29  |  1.24    Ca    8.9      12 May 2021 06:17  Ca    9.1      11 May 2021 20:47    TPro  8.1  /  Alb  3.2<L>  /  TBili  0.4  /  DBili  x   /  AST  21  /  ALT  15  /  AlkPhos  71  05-12  TPro  8.3  /  Alb  3.4  /  TBili  0.4  /  DBili  x   /  AST  28  /  ALT  25  /  AlkPhos  74  05-11      proBNP: Serum Pro-Brain Natriuretic Peptide: 2464 pg/mL (05-11 @ 20:47)    Lipid Profile:   HgA1c:   TSH:       CARDIAC MARKERS:  Troponin I, Serum: <.015 ng/mL (05-11 @ 20:47)            TELEMETRY: 	    ECG:  	  RADIOLOGY:  OTHER: 	    PREVIOUS DIAGNOSTIC TESTING:    [ ] Echocardiogram:  [ ]  Catheterization:  [ ] Stress Test:  	  	  ASSESSMENT/PLAN: 	    Km Avalos MD

## 2021-05-12 NOTE — CONSULT NOTE ADULT - ASSESSMENT
89F with a PMH of HTN, COPD on O2, Diastolic CHF, Pulmonary HTN, Atrial fibrillation on Coumadin, hx of multiple admissions for COPD and CHF exacerbation presents to the ED for dyspnea.      dyspnea.    pt with sob, now requiring nigh flow nasal cannula, currently in comfortable appearing.  unclear baseline pulm function.  CT chest with effusions, unclear if these are acute or chronic, pt with valvular disease that may have contributed to fluid accumulation in the lungs, however pt is not sick appearing, and is unable to state duration of her symptoms  while this may represent an acute flash pulm edema causing sob, this may be chronic, +/- a COPD exacerbation.  cont revatio, CCB  agree with IV lasix for now  strict I and O's and daily weights.  lactate is negative and trop is negative  will cont to follow with you 89F with a PMH of HTN, COPD on O2, Diastolic CHF, Pulmonary HTN, Atrial fibrillation on Coumadin, hx of multiple admissions for COPD and CHF exacerbation presents to the ED for dyspnea.      dyspnea.    pt with sob, now requiring nigh flow nasal cannula, currently in comfortable appearing.  unclear baseline pulm function.  CT chest with effusions, unclear if these are acute or chronic, pt with valvular disease that may have contributed to fluid accumulation in the lungs, however pt is not sick appearing, and is unable to state duration of her symptoms  while this may represent an acute flash pulm edema causing sob, this may be chronic, +/- a COPD exacerbation.  cont revatio, CCB  agree with IV lasix for now, however pt does not appear to be grossly overloaded, as she is laying flat without edema and dry mucus membranes on exam.   strict I and O's and daily weights.  lactate is negative and trop is negative  consider pulm eval  will cont to follow with you

## 2021-05-13 LAB
ANION GAP SERPL CALC-SCNC: 4 MMOL/L — LOW (ref 5–17)
BUN SERPL-MCNC: 54 MG/DL — HIGH (ref 7–23)
CALCIUM SERPL-MCNC: 8.6 MG/DL — SIGNIFICANT CHANGE UP (ref 8.5–10.1)
CHLORIDE SERPL-SCNC: 103 MMOL/L — SIGNIFICANT CHANGE UP (ref 96–108)
CO2 SERPL-SCNC: 33 MMOL/L — HIGH (ref 22–31)
COVID-19 SPIKE DOMAIN AB INTERP: NEGATIVE — SIGNIFICANT CHANGE UP
COVID-19 SPIKE DOMAIN ANTIBODY RESULT: 0.4 U/ML — SIGNIFICANT CHANGE UP
CREAT SERPL-MCNC: 1.16 MG/DL — SIGNIFICANT CHANGE UP (ref 0.5–1.3)
GLUCOSE SERPL-MCNC: 91 MG/DL — SIGNIFICANT CHANGE UP (ref 70–99)
HCT VFR BLD CALC: 32.5 % — LOW (ref 34.5–45)
HGB BLD-MCNC: 10.4 G/DL — LOW (ref 11.5–15.5)
INR BLD: 1.54 RATIO — HIGH (ref 0.88–1.16)
MAGNESIUM SERPL-MCNC: 2.2 MG/DL — SIGNIFICANT CHANGE UP (ref 1.6–2.6)
MCHC RBC-ENTMCNC: 32 GM/DL — SIGNIFICANT CHANGE UP (ref 32–36)
MCHC RBC-ENTMCNC: 33.7 PG — SIGNIFICANT CHANGE UP (ref 27–34)
MCV RBC AUTO: 105.2 FL — HIGH (ref 80–100)
NRBC # BLD: 0 /100 WBCS — SIGNIFICANT CHANGE UP (ref 0–0)
PHOSPHATE SERPL-MCNC: 3.5 MG/DL — SIGNIFICANT CHANGE UP (ref 2.5–4.5)
PLATELET # BLD AUTO: 200 K/UL — SIGNIFICANT CHANGE UP (ref 150–400)
POTASSIUM SERPL-MCNC: 4.1 MMOL/L — SIGNIFICANT CHANGE UP (ref 3.5–5.3)
POTASSIUM SERPL-SCNC: 4.1 MMOL/L — SIGNIFICANT CHANGE UP (ref 3.5–5.3)
PROCALCITONIN SERPL-MCNC: 0.09 NG/ML — SIGNIFICANT CHANGE UP (ref 0.02–0.1)
PROTHROM AB SERPL-ACNC: 17.5 SEC — HIGH (ref 10.6–13.6)
RBC # BLD: 3.09 M/UL — LOW (ref 3.8–5.2)
RBC # FLD: 14.6 % — HIGH (ref 10.3–14.5)
SARS-COV-2 IGG+IGM SERPL QL IA: 0.4 U/ML — SIGNIFICANT CHANGE UP
SARS-COV-2 IGG+IGM SERPL QL IA: NEGATIVE — SIGNIFICANT CHANGE UP
SODIUM SERPL-SCNC: 140 MMOL/L — SIGNIFICANT CHANGE UP (ref 135–145)
TSH SERPL-MCNC: 1.91 UU/ML — SIGNIFICANT CHANGE UP (ref 0.36–3.74)
WBC # BLD: 8.05 K/UL — SIGNIFICANT CHANGE UP (ref 3.8–10.5)
WBC # FLD AUTO: 8.05 K/UL — SIGNIFICANT CHANGE UP (ref 3.8–10.5)

## 2021-05-13 PROCEDURE — 99233 SBSQ HOSP IP/OBS HIGH 50: CPT

## 2021-05-13 RX ORDER — IPRATROPIUM/ALBUTEROL SULFATE 18-103MCG
3 AEROSOL WITH ADAPTER (GRAM) INHALATION EVERY 6 HOURS
Refills: 0 | Status: COMPLETED | OUTPATIENT
Start: 2021-05-13 | End: 2021-05-14

## 2021-05-13 RX ORDER — LOSARTAN POTASSIUM 100 MG/1
25 TABLET, FILM COATED ORAL DAILY
Refills: 0 | Status: DISCONTINUED | OUTPATIENT
Start: 2021-05-14 | End: 2021-05-19

## 2021-05-13 RX ORDER — WARFARIN SODIUM 2.5 MG/1
5 TABLET ORAL ONCE
Refills: 0 | Status: COMPLETED | OUTPATIENT
Start: 2021-05-13 | End: 2021-05-13

## 2021-05-13 RX ORDER — DILTIAZEM HCL 120 MG
360 CAPSULE, EXT RELEASE 24 HR ORAL DAILY
Refills: 0 | Status: DISCONTINUED | OUTPATIENT
Start: 2021-05-13 | End: 2021-05-19

## 2021-05-13 RX ADMIN — Medication 360 MILLIGRAM(S): at 06:09

## 2021-05-13 RX ADMIN — Medication 650 MILLIGRAM(S): at 15:42

## 2021-05-13 RX ADMIN — PANTOPRAZOLE SODIUM 40 MILLIGRAM(S): 20 TABLET, DELAYED RELEASE ORAL at 06:10

## 2021-05-13 RX ADMIN — AZITHROMYCIN 255 MILLIGRAM(S): 500 TABLET, FILM COATED ORAL at 17:19

## 2021-05-13 RX ADMIN — Medication 20 MILLIGRAM(S): at 21:51

## 2021-05-13 RX ADMIN — WARFARIN SODIUM 5 MILLIGRAM(S): 2.5 TABLET ORAL at 21:52

## 2021-05-13 RX ADMIN — Medication 3 MILLILITER(S): at 17:08

## 2021-05-13 RX ADMIN — Medication 3 MILLILITER(S): at 12:21

## 2021-05-13 RX ADMIN — Medication 650 MILLIGRAM(S): at 22:51

## 2021-05-13 RX ADMIN — Medication 3 MILLILITER(S): at 23:53

## 2021-05-13 RX ADMIN — Medication 100 MICROGRAM(S): at 06:09

## 2021-05-13 RX ADMIN — Medication 20 MILLIGRAM(S): at 12:36

## 2021-05-13 RX ADMIN — CEFTRIAXONE 100 MILLIGRAM(S): 500 INJECTION, POWDER, FOR SOLUTION INTRAMUSCULAR; INTRAVENOUS at 17:19

## 2021-05-13 RX ADMIN — Medication 650 MILLIGRAM(S): at 13:15

## 2021-05-13 RX ADMIN — Medication 20 MILLIEQUIVALENT(S): at 12:36

## 2021-05-13 RX ADMIN — LOSARTAN POTASSIUM 25 MILLIGRAM(S): 100 TABLET, FILM COATED ORAL at 06:10

## 2021-05-13 RX ADMIN — Medication 20 MILLIGRAM(S): at 06:10

## 2021-05-13 RX ADMIN — Medication 650 MILLIGRAM(S): at 21:51

## 2021-05-13 RX ADMIN — Medication 40 MILLIGRAM(S): at 17:20

## 2021-05-13 RX ADMIN — Medication 40 MILLIGRAM(S): at 06:11

## 2021-05-13 NOTE — PROGRESS NOTE ADULT - SUBJECTIVE AND OBJECTIVE BOX
Patient: FREDERICK MA [id number] 89y Female                            Hospitalist Attending Note    Seen with Ethiopian  [id number].  Remains on high flow.   Reports feeling a little better.   No chest pain / palpitations.     ____________________PHYSICAL EXAM:  GENERAL:  NAD Alert and Oriented x 3   HEENT: NCAT  CARDIOVASCULAR:  S1, S2  LUNGS: coarse BS b/l, decreased BS R base.   ABDOMEN:  soft, (-) tenderness, (-) distension, (+) bowel sounds, (-) guarding, (-) rebound (-) rigidity  EXTREMITIES:  no cyanosis / clubbing / edema.   ____________________    VITALS:  Vital Signs Last 24 Hrs  T(C): 36.4 (13 May 2021 11:47), Max: 36.8 (12 May 2021 16:35)  T(F): 97.6 (13 May 2021 11:47), Max: 98.2 (12 May 2021 16:35)  HR: 70 (13 May 2021 11:47) (64 - 84)  BP: 102/48 (13 May 2021 11:47) (102/48 - 149/82)  BP(mean): --  RR: 18 (13 May 2021 11:47) (18 - 20)  SpO2: 99% (13 May 2021 11:47) (94% - 100%) Daily     Daily   CAPILLARY BLOOD GLUCOSE        I&O's Summary    12 May 2021 07:01  -  13 May 2021 07:00  --------------------------------------------------------  IN: 720 mL / OUT: 850 mL / NET: -130 mL    13 May 2021 07:01  -  13 May 2021 12:56  --------------------------------------------------------  IN: 600 mL / OUT: 0 mL / NET: 600 mL        LABS:                        10.4   8.05  )-----------( 200      ( 13 May 2021 07:10 )             32.5     05-13    140  |  103  |  54<H>  ----------------------------<  91  4.1   |  33<H>  |  1.16    Ca    8.6      13 May 2021 07:10  Phos  3.5     05-13  Mg     2.2     05-13    TPro  8.1  /  Alb  3.2<L>  /  TBili  0.4  /  DBili  x   /  AST  21  /  ALT  15  /  AlkPhos  71  05-12    PT/INR - ( 13 May 2021 07:10 )   PT: 17.5 sec;   INR: 1.54 ratio         PTT - ( 11 May 2021 20:47 )  PTT:36.4 sec  LIVER FUNCTIONS - ( 12 May 2021 06:17 )  Alb: 3.2 g/dL / Pro: 8.1 gm/dL / ALK PHOS: 71 U/L / ALT: 15 U/L / AST: 21 U/L / GGT: x             CARDIAC MARKERS ( 11 May 2021 20:47 )  <.015 ng/mL / x     / x     / x     / x            Culture - Blood (collected 12 May 2021 09:08)  Source: .Blood Blood-Peripheral  Preliminary Report (13 May 2021 10:01):    No growth to date.    Culture - Blood (collected 12 May 2021 09:08)  Source: .Blood Blood-Venous  Preliminary Report (13 May 2021 10:01):    No growth to date.        MEDICATIONS:  acetaminophen   Tablet .. 650 milliGRAM(s) Oral every 6 hours PRN  albuterol/ipratropium for Nebulization 3 milliLiter(s) Nebulizer every 6 hours PRN  albuterol/ipratropium for Nebulization 3 milliLiter(s) Nebulizer every 6 hours  ALPRAZolam 0.25 milliGRAM(s) Oral at bedtime PRN  azithromycin  IVPB      azithromycin  IVPB 500 milliGRAM(s) IV Intermittent every 24 hours  cefTRIAXone   IVPB 1000 milliGRAM(s) IV Intermittent every 24 hours  diltiazem    milliGRAM(s) Oral daily  furosemide   Injectable 40 milliGRAM(s) IV Push two times a day  levothyroxine 100 MICROGram(s) Oral daily  pantoprazole    Tablet 40 milliGRAM(s) Oral before breakfast  potassium chloride    Tablet ER 20 milliEquivalent(s) Oral daily  sildenafil (REVATIO) 20 milliGRAM(s) Oral every 8 hours  warfarin 5 milliGRAM(s) Oral once

## 2021-05-13 NOTE — PROGRESS NOTE ADULT - ASSESSMENT
89F with a PMH of HTN, COPD on O2, Diastolic CHF, Severe Pulmonary HTN, Hypothyroidism, Atrial fibrillation on Coumadin, hx of multiple admissions for COPD and CHF exacerbation presents to the ED for dyspnea.  Patient awake and alert however is a poor historian.  Patient states that her breathing became significantly worse today.  No other complaints.  Patient requiring high flow nasal cannula in ED.  SpO2 currently 99% on 30L w/ FiO2 of 50%.  Will admit patient to tele.      ·  Problem: Acute respiratory failure with hypoxia.  Plan: Patient currently requiring high flow nasal cannula.  Dyspnea likely multifactorial (pHTN + CHF + COPD).  IV lasix 80 given in ED.  Continue diuretics.  CT Chest noted R > L pleural effusion.  ·  Problem: RLL Pneumonia - consolidation noted on CT.  Start on Rocephin, Zithromax.   ·  Problem: Atrial fibrillation, unspecified type.  Plan: Continue diltiazem.  INR still low.  Continue Coumadin dosing.    ·  Problem: Acute congestive heart failure, unspecified heart failure type.  Plan: Diuresis as per above.  TTE in 10/20 showed mild pulm HTN and no CHF.  Rpt TTE, Cardio eval appreciated.   ·  Problem: Chronic obstructive pulmonary disease, unspecified COPD type.  Plan: no current wheezing.   ·  Problem: Pulmonary hypertension.  Plan: continue sildenafil.  Pulm eval in am - Bernabe.   ·  Problem: Hypothyroidism, unspecified type. Plan: synthroid.  ·  Problem: Essential hypertension.  Plan: losartan.   DVT Proph - on AC

## 2021-05-13 NOTE — PROGRESS NOTE ADULT - ASSESSMENT
89F with a PMH of HTN, COPD on O2, Diastolic CHF, Pulmonary HTN, Atrial fibrillation on Coumadin, hx of multiple admissions for COPD and CHF exacerbation presents to the ED for dyspnea.    BNP 2500, trop neg x1    pt with acute on chronic hypoxic hypercarbic Respiratory failure now requiring high flow nasal cannula, currently in comfortable appearing.  CT chest with effusions, unclear if these are acute or chronic, pt with valvular disease that may have contributed to fluid accumulation in the lungs, however pt is not sick appearing, and is unable to state duration of her symptoms  while this may represent an acute flash pulm edema causing sob, this may be chronic, +/- a COPD exacerbation. Also   cont Revatio/ CCB/losartan  Cont coumadin as per INR  agree with IV Lasix for now, however pt does not appear to be grossly overloaded, monitor renal function and electrolytes   strict I and O's and daily weights.  lactate is negative and trop is negative  Pulm following   will cont to follow with you 89F with a PMH of HTN, COPD on O2, Diastolic CHF, Pulmonary HTN, Atrial fibrillation on Coumadin, hx of multiple admissions for COPD and CHF exacerbation presents to the ED for dyspnea.    BNP 2500, trop neg x1    pt with acute on chronic hypoxic hypercarbic Respiratory failure now requiring high flow nasal cannula to maintain O2 sat  Ct chest with Bilateral pleural effusions right larger than left, Right lower lobe dependent consolidation/atelectasis. Pulm following  Cont nebulizer Rx, ABx as per primary team  Cont with IV Lasix for now, monitor renal function and electrolytes   strict I and O's and daily weights.  lactate is negative and trop is negative  rate controlled afib on tele, Cont coumadin as per INR  Cont cardizem/ losartan   DNR status  will cont to follow with you 89F with a PMH of HTN, COPD on O2, Diastolic CHF, Pulmonary HTN, Atrial fibrillation on Coumadin, hx of multiple admissions for COPD and CHF exacerbation presents to the ED for dyspnea.    BNP 2500, trop neg x1    pt with acute on chronic hypoxic hypercarbic Respiratory failure now requiring high flow nasal cannula to maintain O2 sat  Ct chest with Bilateral pleural effusions right larger than left, Right lower lobe dependent consolidation/atelectasis. Pulm following  Cont nebulizer Rx, ABx as per primary team  Cont with IV Lasix for now, monitor renal function and electrolytes   strict I and O's and daily weights.  lactate is negative and trop is negative  rate controlled afib on tele, Cont coumadin as per INR  Cont cardizem/ losartan with holding parameters   DNR status  will cont to follow with you

## 2021-05-13 NOTE — PROGRESS NOTE ADULT - SUBJECTIVE AND OBJECTIVE BOX
Patient is a 89y old  Female who presents with a chief complaint of acute dyspnea (12 May 2021 18:19)    PAST MEDICAL & SURGICAL HISTORY:  COPD exacerbation    Atrial fibrillation    Hypothyroidism    Essential hypertension    No significant past surgical history    INTERVAL HISTORY:  	  MEDICATIONS:  MEDICATIONS  (STANDING):  azithromycin  IVPB      azithromycin  IVPB 500 milliGRAM(s) IV Intermittent every 24 hours  cefTRIAXone   IVPB 1000 milliGRAM(s) IV Intermittent every 24 hours  diltiazem    milliGRAM(s) Oral daily  furosemide   Injectable 40 milliGRAM(s) IV Push two times a day  levothyroxine 100 MICROGram(s) Oral daily  losartan 25 milliGRAM(s) Oral daily  pantoprazole    Tablet 40 milliGRAM(s) Oral before breakfast  potassium chloride    Tablet ER 20 milliEquivalent(s) Oral daily  sildenafil (REVATIO) 20 milliGRAM(s) Oral every 8 hours  warfarin 5 milliGRAM(s) Oral once    MEDICATIONS  (PRN):  acetaminophen   Tablet .. 650 milliGRAM(s) Oral every 6 hours PRN Temp greater or equal to 38C (100.4F), Moderate Pain (4 - 6)  albuterol/ipratropium for Nebulization 3 milliLiter(s) Nebulizer every 6 hours PRN Shortness of Breath  ALPRAZolam 0.25 milliGRAM(s) Oral at bedtime PRN sleep    Vitals:  T(F): 97.6 (05-13-21 @ 04:54), Max: 98.3 (05-12-21 @ 11:16)  HR: 72 (05-13-21 @ 09:26) (64 - 84)  BP: 136/80 (05-13-21 @ 04:54) (117/59 - 149/82)  RR: 20 (05-13-21 @ 09:26) (17 - 20)  SpO2: 97% (05-13-21 @ 09:26) (94% - 100%)    05-12 @ 07:01  -  05-13 @ 07:00  --------------------------------------------------------  IN:    Oral Fluid: 720 mL  Total IN: 720 mL    OUT:    Voided (mL): 850 mL  Total OUT: 850 mL    Total NET: -130 mL    Weight (kg): 63.5 (05-11 @ 19:46)  BMI (kg/m2): 27.3 (05-11 @ 19:46)    PHYSICAL EXAM:  Neuro: Awake, responsive  CV: S1 S2 irregular   Lungs: CTABL  GI: Soft, BS +, ND, NT  Extremities: No edema    TELEMETRY:     RADIOLOGY: < from: CT Chest No Cont (05.12.21 @ 02:14) >  LUNGS AND AIRWAYS: Patent central airways.  Small left pleural effusion slightly decreased. Moderate right pleural effusion slightly increased. Right basilar dependent consolidation/atelectasis. Correlate clinically for infection.  PLEURA: As above  MEDIASTINUM AND URBAN: Scattered nonspecific subcentimeter mediastinal lymph nodes likely reactive. Hilar evaluation limited without IV contrast  VESSELS: Ectatic ascending thoracic aorta up to 4.5 cm similar to prior. Prominent main pulmonary artery concerning for pulmonary hypertension.  HEART: Cardiomegaly with cardiac vascular calcification.. No pericardial effusion.  CHEST WALL AND LOWER NECK: Median sternotomy.  VISUALIZED UPPER ABDOMEN: 3.1 cm fusiform infrarenal abdominal aortic aneurysm. No leakage  BONES: Stable.    IMPRESSION:  Bilateral pleural effusions right larger than left.  Right lower lobe dependent consolidation/atelectasis. Correlate clinically for infection    < end of copied text >    DIAGNOSTIC TESTING:    [x ] Echocardiogram: < from: TTE Echo Complete w/o Contrast w/ Doppler (10.22.20 @ 09:39) >  Left Ventricle: Normal left ventricular size and wall thicknesses, with normal systolic and diastolic function.  Left ventricular ejection fraction, by visual estimation, is 60 to 65%. Normal LV filling pressures.  Right Ventricle: Normal right ventricular size and function.  Left Atrium: Mildly enlarged left atrium.  Right Atrium: Mildly enlarged right atrium.  Pericardium: There is no evidence of pericardial effusion.  Mitral Valve: Mild thickening and calcification of the anterior and posterior mitral valve leaflets. Mild mitral valve regurgitation is seen.  Tricuspid Valve: Structurally normal tricuspid valve, with normal leaflet excursion. Moderate tricuspid regurgitation is visualized. Estimated pulmonary artery systolic pressure is 48.9 mmHg assuming a right atrial pressure of 8 mmHg, which is consistentwith mild pulmonary hypertension.  Aortic Valve: Mild aortic stenosis is present. No evidence of aortic valve regurgitation is seen.  Pulmonic Valve: Structurally normal pulmonic valve, with normal leaflet excursion. Mild to moderate pulmonic valve regurgitation.  Aorta: The aortic root is normal in size and structure.  Pulmonary Artery: The main pulmonary artery is normal in size.    Summary:   1. Left ventricular ejection fraction, by visual estimation, is 60 to 65%.   2. Mildly enlarged leftatrium.   3. Mildly enlarged right atrium.   4. Mild mitral valve regurgitation.   5. Mild thickening and calcification of the anterior and posterior mitral valve leaflets.   6. Moderate tricuspid regurgitation.   7. Mild aortic valve stenosis.   8. Mild to moderate pulmonic valve regurgitation.   9. Estimated pulmonary artery systolic pressure is 48.9 mmHg assuming a right atrial pressure of 8 mmHg, which is consistent with mild pulmonary hypertension.  10. No vegetation is seen, a finding whichdoes not exclude endocarditis.    < end of copied text >    LABS:	 	    CARDIAC MARKERS:  Troponin I, Serum: <.015 ng/mL (05-11 @ 20:47)    13 May 2021 07:10    140    |  103    |  54     ----------------------------<  91     4.1     |  33     |  1.16   12 May 2021 06:17    137    |  102    |  54     ----------------------------<  178    4.0     |  30     |  1.19   11 May 2021 20:47    138    |  103    |  52     ----------------------------<  102    4.7     |  29     |  1.24     Ca    8.6        13 May 2021 07:10    TPro  8.1    /  Alb  3.2    /  TBili  0.4    /  DBili  x      /  AST  21     /  ALT  15     /  AlkPhos  71     12 May 2021 06:17                          10.4   8.05  )-----------( 200      ( 13 May 2021 07:10 )             32.5 ,                       10.0   5.73  )-----------( 181      ( 12 May 2021 06:17 )             31.0 ,                       10.8   8.80  )-----------( 196      ( 11 May 2021 20:47 )             32.3   proBNP: Serum Pro-Brain Natriuretic Peptide: 2464 pg/mL (05-11 @ 20:47)    PT/PTT- ( 13 May 2021 07:10 )   PT: 17.5 sec;  PTT: x      INR: 1.54 ratio (05-13 @ 07:10)  INR: 1.74 ratio (05-12 @ 06:17)  INR: 1.86 ratio (05-11 @ 20:47)           Patient is a 89y old  Female who presents with a chief complaint of acute dyspnea (12 May 2021 18:19)    PAST MEDICAL & SURGICAL HISTORY:  COPD exacerbation    Atrial fibrillation    Hypothyroidism    Essential hypertension    No significant past surgical history    INTERVAL HISTORY: Remains on high flow O2, + SOB  	  MEDICATIONS:  MEDICATIONS  (STANDING):  azithromycin  IVPB      azithromycin  IVPB 500 milliGRAM(s) IV Intermittent every 24 hours  cefTRIAXone   IVPB 1000 milliGRAM(s) IV Intermittent every 24 hours  diltiazem    milliGRAM(s) Oral daily  furosemide   Injectable 40 milliGRAM(s) IV Push two times a day  levothyroxine 100 MICROGram(s) Oral daily  losartan 25 milliGRAM(s) Oral daily  pantoprazole    Tablet 40 milliGRAM(s) Oral before breakfast  potassium chloride    Tablet ER 20 milliEquivalent(s) Oral daily  sildenafil (REVATIO) 20 milliGRAM(s) Oral every 8 hours  warfarin 5 milliGRAM(s) Oral once    MEDICATIONS  (PRN):  acetaminophen   Tablet .. 650 milliGRAM(s) Oral every 6 hours PRN Temp greater or equal to 38C (100.4F), Moderate Pain (4 - 6)  albuterol/ipratropium for Nebulization 3 milliLiter(s) Nebulizer every 6 hours PRN Shortness of Breath  ALPRAZolam 0.25 milliGRAM(s) Oral at bedtime PRN sleep    Vitals:  T(F): 97.6 (05-13-21 @ 04:54), Max: 98.3 (05-12-21 @ 11:16)  HR: 72 (05-13-21 @ 09:26) (64 - 84)  BP: 136/80 (05-13-21 @ 04:54) (117/59 - 149/82)  RR: 20 (05-13-21 @ 09:26) (17 - 20)  SpO2: 97% (05-13-21 @ 09:26) (94% - 100%)    05-12 @ 07:01  -  05-13 @ 07:00  --------------------------------------------------------  IN:    Oral Fluid: 720 mL  Total IN: 720 mL    OUT:    Voided (mL): 850 mL  Total OUT: 850 mL    Total NET: -130 mL    Weight (kg): 63.5 (05-11 @ 19:46)  BMI (kg/m2): 27.3 (05-11 @ 19:46)    PHYSICAL EXAM:  Neuro: Awake, responsive  CV: S1 S2 irregular + SM  Lungs: diminished to bases, few rales to bases with mild exp wheezing   GI: Soft, BS +, ND, NT  Extremities: No edema    TELEMETRY: atrial fibrillation     RADIOLOGY: < from: CT Chest No Cont (05.12.21 @ 02:14) >  LUNGS AND AIRWAYS: Patent central airways.  Small left pleural effusion slightly decreased. Moderate right pleural effusion slightly increased. Right basilar dependent consolidation/atelectasis. Correlate clinically for infection.  PLEURA: As above  MEDIASTINUM AND URBAN: Scattered nonspecific subcentimeter mediastinal lymph nodes likely reactive. Hilar evaluation limited without IV contrast  VESSELS: Ectatic ascending thoracic aorta up to 4.5 cm similar to prior. Prominent main pulmonary artery concerning for pulmonary hypertension.  HEART: Cardiomegaly with cardiac vascular calcification.. No pericardial effusion.  CHEST WALL AND LOWER NECK: Median sternotomy.  VISUALIZED UPPER ABDOMEN: 3.1 cm fusiform infrarenal abdominal aortic aneurysm. No leakage  BONES: Stable.    IMPRESSION:  Bilateral pleural effusions right larger than left.  Right lower lobe dependent consolidation/atelectasis. Correlate clinically for infection    < end of copied text >    DIAGNOSTIC TESTING:    [x ] Echocardiogram: < from: TTE Echo Complete w/o Contrast w/ Doppler (10.22.20 @ 09:39) >  Left Ventricle: Normal left ventricular size and wall thicknesses, with normal systolic and diastolic function.  Left ventricular ejection fraction, by visual estimation, is 60 to 65%. Normal LV filling pressures.  Right Ventricle: Normal right ventricular size and function.  Left Atrium: Mildly enlarged left atrium.  Right Atrium: Mildly enlarged right atrium.  Pericardium: There is no evidence of pericardial effusion.  Mitral Valve: Mild thickening and calcification of the anterior and posterior mitral valve leaflets. Mild mitral valve regurgitation is seen.  Tricuspid Valve: Structurally normal tricuspid valve, with normal leaflet excursion. Moderate tricuspid regurgitation is visualized. Estimated pulmonary artery systolic pressure is 48.9 mmHg assuming a right atrial pressure of 8 mmHg, which is consistentwith mild pulmonary hypertension.  Aortic Valve: Mild aortic stenosis is present. No evidence of aortic valve regurgitation is seen.  Pulmonic Valve: Structurally normal pulmonic valve, with normal leaflet excursion. Mild to moderate pulmonic valve regurgitation.  Aorta: The aortic root is normal in size and structure.  Pulmonary Artery: The main pulmonary artery is normal in size.    Summary:   1. Left ventricular ejection fraction, by visual estimation, is 60 to 65%.   2. Mildly enlarged left atrium.   3. Mildly enlarged right atrium.   4. Mild mitral valve regurgitation.   5. Mild thickening and calcification of the anterior and posterior mitral valve leaflets.   6. Moderate tricuspid regurgitation.   7. Mild aortic valve stenosis.   8. Mild to moderate pulmonic valve regurgitation.   9. Estimated pulmonary artery systolic pressure is 48.9 mmHg assuming a right atrial pressure of 8 mmHg, which is consistent with mild pulmonary hypertension.  10. No vegetation is seen, a finding which does not exclude endocarditis.    < end of copied text >    LABS:	 	    CARDIAC MARKERS:  Troponin I, Serum: <.015 ng/mL (05-11 @ 20:47)    13 May 2021 07:10    140    |  103    |  54     ----------------------------<  91     4.1     |  33     |  1.16   12 May 2021 06:17    137    |  102    |  54     ----------------------------<  178    4.0     |  30     |  1.19   11 May 2021 20:47    138    |  103    |  52     ----------------------------<  102    4.7     |  29     |  1.24     Ca    8.6        13 May 2021 07:10    TPro  8.1    /  Alb  3.2    /  TBili  0.4    /  DBili  x      /  AST  21     /  ALT  15     /  AlkPhos  71     12 May 2021 06:17                          10.4   8.05  )-----------( 200      ( 13 May 2021 07:10 )             32.5 ,                       10.0   5.73  )-----------( 181      ( 12 May 2021 06:17 )             31.0 ,                       10.8   8.80  )-----------( 196      ( 11 May 2021 20:47 )             32.3   proBNP: Serum Pro-Brain Natriuretic Peptide: 2464 pg/mL (05-11 @ 20:47)    PT/PTT- ( 13 May 2021 07:10 )   PT: 17.5 sec;  PTT: x      INR: 1.54 ratio (05-13 @ 07:10)  INR: 1.74 ratio (05-12 @ 06:17)  INR: 1.86 ratio (05-11 @ 20:47)

## 2021-05-13 NOTE — PROGRESS NOTE ADULT - CONVERSATION DETAILS
Goals of care - I had a lengthy conversation with pt, , daughter Lilliam.    We discussed the comorbid conditions, hospital care, and prognosis.  We also discussed advanced directives - made aware of limited prognosis if patient were to be intubated or resuscitated, in consideration of age and comorbid conditions.  She confirmed DNR / DNI.    Total time spent on patient care discussion = 20 minutes.

## 2021-05-13 NOTE — PROGRESS NOTE ADULT - ATTENDING COMMENTS
Patient seen and examined  labs and vitals reviewed  agree with above assessment and plan  pt remains sob, remains on high flow nc.  pt still with mild crackles on exam, can cont lasix one more day  cont mgmt of possible copd exacerbation per primary team.  will cont to follow

## 2021-05-14 LAB
ANION GAP SERPL CALC-SCNC: 4 MMOL/L — LOW (ref 5–17)
BUN SERPL-MCNC: 60 MG/DL — HIGH (ref 7–23)
CALCIUM SERPL-MCNC: 8.4 MG/DL — LOW (ref 8.5–10.1)
CHLORIDE SERPL-SCNC: 101 MMOL/L — SIGNIFICANT CHANGE UP (ref 96–108)
CO2 SERPL-SCNC: 33 MMOL/L — HIGH (ref 22–31)
CREAT SERPL-MCNC: 1.29 MG/DL — SIGNIFICANT CHANGE UP (ref 0.5–1.3)
GLUCOSE SERPL-MCNC: 93 MG/DL — SIGNIFICANT CHANGE UP (ref 70–99)
INR BLD: 1.91 RATIO — HIGH (ref 0.88–1.16)
POTASSIUM SERPL-MCNC: 4.2 MMOL/L — SIGNIFICANT CHANGE UP (ref 3.5–5.3)
POTASSIUM SERPL-SCNC: 4.2 MMOL/L — SIGNIFICANT CHANGE UP (ref 3.5–5.3)
PROTHROM AB SERPL-ACNC: 21.5 SEC — HIGH (ref 10.6–13.6)
SODIUM SERPL-SCNC: 138 MMOL/L — SIGNIFICANT CHANGE UP (ref 135–145)

## 2021-05-14 PROCEDURE — 99233 SBSQ HOSP IP/OBS HIGH 50: CPT

## 2021-05-14 PROCEDURE — 93306 TTE W/DOPPLER COMPLETE: CPT | Mod: 26

## 2021-05-14 RX ORDER — WARFARIN SODIUM 2.5 MG/1
4 TABLET ORAL ONCE
Refills: 0 | Status: COMPLETED | OUTPATIENT
Start: 2021-05-14 | End: 2021-05-14

## 2021-05-14 RX ORDER — FUROSEMIDE 40 MG
40 TABLET ORAL DAILY
Refills: 0 | Status: DISCONTINUED | OUTPATIENT
Start: 2021-05-14 | End: 2021-05-19

## 2021-05-14 RX ADMIN — AZITHROMYCIN 255 MILLIGRAM(S): 500 TABLET, FILM COATED ORAL at 15:16

## 2021-05-14 RX ADMIN — Medication 40 MILLIGRAM(S): at 05:42

## 2021-05-14 RX ADMIN — Medication 20 MILLIGRAM(S): at 21:25

## 2021-05-14 RX ADMIN — Medication 20 MILLIGRAM(S): at 05:42

## 2021-05-14 RX ADMIN — Medication 3 MILLILITER(S): at 11:14

## 2021-05-14 RX ADMIN — Medication 3 MILLILITER(S): at 17:43

## 2021-05-14 RX ADMIN — Medication 20 MILLIEQUIVALENT(S): at 12:37

## 2021-05-14 RX ADMIN — CEFTRIAXONE 100 MILLIGRAM(S): 500 INJECTION, POWDER, FOR SOLUTION INTRAMUSCULAR; INTRAVENOUS at 18:10

## 2021-05-14 RX ADMIN — PANTOPRAZOLE SODIUM 40 MILLIGRAM(S): 20 TABLET, DELAYED RELEASE ORAL at 05:42

## 2021-05-14 RX ADMIN — WARFARIN SODIUM 4 MILLIGRAM(S): 2.5 TABLET ORAL at 21:25

## 2021-05-14 RX ADMIN — Medication 3 MILLILITER(S): at 05:19

## 2021-05-14 RX ADMIN — Medication 360 MILLIGRAM(S): at 12:37

## 2021-05-14 RX ADMIN — Medication 20 MILLIGRAM(S): at 14:54

## 2021-05-14 RX ADMIN — Medication 100 MICROGRAM(S): at 05:42

## 2021-05-14 RX ADMIN — Medication 0.25 MILLIGRAM(S): at 21:25

## 2021-05-14 NOTE — PROGRESS NOTE ADULT - ATTENDING COMMENTS
Patient seen and examined  labs and vitals reviewed  agree with above assessment and plan  pt remains sob, remains on high flow nc.  pt still with mild crackles on exam, can cont lasix one more day  cont mgmt of possible copd exacerbation per primary team.  will cont to follow Patient seen and examined  labs and vitals reviewed  agree with above assessment and plan  pt remains sob, remains on high flow nc.  cont mgmt of possible copd exacerbation per primary team.  would consider GOC discussion, as pt is persistently sob despite diuresis, may require o2 upon d/c

## 2021-05-14 NOTE — PROGRESS NOTE ADULT - ASSESSMENT
89F with a PMH of HTN, COPD on O2, Diastolic CHF, Severe Pulmonary HTN, Hypothyroidism, Atrial fibrillation on Coumadin, hx of multiple admissions for COPD and CHF exacerbation presents to the ED for dyspnea, treated for acute CHF / COPD / RLL pneumonia.    ·  Problem: Acute respiratory failure with hypoxia.  Plan: Patient currently requiring high flow nasal cannula.  Dyspnea likely multifactorial (pHTN + CHF + COPD).  Continue diuretics.  CT Chest noted R > L pleural effusion.  Taper O2.   ·  Problem: RLL Pneumonia - consolidation noted on CT.  Start on Rocephin, Zithromax.   ·  Problem: Atrial fibrillation, unspecified type.  Plan: Continue diltiazem.  INR approaching therapeutic level.  Continue Coumadin dosing.    ·  Problem: Acute congestive heart failure, unspecified heart failure type.  Plan: Diuresis as per above.  TTE in 10/20 showed mild pulm HTN and no CHF.  Rpt TTE, Cardio eval appreciated.   ·  Problem: Chronic obstructive pulmonary disease, unspecified COPD type.  Plan: no current wheezing.   ·  Problem: Pulmonary hypertension.  Plan: continue sildenafil.  Pulmonary following.   ·  Problem: Hypothyroidism, unspecified type. Plan: synthroid.  ·  Problem: Essential hypertension.  Plan: losartan.   DVT Proph - on AC

## 2021-05-14 NOTE — PROGRESS NOTE ADULT - ASSESSMENT
89F with a PMH of HTN, COPD on O2, Diastolic CHF, Pulmonary HTN, Atrial fibrillation on Coumadin, hx of multiple admissions for COPD and CHF exacerbation presents to the ED for dyspnea.    BNP 2500, trop neg x1    pt with acute on chronic hypoxic hypercarbic Respiratory failure now requiring high flow nasal cannula to maintain O2 sat  Ct chest with Bilateral pleural effusions right larger than left, Right lower lobe dependent consolidation/atelectasis. Pulm following  Cont nebulizer Rx,  ABx as per primary team  IV Lasix changed to PO, monitor renal function and electrolytes   strict I and O's and daily weights.  lactate is negative and trop is negative  rate controlled Afib on tele, Cont coumadin as per INR  Cont cardizem/ losartan with holding parameters   Repeat Echo pending  DNR status  will cont to follow with you

## 2021-05-14 NOTE — PROGRESS NOTE ADULT - SUBJECTIVE AND OBJECTIVE BOX
Patient: FREDERICK MA [id number] 89y Female                            Hospitalist Attending Note    Remains on high flow.   Reports feeling a little better.   No chest pain / palpitations.     ____________________PHYSICAL EXAM:  GENERAL:  NAD Alert and Oriented x 3   HEENT: NCAT  CARDIOVASCULAR:  S1, S2  LUNGS: coarse BS b/l, decreased BS R base.   ABDOMEN:  soft, (-) tenderness, (-) distension, (+) bowel sounds, (-) guarding, (-) rebound (-) rigidity  EXTREMITIES:  no cyanosis / clubbing / edema.   ____________________    VITALS:  Vital Signs Last 24 Hrs  T(C): 36.7 (14 May 2021 16:23), Max: 36.7 (14 May 2021 16:23)  T(F): 98.1 (14 May 2021 16:23), Max: 98.1 (14 May 2021 16:23)  HR: 112 (14 May 2021 16:23) (70 - 112)  BP: 102/71 (14 May 2021 16:23) (102/71 - 119/73)  BP(mean): 81 (14 May 2021 16:23) (81 - 81)  RR: 18 (14 May 2021 16:23) (18 - 18)  SpO2: 92% (14 May 2021 16:23) (92% - 100%) Daily     Daily Weight in k.3 (14 May 2021 05:04)  CAPILLARY BLOOD GLUCOSE        I&O's Summary    13 May 2021 07:  -  14 May 2021 07:00  --------------------------------------------------------  IN: 900 mL / OUT: 500 mL / NET: 400 mL    14 May 2021 07:01  -  14 May 2021 18:46  --------------------------------------------------------  IN: 320 mL / OUT: 400 mL / NET: -80 mL        LABS:                        10.4   8.05  )-----------( 200      ( 13 May 2021 07:10 )             32.5     05-14    138  |  101  |  60<H>  ----------------------------<  93  4.2   |  33<H>  |  1.29    Ca    8.4<L>      14 May 2021 07:35  Phos  3.5     05-13  Mg     2.2     05-13      PT/INR - ( 14 May 2021 07:35 )   PT: 21.5 sec;   INR: 1.91 ratio                     Culture - Blood (collected 12 May 2021 09:08)  Source: .Blood Blood-Peripheral  Preliminary Report (13 May 2021 10:01):    No growth to date.    Culture - Blood (collected 12 May 2021 09:08)  Source: .Blood Blood-Venous  Preliminary Report (13 May 2021 10:01):    No growth to date.        MEDICATIONS:  acetaminophen   Tablet .. 650 milliGRAM(s) Oral every 6 hours PRN  albuterol/ipratropium for Nebulization 3 milliLiter(s) Nebulizer every 6 hours PRN  ALPRAZolam 0.25 milliGRAM(s) Oral at bedtime PRN  azithromycin  IVPB      azithromycin  IVPB 500 milliGRAM(s) IV Intermittent every 24 hours  cefTRIAXone   IVPB 1000 milliGRAM(s) IV Intermittent every 24 hours  diltiazem    milliGRAM(s) Oral daily  furosemide    Tablet 40 milliGRAM(s) Oral daily  levothyroxine 100 MICROGram(s) Oral daily  losartan 25 milliGRAM(s) Oral daily  pantoprazole    Tablet 40 milliGRAM(s) Oral before breakfast  potassium chloride    Tablet ER 20 milliEquivalent(s) Oral daily  sildenafil (REVATIO) 20 milliGRAM(s) Oral every 8 hours  warfarin 4 milliGRAM(s) Oral once

## 2021-05-14 NOTE — PROGRESS NOTE ADULT - SUBJECTIVE AND OBJECTIVE BOX
INTERVAL HPI:  89 WF with HTN, COPD on O2 & Nocturnal NIV, Diastolic CHF, Severe pHTN, Hypothyroidism ,A fib on Coumadin, Valve replacement. Non smoker. Multiple admissions for COPD exacerbation and decompensated CHF.  Presented  to the ED for progressive  dyspnea that became significantly worse today.  Reports having cough scant phlegm also. Not able to provide more details to history.  Admitted with acute on chronic hypoxic hypercarbic Respiratory failure.    OVERNIGHT EVENTS:  Awake responsive    Vital Signs Last 24 Hrs  T(C): 36.3 (14 May 2021 09:59), Max: 36.6 (14 May 2021 05:04)  T(F): 97.4 (14 May 2021 09:59), Max: 97.9 (14 May 2021 05:04)  HR: 105 (14 May 2021 15:22) (70 - 105)  BP: 119/73 (14 May 2021 09:59) (110/60 - 119/73)  BP(mean): --  RR: 18 (14 May 2021 15:22) (18 - 18)  SpO2: 98% (14 May 2021 15:22) (96% - 100%)    PHYSICAL EXAM:  GEN:         Awake, responsive and comfortable.  HEENT:    Normal.    RESP:       no distress  CVS:          Regular rate and rhythm.   ABD:         Soft, non-tender, non-distended;     MEDICATIONS  (STANDING):  albuterol/ipratropium for Nebulization 3 milliLiter(s) Nebulizer every 6 hours  azithromycin  IVPB      azithromycin  IVPB 500 milliGRAM(s) IV Intermittent every 24 hours  cefTRIAXone   IVPB 1000 milliGRAM(s) IV Intermittent every 24 hours  diltiazem    milliGRAM(s) Oral daily  furosemide    Tablet 40 milliGRAM(s) Oral daily  levothyroxine 100 MICROGram(s) Oral daily  losartan 25 milliGRAM(s) Oral daily  pantoprazole    Tablet 40 milliGRAM(s) Oral before breakfast  potassium chloride    Tablet ER 20 milliEquivalent(s) Oral daily  sildenafil (REVATIO) 20 milliGRAM(s) Oral every 8 hours    MEDICATIONS  (PRN):  acetaminophen   Tablet .. 650 milliGRAM(s) Oral every 6 hours PRN Temp greater or equal to 38C (100.4F), Moderate Pain (4 - 6)  albuterol/ipratropium for Nebulization 3 milliLiter(s) Nebulizer every 6 hours PRN Shortness of Breath  ALPRAZolam 0.25 milliGRAM(s) Oral at bedtime PRN sleep    LABS:                        10.4   8.05  )-----------( 200      ( 13 May 2021 07:10 )             32.5     05-14    138  |  101  |  60<H>  ----------------------------<  93  4.2   |  33<H>  |  1.29    Ca    8.4<L>      14 May 2021 07:35  Phos  3.5     05-13  Mg     2.2     05-13    PT/INR - ( 14 May 2021 07:35 )   PT: 21.5 sec;   INR: 1.91 ratio      05-11 @ 22:20  pH: 7.38  pCO2: 49  pO2: 118  SaO2: 98    ASSESSMENT AND PLAN:  ·	Acute on chronic hypoxic hypercarbic Respiratory failure.  ·	Right pleural effusion.  ·	Acute on chronic diastolic CHF.  ·	Acute COPD exacerbation.  ·	Severe pHTN.  ·	Moderate TR.  ·	Renal  Insuffiencey.  ·	Chronic A Fib.  ·	Hypothyroidism.  ·	Anemia.    SPO2 99% on 30 litre/40% high flow. Will titrate down as tolerated.  Continue nocturnal NIV  Continue sildenafil and diuretics.  Procalcitonin normal.  On empiric antibiotics.

## 2021-05-14 NOTE — PROGRESS NOTE ADULT - SUBJECTIVE AND OBJECTIVE BOX
Patient is a 89y old  Female who presents with a chief complaint of acute dyspnea (13 May 2021 12:43)    PAST MEDICAL & SURGICAL HISTORY:  COPD exacerbation    Atrial fibrillation    Hypothyroidism    Essential hypertension    No significant past surgical history    INTERVAL HISTORY: Patient in bed, high flow Oxygen in use. Conversed with patient via [place]  #[id number].   	  MEDICATIONS:  MEDICATIONS  (STANDING):  albuterol/ipratropium for Nebulization 3 milliLiter(s) Nebulizer every 6 hours  azithromycin  IVPB      azithromycin  IVPB 500 milliGRAM(s) IV Intermittent every 24 hours  cefTRIAXone   IVPB 1000 milliGRAM(s) IV Intermittent every 24 hours  diltiazem    milliGRAM(s) Oral daily  furosemide    Tablet 40 milliGRAM(s) Oral daily  levothyroxine 100 MICROGram(s) Oral daily  losartan 25 milliGRAM(s) Oral daily  pantoprazole    Tablet 40 milliGRAM(s) Oral before breakfast  potassium chloride    Tablet ER 20 milliEquivalent(s) Oral daily  sildenafil (REVATIO) 20 milliGRAM(s) Oral every 8 hours    MEDICATIONS  (PRN):  acetaminophen   Tablet .. 650 milliGRAM(s) Oral every 6 hours PRN Temp greater or equal to 38C (100.4F), Moderate Pain (4 - 6)  albuterol/ipratropium for Nebulization 3 milliLiter(s) Nebulizer every 6 hours PRN Shortness of Breath  ALPRAZolam 0.25 milliGRAM(s) Oral at bedtime PRN sleep      Vitals:  T(F): 97.4 (05-14-21 @ 09:59), Max: 97.9 (05-14-21 @ 05:04)  HR: 78 (05-14-21 @ 11:27) (70 - 83)  BP: 119/73 (05-14-21 @ 09:59) (110/60 - 119/73)  RR: 18 (05-14-21 @ 09:59) (18 - 18)  SpO2: 98% (05-14-21 @ 11:27) (96% - 100%)  Wt(kg): --53.6 kg    05-13 @ 07:01  -  05-14 @ 07:00  --------------------------------------------------------  IN:    Oral Fluid: 900 mL  Total IN: 900 mL    OUT:    Voided (mL): 500 mL  Total OUT: 500 mL    Total NET: 400 mL      05-14 @ 07:01  -  05-14 @ 12:43  --------------------------------------------------------  IN:    Oral Fluid: 200 mL  Total IN: 200 mL    OUT:  Total OUT: 0 mL    Total NET: 200 mL    Weight (kg): 63.5 (05-11 @ 19:46)  BMI (kg/m2): 27.3 (05-11 @ 19:46)    PHYSICAL EXAM:  Neuro: Awake, responsive  CV: S1 S2 irregular   Lungs: Crackles   GI: Soft, BS +, ND, NT  Extremities: No edema    TELEMETRY: Afib  	    ECG:  	    RADIOLOGY: < from: CT Chest No Cont (05.12.21 @ 02:14) >  FINDINGS:    LUNGS AND AIRWAYS: Patent central airways.  Small left pleural effusion slightly decreased. Moderate right pleural effusion slightly increased. Right basilar dependent consolidation/atelectasis. Correlate clinically for infection.  PLEURA: As above  MEDIASTINUM AND URBAN: Scattered nonspecific subcentimeter mediastinal lymph nodes likely reactive. Hilar evaluation limited without IV contrast  VESSELS: Ectatic ascending thoracic aorta up to 4.5 cm similar to prior. Prominent main pulmonary artery concerning for pulmonary hypertension.  HEART: Cardiomegaly with cardiac vascular calcification.. No pericardial effusion.  CHEST WALL AND LOWER NECK: Median sternotomy.  VISUALIZED UPPER ABDOMEN: 3.1 cm fusiform infrarenal abdominal aortic aneurysm. No leakage  BONES: Stable.    IMPRESSION:  Bilateral pleural effusions right larger than left.  Right lower lobe dependent consolidation/atelectasis. Correlate clinically for infection  Additional findings as discussed      < end of copied text >      DIAGNOSTIC TESTING:    [x ] Echocardiogram: < from: TTE Echo Complete w/o Contrast w/ Doppler (10.22.20 @ 09:39) >  Summary:   1. Left ventricular ejection fraction, by visual estimation, is 60 to 65%.   2. Mildly enlarged leftatrium.   3. Mildly enlarged right atrium.   4. Mild mitral valve regurgitation.   5. Mild thickening and calcification of the anterior and posterior mitral valve leaflets.   6. Moderate tricuspid regurgitation.   7. Mild aortic valve stenosis.   8. Mild to moderate pulmonic valve regurgitation.   9. Estimated pulmonary artery systolic pressure is 48.9 mmHg assuming a right atrial pressure of 8 mmHg, which is consistent with mild pulmonary hypertension.  10. No vegetation is seen, a finding whichdoes not exclude endocarditis.    < end of copied text >       LABS:	 	    CARDIAC MARKERS:  Troponin I, Serum: <.015 ng/mL (05-11 @ 20:47)    14 May 2021 07:35    138    |  101    |  60     ----------------------------<  93     4.2     |  33     |  1.29   13 May 2021 07:10    140    |  103    |  54     ----------------------------<  91     4.1     |  33     |  1.16   12 May 2021 06:17    137    |  102    |  54     ----------------------------<  178    4.0     |  30     |  1.19     Ca    8.4        14 May 2021 07:35  Phos  3.5       13 May 2021 07:10  Mg     2.2       13 May 2021 07:10                            10.4   8.05  )-----------( 200      ( 13 May 2021 07:10 )             32.5 ,                       10.0   5.73  )-----------( 181      ( 12 May 2021 06:17 )             31.0 ,                       10.8   8.80  )-----------( 196      ( 11 May 2021 20:47 )             32.3   proBNP: Serum Pro-Brain Natriuretic Peptide: 2464 pg/mL (05-11 @ 20:47)    Lipid Profile:   HgA1c:   TSH: Thyroid Stimulating Hormone, Serum: 1.910 uU/mL (05-13 @ 07:10)      PT/PTT- ( 14 May 2021 07:35 )   PT: 21.5 sec;  PTT: x        INR: 1.91 ratio (05-14 @ 07:35)  INR: 1.54 ratio (05-13 @ 07:10)  INR: 1.74 ratio (05-12 @ 06:17)  INR: 1.86 ratio (05-11 @ 20:47)

## 2021-05-15 LAB
ANION GAP SERPL CALC-SCNC: 3 MMOL/L — LOW (ref 5–17)
BUN SERPL-MCNC: 48 MG/DL — HIGH (ref 7–23)
CALCIUM SERPL-MCNC: 8.9 MG/DL — SIGNIFICANT CHANGE UP (ref 8.5–10.1)
CHLORIDE SERPL-SCNC: 102 MMOL/L — SIGNIFICANT CHANGE UP (ref 96–108)
CO2 SERPL-SCNC: 33 MMOL/L — HIGH (ref 22–31)
CREAT SERPL-MCNC: 1.02 MG/DL — SIGNIFICANT CHANGE UP (ref 0.5–1.3)
GLUCOSE SERPL-MCNC: 92 MG/DL — SIGNIFICANT CHANGE UP (ref 70–99)
HCT VFR BLD CALC: 31.9 % — LOW (ref 34.5–45)
HGB BLD-MCNC: 10.1 G/DL — LOW (ref 11.5–15.5)
INR BLD: 2.22 RATIO — HIGH (ref 0.88–1.16)
MCHC RBC-ENTMCNC: 31.7 GM/DL — LOW (ref 32–36)
MCHC RBC-ENTMCNC: 33.9 PG — SIGNIFICANT CHANGE UP (ref 27–34)
MCV RBC AUTO: 107 FL — HIGH (ref 80–100)
NRBC # BLD: 0 /100 WBCS — SIGNIFICANT CHANGE UP (ref 0–0)
PLATELET # BLD AUTO: 179 K/UL — SIGNIFICANT CHANGE UP (ref 150–400)
POTASSIUM SERPL-MCNC: 4.4 MMOL/L — SIGNIFICANT CHANGE UP (ref 3.5–5.3)
POTASSIUM SERPL-SCNC: 4.4 MMOL/L — SIGNIFICANT CHANGE UP (ref 3.5–5.3)
PROTHROM AB SERPL-ACNC: 24.8 SEC — HIGH (ref 10.6–13.6)
RBC # BLD: 2.98 M/UL — LOW (ref 3.8–5.2)
RBC # FLD: 14.9 % — HIGH (ref 10.3–14.5)
SODIUM SERPL-SCNC: 138 MMOL/L — SIGNIFICANT CHANGE UP (ref 135–145)
WBC # BLD: 8.13 K/UL — SIGNIFICANT CHANGE UP (ref 3.8–10.5)
WBC # FLD AUTO: 8.13 K/UL — SIGNIFICANT CHANGE UP (ref 3.8–10.5)

## 2021-05-15 PROCEDURE — 99233 SBSQ HOSP IP/OBS HIGH 50: CPT

## 2021-05-15 PROCEDURE — 99232 SBSQ HOSP IP/OBS MODERATE 35: CPT

## 2021-05-15 RX ORDER — WARFARIN SODIUM 2.5 MG/1
3 TABLET ORAL ONCE
Refills: 0 | Status: COMPLETED | OUTPATIENT
Start: 2021-05-15 | End: 2021-05-15

## 2021-05-15 RX ADMIN — WARFARIN SODIUM 3 MILLIGRAM(S): 2.5 TABLET ORAL at 22:43

## 2021-05-15 RX ADMIN — AZITHROMYCIN 255 MILLIGRAM(S): 500 TABLET, FILM COATED ORAL at 15:04

## 2021-05-15 RX ADMIN — PANTOPRAZOLE SODIUM 40 MILLIGRAM(S): 20 TABLET, DELAYED RELEASE ORAL at 06:20

## 2021-05-15 RX ADMIN — Medication 20 MILLIEQUIVALENT(S): at 12:33

## 2021-05-15 RX ADMIN — Medication 20 MILLIGRAM(S): at 14:47

## 2021-05-15 RX ADMIN — CEFTRIAXONE 100 MILLIGRAM(S): 500 INJECTION, POWDER, FOR SOLUTION INTRAMUSCULAR; INTRAVENOUS at 17:09

## 2021-05-15 RX ADMIN — Medication 650 MILLIGRAM(S): at 18:15

## 2021-05-15 RX ADMIN — Medication 650 MILLIGRAM(S): at 17:15

## 2021-05-15 RX ADMIN — Medication 0.25 MILLIGRAM(S): at 22:35

## 2021-05-15 RX ADMIN — Medication 20 MILLIGRAM(S): at 06:20

## 2021-05-15 RX ADMIN — Medication 20 MILLIGRAM(S): at 22:36

## 2021-05-15 RX ADMIN — Medication 40 MILLIGRAM(S): at 06:20

## 2021-05-15 RX ADMIN — Medication 360 MILLIGRAM(S): at 06:20

## 2021-05-15 RX ADMIN — Medication 100 MICROGRAM(S): at 06:20

## 2021-05-15 NOTE — PROGRESS NOTE ADULT - SUBJECTIVE AND OBJECTIVE BOX
Patient: FREDERICK MA [id number] 89y Female                            Hospitalist Attending Note    Now on 3L /min via NC.    Feels much better. Seen with daughter Lilliam present via phone.   No chest pain / palpitations.     ____________________PHYSICAL EXAM:  GENERAL:  NAD Alert and Oriented x 3   HEENT: NCAT  CARDIOVASCULAR:  S1, S2  LUNGS: coarse BS b/l, decreased BS R base.   ABDOMEN:  soft, (-) tenderness, (-) distension, (+) bowel sounds, (-) guarding, (-) rebound (-) rigidity  EXTREMITIES:  no cyanosis / clubbing / edema.   ____________________    VITALS:  Vital Signs Last 24 Hrs  T(C): 36.4 (15 May 2021 10:25), Max: 36.8 (15 May 2021 06:35)  T(F): 97.6 (15 May 2021 10:25), Max: 98.2 (15 May 2021 06:35)  HR: 62 (15 May 2021 10:25) (62 - 112)  BP: 117/69 (15 May 2021 10:25) (102/71 - 117/69)  BP(mean): 81 (14 May 2021 16:23) (81 - 81)  RR: 19 (15 May 2021 10:25) (18 - 22)  SpO2: 98% (15 May 2021 10:25) (92% - 99%) Daily     Daily Weight in k.2 (15 May 2021 06:35)  CAPILLARY BLOOD GLUCOSE        I&O's Summary    14 May 2021 07:01  -  15 May 2021 07:00  --------------------------------------------------------  IN: 320 mL / OUT: 400 mL / NET: -80 mL    15 May 2021 07:01  -  15 May 2021 14:55  --------------------------------------------------------  IN: 320 mL / OUT: 0 mL / NET: 320 mL        LABS:                        10.1   8.13  )-----------( 179      ( 15 May 2021 07:02 )             31.9     05-15    138  |  102  |  48<H>  ----------------------------<  92  4.4   |  33<H>  |  1.02    Ca    8.9      15 May 2021 07:02      PT/INR - ( 15 May 2021 07:02 )   PT: 24.8 sec;   INR: 2.22 ratio                       MEDICATIONS:  acetaminophen   Tablet .. 650 milliGRAM(s) Oral every 6 hours PRN  albuterol/ipratropium for Nebulization 3 milliLiter(s) Nebulizer every 6 hours PRN  ALPRAZolam 0.25 milliGRAM(s) Oral at bedtime PRN  azithromycin  IVPB      azithromycin  IVPB 500 milliGRAM(s) IV Intermittent every 24 hours  cefTRIAXone   IVPB 1000 milliGRAM(s) IV Intermittent every 24 hours  diltiazem    milliGRAM(s) Oral daily  furosemide    Tablet 40 milliGRAM(s) Oral daily  levothyroxine 100 MICROGram(s) Oral daily  losartan 25 milliGRAM(s) Oral daily  pantoprazole    Tablet 40 milliGRAM(s) Oral before breakfast  potassium chloride    Tablet ER 20 milliEquivalent(s) Oral daily  sildenafil (REVATIO) 20 milliGRAM(s) Oral every 8 hours  warfarin 3 milliGRAM(s) Oral once

## 2021-05-15 NOTE — PROGRESS NOTE ADULT - ASSESSMENT
89F with a PMH of HTN, COPD on O2, Diastolic CHF, Pulmonary HTN, Atrial fibrillation on Coumadin, hx of multiple admissions for COPD and CHF exacerbation presents to the ED for dyspnea.      appears to be stable  repeat TTE with severe pHTN PASP 63mmHg  In the setting known HFpEF and Stage 3 diastolic dysfunction there is probable component of Group II pHTN  cont maintenance PO diuretics  slow afib likely vagal tone while on AVN while sleeping    -add spironolactone 25mg daily for possible reduction in CHF exacerbations  -monitor K at least four days afterwards

## 2021-05-15 NOTE — PROGRESS NOTE ADULT - SUBJECTIVE AND OBJECTIVE BOX
INTERVAL HPI:  89 WF with HTN, COPD on O2 & Nocturnal NIV, Diastolic CHF, Severe pHTN, Hypothyroidism ,A fib on Coumadin, Valve replacement. Non smoker. Multiple admissions for COPD exacerbation and decompensated CHF.  Presented  to the ED for progressive  dyspnea that became significantly worse today.  Reports having cough scant phlegm also. Not able to provide more details to history.  Admitted with acute on chronic hypoxic hypercarbic Respiratory failure.    OVERNIGHT EVENTS:  Comfortable, on nasal O2.    Vital Signs Last 24 Hrs  T(C): 36.4 (15 May 2021 10:25), Max: 36.8 (15 May 2021 06:35)  T(F): 97.6 (15 May 2021 10:25), Max: 98.2 (15 May 2021 06:35)  HR: 62 (15 May 2021 10:25) (62 - 112)  BP: 117/69 (15 May 2021 10:25) (102/71 - 117/69)  BP(mean): 81 (14 May 2021 16:23) (81 - 81)  RR: 19 (15 May 2021 10:25) (18 - 22)  SpO2: 98% (15 May 2021 10:25) (92% - 99%)    PHYSICAL EXAM:  GEN:         Awake, responsive and comfortable.  HEENT:    Normal.    RESP:       no distress  CVS:         Regular rate and rhythm.   ABD:         Soft, non-tender, non-distended;     MEDICATIONS  (STANDING):  azithromycin  IVPB      azithromycin  IVPB 500 milliGRAM(s) IV Intermittent every 24 hours  cefTRIAXone   IVPB 1000 milliGRAM(s) IV Intermittent every 24 hours  diltiazem    milliGRAM(s) Oral daily  furosemide    Tablet 40 milliGRAM(s) Oral daily  levothyroxine 100 MICROGram(s) Oral daily  losartan 25 milliGRAM(s) Oral daily  pantoprazole    Tablet 40 milliGRAM(s) Oral before breakfast  potassium chloride    Tablet ER 20 milliEquivalent(s) Oral daily  sildenafil (REVATIO) 20 milliGRAM(s) Oral every 8 hours  warfarin 3 milliGRAM(s) Oral once    MEDICATIONS  (PRN):  acetaminophen   Tablet .. 650 milliGRAM(s) Oral every 6 hours PRN Temp greater or equal to 38C (100.4F), Moderate Pain (4 - 6)  albuterol/ipratropium for Nebulization 3 milliLiter(s) Nebulizer every 6 hours PRN Shortness of Breath  ALPRAZolam 0.25 milliGRAM(s) Oral at bedtime PRN sleep    LABS:                        10.1   8.13  )-----------( 179      ( 15 May 2021 07:02 )             31.9     05-15    138  |  102  |  48<H>  ----------------------------<  92  4.4   |  33<H>  |  1.02    Ca    8.9      15 May 2021 07:02    PT/INR - ( 15 May 2021 07:02 )   PT: 24.8 sec;   INR: 2.22 ratio      05-11 @ 22:20  pH: 7.38  pCO2: 49  pO2: 118  SaO2: 98    ASSESSMENT AND PLAN:  ·	Acute on chronic hypoxic hypercarbic Respiratory failure.  ·	Right pleural effusion.  ·	Acute on chronic diastolic CHF.  ·	Acute COPD exacerbation.  ·	Severe pHTN.  ·	Moderate TR.  ·	Renal  Insuffiencey.  ·	Chronic A Fib.  ·	Hypothyroidism.  ·	Anemia.    Now tolerating nasal O2.  Continue nocturnal NIV  Continue sildenafil and diuretics.  Procalcitonin normal.  On empiric antibiotics.  Discussed with son at bed side.

## 2021-05-15 NOTE — PROGRESS NOTE ADULT - ASSESSMENT
89F with a PMH of HTN, COPD on O2, Diastolic CHF, Severe Pulmonary HTN, Hypothyroidism, Atrial fibrillation on Coumadin, hx of multiple admissions for COPD and CHF exacerbation presents to the ED for dyspnea, treated for acute CHF / COPD / RLL pneumonia.    ·  Problem: Acute on chronic respiratory failure with hypoxia.  Plan: Pt now on 3L /min via NC.  Per royal, at baseline on 2.5 L / min at home.  Dyspnea likely multifactorial (pHTN + CHF + COPD).  Continue diuretics.  CT Chest noted R > L pleural effusion.  Taper O2.   ·  Problem: RLL Pneumonia - consolidation noted on CT.  on Rocephin, Zithromax.   ·  Problem: Atrial fibrillation, unspecified type.  Plan: Continue diltiazem.  INR therapeutic.  Continue Coumadin dosing.    ·  Problem: Acute congestive heart failure, unspecified heart failure type.  Plan: Diuresis as per above.  TTE in 10/20 showed mild pulm HTN and no CHF.  Rpt TTE, Cardio eval appreciated.   ·  Problem: Chronic obstructive pulmonary disease, unspecified COPD type.  Plan: no current wheezing.   ·  Problem: Pulmonary hypertension.  Plan: continue sildenafil.  Pulmonary following.   ·  Problem: Hypothyroidism, unspecified type. Plan: synthroid.  ·  Problem: Essential hypertension.  Plan: losartan.   DVT Proph - on AC    OOB with PT.  Likely HEVER placement.

## 2021-05-15 NOTE — PROGRESS NOTE ADULT - SUBJECTIVE AND OBJECTIVE BOX
Cardiology Progress Note      Interval Events:  on 3L NC  feels okay      MEDICATIONS:  diltiazem    milliGRAM(s) Oral daily  furosemide    Tablet 40 milliGRAM(s) Oral daily  losartan 25 milliGRAM(s) Oral daily  sildenafil (REVATIO) 20 milliGRAM(s) Oral every 8 hours  warfarin 3 milliGRAM(s) Oral once  azithromycin  IVPB      azithromycin  IVPB 500 milliGRAM(s) IV Intermittent every 24 hours  cefTRIAXone   IVPB 1000 milliGRAM(s) IV Intermittent every 24 hours  albuterol/ipratropium for Nebulization 3 milliLiter(s) Nebulizer every 6 hours PRN  acetaminophen   Tablet .. 650 milliGRAM(s) Oral every 6 hours PRN  ALPRAZolam 0.25 milliGRAM(s) Oral at bedtime PRN  pantoprazole    Tablet 40 milliGRAM(s) Oral before breakfast  levothyroxine 100 MICROGram(s) Oral daily  potassium chloride    Tablet ER 20 milliEquivalent(s) Oral daily    PHYSICAL EXAM:  T(C): 36.4 (05-15-21 @ 10:25), Max: 36.8 (05-15-21 @ 06:35)  HR: 62 (05-15-21 @ 10:25) (62 - 112)  BP: 117/69 (05-15-21 @ 10:25) (102/71 - 117/69)  RR: 19 (05-15-21 @ 10:25) (18 - 22)  SpO2: 98% (05-15-21 @ 10:25) (92% - 99%)  Wt(kg): --  I&O's Summary    14 May 2021 07:01  -  15 May 2021 07:00  --------------------------------------------------------  IN: 320 mL / OUT: 400 mL / NET: -80 mL    15 May 2021 07:01  -  15 May 2021 15:01  --------------------------------------------------------  IN: 320 mL / OUT: 0 mL / NET: 320 mL    Appearance: No acute distress  HEENT:   on NC  Cardiovascular: Normal S1 S2, no elevated JVP, no murmurs, no edema  Respiratory: Lungs clear to auscultation anteriorly with poor inspiratory effort  Psychiatry: Mood & affect appropriate  Gastrointestinal:  soft nt  Skin: No rashes, no ecchymoses, no cyanosis	  Neurologic: grossly non-focal  Extremities: no clubbing, cyanosis or edema  Vascular: Peripheral pulses palpable bilaterally    LABS:	 	  CBC Full  -  ( 15 May 2021 07:02 )  WBC Count : 8.13 K/uL  Hemoglobin : 10.1 g/dL  Hematocrit : 31.9 %  Platelet Count - Automated : 179 K/uL    05-15  138  |  102  |  48<H>  ----------------------------<  92  4.4   |  33<H>  |  1.02    05-14  138  |  101  |  60<H>  ----------------------------<  93  4.2   |  33<H>  |  1.29    Ca    8.9      15 May 2021 07:02  Ca    8.4<L>      14 May 2021 07:35      proBNP:   Lipid Profile:   HgA1c:   TSH:     CARDIAC MARKERS:    TELEMETRY: 	  slow afib  ECG:  	  RADIOLOGY:  OTHER: 	    PREVIOUS DIAGNOSTIC TESTING:    [x] Echocardiogram:  < from: TTE Echo Complete w/o Contrast w/ Doppler (05.14.21 @ 16:17) >  Summary:   1. Technically suboptimal study.   2. Left ventricular ejection fraction, by visual estimation, is 60 to 65%.   3. Normal global left ventricular systolic function.   4. Severely enlarged left atrium.   5. Spectral Doppler shows restrictive pattern of left ventricular myocardial filling (Grade III diastolic dysfunction).   6. Right atrial enlargement.   7. Mild mitral annular calcification.   8. Mild mitral valve regurgitation.   9. Thickening of the anterior and posterior mitral valve leaflets.  10. Estimated pulmonary artery systolic pressure is 63.3 mmHg assuming a right atrial pressure of 10 mmHg, which is consistent with severe pulmonary hypertension.  11. The aortic valve mean gradient is 14.1 mmHg consistent with mild aortic stenosis.    < end of copied text >    [ ] Catheterization:   [ ] Stress Test:

## 2021-05-16 LAB
INR BLD: 2.52 RATIO — HIGH (ref 0.88–1.16)
PROTHROM AB SERPL-ACNC: 28 SEC — HIGH (ref 10.6–13.6)

## 2021-05-16 PROCEDURE — 99232 SBSQ HOSP IP/OBS MODERATE 35: CPT

## 2021-05-16 PROCEDURE — 74018 RADEX ABDOMEN 1 VIEW: CPT | Mod: 26

## 2021-05-16 PROCEDURE — 99233 SBSQ HOSP IP/OBS HIGH 50: CPT

## 2021-05-16 RX ORDER — WARFARIN SODIUM 2.5 MG/1
3 TABLET ORAL ONCE
Refills: 0 | Status: COMPLETED | OUTPATIENT
Start: 2021-05-16 | End: 2021-05-16

## 2021-05-16 RX ORDER — ALPRAZOLAM 0.25 MG
0.25 TABLET ORAL THREE TIMES A DAY
Refills: 0 | Status: DISCONTINUED | OUTPATIENT
Start: 2021-05-16 | End: 2021-05-19

## 2021-05-16 RX ORDER — ACETAMINOPHEN 500 MG
1000 TABLET ORAL ONCE
Refills: 0 | Status: DISCONTINUED | OUTPATIENT
Start: 2021-05-16 | End: 2021-05-19

## 2021-05-16 RX ADMIN — Medication 20 MILLIEQUIVALENT(S): at 12:24

## 2021-05-16 RX ADMIN — Medication 20 MILLIGRAM(S): at 14:30

## 2021-05-16 RX ADMIN — Medication 650 MILLIGRAM(S): at 11:00

## 2021-05-16 RX ADMIN — Medication 20 MILLIGRAM(S): at 05:23

## 2021-05-16 RX ADMIN — LOSARTAN POTASSIUM 25 MILLIGRAM(S): 100 TABLET, FILM COATED ORAL at 05:23

## 2021-05-16 RX ADMIN — CEFTRIAXONE 100 MILLIGRAM(S): 500 INJECTION, POWDER, FOR SOLUTION INTRAMUSCULAR; INTRAVENOUS at 16:11

## 2021-05-16 RX ADMIN — Medication 360 MILLIGRAM(S): at 05:23

## 2021-05-16 RX ADMIN — PANTOPRAZOLE SODIUM 40 MILLIGRAM(S): 20 TABLET, DELAYED RELEASE ORAL at 05:24

## 2021-05-16 RX ADMIN — Medication 20 MILLIGRAM(S): at 21:30

## 2021-05-16 RX ADMIN — Medication 650 MILLIGRAM(S): at 10:00

## 2021-05-16 RX ADMIN — WARFARIN SODIUM 3 MILLIGRAM(S): 2.5 TABLET ORAL at 21:43

## 2021-05-16 RX ADMIN — Medication 0.25 MILLIGRAM(S): at 21:30

## 2021-05-16 RX ADMIN — Medication 40 MILLIGRAM(S): at 05:23

## 2021-05-16 RX ADMIN — Medication 100 MICROGRAM(S): at 05:23

## 2021-05-16 RX ADMIN — AZITHROMYCIN 255 MILLIGRAM(S): 500 TABLET, FILM COATED ORAL at 16:11

## 2021-05-16 NOTE — PROGRESS NOTE ADULT - ASSESSMENT
89F with a PMH of HTN, COPD on O2, Diastolic CHF, Severe Pulmonary HTN, Hypothyroidism, Atrial fibrillation on Coumadin, hx of multiple admissions for COPD and CHF exacerbation presents to the ED for dyspnea, treated for acute CHF / COPD / RLL pneumonia.  TTE showing normal EF with severe pulmonary hypertension, Grade III diastolic dysfunction.      ·  Problem: Pulmonary hypertension.  Plan: TTE confirms severe pulmonary hypertension. continue sildenafil.  Pulmonary following.   ·  Problem: Acute on chronic respiratory failure with hypoxia.  Plan: Pt now on 3L /min via NC.  Per royal, at baseline on 2.5 L / min at home.  Dyspnea likely multifactorial (pHTN + CHF + COPD).  Continue diuretics.  CT Chest noted R > L pleural effusion.  Taper O2.   ·  Problem: Abdominal pain - appears to be better.  Benign abdominal exam.  D/w daughter, states pt typically with somatic complaints anxiety.  Will check AXR.   ·  Problem: RLL Pneumonia - consolidation noted on CT.  on Rocephin, Zithromax.   ·  Problem: Atrial fibrillation, unspecified type.  Plan: Continue diltiazem.  INR therapeutic.  Continue Coumadin dosing.    ·  Problem: Acute on CHronic Diastolic congestive heart failure, Plan: Diuresis as per above.  Cardio following.    ·  Problem: Chronic obstructive pulmonary disease, unspecified COPD type.  Plan: no current wheezing.   ·  Problem: Hypothyroidism, unspecified type. Plan: synthroid.  ·  Problem: Essential hypertension.  Plan: losartan.   DVT Proph - on AC    OOB with PT.  Likely HEVER placement 5/17.

## 2021-05-16 NOTE — PROGRESS NOTE ADULT - ASSESSMENT
89F with a PMH of HTN, COPD on O2, Diastolic CHF, Pulmonary HTN, Atrial fibrillation on Coumadin, hx of multiple admissions for COPD and CHF exacerbation presents to the ED for dyspnea.      appears to be stable  repeat TTE with severe pHTN PASP 63mmHg  In the setting known HFpEF and Stage 3 diastolic dysfunction there is probable component of Group II pHTN  cont maintenance PO diuretics  slow afib likely vagal tone while on AVN while sleeping    -add spironolactone 25mg daily for possible reduction in CHF exacerbations and may reduce her need for potassium supplementation

## 2021-05-16 NOTE — PROGRESS NOTE ADULT - SUBJECTIVE AND OBJECTIVE BOX
Cardiology Progress Note    [facility] Cardiology at [place]  [address], [place] [address]  Office: [phone]  Fax: [phone]    Interval Events:  No significant events      MEDICATIONS:  diltiazem    milliGRAM(s) Oral daily  furosemide    Tablet 40 milliGRAM(s) Oral daily  losartan 25 milliGRAM(s) Oral daily  sildenafil (REVATIO) 20 milliGRAM(s) Oral every 8 hours    azithromycin  IVPB      azithromycin  IVPB 500 milliGRAM(s) IV Intermittent every 24 hours  cefTRIAXone   IVPB 1000 milliGRAM(s) IV Intermittent every 24 hours    albuterol/ipratropium for Nebulization 3 milliLiter(s) Nebulizer every 6 hours PRN    acetaminophen   Tablet .. 650 milliGRAM(s) Oral every 6 hours PRN  ALPRAZolam 0.25 milliGRAM(s) Oral at bedtime PRN    pantoprazole    Tablet 40 milliGRAM(s) Oral before breakfast    levothyroxine 100 MICROGram(s) Oral daily    potassium chloride    Tablet ER 20 milliEquivalent(s) Oral daily      PHYSICAL EXAM:  T(C): 36.4 (05-16-21 @ 10:50), Max: 36.8 (05-16-21 @ 05:24)  HR: 64 (05-16-21 @ 10:50) (55 - 68)  BP: 112/72 (05-16-21 @ 10:50) (112/72 - 132/59)  RR: 19 (05-16-21 @ 10:50) (18 - 19)  SpO2: 93% (05-16-21 @ 10:50) (93% - 97%)  Wt(kg): --  I&O's Summary    15 May 2021 07:01  -  16 May 2021 07:00  --------------------------------------------------------  IN: 860 mL / OUT: 650 mL / NET: 210 mL    16 May 2021 07:01  -  16 May 2021 12:19  --------------------------------------------------------  IN: 300 mL / OUT: 0 mL / NET: 300 mL      Appearance: No acute distress  HEENT:   on NC  Cardiovascular: Normal S1 S2, no elevated JVP, no murmurs, no edema  Respiratory: Lungs clear to auscultation anteriorly with poor inspiratory effort  Psychiatry: Mood & affect appropriate  Gastrointestinal:  soft nt  Skin: No rashes, no ecchymoses, no cyanosis	  Neurologic: grossly non-focal  Extremities: no clubbing, cyanosis or edema  Vascular: Peripheral pulses palpable bilaterally    LABS:	 	  CBC Full  -  ( 15 May 2021 07:02 )  WBC Count : 8.13 K/uL  Hemoglobin : 10.1 g/dL  Hematocrit : 31.9 %  Platelet Count - Automated : 179 K/uL  Mean Cell Volume : 107.0 fl  Mean Cell Hemoglobin : 33.9 pg  Mean Cell Hemoglobin Concentration : 31.7 gm/dL  Auto Neutrophil # : x  Auto Lymphocyte # : x  Auto Monocyte # : x  Auto Eosinophil # : x  Auto Basophil # : x  Auto Neutrophil % : x  Auto Lymphocyte % : x  Auto Monocyte % : x  Auto Eosinophil % : x  Auto Basophil % : x    05-15    138  |  102  |  48<H>  ----------------------------<  92  4.4   |  33<H>  |  1.02    Ca    8.9      15 May 2021 07:02        proBNP:   Lipid Profile:   HgA1c:   TSH:     CARDIAC MARKERS:    TELEMETRY: 	  afib  ECG:  	  RADIOLOGY:  OTHER: 	    PREVIOUS DIAGNOSTIC TESTING:    [ ] Echocardiogram:   [ ] Catheterization:   [ ] Stress Test:

## 2021-05-16 NOTE — PROGRESS NOTE ADULT - SUBJECTIVE AND OBJECTIVE BOX
INTERVAL HPI:  89 WF with HTN, COPD on O2 & Nocturnal NIV, Diastolic CHF, Severe pHTN, Hypothyroidism ,A fib on Coumadin, Valve replacement. Non smoker. Multiple admissions for COPD exacerbation and decompensated CHF.  Presented  to the ED for progressive  dyspnea that became significantly worse today.  Reports having cough scant phlegm also. Not able to provide more details to history.  Admitted with acute on chronic hypoxic hypercarbic Respiratory failure.    OVERNIGHT EVENTS:  Awake and comfortable    Vital Signs Last 24 Hrs  T(C): 36.4 (16 May 2021 10:50), Max: 36.8 (16 May 2021 05:24)  T(F): 97.6 (16 May 2021 10:50), Max: 98.3 (16 May 2021 05:24)  HR: 64 (16 May 2021 10:50) (55 - 68)  BP: 112/72 (16 May 2021 10:50) (112/72 - 132/59)  BP(mean): --  RR: 19 (16 May 2021 10:50) (18 - 19)  SpO2: 93% (16 May 2021 10:50) (93% - 97%)    PHYSICAL EXAM:  GEN:         Awake, responsive and comfortable.  HEENT:    Normal.    RESP:       no distress  CVS:         Regular rate and rhythm.   ABD:         Soft, non-tender, non-distended;     MEDICATIONS  (STANDING):  acetaminophen  IVPB .. 1000 milliGRAM(s) IV Intermittent once  azithromycin  IVPB      azithromycin  IVPB 500 milliGRAM(s) IV Intermittent every 24 hours  cefTRIAXone   IVPB 1000 milliGRAM(s) IV Intermittent every 24 hours  diltiazem    milliGRAM(s) Oral daily  furosemide    Tablet 40 milliGRAM(s) Oral daily  levothyroxine 100 MICROGram(s) Oral daily  losartan 25 milliGRAM(s) Oral daily  pantoprazole    Tablet 40 milliGRAM(s) Oral before breakfast  potassium chloride    Tablet ER 20 milliEquivalent(s) Oral daily  sildenafil (REVATIO) 20 milliGRAM(s) Oral every 8 hours  warfarin 3 milliGRAM(s) Oral once    MEDICATIONS  (PRN):  acetaminophen   Tablet .. 650 milliGRAM(s) Oral every 6 hours PRN Temp greater or equal to 38C (100.4F), Moderate Pain (4 - 6)  albuterol/ipratropium for Nebulization 3 milliLiter(s) Nebulizer every 6 hours PRN Shortness of Breath  ALPRAZolam 0.25 milliGRAM(s) Oral at bedtime PRN sleep    LABS:                        10.1   8.13  )-----------( 179      ( 15 May 2021 07:02 )             31.9     05-15    138  |  102  |  48<H>  ----------------------------<  92  4.4   |  33<H>  |  1.02    Ca    8.9      15 May 2021 07:02    PT/INR - ( 16 May 2021 07:11 )   PT: 28.0 sec;   INR: 2.52 ratio      05-11 @ 22:20  pH: 7.38  pCO2: 49  pO2: 118  SaO2: 98    ASSESSMENT AND PLAN:  ·	Acute on chronic hypoxic hypercarbic Respiratory failure.  ·	Right pleural effusion.  ·	Acute on chronic diastolic CHF.  ·	Acute COPD exacerbation.  ·	Severe pHTN.  ·	Moderate TR.  ·	Renal  Insuffiencey.  ·	Chronic A Fib.  ·	Hypothyroidism.  ·	Anemia.      Clinically better.  O2 with nocturnal NIV.  On sildenafil and diuretics.  Discussed with daughter at bed side.

## 2021-05-16 NOTE — PROGRESS NOTE ADULT - SUBJECTIVE AND OBJECTIVE BOX
Patient: FREDERICK MA [id number] 89y Female                            Hospitalist Attending Note    Remains on 3L /min via NC.  Denies changes in SOB.  C/o epigastric abdominal pain with po intake, now better.  No chest pain / palpitations.     ____________________PHYSICAL EXAM:  GENERAL:  NAD Alert and Oriented x 3   HEENT: NCAT  CARDIOVASCULAR:  S1, S2  LUNGS: coarse BS b/l, decreased BS R base.   ABDOMEN:  soft, (-) tenderness, (-) distension, (+) bowel sounds, (-) guarding, (-) rebound (-) rigidity  EXTREMITIES:  no cyanosis / clubbing / edema.   ____________________      VITALS:  Vital Signs Last 24 Hrs  T(C): 36.4 (16 May 2021 10:50), Max: 36.8 (16 May 2021 05:24)  T(F): 97.6 (16 May 2021 10:50), Max: 98.3 (16 May 2021 05:24)  HR: 64 (16 May 2021 10:50) (55 - 68)  BP: 112/72 (16 May 2021 10:50) (112/72 - 132/59)  BP(mean): --  RR: 19 (16 May 2021 10:50) (18 - 19)  SpO2: 93% (16 May 2021 10:50) (93% - 97%) Daily     Daily Weight in k.6 (16 May 2021 05:24)  CAPILLARY BLOOD GLUCOSE        I&O's Summary    15 May 2021 07:  -  16 May 2021 07:00  --------------------------------------------------------  IN: 860 mL / OUT: 650 mL / NET: 210 mL    16 May 2021 07:01  -  16 May 2021 16:20  --------------------------------------------------------  IN: 420 mL / OUT: 600 mL / NET: -180 mL        LABS:                        10.1   8.13  )-----------( 179      ( 15 May 2021 07:02 )             31.9     05-15    138  |  102  |  48<H>  ----------------------------<  92  4.4   |  33<H>  |  1.02    Ca    8.9      15 May 2021 07:02      PT/INR - ( 16 May 2021 07:11 )   PT: 28.0 sec;   INR: 2.52 ratio                       MEDICATIONS:  acetaminophen   Tablet .. 650 milliGRAM(s) Oral every 6 hours PRN  acetaminophen  IVPB .. 1000 milliGRAM(s) IV Intermittent once  albuterol/ipratropium for Nebulization 3 milliLiter(s) Nebulizer every 6 hours PRN  ALPRAZolam 0.25 milliGRAM(s) Oral at bedtime PRN  azithromycin  IVPB      azithromycin  IVPB 500 milliGRAM(s) IV Intermittent every 24 hours  diltiazem    milliGRAM(s) Oral daily  furosemide    Tablet 40 milliGRAM(s) Oral daily  levothyroxine 100 MICROGram(s) Oral daily  losartan 25 milliGRAM(s) Oral daily  pantoprazole    Tablet 40 milliGRAM(s) Oral before breakfast  potassium chloride    Tablet ER 20 milliEquivalent(s) Oral daily  sildenafil (REVATIO) 20 milliGRAM(s) Oral every 8 hours  warfarin 3 milliGRAM(s) Oral once

## 2021-05-17 LAB
ANION GAP SERPL CALC-SCNC: 6 MMOL/L — SIGNIFICANT CHANGE UP (ref 5–17)
BUN SERPL-MCNC: 44 MG/DL — HIGH (ref 7–23)
CALCIUM SERPL-MCNC: 8.9 MG/DL — SIGNIFICANT CHANGE UP (ref 8.5–10.1)
CHLORIDE SERPL-SCNC: 99 MMOL/L — SIGNIFICANT CHANGE UP (ref 96–108)
CO2 SERPL-SCNC: 33 MMOL/L — HIGH (ref 22–31)
CREAT SERPL-MCNC: 1.02 MG/DL — SIGNIFICANT CHANGE UP (ref 0.5–1.3)
CULTURE RESULTS: SIGNIFICANT CHANGE UP
CULTURE RESULTS: SIGNIFICANT CHANGE UP
GLUCOSE SERPL-MCNC: 88 MG/DL — SIGNIFICANT CHANGE UP (ref 70–99)
HCT VFR BLD CALC: 32.1 % — LOW (ref 34.5–45)
HGB BLD-MCNC: 10.2 G/DL — LOW (ref 11.5–15.5)
INR BLD: 2.3 RATIO — HIGH (ref 0.88–1.16)
MCHC RBC-ENTMCNC: 31.8 GM/DL — LOW (ref 32–36)
MCHC RBC-ENTMCNC: 33.9 PG — SIGNIFICANT CHANGE UP (ref 27–34)
MCV RBC AUTO: 106.6 FL — HIGH (ref 80–100)
NRBC # BLD: 0 /100 WBCS — SIGNIFICANT CHANGE UP (ref 0–0)
PLATELET # BLD AUTO: 177 K/UL — SIGNIFICANT CHANGE UP (ref 150–400)
POTASSIUM SERPL-MCNC: 4.4 MMOL/L — SIGNIFICANT CHANGE UP (ref 3.5–5.3)
POTASSIUM SERPL-SCNC: 4.4 MMOL/L — SIGNIFICANT CHANGE UP (ref 3.5–5.3)
PROTHROM AB SERPL-ACNC: 25.6 SEC — HIGH (ref 10.6–13.6)
RBC # BLD: 3.01 M/UL — LOW (ref 3.8–5.2)
RBC # FLD: 15 % — HIGH (ref 10.3–14.5)
SODIUM SERPL-SCNC: 138 MMOL/L — SIGNIFICANT CHANGE UP (ref 135–145)
SPECIMEN SOURCE: SIGNIFICANT CHANGE UP
SPECIMEN SOURCE: SIGNIFICANT CHANGE UP
WBC # BLD: 9 K/UL — SIGNIFICANT CHANGE UP (ref 3.8–10.5)
WBC # FLD AUTO: 9 K/UL — SIGNIFICANT CHANGE UP (ref 3.8–10.5)

## 2021-05-17 PROCEDURE — 71045 X-RAY EXAM CHEST 1 VIEW: CPT | Mod: 26

## 2021-05-17 PROCEDURE — 99233 SBSQ HOSP IP/OBS HIGH 50: CPT

## 2021-05-17 PROCEDURE — 74018 RADEX ABDOMEN 1 VIEW: CPT | Mod: 26

## 2021-05-17 RX ORDER — WARFARIN SODIUM 2.5 MG/1
3.5 TABLET ORAL ONCE
Refills: 0 | Status: COMPLETED | OUTPATIENT
Start: 2021-05-17 | End: 2021-05-17

## 2021-05-17 RX ADMIN — Medication 20 MILLIGRAM(S): at 05:38

## 2021-05-17 RX ADMIN — Medication 20 MILLIEQUIVALENT(S): at 12:31

## 2021-05-17 RX ADMIN — PANTOPRAZOLE SODIUM 40 MILLIGRAM(S): 20 TABLET, DELAYED RELEASE ORAL at 05:38

## 2021-05-17 RX ADMIN — LOSARTAN POTASSIUM 25 MILLIGRAM(S): 100 TABLET, FILM COATED ORAL at 09:10

## 2021-05-17 RX ADMIN — Medication 40 MILLIGRAM(S): at 09:10

## 2021-05-17 RX ADMIN — Medication 360 MILLIGRAM(S): at 09:11

## 2021-05-17 RX ADMIN — Medication 650 MILLIGRAM(S): at 05:49

## 2021-05-17 RX ADMIN — Medication 20 MILLIGRAM(S): at 21:09

## 2021-05-17 RX ADMIN — WARFARIN SODIUM 3.5 MILLIGRAM(S): 2.5 TABLET ORAL at 21:09

## 2021-05-17 RX ADMIN — AZITHROMYCIN 255 MILLIGRAM(S): 500 TABLET, FILM COATED ORAL at 15:21

## 2021-05-17 RX ADMIN — Medication 20 MILLIGRAM(S): at 15:13

## 2021-05-17 RX ADMIN — Medication 100 MICROGRAM(S): at 05:38

## 2021-05-17 RX ADMIN — Medication 0.25 MILLIGRAM(S): at 21:09

## 2021-05-17 NOTE — PROGRESS NOTE ADULT - ASSESSMENT
89F with a PMH of HTN, COPD on O2, Diastolic CHF, Severe Pulmonary HTN, Hypothyroidism, Atrial fibrillation on Coumadin, hx of multiple admissions for COPD and CHF exacerbation presents to the ED for dyspnea, treated for acute CHF / COPD / RLL pneumonia.  TTE showing normal EF with severe pulmonary hypertension, Grade III diastolic dysfunction.      ·  Problem: Pulmonary hypertension.  Plan: TTE confirms severe pulmonary hypertension. continue sildenafil.  Pulmonary following.   ·  Problem: Acute on chronic respiratory failure with hypoxia.  Plan: Pt now on 3L /min via NC.  Per royal, at baseline on 2.5 L / min at home.  Dyspnea likely multifactorial (pHTN + CHF + COPD).  Continue diuretics.  CT Chest noted R > L pleural effusion.  Continue O2.    ·  Problem: Abdominal pain - appears to be better.  Benign abdominal exam.  D/w daughter, states pt typically with somatic complaints anxiety.  Will check AXR.   ·  Problem: RLL Pneumonia - consolidation noted on CT.  on Rocephin, Zithromax.   ·  Problem: Atrial fibrillation, unspecified type.  Plan: Continue diltiazem.  INR therapeutic.  Continue Coumadin dosing.    ·  Problem: Acute on CHronic Diastolic congestive heart failure, Plan: Diuresis as per above.  Cardio following.    ·  Problem: Chronic obstructive pulmonary disease, unspecified COPD type.  Plan: no current wheezing.   ·  Problem: Hypothyroidism, unspecified type. Plan: synthroid.  ·  Problem: Essential hypertension.  Plan: losartan.   DVT Proph - on AC    OOB with PT. Plan d/c home, d/w daughter Lilliam, d/w son Candelario [phone]

## 2021-05-17 NOTE — PROGRESS NOTE ADULT - SUBJECTIVE AND OBJECTIVE BOX
INTERVAL HPI:   89 WF with HTN, COPD on O2 & Nocturnal NIV, Diastolic CHF, Severe pHTN, Hypothyroidism ,A fib on Coumadin, Valve replacement. Non smoker. Multiple admissions for COPD exacerbation and decompensated CHF.  Presented  to the ED for progressive  dyspnea that became significantly worse today.  Reports having cough scant phlegm also. Not able to provide more details to history.  Admitted with acute on chronic hypoxic hypercarbic Respiratory failure.    OVERNIGHT EVENTS:  Resting comfortable    Vital Signs Last 24 Hrs  T(C): 36.7 (17 May 2021 16:37), Max: 36.7 (17 May 2021 04:38)  T(F): 98.1 (17 May 2021 16:37), Max: 98.1 (17 May 2021 16:37)  HR: 80 (17 May 2021 16:37) (63 - 80)  BP: 125/83 (17 May 2021 16:37) (124/57 - 146/71)  BP(mean): --  RR: 18 (17 May 2021 16:37) (17 - 18)  SpO2: 100% (17 May 2021 16:37) (95% - 100%)    PHYSICAL EXAM:  GEN:        comfortable.  HEENT:    Normal.    RESP:        no distress  CVS:          Regular rate and rhythm.   ABD:         Soft, non-tender, non-distended;     MEDICATIONS  (STANDING):  acetaminophen  IVPB .. 1000 milliGRAM(s) IV Intermittent once  azithromycin  IVPB      azithromycin  IVPB 500 milliGRAM(s) IV Intermittent every 24 hours  diltiazem    milliGRAM(s) Oral daily  furosemide    Tablet 40 milliGRAM(s) Oral daily  levothyroxine 100 MICROGram(s) Oral daily  losartan 25 milliGRAM(s) Oral daily  pantoprazole    Tablet 40 milliGRAM(s) Oral before breakfast  potassium chloride    Tablet ER 20 milliEquivalent(s) Oral daily  sildenafil (REVATIO) 20 milliGRAM(s) Oral every 8 hours  warfarin 3.5 milliGRAM(s) Oral once    MEDICATIONS  (PRN):  acetaminophen   Tablet .. 650 milliGRAM(s) Oral every 6 hours PRN Temp greater or equal to 38C (100.4F), Moderate Pain (4 - 6)  albuterol/ipratropium for Nebulization 3 milliLiter(s) Nebulizer every 6 hours PRN Shortness of Breath  ALPRAZolam 0.25 milliGRAM(s) Oral three times a day PRN anxiety    LABS:                        10.2   9.00  )-----------( 177      ( 17 May 2021 06:50 )             32.1     05-17    138  |  99  |  44<H>  ----------------------------<  88  4.4   |  33<H>  |  1.02    Ca    8.9      17 May 2021 06:50    PT/INR - ( 17 May 2021 06:50 )   PT: 25.6 sec;   INR: 2.30 ratio      05-11 @ 22:20  pH: 7.38  pCO2: 49  pO2: 118  SaO2: 98    ASSESSMENT AND PLAN:  ·	Acute on chronic hypoxic hypercarbic Respiratory failure.  ·	Right pleural effusion.  ·	Acute on chronic diastolic CHF.  ·	Acute COPD exacerbation.  ·	Severe pHTN.  ·	Moderate TR.  ·	Renal  Insuffiencey.  ·	Chronic A Fib.  ·	Hypothyroidism.  ·	Anemia.    Pulmonary status close to base line.  Continue O2 with nocturnal NIV.  On sildenafil and diuretics.  Discharge planning.

## 2021-05-17 NOTE — CHART NOTE - NSCHARTNOTEFT_GEN_A_CORE
Chart reviewed and noted new PT consult on 5/15/21. PT Initial eval on 5/12/21 -- indicating patient has 24/7 home health aide services, hospital bed and patient lift device. Son stated would prefer her mother goes home and did not think PT visits required at home. Patient was discharged from PT services from 5/12/21 being that no identified PT needs apparent, with good support and safe patient handling devices at home for optimal care. Voice message left to Dr. Pickett's private messaging service to inform of same. No further PT assessment indicated.

## 2021-05-17 NOTE — PROGRESS NOTE ADULT - SUBJECTIVE AND OBJECTIVE BOX
Patient: FREDERICK MA [id number] 89y Female                            Hospitalist Attending Note    Remains on 3L /min via NC.  Seen with daughter present via phone.  No Abdominal pain, nausea, vomiting, diarrhea, nor constipation.     ____________________PHYSICAL EXAM:  GENERAL:  NAD Alert and Oriented x 3   HEENT: NCAT  CARDIOVASCULAR:  S1, S2  LUNGS: coarse BS b/l, decreased BS R base.   ABDOMEN:  soft, (-) tenderness, (-) distension, (+) bowel sounds, (-) guarding, (-) rebound (-) rigidity  EXTREMITIES:  no cyanosis / clubbing / edema.   ____________________      VITALS:  Vital Signs Last 24 Hrs  T(C): 36.2 (17 May 2021 11:34), Max: 36.7 (16 May 2021 16:50)  T(F): 97.1 (17 May 2021 11:34), Max: 98.1 (16 May 2021 16:50)  HR: 77 (17 May 2021 11:34) (63 - 77)  BP: 124/57 (17 May 2021 11:34) (115/68 - 146/71)  BP(mean): --  RR: 17 (17 May 2021 11:34) (17 - 18)  SpO2: 98% (17 May 2021 11:34) (95% - 99%) Daily     Daily Weight in k.7 (17 May 2021 04:38)  CAPILLARY BLOOD GLUCOSE        I&O's Summary    16 May 2021 07:  -  17 May 2021 07:00  --------------------------------------------------------  IN: 720 mL / OUT: 1400 mL / NET: -680 mL    17 May 2021 07:01  -  17 May 2021 16:48  --------------------------------------------------------  IN: 540 mL / OUT: 0 mL / NET: 540 mL        LABS:                        10.2   9.00  )-----------( 177      ( 17 May 2021 06:50 )             32.1     05-    138  |  99  |  44<H>  ----------------------------<  88  4.4   |  33<H>  |  1.02    Ca    8.9      17 May 2021 06:50      PT/INR - ( 17 May 2021 06:50 )   PT: 25.6 sec;   INR: 2.30 ratio                       MEDICATIONS:  acetaminophen   Tablet .. 650 milliGRAM(s) Oral every 6 hours PRN  acetaminophen  IVPB .. 1000 milliGRAM(s) IV Intermittent once  albuterol/ipratropium for Nebulization 3 milliLiter(s) Nebulizer every 6 hours PRN  ALPRAZolam 0.25 milliGRAM(s) Oral three times a day PRN  azithromycin  IVPB      azithromycin  IVPB 500 milliGRAM(s) IV Intermittent every 24 hours  diltiazem    milliGRAM(s) Oral daily  furosemide    Tablet 40 milliGRAM(s) Oral daily  levothyroxine 100 MICROGram(s) Oral daily  losartan 25 milliGRAM(s) Oral daily  pantoprazole    Tablet 40 milliGRAM(s) Oral before breakfast  potassium chloride    Tablet ER 20 milliEquivalent(s) Oral daily  sildenafil (REVATIO) 20 milliGRAM(s) Oral every 8 hours  warfarin 3.5 milliGRAM(s) Oral once

## 2021-05-18 LAB
INR BLD: 2.48 RATIO — HIGH (ref 0.88–1.16)
MAGNESIUM SERPL-MCNC: 2.2 MG/DL — SIGNIFICANT CHANGE UP (ref 1.6–2.6)
PHOSPHATE SERPL-MCNC: 2.5 MG/DL — SIGNIFICANT CHANGE UP (ref 2.5–4.5)
PROTHROM AB SERPL-ACNC: 27.6 SEC — HIGH (ref 10.6–13.6)
RAPID RVP RESULT: SIGNIFICANT CHANGE UP
SARS-COV-2 RNA SPEC QL NAA+PROBE: SIGNIFICANT CHANGE UP

## 2021-05-18 PROCEDURE — 74018 RADEX ABDOMEN 1 VIEW: CPT | Mod: 26

## 2021-05-18 PROCEDURE — 99233 SBSQ HOSP IP/OBS HIGH 50: CPT

## 2021-05-18 RX ORDER — MULTIVIT WITH MIN/MFOLATE/K2 340-15/3 G
1 POWDER (GRAM) ORAL ONCE
Refills: 0 | Status: COMPLETED | OUTPATIENT
Start: 2021-05-18 | End: 2021-05-18

## 2021-05-18 RX ADMIN — Medication 20 MILLIGRAM(S): at 21:16

## 2021-05-18 RX ADMIN — Medication 100 MICROGRAM(S): at 05:10

## 2021-05-18 RX ADMIN — Medication 3 MILLILITER(S): at 05:24

## 2021-05-18 RX ADMIN — Medication 20 MILLIGRAM(S): at 13:52

## 2021-05-18 RX ADMIN — Medication 360 MILLIGRAM(S): at 05:10

## 2021-05-18 RX ADMIN — PANTOPRAZOLE SODIUM 40 MILLIGRAM(S): 20 TABLET, DELAYED RELEASE ORAL at 05:10

## 2021-05-18 RX ADMIN — Medication 40 MILLIGRAM(S): at 05:10

## 2021-05-18 RX ADMIN — Medication 20 MILLIGRAM(S): at 05:10

## 2021-05-18 RX ADMIN — Medication 1 BOTTLE: at 19:17

## 2021-05-18 RX ADMIN — Medication 20 MILLIEQUIVALENT(S): at 11:54

## 2021-05-18 RX ADMIN — Medication 0.25 MILLIGRAM(S): at 22:29

## 2021-05-18 NOTE — DIETITIAN INITIAL EVALUATION ADULT. - OTHER CALCULATIONS
Ht (cm):   152.4    Wt (kg):  60.3 (5/18)     BMI:   25.9      IBW:  45.5 kg   %IBW:  133%   UBW:  unknown   %UBW: unknown

## 2021-05-18 NOTE — PROGRESS NOTE ADULT - SUBJECTIVE AND OBJECTIVE BOX
INTERVAL HPI:  89 WF with HTN, COPD on O2 & Nocturnal NIV, Diastolic CHF, Severe pHTN, Hypothyroidism ,A fib on Coumadin, Valve replacement. Non smoker. Multiple admissions for COPD exacerbation and decompensated CHF.  Presented  to the ED for progressive  dyspnea that became significantly worse today.  Reports having cough scant phlegm also. Not able to provide more details to history.  Admitted with acute on chronic hypoxic hypercarbic Respiratory failure.    OVERNIGHT EVENTS:  Awake and comfortable.    Vital Signs Last 24 Hrs  T(C): 36.9 (18 May 2021 15:54), Max: 36.9 (18 May 2021 10:39)  T(F): 98.4 (18 May 2021 15:54), Max: 98.5 (18 May 2021 10:39)  HR: 75 (18 May 2021 15:54) (54 - 92)  BP: 130/73 (18 May 2021 15:54) (108/69 - 134/68)  BP(mean): --  RR: 18 (18 May 2021 15:54) (18 - 18)  SpO2: 97% (18 May 2021 15:54) (96% - 99%)    PHYSICAL EXAM:  GEN:         Awake, responsive and comfortable.  HEENT:    Normal.    RESP:       no distress  CVS:          Regular rate and rhythm.   ABD:         Soft, non-tender, non-distended;     MEDICATIONS  (STANDING):  acetaminophen  IVPB .. 1000 milliGRAM(s) IV Intermittent once  diltiazem    milliGRAM(s) Oral daily  furosemide    Tablet 40 milliGRAM(s) Oral daily  levothyroxine 100 MICROGram(s) Oral daily  losartan 25 milliGRAM(s) Oral daily  magnesium citrate Oral Solution 1 Bottle Oral once  pantoprazole    Tablet 40 milliGRAM(s) Oral before breakfast  potassium chloride    Tablet ER 20 milliEquivalent(s) Oral daily  sildenafil (REVATIO) 20 milliGRAM(s) Oral every 8 hours    MEDICATIONS  (PRN):  acetaminophen   Tablet .. 650 milliGRAM(s) Oral every 6 hours PRN Temp greater or equal to 38C (100.4F), Moderate Pain (4 - 6)  albuterol/ipratropium for Nebulization 3 milliLiter(s) Nebulizer every 6 hours PRN Shortness of Breath  ALPRAZolam 0.25 milliGRAM(s) Oral three times a day PRN anxiety    LABS:                        10.2   9.00  )-----------( 177      ( 17 May 2021 06:50 )             32.1     05-17    138  |  99  |  44<H>  ----------------------------<  88  4.4   |  33<H>  |  1.02    Ca    8.9      17 May 2021 06:50  Phos  2.5     05-18  Mg     2.2     05-18    PT/INR - ( 18 May 2021 07:32 )   PT: 27.6 sec;   INR: 2.48 ratio      05-11 @ 22:20  pH: 7.38  pCO2: 49  pO2: 118  SaO2: 98  ASSESSMENT AND PLAN:  ·	Acute on chronic hypoxic hypercarbic Respiratory failure.  ·	Right pleural effusion.  ·	Acute on chronic diastolic CHF.  ·	Acute COPD exacerbation.  ·	Severe pHTN.  ·	Moderate TR.  ·	Renal  Insuffiencey.  ·	Chronic A Fib.  ·	Hypothyroidism.  ·	Anemia.    Pulmonary improved and stable.  Continue O2 with nocturnal NIV.  On sildenafil and diuretics.  Discharge planning.

## 2021-05-18 NOTE — CONSULT NOTE ADULT - SUBJECTIVE AND OBJECTIVE BOX
HPI:  Patient is a 89F with a PMH of HTN, COPD on O2, Diastolic CHF, Severe Pulmonary HTN, Hypothyroidism, Atrial fibrillation on Coumadin, hx of multiple admissions for COPD and CHF exacerbation presents to the ED for dyspnea.  Patient awake and alert however is a poor historian.  Patient states that her breathing became significantly worse today.  No other complaints.  Patient requiring high flow nasal cannula in ED.  SpO2 currently 99% on 30L w/ FiO2 of 50%.  Will admit patient to tele.      Code status: DNR/DNI (MOLST signed in chart) (12 May 2021 00:42)  -------------- As Above --------------- patient is a poor historian  Called to se patient regarding ileus seen on 051621 and 051721. Abdominal pain (?). No N/V.       PAST MEDICAL & SURGICAL HISTORY:  COPD exacerbation    Atrial fibrillation    Hypothyroidism    Essential hypertension    No significant past surgical history        MEDICATIONS  (STANDING):  acetaminophen  IVPB .. 1000 milliGRAM(s) IV Intermittent once  diltiazem    milliGRAM(s) Oral daily  furosemide    Tablet 40 milliGRAM(s) Oral daily  levothyroxine 100 MICROGram(s) Oral daily  losartan 25 milliGRAM(s) Oral daily  pantoprazole    Tablet 40 milliGRAM(s) Oral before breakfast  potassium chloride    Tablet ER 20 milliEquivalent(s) Oral daily  sildenafil (REVATIO) 20 milliGRAM(s) Oral every 8 hours    MEDICATIONS  (PRN):  acetaminophen   Tablet .. 650 milliGRAM(s) Oral every 6 hours PRN Temp greater or equal to 38C (100.4F), Moderate Pain (4 - 6)  albuterol/ipratropium for Nebulization 3 milliLiter(s) Nebulizer every 6 hours PRN Shortness of Breath  ALPRAZolam 0.25 milliGRAM(s) Oral three times a day PRN anxiety      Allergies    No Known Allergies    Intolerances        FAMILY HISTORY:      REVIEW OF SYSTEMS:    CONSTITUTIONAL: No fever, weight loss, or fatigue  EYES: No eye pain, visual disturbances, or discharge  ENMT:  No difficulty hearing, tinnitus, vertigo; No sinus or throat pain  NECK: No pain or stiffness  BREASTS: No pain, masses, or nipple discharge  RESPIRATORY: No cough, wheezing, chills or hemoptysis; No shortness of breath  CARDIOVASCULAR: No chest pain, palpitations, dizziness, or leg swelling  GASTROINTESTINAL: No abdominal or epigastric pain. No nausea, vomiting, or hematemesis; No diarrhea or constipation. No melena or hematochezia.  GENITOURINARY: No dysuria, frequency, hematuria, or incontinence  NEUROLOGICAL: No headaches, memory loss, loss of strength, numbness, or tremors  SKIN: No itching, burning, rashes, or lesions   LYMPH NODES: No enlarged glands  ENDOCRINE: No heat or cold intolerance; No hair loss  MUSCULOSKELETAL: No joint pain or swelling; No muscle, back, or extremity pain  PSYCHIATRIC: No depression, anxiety, mood swings, or difficulty sleeping  HEME/LYMPH: No easy bruising, or bleeding gums  ALLERGY AND IMMUNOLOGIC: No hives or eczema          SOCIAL HISTORY:    FAMILY HISTORY:      Vital Signs Last 24 Hrs  T(C): 36.9 (18 May 2021 15:54), Max: 36.9 (18 May 2021 10:39)  T(F): 98.4 (18 May 2021 15:54), Max: 98.5 (18 May 2021 10:39)  HR: 75 (18 May 2021 15:54) (54 - 92)  BP: 130/73 (18 May 2021 15:54) (108/69 - 134/68)  BP(mean): --  RR: 18 (18 May 2021 15:54) (18 - 18)  SpO2: 97% (18 May 2021 15:54) (96% - 100%)    PHYSICAL EXAM:    GENERAL: NAD, well-groomed, well-developed  HEAD:  Atraumatic, Normocephalic  EYES: EOMI, PERRLA, conjunctiva and sclera clear  ENMT: No tonsillar erythema, exudates, or enlargement; Moist mucous membranes, Good dentition, No lesions  NECK: Supple, No JVD, Normal thyroid  NERVOUS SYSTEM:  Alert & Oriented X3, Good concentration; Motor Strength 5/5 B/L upper and lower extremities; DTRs 2+ intact and symmetric  CHEST/LUNG: Clear to percussion bilaterally; No rales, rhonchi, wheezing, or rubs  HEART: Regular rate and rhythm; No murmurs, rubs, or gallops  ABDOMEN: Soft, Nontender, Nondistended; Bowel sounds present  EXTREMITIES:  2+ Peripheral Pulses, No clubbing, cyanosis, or edema  LYMPH: No lymphadenopathy noted   RECTAL: No masses, prostate normal size and smooth, Guaiaci negative   BREAST: No palpable masses, skin no lesions no retractions, no discharges. adnexal no palpable masses noted   GYN: uterus normal size, adnexal, no palpable masses, no CMT, no uterine discharge   SKIN: No rashes or lesions    LABS:                        10.2   9.00  )-----------( 177      ( 17 May 2021 06:50 )             32.1       CBC:  05-17 @ 06:50  WBC  9.00  HGB 10.2  HCT 32.1 Plate 177  .6  05-15 @ 07:02  WBC  8.13  HGB 10.1  HCT 31.9 Plate 179  .0  05-13 @ 07:10  WBC  8.05  HGB 10.4  HCT 32.5 Plate 200  .2  05-12 @ 06:17  WBC  5.73  HGB 10.0  HCT 31.0 Plate 181  .7  05-11 @ 20:47  WBC  8.80  HGB 10.8  HCT 32.3 Plate 196  .5           17 May 2021 06:50    138    |  99     |  44     ----------------------------<  88     4.4     |  33     |  1.02   15 May 2021 07:02    138    |  102    |  48     ----------------------------<  92     4.4     |  33     |  1.02   14 May 2021 07:35    138    |  101    |  60     ----------------------------<  93     4.2     |  33     |  1.29   13 May 2021 07:10    140    |  103    |  54     ----------------------------<  91     4.1     |  33     |  1.16   12 May 2021 06:17    137    |  102    |  54     ----------------------------<  178    4.0     |  30     |  1.19   11 May 2021 20:47    138    |  103    |  52     ----------------------------<  102    4.7     |  29     |  1.24     Ca    8.9        17 May 2021 06:50  Ca    8.9        15 May 2021 07:02  Ca    8.4        14 May 2021 07:35  Ca    8.6        13 May 2021 07:10  Ca    8.9        12 May 2021 06:17  Ca    9.1        11 May 2021 20:47  Phos  3.5       13 May 2021 07:10  Mg     2.2       13 May 2021 07:10    TPro  8.1    /  Alb  3.2    /  TBili  0.4    /  DBili  x      /  AST  21     /  ALT  15     /  AlkPhos  71     12 May 2021 06:17  TPro  8.3    /  Alb  3.4    /  TBili  0.4    /  DBili  x      /  AST  28     /  ALT  25     /  AlkPhos  74     11 May 2021 20:47    PT/INR - ( 18 May 2021 07:32 )   PT: 27.6 sec;   INR: 2.48 ratio                 RADIOLOGY & ADDITIONAL STUDIES: HPI:  Patient is a 89F with a PMH of HTN, COPD on O2, Diastolic CHF, Severe Pulmonary HTN, Hypothyroidism, Atrial fibrillation on Coumadin, hx of multiple admissions for COPD and CHF exacerbation presents to the ED for dyspnea.  Patient awake and alert however is a poor historian.  Patient states that her breathing became significantly worse today.  No other complaints.  Patient requiring high flow nasal cannula in ED.  SpO2 currently 99% on 30L w/ FiO2 of 50%.  Will admit patient to tele.      Code status: DNR/DNI (MOLST signed in chart) (12 May 2021 00:42)  -------------- As Above --------------- patient is a poor historian  Called to se patient regarding ileus seen on 051621 and 051721. Abdominal pain (?). No N/V.  History of hypothyroidism   Clinically doing better      PAST MEDICAL & SURGICAL HISTORY:  COPD exacerbation    Atrial fibrillation    Hypothyroidism    Essential hypertension    No significant past surgical history        MEDICATIONS  (STANDING):  acetaminophen  IVPB .. 1000 milliGRAM(s) IV Intermittent once  diltiazem    milliGRAM(s) Oral daily  furosemide    Tablet 40 milliGRAM(s) Oral daily  levothyroxine 100 MICROGram(s) Oral daily  losartan 25 milliGRAM(s) Oral daily  pantoprazole    Tablet 40 milliGRAM(s) Oral before breakfast  potassium chloride    Tablet ER 20 milliEquivalent(s) Oral daily  sildenafil (REVATIO) 20 milliGRAM(s) Oral every 8 hours    MEDICATIONS  (PRN):  acetaminophen   Tablet .. 650 milliGRAM(s) Oral every 6 hours PRN Temp greater or equal to 38C (100.4F), Moderate Pain (4 - 6)  albuterol/ipratropium for Nebulization 3 milliLiter(s) Nebulizer every 6 hours PRN Shortness of Breath  ALPRAZolam 0.25 milliGRAM(s) Oral three times a day PRN anxiety      Allergies    No Known Allergies    Intolerances        FAMILY HISTORY:      REVIEW OF SYSTEMS:    CONSTITUTIONAL: No fever, weight loss, or fatigue  EYES: No eye pain, visual disturbances, or discharge  ENMT:  No difficulty hearing, tinnitus, vertigo; No sinus or throat pain  NECK: No pain or stiffness  BREASTS: No pain, masses, or nipple discharge  RESPIRATORY: No cough, wheezing, chills or hemoptysis; No shortness of breath  CARDIOVASCULAR: No chest pain, palpitations, dizziness, or leg swelling  GASTROINTESTINAL:  see above   GENITOURINARY: No dysuria, frequency, hematuria, or incontinence  NEUROLOGICAL: No headaches, memory loss, loss of strength, numbness, or tremors  SKIN: No itching, burning, rashes, or lesions   LYMPH NODES: No enlarged glands  ENDOCRINE: No heat or cold intolerance; No hair loss  MUSCULOSKELETAL: No joint pain or swelling; No muscle, back, or extremity pain  PSYCHIATRIC: No depression, anxiety, mood swings, or difficulty sleeping  HEME/LYMPH: No easy bruising, or bleeding gums  ALLERGY AND IMMUNOLOGIC: No hives or eczema          SOCIAL HISTORY:    FAMILY HISTORY:      Vital Signs Last 24 Hrs  T(C): 36.9 (18 May 2021 15:54), Max: 36.9 (18 May 2021 10:39)  T(F): 98.4 (18 May 2021 15:54), Max: 98.5 (18 May 2021 10:39)  HR: 75 (18 May 2021 15:54) (54 - 92)  BP: 130/73 (18 May 2021 15:54) (108/69 - 134/68)  BP(mean): --  RR: 18 (18 May 2021 15:54) (18 - 18)  SpO2: 97% (18 May 2021 15:54) (96% - 100%)    PHYSICAL EXAM:    GENERAL: NAD, well-groomed, well-developed  HEAD:  Atraumatic, Normocephalic  EYES: EOMI, PERRLA, conjunctiva and sclera clear  NECK: Supple, No JVD, Normal thyroid  NERVOUS SYSTEM:  Alert & Oriented X3, fair concentration;   CHEST/LUNG: Clear to percussion bilaterally; No rales, rhonchi, wheezing, or rubs  HEART: Regular rate and rhythm; No murmurs, rubs, or gallops  ABDOMEN: Soft, Nontender, Nondistended; Bowel sounds present  EXTREMITIES:  2+ Peripheral Pulses, No clubbing, cyanosis, or edema  LYMPH: No lymphadenopathy noted   RECTAL: No masses, Significant, soft , formed stool.    SKIN: No rashes or lesions    LABS:                        10.2   9.00  )-----------( 177      ( 17 May 2021 06:50 )             32.1       CBC:  05-17 @ 06:50  WBC  9.00  HGB 10.2  HCT 32.1 Plate 177  .6  05-15 @ 07:02  WBC  8.13  HGB 10.1  HCT 31.9 Plate 179  .0  05-13 @ 07:10  WBC  8.05  HGB 10.4  HCT 32.5 Plate 200  .2  05-12 @ 06:17  WBC  5.73  HGB 10.0  HCT 31.0 Plate 181  .7  05-11 @ 20:47  WBC  8.80  HGB 10.8  HCT 32.3 Plate 196  .5           17 May 2021 06:50    138    |  99     |  44     ----------------------------<  88     4.4     |  33     |  1.02   15 May 2021 07:02    138    |  102    |  48     ----------------------------<  92     4.4     |  33     |  1.02   14 May 2021 07:35    138    |  101    |  60     ----------------------------<  93     4.2     |  33     |  1.29   13 May 2021 07:10    140    |  103    |  54     ----------------------------<  91     4.1     |  33     |  1.16   12 May 2021 06:17    137    |  102    |  54     ----------------------------<  178    4.0     |  30     |  1.19   11 May 2021 20:47    138    |  103    |  52     ----------------------------<  102    4.7     |  29     |  1.24     Ca    8.9        17 May 2021 06:50  Ca    8.9        15 May 2021 07:02  Ca    8.4        14 May 2021 07:35  Ca    8.6        13 May 2021 07:10  Ca    8.9        12 May 2021 06:17  Ca    9.1        11 May 2021 20:47  Phos  3.5       13 May 2021 07:10  Mg     2.2       13 May 2021 07:10    TPro  8.1    /  Alb  3.2    /  TBili  0.4    /  DBili  x      /  AST  21     /  ALT  15     /  AlkPhos  71     12 May 2021 06:17  TPro  8.3    /  Alb  3.4    /  TBili  0.4    /  DBili  x      /  AST  28     /  ALT  25     /  AlkPhos  74     11 May 2021 20:47    PT/INR - ( 18 May 2021 07:32 )   PT: 27.6 sec;   INR: 2.48 ratio                 RADIOLOGY & ADDITIONAL STUDIES:

## 2021-05-18 NOTE — CONSULT NOTE ADULT - ASSESSMENT
HPI:  Patient is a 89F with a PMH of HTN, COPD on O2, Diastolic CHF, Severe Pulmonary HTN, Hypothyroidism, Atrial fibrillation on Coumadin, hx of multiple admissions for COPD and CHF exacerbation presents to the ED for dyspnea.  Patient awake and alert however is a poor historian.  Patient states that her breathing became significantly worse today.  No other complaints.  Patient requiring high flow nasal cannula in ED.  SpO2 currently 99% on 30L w/ FiO2 of 50%.  Will admit patient to tele.      Code status: DNR/DNI (MOLST signed in chart) (12 May 2021 00:42)  -------------- As Above --------------- patient is a poor historian  Called to se patient regarding ileus seen on 051621 and 051721. Abdominal pain (?). No N/V.  History of hypothyroidism   Clinically doing better    Ileus - 1) check today's KUB 2) Check Mag / Phos / B 12  / Folate 3) HPI:  Patient is a 89F with a PMH of HTN, COPD on O2, Diastolic CHF, Severe Pulmonary HTN, Hypothyroidism, Atrial fibrillation on Coumadin, hx of multiple admissions for COPD and CHF exacerbation presents to the ED for dyspnea.  Patient awake and alert however is a poor historian.  Patient states that her breathing became significantly worse today.  No other complaints.  Patient requiring high flow nasal cannula in ED.  SpO2 currently 99% on 30L w/ FiO2 of 50%.  Will admit patient to tele.      Code status: DNR/DNI (MOLST signed in chart) (12 May 2021 00:42)  -------------- As Above --------------- patient is a poor historian  Called to se patient regarding ileus seen on 051621 and 051721. Abdominal pain (?). No N/V.  History of hypothyroidism   Clinically doing better    Ileus - 1) check today's KUB 2) Check Mag / Phos / B 12  / Folate 3) UA 4) Enema x 1 4) Mag citrate x 1 5) repeat KUB in AM 6) Continue solid diet  === Left message for daughter to call me

## 2021-05-18 NOTE — PROGRESS NOTE ADULT - SUBJECTIVE AND OBJECTIVE BOX
Patient: FREDERICK MA [id number] 89y Female                            Hospitalist Attending Note    On O2 via NC this am.  No Abdominal pain, nausea, vomiting, diarrhea, nor constipation. Tolerating po intake.  Reports some abdominal discomfort last night, better this am.    ____________________PHYSICAL EXAM:  GENERAL:  NAD Alert and Oriented x 3   HEENT: NCAT  CARDIOVASCULAR:  S1, S2  LUNGS: coarse BS b/l, decreased BS R base.   ABDOMEN:  soft, (-) tenderness, (-) distension, (+) bowel sounds, (-) guarding, (-) rebound (-) rigidity  EXTREMITIES:  no cyanosis / clubbing / edema.   ____________________    VITALS:  Vital Signs Last 24 Hrs  T(C): 36.9 (18 May 2021 15:54), Max: 36.9 (18 May 2021 10:39)  T(F): 98.4 (18 May 2021 15:54), Max: 98.5 (18 May 2021 10:39)  HR: 75 (18 May 2021 15:54) (54 - 92)  BP: 130/73 (18 May 2021 15:54) (108/69 - 134/68)  BP(mean): --  RR: 18 (18 May 2021 15:54) (18 - 18)  SpO2: 97% (18 May 2021 15:54) (96% - 100%) Daily     Daily Weight in k.3 (18 May 2021 04:57)  CAPILLARY BLOOD GLUCOSE        I&O's Summary    17 May 2021 07:  -  18 May 2021 07:00  --------------------------------------------------------  IN: 540 mL / OUT: 1350 mL / NET: -810 mL    18 May 2021 07:01  -  18 May 2021 15:58  --------------------------------------------------------  IN: 240 mL / OUT: 1000 mL / NET: -760 mL        LABS:                        10.2   9.00  )-----------( 177      ( 17 May 2021 06:50 )             32.1     05-17    138  |  99  |  44<H>  ----------------------------<  88  4.4   |  33<H>  |  1.02    Ca    8.9      17 May 2021 06:50      PT/INR - ( 18 May 2021 07:32 )   PT: 27.6 sec;   INR: 2.48 ratio                       MEDICATIONS:  acetaminophen   Tablet .. 650 milliGRAM(s) Oral every 6 hours PRN  acetaminophen  IVPB .. 1000 milliGRAM(s) IV Intermittent once  albuterol/ipratropium for Nebulization 3 milliLiter(s) Nebulizer every 6 hours PRN  ALPRAZolam 0.25 milliGRAM(s) Oral three times a day PRN  azithromycin  IVPB      azithromycin  IVPB 500 milliGRAM(s) IV Intermittent every 24 hours  diltiazem    milliGRAM(s) Oral daily  furosemide    Tablet 40 milliGRAM(s) Oral daily  levothyroxine 100 MICROGram(s) Oral daily  losartan 25 milliGRAM(s) Oral daily  pantoprazole    Tablet 40 milliGRAM(s) Oral before breakfast  potassium chloride    Tablet ER 20 milliEquivalent(s) Oral daily  sildenafil (REVATIO) 20 milliGRAM(s) Oral every 8 hours

## 2021-05-18 NOTE — DIETITIAN INITIAL EVALUATION ADULT. - PERTINENT LABORATORY DATA
05-17 Na138 mmol/L Glu 88 mg/dL K+ 4.4 mmol/L Cr  1.02 mg/dL BUN 44 mg/dL<H> 05-13 Phos 3.5 mg/dL 05-12 Alb 3.2 g/dL<L>

## 2021-05-18 NOTE — DIETITIAN INITIAL EVALUATION ADULT. - OTHER INFO
Pt c multiple admissions for SOB 2/2 CHF/COPD exacerbations. Unable to interview pt due to confusion & lethargy. Per medical record pt lives alone c 24/7 HHA assistance; has supportive children nearby.  Per review of previous nutrition assessments small wt fluctuations noted; difficult to determine true wt loss due to CHF/edematous status during each admission (June 2020 wt 65.5 kg c 2+ b/l leg edema present).  No reports of any N/V/C/D or chew/swallowing difficulty.

## 2021-05-18 NOTE — PROGRESS NOTE ADULT - ASSESSMENT
89F with a PMH of HTN, COPD on O2, Diastolic CHF, Severe Pulmonary HTN, Hypothyroidism, Atrial fibrillation on Coumadin, hx of multiple admissions for COPD and CHF exacerbation presents to the ED for dyspnea, treated for acute CHF / COPD / RLL pneumonia.  TTE showing normal EF with severe pulmonary hypertension, Grade III diastolic dysfunction.      ·  Problem: Pulmonary hypertension.  Plan: TTE confirms severe pulmonary hypertension. continue sildenafil.  Pulmonary following.   ·  Problem: Acute on chronic respiratory failure with hypoxia.  Plan: Pt now on 3L /min via NC.  Per royal, at baseline on 2.5 L / min at home.  Dyspnea likely multifactorial (pHTN + CHF + COPD).  Continue diuretics.  CT Chest noted R > L pleural effusion.  Continue O2.    ·  Problem: Ileus - Repeat AXR showing some improvement.  Discussed with GI.  Trial of laxatives.   ·  Problem: RLL Pneumonia - consolidation noted on CT.  Completed Rocephin, Zithromax. Outpatient repeat CXR, pulmonary followup d/w daughter.   ·  Problem: Atrial fibrillation, unspecified type.  Plan: Continue diltiazem.  INR therapeutic.  Continue Coumadin dosing.    ·  Problem: Acute on CHronic Diastolic congestive heart failure, Plan: Diuresis as per above.  Cardio following.    ·  Problem: Chronic obstructive pulmonary disease, unspecified COPD type.  Plan: no current wheezing.   ·  Problem: Hypothyroidism, unspecified type. Plan: synthroid.  ·  Problem: Essential hypertension.  Plan: losartan.   DVT Proph - on AC    OOB with PT. Plan d/c home, d/w daughter Lilliam and son Candelario [phone]

## 2021-05-19 ENCOUNTER — TRANSCRIPTION ENCOUNTER (OUTPATIENT)
Age: 86
End: 2021-05-19

## 2021-05-19 VITALS
TEMPERATURE: 98 F | SYSTOLIC BLOOD PRESSURE: 118 MMHG | OXYGEN SATURATION: 100 % | HEART RATE: 78 BPM | DIASTOLIC BLOOD PRESSURE: 66 MMHG | RESPIRATION RATE: 18 BRPM

## 2021-05-19 LAB
ANION GAP SERPL CALC-SCNC: 2 MMOL/L — LOW (ref 5–17)
APPEARANCE UR: CLEAR — SIGNIFICANT CHANGE UP
BACTERIA # UR AUTO: ABNORMAL
BILIRUB UR-MCNC: NEGATIVE — SIGNIFICANT CHANGE UP
BUN SERPL-MCNC: 41 MG/DL — HIGH (ref 7–23)
CALCIUM SERPL-MCNC: 8.7 MG/DL — SIGNIFICANT CHANGE UP (ref 8.5–10.1)
CHLORIDE SERPL-SCNC: 99 MMOL/L — SIGNIFICANT CHANGE UP (ref 96–108)
CO2 SERPL-SCNC: 38 MMOL/L — HIGH (ref 22–31)
COLOR SPEC: YELLOW — SIGNIFICANT CHANGE UP
CREAT SERPL-MCNC: 0.89 MG/DL — SIGNIFICANT CHANGE UP (ref 0.5–1.3)
DIFF PNL FLD: ABNORMAL
EPI CELLS # UR: SIGNIFICANT CHANGE UP
FOLATE SERPL-MCNC: 7.8 NG/ML — SIGNIFICANT CHANGE UP
GLUCOSE SERPL-MCNC: 87 MG/DL — SIGNIFICANT CHANGE UP (ref 70–99)
GLUCOSE UR QL: NEGATIVE MG/DL — SIGNIFICANT CHANGE UP
HCT VFR BLD CALC: 29.1 % — LOW (ref 34.5–45)
HCT VFR BLD CALC: 29.6 % — LOW (ref 34.5–45)
HGB BLD-MCNC: 9.2 G/DL — LOW (ref 11.5–15.5)
HGB BLD-MCNC: 9.4 G/DL — LOW (ref 11.5–15.5)
INR BLD: 2.07 RATIO — HIGH (ref 0.88–1.16)
KETONES UR-MCNC: NEGATIVE — SIGNIFICANT CHANGE UP
LEUKOCYTE ESTERASE UR-ACNC: ABNORMAL
MAGNESIUM SERPL-MCNC: 2.5 MG/DL — SIGNIFICANT CHANGE UP (ref 1.6–2.6)
MCHC RBC-ENTMCNC: 31.6 GM/DL — LOW (ref 32–36)
MCHC RBC-ENTMCNC: 31.8 GM/DL — LOW (ref 32–36)
MCHC RBC-ENTMCNC: 33.9 PG — SIGNIFICANT CHANGE UP (ref 27–34)
MCHC RBC-ENTMCNC: 34.6 PG — HIGH (ref 27–34)
MCV RBC AUTO: 107.4 FL — HIGH (ref 80–100)
MCV RBC AUTO: 108.8 FL — HIGH (ref 80–100)
NITRITE UR-MCNC: NEGATIVE — SIGNIFICANT CHANGE UP
NRBC # BLD: 0 /100 WBCS — SIGNIFICANT CHANGE UP (ref 0–0)
NRBC # BLD: 0 /100 WBCS — SIGNIFICANT CHANGE UP (ref 0–0)
PH UR: 5 — SIGNIFICANT CHANGE UP (ref 5–8)
PHOSPHATE SERPL-MCNC: 2.8 MG/DL — SIGNIFICANT CHANGE UP (ref 2.5–4.5)
PLATELET # BLD AUTO: 171 K/UL — SIGNIFICANT CHANGE UP (ref 150–400)
PLATELET # BLD AUTO: 173 K/UL — SIGNIFICANT CHANGE UP (ref 150–400)
POTASSIUM SERPL-MCNC: 4.5 MMOL/L — SIGNIFICANT CHANGE UP (ref 3.5–5.3)
POTASSIUM SERPL-SCNC: 4.5 MMOL/L — SIGNIFICANT CHANGE UP (ref 3.5–5.3)
PROT UR-MCNC: 15 MG/DL
PROTHROM AB SERPL-ACNC: 23.2 SEC — HIGH (ref 10.6–13.6)
RBC # BLD: 2.71 M/UL — LOW (ref 3.8–5.2)
RBC # BLD: 2.72 M/UL — LOW (ref 3.8–5.2)
RBC # FLD: 14.7 % — HIGH (ref 10.3–14.5)
RBC # FLD: 14.7 % — HIGH (ref 10.3–14.5)
RBC CASTS # UR COMP ASSIST: SIGNIFICANT CHANGE UP /HPF (ref 0–4)
SODIUM SERPL-SCNC: 139 MMOL/L — SIGNIFICANT CHANGE UP (ref 135–145)
SP GR SPEC: 1.01 — SIGNIFICANT CHANGE UP (ref 1.01–1.02)
UROBILINOGEN FLD QL: NEGATIVE MG/DL — SIGNIFICANT CHANGE UP
VIT B12 SERPL-MCNC: 660 PG/ML — SIGNIFICANT CHANGE UP (ref 232–1245)
WBC # BLD: 7.68 K/UL — SIGNIFICANT CHANGE UP (ref 3.8–10.5)
WBC # BLD: 8.38 K/UL — SIGNIFICANT CHANGE UP (ref 3.8–10.5)
WBC # FLD AUTO: 7.68 K/UL — SIGNIFICANT CHANGE UP (ref 3.8–10.5)
WBC # FLD AUTO: 8.38 K/UL — SIGNIFICANT CHANGE UP (ref 3.8–10.5)
WBC UR QL: SIGNIFICANT CHANGE UP

## 2021-05-19 PROCEDURE — 74018 RADEX ABDOMEN 1 VIEW: CPT | Mod: 26

## 2021-05-19 PROCEDURE — 99239 HOSP IP/OBS DSCHRG MGMT >30: CPT

## 2021-05-19 RX ORDER — WARFARIN SODIUM 2.5 MG/1
4 TABLET ORAL ONCE
Refills: 0 | Status: DISCONTINUED | OUTPATIENT
Start: 2021-05-19 | End: 2021-05-19

## 2021-05-19 RX ORDER — PSYLLIUM SEED (WITH DEXTROSE)
3.4 POWDER (GRAM) ORAL
Qty: 0 | Refills: 0 | DISCHARGE

## 2021-05-19 RX ORDER — DOCUSATE SODIUM 100 MG
1 CAPSULE ORAL
Qty: 0 | Refills: 0 | DISCHARGE

## 2021-05-19 RX ADMIN — Medication 40 MILLIGRAM(S): at 05:42

## 2021-05-19 RX ADMIN — PANTOPRAZOLE SODIUM 40 MILLIGRAM(S): 20 TABLET, DELAYED RELEASE ORAL at 06:40

## 2021-05-19 RX ADMIN — Medication 650 MILLIGRAM(S): at 07:34

## 2021-05-19 RX ADMIN — Medication 20 MILLIGRAM(S): at 05:41

## 2021-05-19 RX ADMIN — Medication 100 MICROGRAM(S): at 05:42

## 2021-05-19 RX ADMIN — Medication 20 MILLIEQUIVALENT(S): at 13:55

## 2021-05-19 RX ADMIN — Medication 650 MILLIGRAM(S): at 06:40

## 2021-05-19 RX ADMIN — Medication 360 MILLIGRAM(S): at 05:41

## 2021-05-19 RX ADMIN — Medication 20 MILLIGRAM(S): at 13:16

## 2021-05-19 NOTE — PROGRESS NOTE ADULT - ASSESSMENT
HPI:  Patient is a 89F with a PMH of HTN, COPD on O2, Diastolic CHF, Severe Pulmonary HTN, Hypothyroidism, Atrial fibrillation on Coumadin, hx of multiple admissions for COPD and CHF exacerbation presents to the ED for dyspnea.  Patient awake and alert however is a poor historian.  Patient states that her breathing became significantly worse today.  No other complaints.  Patient requiring high flow nasal cannula in ED.  SpO2 currently 99% on 30L w/ FiO2 of 50%.  Will admit patient to tele.      Code status: DNR/DNI (MOLST signed in chart) (12 May 2021 00:42)  -------------- As Above --------------- patient is a poor historian  Called to se patient regarding ileus seen on 051621 and 051721. Abdominal pain (?). No N/V.  History of hypothyroidism   Clinically doing better    Ileus - Official report from yesterday's KUB is unchanged. However, stomach less distended. Asymptomatic. Mag / Phos / UA - negative. 1) check today's KUB 2) Check  B 12  / Folate 3) Continue solid diet 4) D/C calorie count  Constipation - s/p enema / mag citrate - normal TSH - 1) Lactulose QD  === Left message for daughter to call me yesterday  === Patient without any GI symptoms. Eating very well. Patient cleared from GI point of view for discharge as long as KUB does not show any worsening.

## 2021-05-19 NOTE — DISCHARGE NOTE NURSING/CASE MANAGEMENT/SOCIAL WORK - PATIENT PORTAL LINK FT
You can access the FollowMyHealth Patient Portal offered by [facility] by registering at the following website: [email or web address]. By joining [employer]’s FollowMyHealth portal, you will also be able to view your health information using other applications (apps) compatible with our system.

## 2021-05-19 NOTE — DISCHARGE NOTE PROVIDER - NSDCCPCAREPLAN_GEN_ALL_CORE_FT
PRINCIPAL DISCHARGE DIAGNOSIS  Diagnosis: Severe pulmonary hypertension  Assessment and Plan of Treatment:       SECONDARY DISCHARGE DIAGNOSES  Diagnosis: Acute respiratory failure with hypoxia  Assessment and Plan of Treatment: Acute respiratory failure with hypoxia    Diagnosis: Chronic atrial fibrillation  Assessment and Plan of Treatment:     Diagnosis: Acute on chronic diastolic congestive heart failure  Assessment and Plan of Treatment:     Diagnosis: Ileus  Assessment and Plan of Treatment:

## 2021-05-19 NOTE — PROGRESS NOTE ADULT - ASSESSMENT
89F with a PMH of HTN, COPD on O2, Diastolic CHF, Severe Pulmonary HTN, Hypothyroidism, Atrial fibrillation on Coumadin, hx of multiple admissions for COPD and CHF exacerbation presents to the ED for dyspnea, treated for acute CHF / COPD / RLL pneumonia.  TTE showing normal EF with severe pulmonary hypertension, Grade III diastolic dysfunction.      ·  Problem: Pulmonary hypertension.  Plan: TTE confirms severe pulmonary hypertension. continue sildenafil.  Pulmonary following.   ·  Problem: Acute on chronic respiratory failure with hypoxia.  Plan: Pt now on 3L /min via NC.  Per royal, at baseline on 2.5 L / min at home.  Dyspnea likely multifactorial (pHTN + CHF + COPD).  Continue diuretics.  CT Chest noted R > L pleural effusion.  Continue O2.    ·  Problem: Ileus - Repeat AXR showing some improvement.  Discussed with GI.  Continue laxatives.   ·  Problem: RLL Pneumonia - consolidation noted on CT.  Completed Rocephin, Zithromax. Outpatient repeat CXR, pulmonary followup d/w daughter.   ·  Problem: Atrial fibrillation, unspecified type.  Plan: Continue diltiazem.  INR therapeutic.  Continue Coumadin.    ·  Problem: Acute on CHronic Diastolic congestive heart failure, Plan: Diuresis as per above.  Cardio following.    ·  Problem: Chronic obstructive pulmonary disease, unspecified COPD type.  Plan: no current wheezing.   ·  Problem: Hypothyroidism, unspecified type. Plan: synthroid.  ·  Problem: Essential hypertension.  Plan: losartan.   DVT Proph - on AC    OOB with PT. Plan d/c home, d/w daughter Lilliam [phone], and son Candelario [phone]  Stable for d/c home.

## 2021-05-19 NOTE — PROGRESS NOTE ADULT - SUBJECTIVE AND OBJECTIVE BOX
Patient is a 89y old  Female who presents with a chief complaint of acute dyspnea (18 May 2021 19:04)      HPI:  Patient is a 89F with a PMH of HTN, COPD on O2, Diastolic CHF, Severe Pulmonary HTN, Hypothyroidism, Atrial fibrillation on Coumadin, hx of multiple admissions for COPD and CHF exacerbation presents to the ED for dyspnea.  Patient awake and alert however is a poor historian.  Patient states that her breathing became significantly worse today.  No other complaints.  Patient requiring high flow nasal cannula in ED.  SpO2 currently 99% on 30L w/ FiO2 of 50%.  Will admit patient to tele.      Code status: DNR/DNI (MOLST signed in chart) (12 May 2021 00:42)      INTERVAL HPI/OVERNIGHT EVENTS:  Patient eating all of her meals 100 % without any N/V or abdominal pain. Had one BM after enema. BMs after mag Citrate.     MEDICATIONS  (STANDING):  acetaminophen  IVPB .. 1000 milliGRAM(s) IV Intermittent once  diltiazem    milliGRAM(s) Oral daily  furosemide    Tablet 40 milliGRAM(s) Oral daily  levothyroxine 100 MICROGram(s) Oral daily  losartan 25 milliGRAM(s) Oral daily  pantoprazole    Tablet 40 milliGRAM(s) Oral before breakfast  potassium chloride    Tablet ER 20 milliEquivalent(s) Oral daily  sildenafil (REVATIO) 20 milliGRAM(s) Oral every 8 hours  warfarin 4 milliGRAM(s) Oral once    MEDICATIONS  (PRN):  acetaminophen   Tablet .. 650 milliGRAM(s) Oral every 6 hours PRN Temp greater or equal to 38C (100.4F), Moderate Pain (4 - 6)  albuterol/ipratropium for Nebulization 3 milliLiter(s) Nebulizer every 6 hours PRN Shortness of Breath  ALPRAZolam 0.25 milliGRAM(s) Oral three times a day PRN anxiety      FAMILY HISTORY:      Allergies    No Known Allergies    Intolerances        PMH/PSH:  COPD exacerbation    Atrial fibrillation    Hypothyroidism    Essential hypertension    No significant past surgical history          REVIEW OF SYSTEMS:  CONSTITUTIONAL: No fever, weight loss,  EYES: No eye pain, visual disturbances, or discharge  ENMT:  No difficulty hearing, tinnitus, vertigo; No sinus or throat pain  NECK: No pain or stiffness  BREASTS: No pain, masses, or nipple discharge  RESPIRATORY: No cough, wheezing, chills or hemoptysis; No shortness of breath  CARDIOVASCULAR: No chest pain, palpitations, dizziness, or leg swelling  GASTROINTESTINAL:  See above   GENITOURINARY: No dysuria, frequency, hematuria, or incontinence  NEUROLOGICAL: No headaches, memory loss, loss of strength, numbness, or tremors  SKIN: No itching, burning, rashes, or lesions   LYMPH NODES: No enlarged glands  ENDOCRINE: No heat or cold intolerance; No hair loss  MUSCULOSKELETAL: No joint pain or swelling; No muscle, back, or extremity pain  PSYCHIATRIC: No depression, anxiety, mood swings, or difficulty sleeping  HEME/LYMPH: No easy bruising, or bleeding gums  ALLERGY AND IMMUNOLOGIC: No hives or eczema    Vital Signs Last 24 Hrs  T(C): 36.5 (19 May 2021 05:06), Max: 36.9 (18 May 2021 15:54)  T(F): 97.7 (19 May 2021 05:06), Max: 98.4 (18 May 2021 15:54)  HR: 68 (19 May 2021 05:06) (62 - 75)  BP: 120/75 (19 May 2021 05:06) (120/75 - 130/80)  BP(mean): --  RR: 18 (19 May 2021 05:06) (18 - 18)  SpO2: 95% (19 May 2021 05:06) (95% - 99%)    PHYSICAL EXAM:  GENERAL: NAD, well-groomed, well-developed  HEAD:  Atraumatic, Normocephalic  EYES: EOMI, PERRLA, conjunctiva and sclera clear  NECK: Supple, No JVD, Normal thyroid  NERVOUS SYSTEM:  Alert & Oriented X3, Good concentration; Motor Strength 5/5 B/L upper and lower extremities;   CHEST/LUNG: Clear to percussion bilaterally; No rales, rhonchi, wheezing, or rubs  HEART: Regular rate and rhythm; No murmurs, rubs, or gallops  ABDOMEN: Soft, Nontender, Nondistended; Bowel sounds present  EXTREMITIES:  2+ Peripheral Pulses, No clubbing, cyanosis, or edema  LYMPH: No lymphadenopathy noted  SKIN: No rashes or lesions    LAB                          9.2    8.38  )-----------( 173      ( 19 May 2021 07:52 )             29.1       CBC:   @ 07:52  WBC 8.38   Hgb 9.2   Hct 29.1   Plts 173  .4   @ 06:50  WBC 9.00   Hgb 10.2   Hct 32.1   Plts 177  .6  05-15 @ 07:02  WBC 8.13   Hgb 10.1   Hct 31.9   Plts 179  .0   @ 07:10  WBC 8.05   Hgb 10.4   Hct 32.5   Plts 200  .2      Chemistry:   @ 07:52  Na+ 139  K+ 4.5  Cl- 99  CO2 38  BUN 41  Cr 0.89      @ 06:50  Na+ 138  K+ 4.4  Cl- 99  CO2 33  BUN 44  Cr 1.02     05-15 @ 07:02  Na+ 138  K+ 4.4  Cl- 102  CO2 33  BUN 48  Cr 1.02      @ 07:35  Na+ 138  K+ 4.2  Cl- 101  CO2 33  BUN 60  Cr 1.29      @ 07:10  Na+ 140  K+ 4.1  Cl- 103  CO2 33  BUN 54  Cr 1.16         Glucose, Serum: 87 mg/dL ( @ 07:52)  Glucose, Serum: 88 mg/dL ( @ 06:50)  Glucose, Serum: 92 mg/dL (05-15 @ 07:02)  Glucose, Serum: 93 mg/dL ( @ 07:35)  Glucose, Serum: 91 mg/dL ( @ 07:10)      19 May 2021 07:52    139    |  99     |  41     ----------------------------<  87     4.5     |  38     |  0.89   17 May 2021 06:50    138    |  99     |  44     ----------------------------<  88     4.4     |  33     |  1.02   15 May 2021 07:02    138    |  102    |  48     ----------------------------<  92     4.4     |  33     |  1.02   14 May 2021 07:35    138    |  101    |  60     ----------------------------<  93     4.2     |  33     |  1.29   13 May 2021 07:10    140    |  103    |  54     ----------------------------<  91     4.1     |  33     |  1.16     Ca    8.7        19 May 2021 07:52  Ca    8.9        17 May 2021 06:50  Ca    8.9        15 May 2021 07:02  Ca    8.4        14 May 2021 07:35  Ca    8.6        13 May 2021 07:10  Phos  2.8       19 May 2021 07:52  Phos  2.5       18 May 2021 16:35  Phos  3.5       13 May 2021 07:10  Mg     2.5       19 May 2021 07:52  Mg     2.2       18 May 2021 16:35  Mg     2.2       13 May 2021 07:10        PT/INR - ( 19 May 2021 07:52 )   PT: 23.2 sec;   INR: 2.07 ratio             Urinalysis Basic - ( 18 May 2021 23:19 )    Color: Yellow / Appearance: Clear / S.015 / pH: x  Gluc: x / Ketone: Negative  / Bili: Negative / Urobili: Negative mg/dL   Blood: x / Protein: 15 mg/dL / Nitrite: Negative   Leuk Esterase: Moderate / RBC: 0-2 /HPF / WBC 3-5   Sq Epi: x / Non Sq Epi: Few / Bacteria: Few        CAPILLARY BLOOD GLUCOSE              RADIOLOGY & ADDITIONAL TESTS:    Imaging Personally Reviewed:  [ ] YES  [ ] NO    Consultant(s) Notes Reviewed:  [ ] YES  [ ] NO    Care Discussed with Consultants/Other Providers [ ] YES  [ ] NO

## 2021-05-19 NOTE — DISCHARGE NOTE PROVIDER - CARE PROVIDER_API CALL
John Gibbs  MEDICINE  [address] [place] [address]  Phone: [phone]  Fax: [phone]  Follow Up Time:     Pravin Capellan)  Cardiovascular Disease; Internal Medicine  [address] [id number]  Phone: [phone]  Fax: [phone]  Follow Up Time:     Salvador Kidd)  Medicine  [address] [facility], [address]  [place] [address]  Phone: [phone]  Fax: [phone]  Follow Up Time:

## 2021-05-19 NOTE — DISCHARGE NOTE PROVIDER - HOSPITAL COURSE
89F with a PMH of HTN, COPD on O2, Diastolic CHF, Severe Pulmonary HTN, Hypothyroidism, Atrial fibrillation on Coumadin, hx of multiple admissions for COPD and CHF exacerbation presents to the ED for dyspnea, treated for acute CHF / COPD / RLL pneumonia.  TTE showing normal EF with severe pulmonary hypertension, Grade III diastolic dysfunction.      ·  Problem: Pulmonary hypertension.  Plan: TTE confirms severe pulmonary hypertension. continue sildenafil.  Pulmonary following.   ·  Problem: Acute on chronic respiratory failure with hypoxia.  Plan: Pt now on 3L /min via NC.  Per royal, at baseline on 2.5 L / min at home.  Dyspnea likely multifactorial (pHTN + CHF + COPD).  Continue diuretics.  CT Chest noted R > L pleural effusion.  Continue O2.    ·  Problem: Ileus - Repeat AXR showing some improvement.  Discussed with GI.  Continue laxatives.   ·  Problem: RLL Pneumonia - consolidation noted on CT.  Completed Rocephin, Zithromax. Outpatient repeat CXR, pulmonary followup d/w daughter.   ·  Problem: Atrial fibrillation, unspecified type.  Plan: Continue diltiazem.  INR therapeutic.  Continue Coumadin.    ·  Problem: Acute on CHronic Diastolic congestive heart failure, Plan: Diuresis as per above.  Cardio following.    ·  Problem: Chronic obstructive pulmonary disease, unspecified COPD type.  Plan: no current wheezing.   ·  Problem: Hypothyroidism, unspecified type. Plan: synthroid.  ·  Problem: Essential hypertension.  Plan: losartan.     OOB with PT. Plan d/c home, d/w daughter Lilliam [phone], and son Candelario [phone]  Stable for d/c home.   Disposition: Stable for discharge.  Outpatient followup discussed.  Total time spent on discharge is  45  minutes.

## 2021-05-19 NOTE — PROGRESS NOTE ADULT - NSICDXPILOT_GEN_ALL_CORE
[place]

## 2021-05-19 NOTE — PROGRESS NOTE ADULT - PROVIDER SPECIALTY LIST ADULT
Hospitalist
Hospitalist
Pulmonology
Cardiology
Pulmonology
Cardiology
Gastroenterology
Hospitalist
Pulmonology
Cardiology
Hospitalist
Pulmonology
Hospitalist
Cardiology

## 2021-05-19 NOTE — DISCHARGE NOTE PROVIDER - NSDCFUADDINST_GEN_ALL_CORE_FT
It is important to see your primary physician as well as the physicians noted below within the next week to perform a comprehensive medical review.  Call their offices for an appointment as soon as you leave the hospital.  You will also need to see them for renewal of your medications.  If you do not have a primary physician, contact the [facility] Physician Referral Service at [phone].  To obtain your results, you can access the [email or web address] Patient Portal at [email or web address].  Your medical issues appear to be stable at this time, but if your symptoms recur or worsen, contact your physicians and/or return to the hospital if necessary.  If you encounter any issues or questions with your medication, call your physicians before stopping the medication.  Do not drive.  Limit your diet to 2 grams of sodium daily.

## 2021-05-19 NOTE — PROGRESS NOTE ADULT - SUBJECTIVE AND OBJECTIVE BOX
INTERVAL HPI:   89 WF with HTN, COPD on O2 & Nocturnal NIV, Diastolic CHF, Severe pHTN, Hypothyroidism ,A fib on Coumadin, Valve replacement. Non smoker. Multiple admissions for COPD exacerbation and decompensated CHF.  Presented  to the ED for progressive  dyspnea that became significantly worse today.  Reports having cough scant phlegm also. Not able to provide more details to history.  Admitted with acute on chronic hypoxic hypercarbic Respiratory failure.    OVERNIGHT EVENTS:  Comfortable in bed.    Vital Signs Last 24 Hrs  T(C): 36.6 (19 May 2021 17:20), Max: 36.6 (19 May 2021 00:17)  T(F): 97.8 (19 May 2021 17:20), Max: 97.8 (19 May 2021 00:17)  HR: 64 (19 May 2021 17:20) (62 - 75)  BP: 122/58 (19 May 2021 17:20) (117/68 - 130/80)  BP(mean): --  RR: 18 (19 May 2021 17:20) (18 - 18)  SpO2: 100% (19 May 2021 17:20) (95% - 100%)    PHYSICAL EXAM:  GEN:         Awake, responsive and comfortable.  HEENT:    Normal.    RESP:       no distress.  CVS:          Regular rate and rhythm.   ABD:         Soft, non-tender, non-distended;     MEDICATIONS  (STANDING):  acetaminophen  IVPB .. 1000 milliGRAM(s) IV Intermittent once  diltiazem    milliGRAM(s) Oral daily  furosemide    Tablet 40 milliGRAM(s) Oral daily  levothyroxine 100 MICROGram(s) Oral daily  losartan 25 milliGRAM(s) Oral daily  pantoprazole    Tablet 40 milliGRAM(s) Oral before breakfast  potassium chloride    Tablet ER 20 milliEquivalent(s) Oral daily  sildenafil (REVATIO) 20 milliGRAM(s) Oral every 8 hours  warfarin 4 milliGRAM(s) Oral once    MEDICATIONS  (PRN):  acetaminophen   Tablet .. 650 milliGRAM(s) Oral every 6 hours PRN Temp greater or equal to 38C (100.4F), Moderate Pain (4 - 6)  albuterol/ipratropium for Nebulization 3 milliLiter(s) Nebulizer every 6 hours PRN Shortness of Breath  ALPRAZolam 0.25 milliGRAM(s) Oral three times a day PRN anxiety    LABS:                        9.4    7.68  )-----------( 171      ( 19 May 2021 12:35 )             29.6         139  |  99  |  41<H>  ----------------------------<  87  4.5   |  38<H>  |  0.89    Ca    8.7      19 May 2021 07:52  Phos  2.8       Mg     2.5         PT/INR - ( 19 May 2021 07:52 )   PT: 23.2 sec;   INR: 2.07 ratio      Urinalysis Basic - ( 18 May 2021 23:19 )    Color: Yellow / Appearance: Clear / S.015 / pH: x  Gluc: x / Ketone: Negative  / Bili: Negative / Urobili: Negative mg/dL   Blood: x / Protein: 15 mg/dL / Nitrite: Negative   Leuk Esterase: Moderate / RBC: 0-2 /HPF / WBC 3-5   Sq Epi: x / Non Sq Epi: Few / Bacteria: Few    ASSESSMENT AND PLAN:  ·	Acute on chronic hypoxic hypercarbic Respiratory failure.  ·	Right pleural effusion.  ·	Acute on chronic diastolic CHF.  ·	Acute COPD exacerbation.  ·	Severe pHTN.  ·	Moderate TR.  ·	Renal  Insuffiencey.  ·	Chronic A Fib.  ·	Hypothyroidism.  ·	Anemia.    Pulmonary status close to base line.  Continue O2 with nocturnal NIV.  On sildenafil and diuretics.  Discharge planning.

## 2021-05-19 NOTE — DISCHARGE NOTE PROVIDER - PROVIDER TOKENS
PROVIDER:[TOKEN:[[id number]]],PROVIDER:[TOKEN:[[id number]]],PROVIDER:[TOKEN:[[id number]]]

## 2021-05-19 NOTE — PROGRESS NOTE ADULT - REASON FOR ADMISSION
acute dyspnea

## 2021-05-19 NOTE — PROGRESS NOTE ADULT - SUBJECTIVE AND OBJECTIVE BOX
Patient: FREDERICK MA [id number] 89y Female                            Hospitalist Attending Note    Remains on O2.  Overall feeling better.  + BM.  No Abdominal pain, nausea, vomiting, diarrhea, nor constipation.   No SOB.    ____________________PHYSICAL EXAM:  GENERAL:  NAD Alert and Oriented x 3   HEENT: NCAT  CARDIOVASCULAR:  S1, S2  LUNGS: coarse BS b/l, decreased BS R base.   ABDOMEN:  soft, (-) tenderness, (-) distension, (+) bowel sounds, (-) guarding, (-) rebound (-) rigidity  EXTREMITIES:  no cyanosis / clubbing / edema.   ____________________    VITALS:  Vital Signs Last 24 Hrs  T(C): 36.4 (19 May 2021 11:10), Max: 36.9 (18 May 2021 15:54)  T(F): 97.5 (19 May 2021 11:10), Max: 98.4 (18 May 2021 15:54)  HR: 62 (19 May 2021 11:10) (62 - 75)  BP: 117/68 (19 May 2021 11:10) (117/68 - 130/80)  BP(mean): --  RR: 18 (19 May 2021 11:10) (18 - 18)  SpO2: 99% (19 May 2021 11:10) (95% - 99%) Daily     Daily Weight in k.2 (19 May 2021 05:06)  CAPILLARY BLOOD GLUCOSE        I&O's Summary    18 May 2021 07:01  -  19 May 2021 07:00  --------------------------------------------------------  IN: 960 mL / OUT: 1500 mL / NET: -540 mL    19 May 2021 07:01  -  19 May 2021 13:29  --------------------------------------------------------  IN: 350 mL / OUT: 0 mL / NET: 350 mL        LABS:                        9.4    7.68  )-----------( 171      ( 19 May 2021 12:35 )             29.6         139  |  99  |  41<H>  ----------------------------<  87  4.5   |  38<H>  |  0.89    Ca    8.7      19 May 2021 07:52  Phos  2.8       Mg     2.5           PT/INR - ( 19 May 2021 07:52 )   PT: 23.2 sec;   INR: 2.07 ratio             Urinalysis Basic - ( 18 May 2021 23:19 )    Color: Yellow / Appearance: Clear / S.015 / pH: x  Gluc: x / Ketone: Negative  / Bili: Negative / Urobili: Negative mg/dL   Blood: x / Protein: 15 mg/dL / Nitrite: Negative   Leuk Esterase: Moderate / RBC: 0-2 /HPF / WBC 3-5   Sq Epi: x / Non Sq Epi: Few / Bacteria: Few              MEDICATIONS:  acetaminophen   Tablet .. 650 milliGRAM(s) Oral every 6 hours PRN  acetaminophen  IVPB .. 1000 milliGRAM(s) IV Intermittent once  albuterol/ipratropium for Nebulization 3 milliLiter(s) Nebulizer every 6 hours PRN  ALPRAZolam 0.25 milliGRAM(s) Oral three times a day PRN  diltiazem    milliGRAM(s) Oral daily  furosemide    Tablet 40 milliGRAM(s) Oral daily  levothyroxine 100 MICROGram(s) Oral daily  losartan 25 milliGRAM(s) Oral daily  pantoprazole    Tablet 40 milliGRAM(s) Oral before breakfast  potassium chloride    Tablet ER 20 milliEquivalent(s) Oral daily  sildenafil (REVATIO) 20 milliGRAM(s) Oral every 8 hours  warfarin 4 milliGRAM(s) Oral once

## 2021-05-26 DIAGNOSIS — D64.9 ANEMIA, UNSPECIFIED: ICD-10-CM

## 2021-05-26 DIAGNOSIS — I48.20 CHRONIC ATRIAL FIBRILLATION, UNSPECIFIED: ICD-10-CM

## 2021-05-26 DIAGNOSIS — Z66 DO NOT RESUSCITATE: ICD-10-CM

## 2021-05-26 DIAGNOSIS — R06.02 SHORTNESS OF BREATH: ICD-10-CM

## 2021-05-26 DIAGNOSIS — J96.22 ACUTE AND CHRONIC RESPIRATORY FAILURE WITH HYPERCAPNIA: ICD-10-CM

## 2021-05-26 DIAGNOSIS — J44.1 CHRONIC OBSTRUCTIVE PULMONARY DISEASE WITH (ACUTE) EXACERBATION: ICD-10-CM

## 2021-05-26 DIAGNOSIS — E03.9 HYPOTHYROIDISM, UNSPECIFIED: ICD-10-CM

## 2021-05-26 DIAGNOSIS — K56.7 ILEUS, UNSPECIFIED: ICD-10-CM

## 2021-05-26 DIAGNOSIS — J96.21 ACUTE AND CHRONIC RESPIRATORY FAILURE WITH HYPOXIA: ICD-10-CM

## 2021-05-26 DIAGNOSIS — I27.20 PULMONARY HYPERTENSION, UNSPECIFIED: ICD-10-CM

## 2021-05-26 DIAGNOSIS — I11.0 HYPERTENSIVE HEART DISEASE WITH HEART FAILURE: ICD-10-CM

## 2021-05-26 DIAGNOSIS — Z99.81 DEPENDENCE ON SUPPLEMENTAL OXYGEN: ICD-10-CM

## 2021-05-26 DIAGNOSIS — Z79.01 LONG TERM (CURRENT) USE OF ANTICOAGULANTS: ICD-10-CM

## 2021-05-26 DIAGNOSIS — I50.33 ACUTE ON CHRONIC DIASTOLIC (CONGESTIVE) HEART FAILURE: ICD-10-CM

## 2021-05-27 ENCOUNTER — INPATIENT (INPATIENT)
Facility: HOSPITAL | Age: 86
LOS: 3 days | Discharge: HOME | End: 2021-05-31
Attending: INTERNAL MEDICINE | Admitting: INTERNAL MEDICINE
Payer: MEDICARE

## 2021-05-27 VITALS — HEIGHT: 60 IN | WEIGHT: 160.06 LBS | RESPIRATION RATE: 30 BRPM | OXYGEN SATURATION: 86 % | TEMPERATURE: 97 F

## 2021-05-27 LAB
ALBUMIN SERPL ELPH-MCNC: 3 G/DL — LOW (ref 3.3–5)
ALP SERPL-CCNC: 66 U/L — SIGNIFICANT CHANGE UP (ref 40–120)
ALT FLD-CCNC: 16 U/L — SIGNIFICANT CHANGE UP (ref 12–78)
ANION GAP SERPL CALC-SCNC: 2 MMOL/L — LOW (ref 5–17)
APPEARANCE UR: CLEAR — SIGNIFICANT CHANGE UP
APTT BLD: 34.4 SEC — SIGNIFICANT CHANGE UP (ref 27.5–35.5)
AST SERPL-CCNC: 16 U/L — SIGNIFICANT CHANGE UP (ref 15–37)
BACTERIA # UR AUTO: ABNORMAL
BASE EXCESS BLDA CALC-SCNC: 6 MMOL/L — HIGH (ref -2–2)
BASOPHILS # BLD AUTO: 0.07 K/UL — SIGNIFICANT CHANGE UP (ref 0–0.2)
BASOPHILS NFR BLD AUTO: 0.8 % — SIGNIFICANT CHANGE UP (ref 0–2)
BILIRUB SERPL-MCNC: 0.4 MG/DL — SIGNIFICANT CHANGE UP (ref 0.2–1.2)
BILIRUB UR-MCNC: NEGATIVE — SIGNIFICANT CHANGE UP
BLOOD GAS COMMENTS: SIGNIFICANT CHANGE UP
BLOOD GAS COMMENTS: SIGNIFICANT CHANGE UP
BLOOD GAS SOURCE: SIGNIFICANT CHANGE UP
BUN SERPL-MCNC: 38 MG/DL — HIGH (ref 7–23)
CALCIUM SERPL-MCNC: 8.5 MG/DL — SIGNIFICANT CHANGE UP (ref 8.5–10.1)
CHLORIDE SERPL-SCNC: 101 MMOL/L — SIGNIFICANT CHANGE UP (ref 96–108)
CO2 SERPL-SCNC: 34 MMOL/L — HIGH (ref 22–31)
COLOR SPEC: YELLOW — SIGNIFICANT CHANGE UP
CREAT SERPL-MCNC: 1.2 MG/DL — SIGNIFICANT CHANGE UP (ref 0.5–1.3)
DIFF PNL FLD: NEGATIVE — SIGNIFICANT CHANGE UP
EOSINOPHIL # BLD AUTO: 0.24 K/UL — SIGNIFICANT CHANGE UP (ref 0–0.5)
EOSINOPHIL NFR BLD AUTO: 2.8 % — SIGNIFICANT CHANGE UP (ref 0–6)
EPI CELLS # UR: ABNORMAL
FLUAV AG NPH QL: SIGNIFICANT CHANGE UP
FLUBV AG NPH QL: SIGNIFICANT CHANGE UP
GLUCOSE SERPL-MCNC: 131 MG/DL — HIGH (ref 70–99)
GLUCOSE UR QL: NEGATIVE MG/DL — SIGNIFICANT CHANGE UP
HCO3 BLDA-SCNC: 32 MMOL/L — HIGH (ref 21–29)
HCT VFR BLD CALC: 28.2 % — LOW (ref 34.5–45)
HGB BLD-MCNC: 9.2 G/DL — LOW (ref 11.5–15.5)
HOROWITZ INDEX BLDA+IHG-RTO: SIGNIFICANT CHANGE UP
IMM GRANULOCYTES NFR BLD AUTO: 0.3 % — SIGNIFICANT CHANGE UP (ref 0–1.5)
INR BLD: 1.99 RATIO — HIGH (ref 0.88–1.16)
KETONES UR-MCNC: NEGATIVE — SIGNIFICANT CHANGE UP
LACTATE SERPL-SCNC: 1 MMOL/L — SIGNIFICANT CHANGE UP (ref 0.7–2)
LEUKOCYTE ESTERASE UR-ACNC: NEGATIVE — SIGNIFICANT CHANGE UP
LIDOCAIN IGE QN: 172 U/L — SIGNIFICANT CHANGE UP (ref 73–393)
LYMPHOCYTES # BLD AUTO: 1.64 K/UL — SIGNIFICANT CHANGE UP (ref 1–3.3)
LYMPHOCYTES # BLD AUTO: 18.8 % — SIGNIFICANT CHANGE UP (ref 13–44)
MAGNESIUM SERPL-MCNC: 2.3 MG/DL — SIGNIFICANT CHANGE UP (ref 1.6–2.6)
MANUAL SMEAR VERIFICATION: SIGNIFICANT CHANGE UP
MCHC RBC-ENTMCNC: 32.6 GM/DL — SIGNIFICANT CHANGE UP (ref 32–36)
MCHC RBC-ENTMCNC: 34.5 PG — HIGH (ref 27–34)
MCV RBC AUTO: 105.6 FL — HIGH (ref 80–100)
MONOCYTES # BLD AUTO: 0.81 K/UL — SIGNIFICANT CHANGE UP (ref 0–0.9)
MONOCYTES NFR BLD AUTO: 9.3 % — SIGNIFICANT CHANGE UP (ref 2–14)
NEUTROPHILS # BLD AUTO: 5.93 K/UL — SIGNIFICANT CHANGE UP (ref 1.8–7.4)
NEUTROPHILS NFR BLD AUTO: 68 % — SIGNIFICANT CHANGE UP (ref 43–77)
NITRITE UR-MCNC: NEGATIVE — SIGNIFICANT CHANGE UP
NRBC # BLD: 0 /100 WBCS — SIGNIFICANT CHANGE UP (ref 0–0)
NT-PROBNP SERPL-SCNC: 2481 PG/ML — HIGH (ref 0–450)
PCO2 BLDA: 57 MMHG — HIGH (ref 32–46)
PH BLD: 7.37 — SIGNIFICANT CHANGE UP (ref 7.35–7.45)
PH UR: 5 — SIGNIFICANT CHANGE UP (ref 5–8)
PLAT MORPH BLD: NORMAL — SIGNIFICANT CHANGE UP
PLATELET # BLD AUTO: 155 K/UL — SIGNIFICANT CHANGE UP (ref 150–400)
PO2 BLDA: 378 MMHG — HIGH (ref 74–108)
POTASSIUM SERPL-MCNC: 4.6 MMOL/L — SIGNIFICANT CHANGE UP (ref 3.5–5.3)
POTASSIUM SERPL-SCNC: 4.6 MMOL/L — SIGNIFICANT CHANGE UP (ref 3.5–5.3)
PROT SERPL-MCNC: 7.3 GM/DL — SIGNIFICANT CHANGE UP (ref 6–8.3)
PROT UR-MCNC: 30 MG/DL
PROTHROM AB SERPL-ACNC: 22.3 SEC — HIGH (ref 10.6–13.6)
RBC # BLD: 2.67 M/UL — LOW (ref 3.8–5.2)
RBC # FLD: 14.8 % — HIGH (ref 10.3–14.5)
RBC BLD AUTO: NORMAL — SIGNIFICANT CHANGE UP
SAO2 % BLDA: 100 % — HIGH (ref 92–96)
SARS-COV-2 RNA SPEC QL NAA+PROBE: SIGNIFICANT CHANGE UP
SODIUM SERPL-SCNC: 137 MMOL/L — SIGNIFICANT CHANGE UP (ref 135–145)
SP GR SPEC: 1.01 — SIGNIFICANT CHANGE UP (ref 1.01–1.02)
TROPONIN I SERPL-MCNC: 0.03 NG/ML — SIGNIFICANT CHANGE UP (ref 0.01–0.04)
UROBILINOGEN FLD QL: NEGATIVE MG/DL — SIGNIFICANT CHANGE UP
WBC # BLD: 8.72 K/UL — SIGNIFICANT CHANGE UP (ref 3.8–10.5)
WBC # FLD AUTO: 8.72 K/UL — SIGNIFICANT CHANGE UP (ref 3.8–10.5)
WBC UR QL: SIGNIFICANT CHANGE UP

## 2021-05-27 PROCEDURE — 99223 1ST HOSP IP/OBS HIGH 75: CPT

## 2021-05-27 PROCEDURE — 99285 EMERGENCY DEPT VISIT HI MDM: CPT | Mod: CS

## 2021-05-27 PROCEDURE — 93010 ELECTROCARDIOGRAM REPORT: CPT

## 2021-05-27 PROCEDURE — 71045 X-RAY EXAM CHEST 1 VIEW: CPT | Mod: 26

## 2021-05-27 RX ORDER — WARFARIN SODIUM 2.5 MG/1
3 TABLET ORAL ONCE
Refills: 0 | Status: COMPLETED | OUTPATIENT
Start: 2021-05-27 | End: 2021-05-27

## 2021-05-27 RX ORDER — PIPERACILLIN AND TAZOBACTAM 4; .5 G/20ML; G/20ML
3.38 INJECTION, POWDER, LYOPHILIZED, FOR SOLUTION INTRAVENOUS EVERY 8 HOURS
Refills: 0 | Status: DISCONTINUED | OUTPATIENT
Start: 2021-05-27 | End: 2021-05-31

## 2021-05-27 RX ORDER — PANTOPRAZOLE SODIUM 20 MG/1
40 TABLET, DELAYED RELEASE ORAL
Refills: 0 | Status: DISCONTINUED | OUTPATIENT
Start: 2021-05-27 | End: 2021-05-31

## 2021-05-27 RX ORDER — LEVOTHYROXINE SODIUM 125 MCG
100 TABLET ORAL DAILY
Refills: 0 | Status: DISCONTINUED | OUTPATIENT
Start: 2021-05-27 | End: 2021-05-31

## 2021-05-27 RX ORDER — FUROSEMIDE 40 MG
40 TABLET ORAL ONCE
Refills: 0 | Status: DISCONTINUED | OUTPATIENT
Start: 2021-05-27 | End: 2021-05-27

## 2021-05-27 RX ORDER — PIPERACILLIN AND TAZOBACTAM 4; .5 G/20ML; G/20ML
3.38 INJECTION, POWDER, LYOPHILIZED, FOR SOLUTION INTRAVENOUS ONCE
Refills: 0 | Status: COMPLETED | OUTPATIENT
Start: 2021-05-27 | End: 2021-05-27

## 2021-05-27 RX ORDER — ALPRAZOLAM 0.25 MG
0.25 TABLET ORAL
Refills: 0 | Status: DISCONTINUED | OUTPATIENT
Start: 2021-05-27 | End: 2021-05-28

## 2021-05-27 RX ORDER — ASPIRIN/CALCIUM CARB/MAGNESIUM 324 MG
162 TABLET ORAL ONCE
Refills: 0 | Status: COMPLETED | OUTPATIENT
Start: 2021-05-27 | End: 2021-05-27

## 2021-05-27 RX ORDER — SODIUM CHLORIDE 9 MG/ML
1000 INJECTION, SOLUTION INTRAVENOUS
Refills: 0 | Status: DISCONTINUED | OUTPATIENT
Start: 2021-05-27 | End: 2021-05-27

## 2021-05-27 RX ORDER — DILTIAZEM HCL 120 MG
300 CAPSULE, EXT RELEASE 24 HR ORAL DAILY
Refills: 0 | Status: DISCONTINUED | OUTPATIENT
Start: 2021-05-27 | End: 2021-05-30

## 2021-05-27 RX ORDER — IPRATROPIUM/ALBUTEROL SULFATE 18-103MCG
3 AEROSOL WITH ADAPTER (GRAM) INHALATION EVERY 6 HOURS
Refills: 0 | Status: DISCONTINUED | OUTPATIENT
Start: 2021-05-27 | End: 2021-05-31

## 2021-05-27 RX ORDER — LOSARTAN POTASSIUM 100 MG/1
25 TABLET, FILM COATED ORAL DAILY
Refills: 0 | Status: DISCONTINUED | OUTPATIENT
Start: 2021-05-27 | End: 2021-05-27

## 2021-05-27 RX ADMIN — Medication 0.25 MILLIGRAM(S): at 22:50

## 2021-05-27 RX ADMIN — PIPERACILLIN AND TAZOBACTAM 200 GRAM(S): 4; .5 INJECTION, POWDER, LYOPHILIZED, FOR SOLUTION INTRAVENOUS at 20:01

## 2021-05-27 RX ADMIN — PIPERACILLIN AND TAZOBACTAM 25 GRAM(S): 4; .5 INJECTION, POWDER, LYOPHILIZED, FOR SOLUTION INTRAVENOUS at 22:50

## 2021-05-27 RX ADMIN — Medication 162 MILLIGRAM(S): at 14:48

## 2021-05-27 RX ADMIN — WARFARIN SODIUM 3 MILLIGRAM(S): 2.5 TABLET ORAL at 22:50

## 2021-05-27 RX ADMIN — Medication 20 MILLIGRAM(S): at 22:50

## 2021-05-27 RX ADMIN — Medication 100 MILLIGRAM(S): at 18:11

## 2021-05-27 NOTE — H&P ADULT - NSHPLABSRESULTS_GEN_ALL_CORE
LABS:                        9.2    8.72  )-----------( 155      ( 27 May 2021 15:16 )             28.2         137  |  101  |  38<H>  ----------------------------<  131<H>  4.6   |  34<H>  |  1.20    Ca    8.5      27 May 2021 15:16  Mg     2.3         TPro  7.3  /  Alb  3.0<L>  /  TBili  0.4  /  DBili  x   /  AST  16  /  ALT  16  /  AlkPhos  66      PT/INR - ( 27 May 2021 15:16 )   PT: 22.3 sec;   INR: 1.99 ratio         PTT - ( 27 May 2021 15:16 )  PTT:34.4 sec  Urinalysis Basic - ( 27 May 2021 17:25 )    Color: Yellow / Appearance: Clear / S.010 / pH: x  Gluc: x / Ketone: Negative  / Bili: Negative / Urobili: Negative mg/dL   Blood: x / Protein: 30 mg/dL / Nitrite: Negative   Leuk Esterase: Negative / RBC: x / WBC 0-2   Sq Epi: x / Non Sq Epi: Moderate / Bacteria: Few          RADIOLOGY & ADDITIONAL TESTS:

## 2021-05-27 NOTE — ED PROVIDER NOTE - PHYSICAL EXAMINATION
VITALS: reviewed  GEN: NAD, A & O x 3  HEAD/EYES: NCAT, EOMI, anicteric sclerae, no conjunctival pallor  ENT: mucus membranes moist, oropharynx WNL, trachea midline, no JVD  RESP: crackles bl, chest wall nontender and atraumatic  CV: heart with reg rhythm S1, S2, distal pulses intact and symmetric bilaterally  ABDOMEN: normoactive bowel sounds, soft, nondistended, nontender, no palpable masses  : no CVAT  MSK: extremities atraumatic and nontender, no edema, no asymmetry. the back is without midline or lateral tenderness, there is no spinal deformity or stepoff and the back is ranged painlessly. the neck has no midline tenderness, deformity, or stepoff, and is ranged painlessly.  SKIN: warm, dry, no rash, no bruising, no cyanosis. color appropriate for ethnicity  NEURO: alert, mentating appropriately, no facial asymmetry.   PSYCH: Affect appropriate

## 2021-05-27 NOTE — ED PROVIDER NOTE - CLINICAL SUMMARY MEDICAL DECISION MAKING FREE TEXT BOX
89 yo F presenting with sob and cough. r/o pna, no respiratory distress- does not require bipap at this time, abg does not show significant hypercapnea, O2 levels high- O2 weaned down. XR shows right sided infiltrate and effusion likely pna causing CHF Exacerbation. started on cipro. tba

## 2021-05-27 NOTE — H&P ADULT - ASSESSMENT
89 yo female PMH HTN, COPD on 3L NC at home, diastolic CHF, severe pulmonary HTN, hypothyroidism, Afib on coumadin, with multiple admissions for COPD and CHF (last discharged 05/19/21) presenting for cough andSOB. Patient has hard time understanding  at bedside (#[id number]) so discussed case with daughter who states her mother is in her right state of mind, no mental status changes, but has been coughing more when she wakes up in mornings. Also complains of sensation of not being able to swallow. No complaints of focal weakness/numbness/tingling. No drooling. 89 yo female PMH HTN, COPD on 3L NC at home, diastolic CHF, severe pulmonary HTN, hypothyroidism, Afib on coumadin, with multiple admissions for COPD and CHF (last discharged 05/19/21) presenting for cough andSOB. Patient has hard time understanding  at bedside (#[id number]) so discussed case with daughter who states her mother is in her right state of mind, no mental status changes, but has been coughing more when she wakes up in mornings. Also complains of sensation of not being able to swallow. No complaints of focal weakness/numbness/tingling. No drooling.    Pulmonary: SOB from possible aspiratioin pneumonia. Will start empiric Zosyn, renal function normal. Speech eval in AM, will try   Dysphagia I diet for now. CXR notes right effusion and infiltrate. CT chest non-contrast ordered for better definition. Continue Duonebs  every 6 hours and Sildenifel tid at home dose. On 3.0 LPM in ER and stable. Was on NRB initially now quickly down to 3.0 lpm.     Full DNR/DNI in effect, confirmed by ER attending.       CV: Continue Cardizem 300 mg/day and Coumadin 3 mg/day at home dose. INR at goal 1.99.     89 yo female PMH HTN, COPD on 3L NC at home, diastolic CHF, severe pulmonary HTN, hypothyroidism, Afib on coumadin, with multiple admissions for COPD and CHF (last discharged 05/19/21) presenting for cough andSOB. Patient has hard time understanding  at bedside (#[id number]) so discussed case with daughter who states her mother is in her right state of mind, no mental status changes, but has been coughing more when she wakes up in mornings. Also complains of sensation of not being able to swallow. No complaints of focal weakness/numbness/tingling. No drooling.    Pulmonary: SOB from possible aspiratioin pneumonia. Will start empiric Zosyn, renal function normal. Speech eval in AM, will try   Dysphagia I diet for now. CXR notes right effusion and infiltrate. CT chest non-contrast ordered for better definition. Continue Duonebs  every 6 hours and Sildenifel tid at home dose. On 3.0 LPM in ER and stable. Was on NRB initially now quickly down to 3.0 lpm.     Full DNR/DNI in effect, confirmed by ER attending.       CV: Continue Cardizem 300 mg/day and Coumadin 3 mg/day at home dose for chronic A.fib.INR at goal 1.99 on arrival.

## 2021-05-27 NOTE — ED ADULT NURSE NOTE - ED STAT RN HANDOFF DETAILS
Report endorsed to oncoming Sarah NICHOLS. Safety checks compld this shift/Safety rounds completed hourly.  IV sites checked Q2+remains WDL. Meds given as ord with no s/s of adverse RXNs. Fall +skin precs in place. Any issues endorsed to oncoming RN for follow up.

## 2021-05-27 NOTE — H&P ADULT - NSHPPHYSICALEXAM_GEN_ALL_CORE
PHYSICAL EXAMINATION:  Vital Signs Last 24 Hrs  T(C): 36.7 (27 May 2021 18:12), Max: 37.2 (27 May 2021 14:32)  T(F): 98 (27 May 2021 18:12), Max: 98.9 (27 May 2021 14:32)  HR: 57 (27 May 2021 18:12) (54 - 90)  BP: 114/49 (27 May 2021 18:12) (97/28 - 145/86)  BP(mean): --  RR: 29 (27 May 2021 18:12) (21 - 30)  SpO2: 99% (27 May 2021 18:12) (86% - 100%)  CAPILLARY BLOOD GLUCOSE          GENERAL: NAD, well-groomed, well-developed  HEAD:  atraumatic, normocephalic  EYES: sclera anicteric  ENMT: mucous membranes moist  NECK: supple, No JVD  CHEST/LUNG: clear to auscultation bilaterally; no rales, rhonchi, or wheezing b/l  HEART: normal S1, S2  ABDOMEN: BS+, soft, ND, NT   EXTREMITIES:  pulses palpable; no clubbing, cyanosis, or edema b/l LEs  NEURO: awake, alert, interactive; moves all extremities  SKIN: no rashes or lesions PHYSICAL EXAMINATION:  Vital Signs Last 24 Hrs  T(C): 36.7 (27 May 2021 18:12), Max: 37.2 (27 May 2021 14:32)  T(F): 98 (27 May 2021 18:12), Max: 98.9 (27 May 2021 14:32)  HR: 57 (27 May 2021 18:12) (54 - 90)  BP: 114/49 (27 May 2021 18:12) (97/28 - 145/86)  BP(mean): --  RR: 29 (27 May 2021 18:12) (21 - 30)  SpO2: 99% (27 May 2021 18:12) (86% - 100%)  CAPILLARY BLOOD GLUCOSE          GENERAL: NAD, seen in ER, comfortable on NC, no wheeze or SOB at rest  HEAD:  atraumatic, normocephalic  EYES: sclera anicteric  ENMT: mucous membranes moist  NECK: supple, No JVD  CHEST/LUNG: clear to auscultation bilaterally; no rales, rhonchi, or wheezing b/l  HEART: normal S1, S2  ABDOMEN: BS+, soft, ND, NT   EXTREMITIES:  pulses palpable; no clubbing, cyanosis, or edema b/l LEs  NEURO: awake, alert, interactive; moves all extremities  SKIN: no rashes or lesions

## 2021-05-27 NOTE — ED ADULT NURSE NOTE - CHIEF COMPLAINT
Problem: Mobility Impaired (Adult and Pediatric) Goal: *Acute Goals and Plan of Care (Insert Text) Physical Therapy Goals Initiated 11/13/2018 1. Patient will move from supine to sit and sit to supine  in bed with modified independence within 7 day(s). 2.  Patient will transfer from bed to chair and chair to bed with modified independence using the least restrictive device within 7 day(s). 3.  Patient will perform sit to stand with modified independence within 7 day(s). 4.  Patient will ambulate with modified independence for 200 feet with the least restrictive device within 7 day(s). 5.  Patient will ascend/descend 2 stairs with 1 handrail(s) with modified independence within 7 day(s). physical Therapy EVALUATION Patient: Ascencion Wolfe (57 y.o. female) Date: 11/13/2018 Primary Diagnosis: Acute on chronic systolic CHF (congestive heart failure) ([facility] [place] [address] Hypokalemia Procedure(s) (LRB): OPEN VENTRAL HERNIA REPAIR (N/A) Precautions:   Fall ASSESSMENT : 
Based on the objective data described below, the patient presents with decreased functional mobility from baseline level of function. Prior to admit patient was independent with mobility using a SPC. Lives with  in a 1 level home with approx 2 SHAQ with rail. Patient currently needing supervision with bed mobility and transfers. Amb approx 20 feet with WBOS, decreased step clearance and slow amarilis but without any overt LOB. Will plan to see patient 1-2 more visits to ensure moving well. Patient does not want to any  PT at this time and anticipate that she is not far from her baseline. Patient will benefit from skilled intervention to address the above impairments. Patients rehabilitation potential is considered to be Good Factors which may influence rehabilitation potential include:  
[]         None noted 
[]         Mental ability/status [x]         Medical condition 
[]         Home/family situation and support systems []         Safety awareness 
[]         Pain tolerance/management 
[]         Other: PLAN : 
Recommendations and Planned Interventions: 
[x]           Bed Mobility Training             [x]    Neuromuscular Re-Education 
[x]           Transfer Training                   []    Orthotic/Prosthetic Training 
[x]           Gait Training                         []    Modalities [x]           Therapeutic Exercises           []    Edema Management/Control 
[x]           Therapeutic Activities            [x]    Patient and Family Training/Education 
[]           Other (comment): Frequency/Duration: Patient will be followed by physical therapy  3 times a week to address goals. Discharge Recommendations: None Further Equipment Recommendations for Discharge: none SUBJECTIVE:  
Patient stated I'm just feeling a little groggy from my test. OBJECTIVE DATA SUMMARY:  
HISTORY:   
Past Medical History:  
Diagnosis Date  Anxiety 1/22/2018  Arthritis OA  Asthma  Diabetes ([facility] [address]  Essential hypertension  GERD (gastroesophageal reflux disease)  Hypercholesterolemia 1/22/2018  Hypertension  Long-term use of high-risk medication 1/22/2018  Neuropathy  Other ill-defined conditions(799.89) IBS, spinal stenosis  Plantar fasciitis  Psychiatric disorder   
 depression, anxiety  Sleep apnea   
 uses CPAP  Type 2 diabetes mellitus with diabetic neuropathy, without long-term current use of insulin ([facility] [address] 6/5/2016 Past Surgical History:  
Procedure Laterality Date  CARDIAC SURG PROCEDURE UNLIST    
 stent  HX APPENDECTOMY  HX HYSTERECTOMY  HX PACEMAKER Prior Level of Function/Home Situation: independent with mobility at baseline using a SPC Personal factors and/or comorbidities impacting plan of care:  
 
Home Situation Home Environment: Private residence # Steps to Enter: 2 Rails to Enter: Yes One/Two Story Residence: One story Living Alone: No 
Support Systems: Family member(s) Patient Expects to be Discharged to[de-identified] Private residence Current DME Used/Available at Home: [address], [place], straight, Commode, bedside, Shower chair, Walker, rollator, Walker, rolling Tub or Shower Type: Tub/Shower combination EXAMINATION/PRESENTATION/DECISION MAKING: Critical Behavior: 
Neurologic State: Alert Orientation Level: Oriented X4 Cognition: Appropriate decision making, Appropriate for age attention/concentration, Appropriate safety awareness, Follows commands Safety/Judgement: Awareness of environment, Insight into deficits Hearing: Auditory Auditory Impairment: None Range Of Motion: 
AROM: Within functional limits Strength:   
Strength: Within functional limits Tone & Sensation:  
Tone: Normal 
  
  
  
  
Sensation: Impaired Coordination: 
Coordination: Within functional limits Vision:  
Acuity: Within Defined Limits Functional Mobility: 
Bed Mobility: 
Rolling: Independent Supine to Sit: Independent Scooting: Independent Transfers: 
Sit to Stand: Independent Stand to Sit: Independent Bed to Chair: Modified independent(ambulating with a Cane) Balance:  
Sitting: Intact Standing: IntactAmbulation/Gait Training:Distance (ft): 20 Feet (ft) Assistive Device: Cane, straight;Gait belt Ambulation - Level of Assistance: Contact guard assistance Gait Description (WDL): Exceptions to [facility] Gait Abnormalities: Decreased step clearance;Shuffling gait Base of Support: Widened Speed/Brielle: Pace decreased (<100 feet/min); Shuffled; Slow Step Length: Left shortened;Right shortened Functional Measure: 
Barthel Index: 
 
Bathin Bladder: 10 Bowels: 10 
Groomin Dressing: 10 Feeding: 10 Mobility: 10 Stairs: 5 Toilet Use: 10 Transfer (Bed to Chair and Back): 15 Total: 90 Barthel and G-code impairment scale: 
Percentage of impairment CH 
0% CI 
 1-19% CJ 
20-39% CK 
40-59% CL 
60-79% CM 
80-99% CN 
100% Barthel Score 0-100 100 99-80 79-60 59-40 20-39 1-19 
 0 Barthel Score 0-20 20 17-19 13-16 9-12 5-8 1-4 0 The Barthel ADL Index: Guidelines 1. The index should be used as a record of what a patient does, not as a record of what a patient could do. 2. The main aim is to establish degree of independence from any help, physical or verbal, however minor and for whatever reason. 3. The need for supervision renders the patient not independent. 4. A patient's performance should be established using the best available evidence. Asking the patient, friends/relatives and nurses are the usual sources, but direct observation and common sense are also important. However direct testing is not needed. 5. Usually the patient's performance over the preceding 24-48 hours is important, but occasionally longer periods will be relevant. 6. Middle categories imply that the patient supplies over 50 per cent of the effort. 7. Use of aids to be independent is allowed. Michela Ly., Barthel, D.W. (1958). Functional evaluation: the Barthel Index. [facility] (14)2. Renetta Kirkpatrick femi David, ENRIQUE, Fransisco Kamara.Willy., Jennifer, [address] [place] [address] (1999). Measuring the change indisability after inpatient rehabilitation; comparison of the responsiveness of the Barthel Index and Functional [place] Measure. Journal of Neurology, Neurosurgery, and Psychiatry, 66(4), 380-023. [place], [place].LEANDRO, PAXTON IzaguirreJ.PRISCA, & Balaji Vazquez MGERRY. (2004.) Assessment of post-stroke quality of life in cost-effectiveness studies: The usefulness of the Barthel Index and the EuroQoL-5D. [facility], 66, 291-80 G codes: In compliance with CMSs Claims Based Outcome Reporting, the following G-code set was chosen for this patient based on their primary functional limitation being treated:  
 
The outcome measure chosen to determine the severity of the functional limitation was the Barthel with a score of 90/100 which was correlated with the impairment scale. ? Mobility - Walking and Moving Around:  
  - CURRENT STATUS: CI - 1%-19% impaired, limited or restricted  - GOAL STATUS: CH - 0% impaired, limited or restricted  - D/C STATUS:  ---------------To be determined---------------  
 
Pain: 
Pain Scale 1: Numeric (0 - 10) Pain Intensity 1: 0 Pain Location 1: Head 
  
Pain Description 1: Aching Activity Tolerance: VSS Please refer to the flowsheet for vital signs taken during this treatment. After treatment:  
[x]         Patient left in no apparent distress sitting up in chair 
[]         Patient left in no apparent distress in bed 
[x]         Call bell left within reach [x]         Nursing notified 
[]         Caregiver present [x]         Chair alarm activated COMMUNICATION/EDUCATION:  
The patients plan of care was discussed with: Physical Therapist, Occupational Therapist and Registered Nurse. [x]         Fall prevention education was provided and the patient/caregiver indicated understanding. [x]         Patient/family have participated as able in goal setting and plan of care. [x]         Patient/family agree to work toward stated goals and plan of care. []         Patient understands intent and goals of therapy, but is neutral about his/her participation. []         Patient is unable to participate in goal setting and plan of care. Thank you for this referral. 
Ester Hale, PT, DPT Time Calculation: 20 mins The patient is a 89y Female complaining of shortness of breath.

## 2021-05-27 NOTE — ED PROVIDER NOTE - CARE PLAN
Principal Discharge DX:	Shortness of breath  Secondary Diagnosis:	Pneumonia  Secondary Diagnosis:	CHF (congestive heart failure)

## 2021-05-27 NOTE — ED PROVIDER NOTE - OBJECTIVE STATEMENT
89 yo F hx HTN, COPD on 3L NC at home, diastolic CHF, severe pulmonary HTN, hypothyroidism, Afib on coumadin, with multiple admissions for COPD and CHF (last discharged 05/19/21) presenting for cough and sOB. Patient has hard time understanding  at bedside (#[id number]) so discussed case with daughter who states her mother is in her right state of mind, no mental status changes, but has been coughing more when she wakes up in mornings. Also complains of sensation of not being able to swallow. No complaints of focal weakness/numbness/tingling. No drooling.

## 2021-05-27 NOTE — ED ADULT NURSE NOTE - OBJECTIVE STATEMENT
PMH HTN, COPD, hypothyroid, afib and CHF PMH HTN, COPD, hypothyroid, afib and CHF. Pt c/o cough and SOB, placed on NRB in field w/ O2 sat 100%. No drooling noted. Denies chest pain, headaches, fevers and LOC. Multiple COPD admissions in past.

## 2021-05-27 NOTE — H&P ADULT - HISTORY OF PRESENT ILLNESS
89 yo female PMH HTN, COPD on 3L NC at home, diastolic CHF, severe pulmonary HTN, hypothyroidism, Afib on coumadin, with multiple admissions for COPD and CHF (last discharged 05/19/21) presenting for cough andSOB. Patient has hard time understanding  at bedside (#[id number]) so discussed case with daughter who states her mother is in her right state of mind, no mental status changes, but has been coughing more when she wakes up in mornings. Also complains of sensation of not being able to swallow. No complaints of focal weakness/numbness/tingling. No drooling.

## 2021-05-27 NOTE — ED CLERICAL - NS ED CLERK NOTE PRE-ARRIVAL INFORMATION; ADDITIONAL PRE-ARRIVAL INFORMATION
This patient is enrolled in the STARS readmission reduction initiative and has active care navigation. This patient can be followed up by the care navigation team within 24 hours.  To arrange close follow-up or to obtain additional clinical information, please call the care navigation contact number above.  Please page the Admitting Hospitalist as listed or the Covering Hospitalist at [phone] for input and/or consultation PRIOR to admission.

## 2021-05-28 LAB
ANION GAP SERPL CALC-SCNC: 3 MMOL/L — LOW (ref 5–17)
APTT BLD: 34.8 SEC — SIGNIFICANT CHANGE UP (ref 27.5–35.5)
BUN SERPL-MCNC: 36 MG/DL — HIGH (ref 7–23)
CALCIUM SERPL-MCNC: 8.9 MG/DL — SIGNIFICANT CHANGE UP (ref 8.5–10.1)
CHLORIDE SERPL-SCNC: 103 MMOL/L — SIGNIFICANT CHANGE UP (ref 96–108)
CO2 SERPL-SCNC: 34 MMOL/L — HIGH (ref 22–31)
COVID-19 SPIKE DOMAIN AB INTERP: NEGATIVE — SIGNIFICANT CHANGE UP
COVID-19 SPIKE DOMAIN ANTIBODY RESULT: 0.4 U/ML — SIGNIFICANT CHANGE UP
CREAT SERPL-MCNC: 1.24 MG/DL — SIGNIFICANT CHANGE UP (ref 0.5–1.3)
CULTURE RESULTS: SIGNIFICANT CHANGE UP
GLUCOSE SERPL-MCNC: 103 MG/DL — HIGH (ref 70–99)
HCT VFR BLD CALC: 27.6 % — LOW (ref 34.5–45)
HGB BLD-MCNC: 8.8 G/DL — LOW (ref 11.5–15.5)
INR BLD: 2.34 RATIO — HIGH (ref 0.88–1.16)
MCHC RBC-ENTMCNC: 31.9 GM/DL — LOW (ref 32–36)
MCHC RBC-ENTMCNC: 34.1 PG — HIGH (ref 27–34)
MCV RBC AUTO: 107 FL — HIGH (ref 80–100)
NRBC # BLD: 0 /100 WBCS — SIGNIFICANT CHANGE UP (ref 0–0)
PLATELET # BLD AUTO: 153 K/UL — SIGNIFICANT CHANGE UP (ref 150–400)
POTASSIUM SERPL-MCNC: 4.1 MMOL/L — SIGNIFICANT CHANGE UP (ref 3.5–5.3)
POTASSIUM SERPL-SCNC: 4.1 MMOL/L — SIGNIFICANT CHANGE UP (ref 3.5–5.3)
PROCALCITONIN SERPL-MCNC: 0.15 NG/ML — HIGH (ref 0.02–0.1)
PROTHROM AB SERPL-ACNC: 26.1 SEC — HIGH (ref 10.6–13.6)
RBC # BLD: 2.58 M/UL — LOW (ref 3.8–5.2)
RBC # FLD: 14.9 % — HIGH (ref 10.3–14.5)
SARS-COV-2 IGG+IGM SERPL QL IA: 0.4 U/ML — SIGNIFICANT CHANGE UP
SARS-COV-2 IGG+IGM SERPL QL IA: NEGATIVE — SIGNIFICANT CHANGE UP
SODIUM SERPL-SCNC: 140 MMOL/L — SIGNIFICANT CHANGE UP (ref 135–145)
SPECIMEN SOURCE: SIGNIFICANT CHANGE UP
TSH SERPL-MCNC: 3.46 UU/ML — SIGNIFICANT CHANGE UP (ref 0.36–3.74)
WBC # BLD: 9.95 K/UL — SIGNIFICANT CHANGE UP (ref 3.8–10.5)
WBC # FLD AUTO: 9.95 K/UL — SIGNIFICANT CHANGE UP (ref 3.8–10.5)

## 2021-05-28 PROCEDURE — 71250 CT THORAX DX C-: CPT | Mod: 26

## 2021-05-28 PROCEDURE — 99223 1ST HOSP IP/OBS HIGH 75: CPT

## 2021-05-28 PROCEDURE — 99497 ADVNCD CARE PLAN 30 MIN: CPT

## 2021-05-28 PROCEDURE — 99233 SBSQ HOSP IP/OBS HIGH 50: CPT

## 2021-05-28 RX ORDER — WARFARIN SODIUM 2.5 MG/1
4 TABLET ORAL ONCE
Refills: 0 | Status: COMPLETED | OUTPATIENT
Start: 2021-05-28 | End: 2021-05-28

## 2021-05-28 RX ORDER — NYSTATIN CREAM 100000 [USP'U]/G
1 CREAM TOPICAL EVERY 12 HOURS
Refills: 0 | Status: DISCONTINUED | OUTPATIENT
Start: 2021-05-28 | End: 2021-05-31

## 2021-05-28 RX ORDER — FUROSEMIDE 40 MG
40 TABLET ORAL EVERY 12 HOURS
Refills: 0 | Status: DISCONTINUED | OUTPATIENT
Start: 2021-05-28 | End: 2021-05-31

## 2021-05-28 RX ORDER — CLONAZEPAM 1 MG
0.5 TABLET ORAL
Refills: 0 | Status: DISCONTINUED | OUTPATIENT
Start: 2021-05-28 | End: 2021-05-31

## 2021-05-28 RX ORDER — IPRATROPIUM/ALBUTEROL SULFATE 18-103MCG
3 AEROSOL WITH ADAPTER (GRAM) INHALATION ONCE
Refills: 0 | Status: COMPLETED | OUTPATIENT
Start: 2021-05-28 | End: 2021-05-28

## 2021-05-28 RX ADMIN — PIPERACILLIN AND TAZOBACTAM 25 GRAM(S): 4; .5 INJECTION, POWDER, LYOPHILIZED, FOR SOLUTION INTRAVENOUS at 13:17

## 2021-05-28 RX ADMIN — Medication 40 MILLIGRAM(S): at 17:12

## 2021-05-28 RX ADMIN — Medication 3 MILLILITER(S): at 17:02

## 2021-05-28 RX ADMIN — Medication 3 MILLILITER(S): at 01:21

## 2021-05-28 RX ADMIN — Medication 20 MILLIGRAM(S): at 22:00

## 2021-05-28 RX ADMIN — PIPERACILLIN AND TAZOBACTAM 25 GRAM(S): 4; .5 INJECTION, POWDER, LYOPHILIZED, FOR SOLUTION INTRAVENOUS at 05:54

## 2021-05-28 RX ADMIN — Medication 3 MILLILITER(S): at 09:22

## 2021-05-28 RX ADMIN — Medication 300 MILLIGRAM(S): at 05:54

## 2021-05-28 RX ADMIN — Medication 20 MILLIGRAM(S): at 05:54

## 2021-05-28 RX ADMIN — WARFARIN SODIUM 4 MILLIGRAM(S): 2.5 TABLET ORAL at 22:00

## 2021-05-28 RX ADMIN — Medication 3 MILLILITER(S): at 11:14

## 2021-05-28 RX ADMIN — Medication 0.25 MILLIGRAM(S): at 10:05

## 2021-05-28 RX ADMIN — Medication 20 MILLIGRAM(S): at 13:18

## 2021-05-28 RX ADMIN — PANTOPRAZOLE SODIUM 40 MILLIGRAM(S): 20 TABLET, DELAYED RELEASE ORAL at 05:55

## 2021-05-28 RX ADMIN — PIPERACILLIN AND TAZOBACTAM 25 GRAM(S): 4; .5 INJECTION, POWDER, LYOPHILIZED, FOR SOLUTION INTRAVENOUS at 22:00

## 2021-05-28 RX ADMIN — Medication 3 MILLILITER(S): at 05:14

## 2021-05-28 RX ADMIN — Medication 100 MICROGRAM(S): at 05:54

## 2021-05-28 NOTE — CONSULT NOTE ADULT - SUBJECTIVE AND OBJECTIVE BOX
CHIEF COMPLAINT:  Patient is a 89y old  Female who presents with a chief complaint of SOB possible aspiration pneumonia (28 May 2021 13:04)      HPI:  89 yo female PMH HTN, COPD on 3L NC at home, diastolic CHF, severe pulmonary HTN, hypothyroidism, Afib on coumadin, with multiple admissions for COPD and CHF (last discharged 21) presenting for cough andSOB. Patient has hard time understanding  at bedside (#[id number]) so discussed case with daughter who states her mother is in her right state of mind, no mental status changes, but has been coughing more when she wakes up in mornings. Also complains of sensation of not being able to swallow. No complaints of focal weakness/numbness/tingling.     ALLERGIES:  No Known Allergies    Home Medications:  ALPRAZolam 0.25 mg oral tablet: 1 tab(s) orally 2 times a day, As Needed (27 May 2021 19:06)  DilTIAZem Hydrochloride  mg/24 hours oral capsule, extended release: 1 cap(s) orally once a day (27 May 2021 19:06)  Dulcolax Stool Softener 100 mg oral capsule: 1 cap(s) orally 2 times a day as needed constipation (19 May 2021 13:28)  furosemide 40 mg oral tablet: 1 tab(s) orally once a day (19 Oct 2020 12:47)  ipratropium-albuterol 0.5 mg-2.5 mg/3 mLinhalation solution: 3 milliliter(s) inhaled every 6 hours, As Needed (19 Oct 2020 12:47)  Metamucil 3.4 g/3.7 g oral powder for reconstitution: 3.4 gram(s) orally once a day with 12oz H20.  (19 May 2021 13:28)  MiraLax oral powder for reconstitution: 17 gram(s) orally once a day (19 May 2021 13:28)  Multiple Vitamins oral tablet: 1 tab(s) orally once a day (19 Oct 2020 12:47)  pantoprazole 40 mg oral delayed release tablet: 1 tab(s) orally once a day (before a meal) (19 Oct 2020 12:47)  potassium chloride 10 mEq oral tablet, extended release: 1 tab(s) orally once a day (19 Oct 2020 12:47)  sildenafil 20 mg oral tablet: 1 tab(s) orally every 8 hours (19 Oct 2020 12:47)  Synthroid 100 mcg (0.1 mg) oral tablet: 1 tab(s) orally once a day (19 Oct 2020 12:47)  Vitamin D2 50,000 intl units (1.25 mg) oral capsule: 1 cap(s) orally once a week (19 Oct 2020 12:47)    PAST MEDICAL & SURGICAL HISTORY:  COPD exacerbation    Atrial fibrillation    Hypothyroidism    Essential hypertension    No significant past surgical history          FAMILY HISTORY:      SOCIAL HISTORY:    REVIEW OF SYSTEMS:  General:  No wt loss, fevers, chills, night sweats  Eyes:  Good vision, no reported pain  ENT:  No sore throat, pain, runny nose, dysphagia  CV:  No pain, palpitations, hypo/hypertension  Resp:  No dyspnea, cough, tachypnea, wheezing  GI:  No pain, nausea, vomiting, diarrhea, constipation  :  No pain, bleeding, incontinence, nocturia  Muscle:  No pain, weakness  Breast:  No pain, abscess, mass, discharge  Neuro:  No weakness, tingling, memory problems  Psych:  No fatigue, insomnia, mood problems, depression  Endocrine:  No polyuria, polydipsia, cold/heat intolerance  Heme:  No petechiae, ecchymosis, easy bruisability  Skin:  No rash, tattoos, scars, edema    PHYSICAL EXAM:  Vital Signs:  Vital Signs Last 24 Hrs  T(C): 36.1 (28 May 2021 16:14), Max: 36.7 (27 May 2021 18:12)  T(F): 97 (28 May 2021 16:14), Max: 98 (27 May 2021 18:12)  HR: 76 (28 May 2021 16:14) (55 - 93)  BP: 108/58 (28 May 2021 16:14) (108/58 - 151/79)  BP(mean): 75 (28 May 2021 16:14) (75 - 75)  RR: 21 (28 May 2021 16:14) (18 - 29)  SpO2: 96% (28 May 2021 16:14) (95% - 99%)  I&O's Summary    28 May 2021 07:01  -  28 May 2021 17:03  --------------------------------------------------------  IN: 180 mL / OUT: 0 mL / NET: 180 mL      I&O's Detail    28 May 2021 07:01  -  28 May 2021 17:03  --------------------------------------------------------  IN:    Oral Fluid: 180 mL  Total IN: 180 mL    OUT:  Total OUT: 0 mL    Total NET: 180 mL          Tele:     Constitutional: well developed, normal appearance, well groomed, well nourished, no deformities and no acute distress.   Eyes: the conjunctiva exhibited no abnormalities and the eyelids demonstrated no xanthelasmas.   HEENT: normal oral mucosa, no oral pallor and no oral cyanosis.   Neck: normal jugular venous A waves present, normal jugular venous V waves present and no jugular venous waddell A waves.   Pulmonary: no respiratory distress, normal respiratory rhythm and effort, no accessory muscle use and lungs were clear to auscultation bilaterally.   Cardiovascular: heart rate and rhythm were normal, normal S1 and S2 and no murmur, gallop, rub, heave or thrill are present.   Abdomen: soft, non-tender, no hepato-splenomegaly and no abdominal mass palpated.   Musculoskeletal: the gait could not be assessed..   Extremities: no clubbing of the fingernails, no localized cyanosis, no petechial hemorrhages and no ischemic changes.   Skin: normal skin color and pigmentation, no rash, no venous stasis, no skin lesions, no skin ulcer and no xanthoma was observed.   Psychiatric: oriented to person, place, and time, the affect was normal, the mood was normal and not feeling anxious.      LABORATORY:                          8.8    9.95  )-----------( 153      ( 28 May 2021 10:15 )             27.6         140  |  103  |  36<H>  ----------------------------<  103<H>  4.1   |  34<H>  |  1.24    Ca    8.9      28 May 2021 10:15  Mg     2.3         TPro  7.3  /  Alb  3.0<L>  /  TBili  0.4  /  DBili  x   /  AST  16  /  ALT  16  /  AlkPhos  66  05-    ABG - ( 27 May 2021 16:31 )  pH, Arterial: x     pH, Blood: 7.37  /  pCO2: 57    /  pO2: 378   / HCO3: 32    / Base Excess: 6.0   /  SaO2: 100               CARDIAC MARKERS ( 27 May 2021 15:16 )  .025 ng/mL / x     / x     / x     / x          CAPILLARY BLOOD GLUCOSE        LIVER FUNCTIONS - ( 27 May 2021 15:16 )  Alb: 3.0 g/dL / Pro: 7.3 gm/dL / ALK PHOS: 66 U/L / ALT: 16 U/L / AST: 16 U/L / GGT: x           PT/INR - ( 28 May 2021 10:15 )   PT: 26.1 sec;   INR: 2.34 ratio         PTT - ( 28 May 2021 08:32 )  PTT:34.8 sec  Urinalysis Basic - ( 27 May 2021 17:25 )    Color: Yellow / Appearance: Clear / S.010 / pH: x  Gluc: x / Ketone: Negative  / Bili: Negative / Urobili: Negative mg/dL   Blood: x / Protein: 30 mg/dL / Nitrite: Negative   Leuk Esterase: Negative / RBC: x / WBC 0-2   Sq Epi: x / Non Sq Epi: Moderate / Bacteria: Few    IMAGING:  < from: 12 Lead ECG (21 @ 14:16) >  Atrial fibrillation  Low voltage QRS  Cannot rule out Anteroseptal infarct (cited on or before 18-OCT-2020)  Abnormal ECG    < end of copied text >    < from: CT Chest No Cont (21 @ 11:08) >  IMPRESSION:  Moderate right pleural effusion, stable.  Small leftpleural effusion, mildly increased.    < end of copied text >    < from: TTE Echo Complete w/o Contrast w/ Doppler (21 @ 16:17) >  Summary:   1. Technically suboptimal study.   2. Left ventricular ejection fraction, by visual estimation, is 60 to 65%.   3. Normal global left ventricular systolic function.   4. Severely enlarged left atrium.   5. Spectral Doppler shows restrictive pattern of left ventricular myocardial filling (Grade III diastolic dysfunction).   6. Right atrial enlargement.   7. Mild mitral annular calcification.   8. Mild mitral valve regurgitation.   9. Thickening of the anterior and posterior mitral valve leaflets.  10. Estimated pulmonary artery systolic pressure is 63.3 mmHg assuming a right atrial pressure of 10 mmHg, which is consistent with severe pulmonary hypertension.  11. The aortic valve mean gradient is 14.1 mmHg consistent with mild aortic stenosis.    < end of copied text >    ASSESSMENT:  89 yo female PMH HTN, COPD on 3L NC at home, diastolic CHF, severe pulmonary HTN, hypothyroidism, Afib on coumadin, with multiple admissions for COPD and CHF (last discharged 21) presenting for cough andSOB. Patient has hard time understanding  at bedside (#[id number]) so discussed case with daughter who states her mother is in her right state of mind, no mental status changes, but has been coughing more when she wakes up in mornings. Also complains of sensation of not being able to swallow. No complaints of focal weakness/numbness/tingling.     PLAN:       albuterol/ipratropium for Nebulization 3 milliLiter(s) Nebulizer every 6 hours  diltiazem    milliGRAM(s) Oral daily  furosemide   Injectable 40 milliGRAM(s) IV Push every 12 hours  levothyroxine 100 MICROGram(s) Oral daily  pantoprazole    Tablet 40 milliGRAM(s) Oral before breakfast  piperacillin/tazobactam IVPB.. 3.375 Gram(s) IV Intermittent every 8 hours  sildenafil (REVATIO) 20 milliGRAM(s) Oral three times a day  warfarin 4 milliGRAM(s) Oral once    agree with lasix to keep fluid negative if possible.  follow labs.  c/w Cardizem & sildenafil  AC for stroke risk reduction.    Quentin Bernard MD, FACC, FASESTHER, DAYSINC, FACP  Director, Heart Failure Services  [facility]  , Department of Cardiology  [facility] [employer]     CHIEF COMPLAINT:  Patient is a 89y old  Female who presents with a chief complaint of SOB possible aspiration pneumonia (28 May 2021 13:04)      HPI:  89 yo female PMH HTN, COPD on 3L NC at home, diastolic CHF, severe pulmonary HTN, hypothyroidism, Afib on coumadin, with multiple admissions for COPD and CHF (last discharged 21) presenting for cough andSOB. Patient has hard time understanding  at bedside (#[id number]) so discussed case with daughter who states her mother is in her right state of mind, no mental status changes, but has been coughing more when she wakes up in mornings. Also complains of sensation of not being able to swallow. No complaints of focal weakness/numbness/tingling.     ALLERGIES:  No Known Allergies    Home Medications:  ALPRAZolam 0.25 mg oral tablet: 1 tab(s) orally 2 times a day, As Needed (27 May 2021 19:06)  DilTIAZem Hydrochloride  mg/24 hours oral capsule, extended release: 1 cap(s) orally once a day (27 May 2021 19:06)  Dulcolax Stool Softener 100 mg oral capsule: 1 cap(s) orally 2 times a day as needed constipation (19 May 2021 13:28)  furosemide 40 mg oral tablet: 1 tab(s) orally once a day (19 Oct 2020 12:47)  ipratropium-albuterol 0.5 mg-2.5 mg/3 mLinhalation solution: 3 milliliter(s) inhaled every 6 hours, As Needed (19 Oct 2020 12:47)  Metamucil 3.4 g/3.7 g oral powder for reconstitution: 3.4 gram(s) orally once a day with 12oz H20.  (19 May 2021 13:28)  MiraLax oral powder for reconstitution: 17 gram(s) orally once a day (19 May 2021 13:28)  Multiple Vitamins oral tablet: 1 tab(s) orally once a day (19 Oct 2020 12:47)  pantoprazole 40 mg oral delayed release tablet: 1 tab(s) orally once a day (before a meal) (19 Oct 2020 12:47)  potassium chloride 10 mEq oral tablet, extended release: 1 tab(s) orally once a day (19 Oct 2020 12:47)  sildenafil 20 mg oral tablet: 1 tab(s) orally every 8 hours (19 Oct 2020 12:47)  Synthroid 100 mcg (0.1 mg) oral tablet: 1 tab(s) orally once a day (19 Oct 2020 12:47)  Vitamin D2 50,000 intl units (1.25 mg) oral capsule: 1 cap(s) orally once a week (19 Oct 2020 12:47)    PAST MEDICAL & SURGICAL HISTORY:  COPD exacerbation    Atrial fibrillation    Hypothyroidism    Essential hypertension    No significant past surgical history          FAMILY HISTORY:  n/a    SOCIAL HISTORY:  no tobacco use reported.    REVIEW OF SYSTEMS:  General:  No wt loss, fevers, chills, night sweats  Eyes:  Good vision, no reported pain  ENT:  No sore throat, pain, runny nose, dysphagia  CV:  No pain, palpitations, hypo/hypertension  Resp:  No dyspnea, cough, tachypnea, wheezing  GI:  No pain, nausea, vomiting, diarrhea, constipation  :  No pain, bleeding, incontinence, nocturia  Muscle:  No pain, weakness  Breast:  No pain, abscess, mass, discharge  Neuro:  No weakness, tingling, memory problems  Psych:  No fatigue, insomnia, mood problems, depression  Endocrine:  No polyuria, polydipsia, cold/heat intolerance  Heme:  No petechiae, ecchymosis, easy bruisability  Skin:  No rash, tattoos, scars, edema    PHYSICAL EXAM:  Vital Signs:  Vital Signs Last 24 Hrs  T(C): 36.1 (28 May 2021 16:14), Max: 36.7 (27 May 2021 18:12)  T(F): 97 (28 May 2021 16:14), Max: 98 (27 May 2021 18:12)  HR: 76 (28 May 2021 16:14) (55 - 93)  BP: 108/58 (28 May 2021 16:14) (108/58 - 151/79)  BP(mean): 75 (28 May 2021 16:14) (75 - 75)  RR: 21 (28 May 2021 16:14) (18 - 29)  SpO2: 96% (28 May 2021 16:14) (95% - 99%)  I&O's Summary    28 May 2021 07:01  -  28 May 2021 17:03  --------------------------------------------------------  IN: 180 mL / OUT: 0 mL / NET: 180 mL      I&O's Detail    28 May 2021 07:01  -  28 May 2021 17:03  --------------------------------------------------------  IN:    Oral Fluid: 180 mL  Total IN: 180 mL    OUT:  Total OUT: 0 mL    Total NET: 180 mL      Constitutional: well developed, normal appearance, well groomed, well nourished, no deformities and no acute distress.   Eyes: the conjunctiva exhibited no abnormalities and the eyelids demonstrated no xanthelasmas.   HEENT: normal oral mucosa, no oral pallor and no oral cyanosis.   Neck: normal jugular venous A waves present, normal jugular venous V waves present and no jugular venous waddell A waves.   Pulmonary: no respiratory distress, normal respiratory rhythm and effort, no accessory muscle use and lungs were clear to auscultation bilaterally.   Cardiovascular: heart rate and rhythm were normal, normal S1 and S2 and no murmur, gallop, rub, heave or thrill are present.   Abdomen: soft, non-tender  Musculoskeletal: the gait could not be assessed.  Extremities: no clubbing of the fingernails, no localized cyanosis, no petechial hemorrhages and no ischemic changes.   Skin: normal skin color and pigmentation, no rash, no venous stasis, no skin lesions, no skin ulcer and no xanthoma was observed.       LABORATORY:                          8.8    9.95  )-----------( 153      ( 28 May 2021 10:15 )             27.6         140  |  103  |  36<H>  ----------------------------<  103<H>  4.1   |  34<H>  |  1.24    Ca    8.9      28 May 2021 10:15  Mg     2.3         TPro  7.3  /  Alb  3.0<L>  /  TBili  0.4  /  DBili  x   /  AST  16  /  ALT  16  /  AlkPhos  66  05-27    ABG - ( 27 May 2021 16:31 )  pH, Arterial: x     pH, Blood: 7.37  /  pCO2: 57    /  pO2: 378   / HCO3: 32    / Base Excess: 6.0   /  SaO2: 100               CARDIAC MARKERS ( 27 May 2021 15:16 )  .025 ng/mL / x     / x     / x     / x            LIVER FUNCTIONS - ( 27 May 2021 15:16 )  Alb: 3.0 g/dL / Pro: 7.3 gm/dL / ALK PHOS: 66 U/L / ALT: 16 U/L / AST: 16 U/L / GGT: x           PT/INR - ( 28 May 2021 10:15 )   PT: 26.1 sec;   INR: 2.34 ratio         PTT - ( 28 May 2021 08:32 )  PTT:34.8 sec  Urinalysis Basic - ( 27 May 2021 17:25 )    Color: Yellow / Appearance: Clear / S.010 / pH: x  Gluc: x / Ketone: Negative  / Bili: Negative / Urobili: Negative mg/dL   Blood: x / Protein: 30 mg/dL / Nitrite: Negative   Leuk Esterase: Negative / RBC: x / WBC 0-2   Sq Epi: x / Non Sq Epi: Moderate / Bacteria: Few    IMAGING:  < from: 12 Lead ECG (21 @ 14:16) >  Atrial fibrillation  Low voltage QRS  Cannot rule out Anteroseptal infarct (cited on or before 18-OCT-2020)  Abnormal ECG    < end of copied text >    < from: CT Chest No Cont (21 @ 11:08) >  IMPRESSION:  Moderate right pleural effusion, stable.  Small leftpleural effusion, mildly increased.    < end of copied text >    < from: TTE Echo Complete w/o Contrast w/ Doppler (21 @ 16:17) >  Summary:   1. Technically suboptimal study.   2. Left ventricular ejection fraction, by visual estimation, is 60 to 65%.   3. Normal global left ventricular systolic function.   4. Severely enlarged left atrium.   5. Spectral Doppler shows restrictive pattern of left ventricular myocardial filling (Grade III diastolic dysfunction).   6. Right atrial enlargement.   7. Mild mitral annular calcification.   8. Mild mitral valve regurgitation.   9. Thickening of the anterior and posterior mitral valve leaflets.  10. Estimated pulmonary artery systolic pressure is 63.3 mmHg assuming a right atrial pressure of 10 mmHg, which is consistent with severe pulmonary hypertension.  11. The aortic valve mean gradient is 14.1 mmHg consistent with mild aortic stenosis.    < end of copied text >    ASSESSMENT:  89 yo female PMH HTN, COPD on 3L NC at home, diastolic CHF, severe pulmonary HTN, hypothyroidism, Afib on coumadin, with multiple admissions for COPD and CHF (last discharged 21) presenting for cough andSOB. Patient has hard time understanding  at bedside (#[id number]) so discussed case with daughter who states her mother is in her right state of mind, no mental status changes, but has been coughing more when she wakes up in mornings. Also complains of sensation of not being able to swallow. No complaints of focal weakness/numbness/tingling.     PLAN:       albuterol/ipratropium for Nebulization 3 milliLiter(s) Nebulizer every 6 hours  diltiazem    milliGRAM(s) Oral daily  furosemide   Injectable 40 milliGRAM(s) IV Push every 12 hours  levothyroxine 100 MICROGram(s) Oral daily  pantoprazole    Tablet 40 milliGRAM(s) Oral before breakfast  piperacillin/tazobactam IVPB.. 3.375 Gram(s) IV Intermittent every 8 hours  sildenafil (REVATIO) 20 milliGRAM(s) Oral three times a day  warfarin 4 milliGRAM(s) Oral once    agree with lasix to keep fluid negative if possible.  follow labs.  c/w Cardizem & sildenafil  AC for stroke risk reduction.    Quentin Bernard MD, FACC, FASESTHER, FASNC, FACP  Director, Heart Failure Services  [facility]  , Department of Cardiology  [facility]

## 2021-05-28 NOTE — PROGRESS NOTE ADULT - CONVERSATION DETAILS
Goals of care - I had a lengthy conversation with pt and with daughter Lilliam.    We discussed the comorbid conditions, hospital care, and prognosis.  We also discussed advanced directives - made aware of limited prognosis if patient were to be intubated or resuscitated, in consideration of age and comorbid conditions.  She agreed with DNR / DNI, wished to discuss further with Lilliam.    Total time spent on patient care discussion = 20 minutes.

## 2021-05-29 LAB
ANION GAP SERPL CALC-SCNC: 6 MMOL/L — SIGNIFICANT CHANGE UP (ref 5–17)
BUN SERPL-MCNC: 34 MG/DL — HIGH (ref 7–23)
CALCIUM SERPL-MCNC: 8.7 MG/DL — SIGNIFICANT CHANGE UP (ref 8.5–10.1)
CHLORIDE SERPL-SCNC: 101 MMOL/L — SIGNIFICANT CHANGE UP (ref 96–108)
CO2 SERPL-SCNC: 35 MMOL/L — HIGH (ref 22–31)
CREAT SERPL-MCNC: 1.18 MG/DL — SIGNIFICANT CHANGE UP (ref 0.5–1.3)
GLUCOSE SERPL-MCNC: 84 MG/DL — SIGNIFICANT CHANGE UP (ref 70–99)
HCT VFR BLD CALC: 30.2 % — LOW (ref 34.5–45)
HGB BLD-MCNC: 9.7 G/DL — LOW (ref 11.5–15.5)
INR BLD: 2.62 RATIO — HIGH (ref 0.88–1.16)
MCHC RBC-ENTMCNC: 32.1 GM/DL — SIGNIFICANT CHANGE UP (ref 32–36)
MCHC RBC-ENTMCNC: 34.8 PG — HIGH (ref 27–34)
MCV RBC AUTO: 108.2 FL — HIGH (ref 80–100)
NRBC # BLD: 0 /100 WBCS — SIGNIFICANT CHANGE UP (ref 0–0)
PLATELET # BLD AUTO: 159 K/UL — SIGNIFICANT CHANGE UP (ref 150–400)
POTASSIUM SERPL-MCNC: 3.5 MMOL/L — SIGNIFICANT CHANGE UP (ref 3.5–5.3)
POTASSIUM SERPL-SCNC: 3.5 MMOL/L — SIGNIFICANT CHANGE UP (ref 3.5–5.3)
PROTHROM AB SERPL-ACNC: 29 SEC — HIGH (ref 10.6–13.6)
RBC # BLD: 2.79 M/UL — LOW (ref 3.8–5.2)
RBC # FLD: 14.9 % — HIGH (ref 10.3–14.5)
SODIUM SERPL-SCNC: 142 MMOL/L — SIGNIFICANT CHANGE UP (ref 135–145)
WBC # BLD: 8.82 K/UL — SIGNIFICANT CHANGE UP (ref 3.8–10.5)
WBC # FLD AUTO: 8.82 K/UL — SIGNIFICANT CHANGE UP (ref 3.8–10.5)

## 2021-05-29 PROCEDURE — 99233 SBSQ HOSP IP/OBS HIGH 50: CPT

## 2021-05-29 RX ORDER — ACETAMINOPHEN 500 MG
650 TABLET ORAL EVERY 6 HOURS
Refills: 0 | Status: DISCONTINUED | OUTPATIENT
Start: 2021-05-29 | End: 2021-05-31

## 2021-05-29 RX ORDER — WARFARIN SODIUM 2.5 MG/1
3 TABLET ORAL ONCE
Refills: 0 | Status: COMPLETED | OUTPATIENT
Start: 2021-05-29 | End: 2021-05-29

## 2021-05-29 RX ADMIN — Medication 3 MILLILITER(S): at 23:32

## 2021-05-29 RX ADMIN — Medication 300 MILLIGRAM(S): at 06:49

## 2021-05-29 RX ADMIN — WARFARIN SODIUM 3 MILLIGRAM(S): 2.5 TABLET ORAL at 22:07

## 2021-05-29 RX ADMIN — Medication 650 MILLIGRAM(S): at 16:15

## 2021-05-29 RX ADMIN — Medication 40 MILLIGRAM(S): at 06:50

## 2021-05-29 RX ADMIN — Medication 0.5 MILLIGRAM(S): at 06:49

## 2021-05-29 RX ADMIN — PIPERACILLIN AND TAZOBACTAM 25 GRAM(S): 4; .5 INJECTION, POWDER, LYOPHILIZED, FOR SOLUTION INTRAVENOUS at 21:28

## 2021-05-29 RX ADMIN — PANTOPRAZOLE SODIUM 40 MILLIGRAM(S): 20 TABLET, DELAYED RELEASE ORAL at 06:50

## 2021-05-29 RX ADMIN — Medication 3 MILLILITER(S): at 05:27

## 2021-05-29 RX ADMIN — Medication 3 MILLILITER(S): at 11:09

## 2021-05-29 RX ADMIN — Medication 3 MILLILITER(S): at 00:07

## 2021-05-29 RX ADMIN — Medication 3 MILLILITER(S): at 17:00

## 2021-05-29 RX ADMIN — Medication 20 MILLIGRAM(S): at 21:28

## 2021-05-29 RX ADMIN — PIPERACILLIN AND TAZOBACTAM 25 GRAM(S): 4; .5 INJECTION, POWDER, LYOPHILIZED, FOR SOLUTION INTRAVENOUS at 06:50

## 2021-05-29 RX ADMIN — Medication 0.5 MILLIGRAM(S): at 17:39

## 2021-05-29 RX ADMIN — Medication 1 DROP(S): at 17:39

## 2021-05-29 RX ADMIN — Medication 100 MICROGRAM(S): at 06:50

## 2021-05-29 RX ADMIN — Medication 20 MILLIGRAM(S): at 13:56

## 2021-05-29 RX ADMIN — PIPERACILLIN AND TAZOBACTAM 25 GRAM(S): 4; .5 INJECTION, POWDER, LYOPHILIZED, FOR SOLUTION INTRAVENOUS at 13:52

## 2021-05-29 RX ADMIN — Medication 650 MILLIGRAM(S): at 15:15

## 2021-05-29 RX ADMIN — Medication 20 MILLIGRAM(S): at 06:50

## 2021-05-29 NOTE — DIETITIAN INITIAL EVALUATION ADULT. - DIET TYPE
Vital Cuisine x 1/day (provides 520 kcal, 22 g protein)/dysphagia 1, pureed, nectar consistency fluid

## 2021-05-29 NOTE — DIETITIAN INITIAL EVALUATION ADULT. - PERTINENT MEDS FT
MEDICATIONS  (STANDING):  albuterol/ipratropium for Nebulization 3 milliLiter(s) Nebulizer every 6 hours  clonazePAM  Tablet 0.5 milliGRAM(s) Oral two times a day  diltiazem    milliGRAM(s) Oral daily  furosemide   Injectable 40 milliGRAM(s) IV Push every 12 hours  levothyroxine 100 MICROGram(s) Oral daily  pantoprazole    Tablet 40 milliGRAM(s) Oral before breakfast  piperacillin/tazobactam IVPB.. 3.375 Gram(s) IV Intermittent every 8 hours  sildenafil (REVATIO) 20 milliGRAM(s) Oral three times a day  warfarin 3 milliGRAM(s) Oral once    MEDICATIONS  (PRN):  nystatin Powder 1 Application(s) Topical every 12 hours PRN rash

## 2021-05-29 NOTE — DIETITIAN INITIAL EVALUATION ADULT. - PERTINENT LABORATORY DATA
05-29 Na142 mmol/L Glu 84 mg/dL K+ 3.5 mmol/L Cr  1.18 mg/dL BUN 34 mg/dL<H> 05-27 Alb 3.0 g/dL<L>05-27 ALT 16 U/L AST 16 U/L Alkaline Phosphatase 66 U/L

## 2021-05-29 NOTE — DIETITIAN INITIAL EVALUATION ADULT. - OTHER INFO
Pt w/ multiple admissions for COPD. Most recently seen on 5/18 for Length Of Stay. Pt w/ c/o difficulty swallowing & coughing upon waking in the morning. Pt w/ Hx HTN , COPD (on 3L NC @ home) ; diastolic CHF ; severe pulmonary HTN ; hypothyroidism; A-fib (on coumadin). Pt has hard time understanding  as per medical chart. Per medical record, she lives at home , alone w/ 24/7 HHA assistance ; Has supportive children nearby. No reports of N/V/D/C/ allergies. Difficult to determine wt loss due to CHF. 5/18- Last Adm WT- 132.9 (60.3 kg).

## 2021-05-30 LAB
INR BLD: 3.24 RATIO — HIGH (ref 0.88–1.16)
PROTHROM AB SERPL-ACNC: 35.5 SEC — HIGH (ref 10.6–13.6)

## 2021-05-30 PROCEDURE — 99232 SBSQ HOSP IP/OBS MODERATE 35: CPT

## 2021-05-30 PROCEDURE — 99233 SBSQ HOSP IP/OBS HIGH 50: CPT

## 2021-05-30 RX ORDER — DILTIAZEM HCL 120 MG
300 CAPSULE, EXT RELEASE 24 HR ORAL DAILY
Refills: 0 | Status: DISCONTINUED | OUTPATIENT
Start: 2021-05-30 | End: 2021-05-31

## 2021-05-30 RX ADMIN — Medication 100 MICROGRAM(S): at 05:30

## 2021-05-30 RX ADMIN — Medication 20 MILLIGRAM(S): at 22:12

## 2021-05-30 RX ADMIN — Medication 3 MILLILITER(S): at 17:02

## 2021-05-30 RX ADMIN — Medication 3 MILLILITER(S): at 05:20

## 2021-05-30 RX ADMIN — Medication 650 MILLIGRAM(S): at 13:20

## 2021-05-30 RX ADMIN — PIPERACILLIN AND TAZOBACTAM 25 GRAM(S): 4; .5 INJECTION, POWDER, LYOPHILIZED, FOR SOLUTION INTRAVENOUS at 05:31

## 2021-05-30 RX ADMIN — Medication 1 DROP(S): at 00:25

## 2021-05-30 RX ADMIN — Medication 40 MILLIGRAM(S): at 09:19

## 2021-05-30 RX ADMIN — Medication 20 MILLIGRAM(S): at 05:32

## 2021-05-30 RX ADMIN — NYSTATIN CREAM 1 APPLICATION(S): 100000 CREAM TOPICAL at 18:32

## 2021-05-30 RX ADMIN — Medication 40 MILLIGRAM(S): at 18:27

## 2021-05-30 RX ADMIN — Medication 1 DROP(S): at 18:27

## 2021-05-30 RX ADMIN — Medication 0.5 MILLIGRAM(S): at 18:27

## 2021-05-30 RX ADMIN — NYSTATIN CREAM 1 APPLICATION(S): 100000 CREAM TOPICAL at 05:31

## 2021-05-30 RX ADMIN — Medication 1 DROP(S): at 13:21

## 2021-05-30 RX ADMIN — Medication 650 MILLIGRAM(S): at 14:20

## 2021-05-30 RX ADMIN — PANTOPRAZOLE SODIUM 40 MILLIGRAM(S): 20 TABLET, DELAYED RELEASE ORAL at 05:43

## 2021-05-30 RX ADMIN — PIPERACILLIN AND TAZOBACTAM 25 GRAM(S): 4; .5 INJECTION, POWDER, LYOPHILIZED, FOR SOLUTION INTRAVENOUS at 22:12

## 2021-05-30 RX ADMIN — Medication 0.5 MILLIGRAM(S): at 05:30

## 2021-05-30 RX ADMIN — PIPERACILLIN AND TAZOBACTAM 25 GRAM(S): 4; .5 INJECTION, POWDER, LYOPHILIZED, FOR SOLUTION INTRAVENOUS at 13:21

## 2021-05-30 RX ADMIN — Medication 20 MILLIGRAM(S): at 13:20

## 2021-05-30 RX ADMIN — Medication 3 MILLILITER(S): at 11:01

## 2021-05-30 RX ADMIN — Medication 1 DROP(S): at 05:33

## 2021-05-31 ENCOUNTER — TRANSCRIPTION ENCOUNTER (OUTPATIENT)
Age: 86
End: 2021-05-31

## 2021-05-31 VITALS — OXYGEN SATURATION: 98 %

## 2021-05-31 LAB
ANION GAP SERPL CALC-SCNC: 9 MMOL/L — SIGNIFICANT CHANGE UP (ref 5–17)
BUN SERPL-MCNC: 43 MG/DL — HIGH (ref 7–23)
CALCIUM SERPL-MCNC: 8.6 MG/DL — SIGNIFICANT CHANGE UP (ref 8.5–10.1)
CHLORIDE SERPL-SCNC: 99 MMOL/L — SIGNIFICANT CHANGE UP (ref 96–108)
CO2 SERPL-SCNC: 33 MMOL/L — HIGH (ref 22–31)
CREAT SERPL-MCNC: 1.35 MG/DL — HIGH (ref 0.5–1.3)
GLUCOSE SERPL-MCNC: 74 MG/DL — SIGNIFICANT CHANGE UP (ref 70–99)
HCT VFR BLD CALC: 30.9 % — LOW (ref 34.5–45)
HGB BLD-MCNC: 9.8 G/DL — LOW (ref 11.5–15.5)
INR BLD: 2.91 RATIO — HIGH (ref 0.88–1.16)
MCHC RBC-ENTMCNC: 31.7 GM/DL — LOW (ref 32–36)
MCHC RBC-ENTMCNC: 33.8 PG — SIGNIFICANT CHANGE UP (ref 27–34)
MCV RBC AUTO: 106.6 FL — HIGH (ref 80–100)
NRBC # BLD: 0 /100 WBCS — SIGNIFICANT CHANGE UP (ref 0–0)
PLATELET # BLD AUTO: 183 K/UL — SIGNIFICANT CHANGE UP (ref 150–400)
POTASSIUM SERPL-MCNC: 3.4 MMOL/L — LOW (ref 3.5–5.3)
POTASSIUM SERPL-SCNC: 3.4 MMOL/L — LOW (ref 3.5–5.3)
PROTHROM AB SERPL-ACNC: 32.1 SEC — HIGH (ref 10.6–13.6)
RBC # BLD: 2.9 M/UL — LOW (ref 3.8–5.2)
RBC # FLD: 14.8 % — HIGH (ref 10.3–14.5)
SODIUM SERPL-SCNC: 141 MMOL/L — SIGNIFICANT CHANGE UP (ref 135–145)
WBC # BLD: 7.26 K/UL — SIGNIFICANT CHANGE UP (ref 3.8–10.5)
WBC # FLD AUTO: 7.26 K/UL — SIGNIFICANT CHANGE UP (ref 3.8–10.5)

## 2021-05-31 PROCEDURE — 99239 HOSP IP/OBS DSCHRG MGMT >30: CPT

## 2021-05-31 RX ORDER — CLONAZEPAM 1 MG
1 TABLET ORAL
Qty: 30 | Refills: 0
Start: 2021-05-31

## 2021-05-31 RX ORDER — WARFARIN SODIUM 2.5 MG/1
3 TABLET ORAL ONCE
Refills: 0 | Status: DISCONTINUED | OUTPATIENT
Start: 2021-05-31 | End: 2021-05-31

## 2021-05-31 RX ORDER — POLYETHYLENE GLYCOL 3350 17 G/17G
17 POWDER, FOR SOLUTION ORAL
Qty: 0 | Refills: 0 | DISCHARGE

## 2021-05-31 RX ORDER — DILTIAZEM HCL 120 MG
1 CAPSULE, EXT RELEASE 24 HR ORAL
Qty: 30 | Refills: 0
Start: 2021-05-31

## 2021-05-31 RX ORDER — DILTIAZEM HCL 120 MG
1 CAPSULE, EXT RELEASE 24 HR ORAL
Qty: 0 | Refills: 0 | DISCHARGE

## 2021-05-31 RX ORDER — ALPRAZOLAM 0.25 MG
1 TABLET ORAL
Qty: 0 | Refills: 0 | DISCHARGE

## 2021-05-31 RX ORDER — FUROSEMIDE 40 MG
40 TABLET ORAL DAILY
Refills: 0 | Status: DISCONTINUED | OUTPATIENT
Start: 2021-05-31 | End: 2021-05-31

## 2021-05-31 RX ORDER — CLONAZEPAM 1 MG
1 TABLET ORAL
Qty: 40 | Refills: 0
Start: 2021-05-31

## 2021-05-31 RX ADMIN — Medication 20 MILLIGRAM(S): at 06:34

## 2021-05-31 RX ADMIN — Medication 3 MILLILITER(S): at 05:32

## 2021-05-31 RX ADMIN — NYSTATIN CREAM 1 APPLICATION(S): 100000 CREAM TOPICAL at 15:06

## 2021-05-31 RX ADMIN — Medication 3 MILLILITER(S): at 17:07

## 2021-05-31 RX ADMIN — Medication 40 MILLIGRAM(S): at 06:34

## 2021-05-31 RX ADMIN — PANTOPRAZOLE SODIUM 40 MILLIGRAM(S): 20 TABLET, DELAYED RELEASE ORAL at 06:33

## 2021-05-31 RX ADMIN — Medication 0.5 MILLIGRAM(S): at 06:33

## 2021-05-31 RX ADMIN — Medication 1 DROP(S): at 17:13

## 2021-05-31 RX ADMIN — PIPERACILLIN AND TAZOBACTAM 25 GRAM(S): 4; .5 INJECTION, POWDER, LYOPHILIZED, FOR SOLUTION INTRAVENOUS at 06:34

## 2021-05-31 RX ADMIN — Medication 1 DROP(S): at 12:05

## 2021-05-31 RX ADMIN — Medication 3 MILLILITER(S): at 11:48

## 2021-05-31 RX ADMIN — Medication 1 DROP(S): at 06:33

## 2021-05-31 RX ADMIN — Medication 0.5 MILLIGRAM(S): at 17:13

## 2021-05-31 RX ADMIN — Medication 1 DROP(S): at 00:33

## 2021-05-31 RX ADMIN — Medication 300 MILLIGRAM(S): at 06:34

## 2021-05-31 RX ADMIN — Medication 20 MILLIGRAM(S): at 15:06

## 2021-05-31 RX ADMIN — Medication 3 MILLILITER(S): at 00:05

## 2021-05-31 RX ADMIN — Medication 100 MICROGRAM(S): at 06:34

## 2021-05-31 NOTE — DISCHARGE NOTE PROVIDER - HOSPITAL COURSE
89F with a PMH of HTN, COPD on O2, Diastolic CHF, Severe Pulmonary HTN, Hypothyroidism, Atrial fibrillation on Coumadin, hx of multiple admissions for COPD and CHF exacerbation presents to the ED for dyspnea, treated for acute CHF / COPD / RLL pneumonia.  TTE showing normal EF with severe pulmonary hypertension, Grade III diastolic dysfunction.      ·  Problem: Acute on chronic respiratory failure with hypoxia.  Appears to be multifactorial, due to chronic Diastolic CHF, b/l R>L pleural effusions, Severe Pulmonary Hypertension.    ·  Problem: Acute on CHronic Diastolic congestive heart failure, Plan: Diuresis as per above.  Cardio following.    ·  Problem: Severe Pulmonary hypertension.  Plan: TTE confirms severe pulmonary hypertension. continue sildenafil.  Pulmonary following.   ·  Problem: B/l Pleural Effusions -Stable   ·  Problem: Anxiety - stable on Klonopin.  D/c Xanax, emphasized to pt's children.   ·  Problem: Chronic Persistent Atrial fibrillation Plan: Rate control.  Continue AC - inr was supratherapeutic yesterday, dose held.  Now therapeutic.  Continue at home dose.   ·  Problem: Chronic obstructive pulmonary disease, unspecified COPD type.  Plan: no current wheezing.   ·  Problem: Hypothyroidism, unspecified type. Plan: synthroid.  ·  Problem: Essential hypertension.  Plan: losartan.   ·  Problem: Functional Quadriplegia - supportive care.   DVT Proph - on AC    Plan of care d/w daughter Lilliam [phone] 5/28, 5/29, 5/31.  D/w Son Candelario 5/30, 5/31.    Stable for d/c.  Discussed with cardiology.   Disposition: Stable for discharge.  Outpatient followup discussed.  Total time spent on discharge is  60 minutes.

## 2021-05-31 NOTE — PROGRESS NOTE ADULT - ASSESSMENT
89F with a PMH of HTN, COPD on O2, Diastolic CHF, Severe Pulmonary HTN, Hypothyroidism, Atrial fibrillation on Coumadin, hx of multiple admissions for COPD and CHF exacerbation presents to the ED for dyspnea, treated for acute CHF / COPD / RLL pneumonia.  TTE showing normal EF with severe pulmonary hypertension, Grade III diastolic dysfunction.      ·  Problem: Acute on chronic respiratory failure with hypoxia.  Appears to be multifactorial, due to chronic Diastolic CHF, b/l R>L pleural effusions, Severe Pulmonary Hypertension.    ·  Problem: Acute on CHronic Diastolic congestive heart failure, Plan: Diuresis as per above.  Cardio following.    ·  Problem: Severe Pulmonary hypertension.  Plan: TTE confirms severe pulmonary hypertension. continue sildenafil.  Pulmonary following.   ·  Problem: B/l Pleural Effusions -Stable   ·  Problem: Anxiety - stable on Klonopin  ·  Problem: Chronic Persistent Atrial fibrillation Plan: Rate control.  Continue AC.  ·  Problem: Chronic obstructive pulmonary disease, unspecified COPD type.  Plan: no current wheezing.   ·  Problem: Hypothyroidism, unspecified type. Plan: synthroid.  ·  Problem: Essential hypertension.  Plan: losartan.   DVT Proph - on AC    Plan of care d/w daughter Lilliam [phone] 5/28, 5/29.  D/w Son Candelario 5/30.  
89F with a PMH of HTN, COPD on O2, Diastolic CHF, Severe Pulmonary HTN, Hypothyroidism, Atrial fibrillation on Coumadin, hx of multiple admissions for COPD and CHF exacerbation presents to the ED for dyspnea, treated for acute CHF / COPD / RLL pneumonia.  TTE showing normal EF with severe pulmonary hypertension, Grade III diastolic dysfunction.      ·  Problem: Acute on chronic respiratory failure with hypoxia.  Appears to be multifactorial, due to chronic Diastolic CHF, b/l R>L pleural effusions, Severe Pulmonary Hypertension.  I discussed goals of care with pt, daughter.  Daughter will clarify with pt - she had been adamant about her advanced directives of "quality over quantity", with an emphasis on her quality of life.  ·  Problem: Acute on CHronic Diastolic congestive heart failure, Plan: Diuresis as per above.  Cardio following.    ·  Problem: Severe Pulmonary hypertension.  Plan: TTE confirms severe pulmonary hypertension. continue sildenafil.  Pulmonary input requested.  ·  Problem: B/l Pleural Effusions - I discussed the possibility of thoracentesis with IR.  Recommends trial with diuresis.    ·  Problem: Chronic Persistent Atrial fibrillation Plan: Rate control.  Continue AC.  ·  Problem: Chronic obstructive pulmonary disease, unspecified COPD type.  Plan: no current wheezing.   ·  Problem: Hypothyroidism, unspecified type. Plan: synthroid.  ·  Problem: Essential hypertension.  Plan: losartan.   DVT Proph - on AC    Plan of care d/w daughter Lilliam [phone] 5/28, 5/29.
89F with a PMH of HTN, COPD on O2, Diastolic CHF, Severe Pulmonary HTN, Hypothyroidism, Atrial fibrillation on Coumadin, hx of multiple admissions for COPD and CHF exacerbation presents to the ED for dyspnea, treated for acute CHF / COPD / RLL pneumonia.  TTE showing normal EF with severe pulmonary hypertension, Grade III diastolic dysfunction.      ·  Problem: Acute on chronic respiratory failure with hypoxia.  Appears to be multifactorial, due to chronic Diastolic CHF, b/l R>L pleural effusions, Severe Pulmonary Hypertension.    ·  Problem: Acute on CHronic Diastolic congestive heart failure, Plan: Diuresis as per above.  Cardio following.    ·  Problem: Severe Pulmonary hypertension.  Plan: TTE confirms severe pulmonary hypertension. continue sildenafil.  Pulmonary following.   ·  Problem: B/l Pleural Effusions -Stable   ·  Problem: Anxiety - stable on Klonopin.  D/c Xanax, emphasized to pt's children.   ·  Problem: Chronic Persistent Atrial fibrillation Plan: Rate control.  Continue AC - inr was supratherapeutic yesterday, dose held.  Now therapeutic.  Continue at home dose.   ·  Problem: Chronic obstructive pulmonary disease, unspecified COPD type.  Plan: no current wheezing.   ·  Problem: Hypothyroidism, unspecified type. Plan: synthroid.  ·  Problem: Essential hypertension.  Plan: losartan.   DVT Proph - on AC    Plan of care d/w daughter Lilliam [phone] 5/28, 5/29, 5/31.  D/w Son Candelario 5/30, 5/31.    Stable for d/c. 
89F with a PMH of HTN, COPD on O2, Diastolic CHF, Severe Pulmonary HTN, Hypothyroidism, Atrial fibrillation on Coumadin, hx of multiple admissions for COPD and CHF exacerbation presents to the ED for dyspnea, treated for acute CHF / COPD / RLL pneumonia.  TTE showing normal EF with severe pulmonary hypertension, Grade III diastolic dysfunction.      ·  Problem: Acute on chronic respiratory failure with hypoxia.  Appears to be multifactorial, due to chronic Diastolic CHF, b/l R>L pleural effusions, Severe Pulmonary Hypertension.  I discussed goals of care with pt, daughter.  Daughter will clarify with pt - she had been adamant about her advanced directives of "quality over quantity", with an emphasis on her quality of life.  ·  Problem: Acute on CHronic Diastolic congestive heart failure, Plan: Diuresis as per above.  Cardio following.    ·  Problem: Severe Pulmonary hypertension.  Plan: TTE confirms severe pulmonary hypertension. continue sildenafil.  Pulmonary input requested.  ·  Problem: B/l Pleural Effusions - I discussed the possibility of thoracentesis with IR.  Recommends trial with diuresis.    ·  Problem: Chronic Persistent Atrial fibrillation Plan: Rate control.  Continue AC.  ·  Problem: Chronic obstructive pulmonary disease, unspecified COPD type.  Plan: no current wheezing.   ·  Problem: Hypothyroidism, unspecified type. Plan: synthroid.  ·  Problem: Essential hypertension.  Plan: losartan.   DVT Proph - on AC    Plan of care d/w daughter Lilliam [phone].

## 2021-05-31 NOTE — DISCHARGE NOTE PROVIDER - NSDCFUADDINST_GEN_ALL_CORE_FT
It is important to see your primary physician as well as the physicians noted below within the next week to perform a comprehensive medical review.  Call their offices for an appointment as soon as you leave the hospital.  You will also need to see them for renewal of your medications.  If you do not have a primary physician, contact the [facility] Physician Referral Service at [phone].  To obtain your results, you can access the [email or web address] Patient Portal at [email or web address].  Your medical issues appear to be stable at this time, but if your symptoms recur or worsen, contact your physicians and/or return to the hospital if necessary.  If you encounter any issues or questions with your medication, call your physicians before stopping the medication.  Do not drive.  Limit your diet to 2 grams of sodium daily.

## 2021-05-31 NOTE — PROGRESS NOTE ADULT - REASON FOR ADMISSION
SOB possible aspiration pneumonia

## 2021-05-31 NOTE — DISCHARGE NOTE NURSING/CASE MANAGEMENT/SOCIAL WORK - PATIENT PORTAL LINK FT
You can access the FollowMyHealth Patient Portal offered by [facility] by registering at the following website: [email or web address]. By joining [facility]’s FollowMyHealth portal, you will also be able to view your health information using other applications (apps) compatible with our system.

## 2021-05-31 NOTE — DISCHARGE NOTE PROVIDER - NSDCMRMEDTOKEN_GEN_ALL_CORE_FT
clonazePAM 0.5 mg oral tablet: 1 tab(s) orally 2 times a day as needed anxiety MDD:2  Coumadin 3 mg oral tablet: 1 tab(s) orally once a day as directed  dilTIAZem 300 mg/24 hours oral capsule, extended release: 1 cap(s) orally once a day  Dulcolax Stool Softener 100 mg oral capsule: 1 cap(s) orally 2 times a day as needed constipation  furosemide 40 mg oral tablet: 1 tab(s) orally once a day  ipratropium-albuterol 0.5 mg-2.5 mg/3 mLinhalation solution: 3 milliliter(s) inhaled every 6 hours, As Needed  losartan 25 mg oral tablet: 1 tab(s) orally once a day  Metamucil 3.4 g/3.7 g oral powder for reconstitution: 3.4 gram(s) orally once a day with 12oz H20.   Multiple Vitamins oral tablet: 1 tab(s) orally once a day  pantoprazole 40 mg oral delayed release tablet: 1 tab(s) orally once a day (before a meal)  potassium chloride 10 mEq oral tablet, extended release: 1 tab(s) orally once a day  sildenafil 20 mg oral tablet: 1 tab(s) orally every 8 hours  Synthroid 100 mcg (0.1 mg) oral tablet: 1 tab(s) orally once a day  Vitamin D2 50,000 intl units (1.25 mg) oral capsule: 1 cap(s) orally once a week

## 2021-05-31 NOTE — DISCHARGE NOTE PROVIDER - CARE PROVIDER_API CALL
Salvador Kidd)  Medicine  [address], [address]  [place] [address]  Phone: [phone]  Fax: [phone]  Follow Up Time:     Quentin Bernard)  Medicine  Cardiology  [address] [place] [address]  Phone: [phone]  Fax: [phone]  Follow Up Time:

## 2021-06-03 ENCOUNTER — APPOINTMENT (OUTPATIENT)
Dept: CARE COORDINATION | Facility: HOME HEALTH | Age: 86
End: 2021-06-03
Payer: MEDICARE

## 2021-06-03 VITALS
RESPIRATION RATE: 20 BRPM | OXYGEN SATURATION: 93 % | TEMPERATURE: 97.7 F | DIASTOLIC BLOOD PRESSURE: 56 MMHG | HEART RATE: 91 BPM | SYSTOLIC BLOOD PRESSURE: 110 MMHG

## 2021-06-03 DIAGNOSIS — J44.9 CHRONIC OBSTRUCTIVE PULMONARY DISEASE, UNSPECIFIED: ICD-10-CM

## 2021-06-03 DIAGNOSIS — I50.30 UNSPECIFIED DIASTOLIC (CONGESTIVE) HEART FAILURE: ICD-10-CM

## 2021-06-03 DIAGNOSIS — B36.9 SUPERFICIAL MYCOSIS, UNSPECIFIED: ICD-10-CM

## 2021-06-03 DIAGNOSIS — I48.91 UNSPECIFIED ATRIAL FIBRILLATION: ICD-10-CM

## 2021-06-03 DIAGNOSIS — I27.20 PULMONARY HYPERTENSION, UNSPECIFIED: ICD-10-CM

## 2021-06-03 PROCEDURE — 99349 HOME/RES VST EST MOD MDM 40: CPT

## 2021-06-04 PROBLEM — I48.91 A-FIB: Status: ACTIVE | Noted: 2020-08-28

## 2021-06-04 PROBLEM — B36.9 FUNGAL DERMATITIS: Status: ACTIVE | Noted: 2020-08-28

## 2021-06-04 PROBLEM — I27.20 PULMONARY HYPERTENSION: Status: ACTIVE | Noted: 2020-08-28

## 2021-06-04 PROBLEM — I50.30 DIASTOLIC HEART FAILURE: Status: ACTIVE | Noted: 2021-06-04

## 2021-06-04 PROBLEM — J44.9 COPD (CHRONIC OBSTRUCTIVE PULMONARY DISEASE): Status: ACTIVE | Noted: 2020-08-28

## 2021-06-04 RX ORDER — IPRATROPIUM BROMIDE AND ALBUTEROL SULFATE 2.5; .5 MG/3ML; MG/3ML
0.5-2.5 (3) SOLUTION RESPIRATORY (INHALATION)
Qty: 180 | Refills: 0 | Status: ACTIVE | COMMUNITY

## 2021-06-04 RX ORDER — ALPRAZOLAM 0.25 MG/1
0.25 TABLET ORAL
Refills: 0 | Status: DISCONTINUED | COMMUNITY
Start: 2020-08-28 | End: 2021-06-04

## 2021-06-04 RX ORDER — NYSTATIN AND TRIAMCINOLONE ACETONIDE 100000; 1 MG/G; MG/G
100000-0.1 CREAM TOPICAL 4 TIMES DAILY
Qty: 1 | Refills: 0 | Status: DISCONTINUED | COMMUNITY
Start: 2020-08-28 | End: 2021-06-04

## 2021-06-04 RX ORDER — FUROSEMIDE 40 MG/1
40 TABLET ORAL DAILY
Refills: 0 | Status: ACTIVE | COMMUNITY

## 2021-06-04 RX ORDER — IPRATROPIUM BROMIDE AND ALBUTEROL SULFATE 2.5; .5 MG/3ML; MG/3ML
0.5-2.5 (3) SOLUTION RESPIRATORY (INHALATION)
Refills: 0 | Status: DISCONTINUED | COMMUNITY
Start: 2020-08-28 | End: 2021-06-04

## 2021-06-04 RX ORDER — MULTIVITAMIN
TABLET ORAL
Refills: 0 | Status: DISCONTINUED | COMMUNITY
Start: 2020-08-28 | End: 2021-06-04

## 2021-06-04 RX ORDER — PANTOPRAZOLE 40 MG/1
40 TABLET, DELAYED RELEASE ORAL
Refills: 0 | Status: ACTIVE | COMMUNITY

## 2021-06-04 RX ORDER — PANTOPRAZOLE 40 MG/1
40 TABLET, DELAYED RELEASE ORAL
Refills: 0 | Status: DISCONTINUED | COMMUNITY
Start: 2020-08-28 | End: 2021-06-04

## 2021-06-04 RX ORDER — PNV NO.95/FERROUS FUM/FOLIC AC 28MG-0.8MG
TABLET ORAL DAILY
Refills: 0 | Status: ACTIVE | COMMUNITY

## 2021-06-04 RX ORDER — POTASSIUM CHLORIDE 750 MG/1
10 TABLET, FILM COATED, EXTENDED RELEASE ORAL DAILY
Refills: 0 | Status: ACTIVE | COMMUNITY

## 2021-06-04 RX ORDER — DILTIAZEM HCL 300 MG
300 CAPSULE, EXT RELEASE 24 HR ORAL DAILY
Refills: 0 | Status: ACTIVE | COMMUNITY

## 2021-06-04 RX ORDER — WARFARIN 3 MG/1
3 TABLET ORAL
Refills: 0 | Status: DISCONTINUED | COMMUNITY
Start: 2020-08-28 | End: 2021-06-04

## 2021-06-04 RX ORDER — CLONAZEPAM 0.5 MG/1
0.5 TABLET ORAL
Refills: 0 | Status: ACTIVE | COMMUNITY

## 2021-06-04 RX ORDER — LOSARTAN POTASSIUM 50 MG/1
50 TABLET, FILM COATED ORAL
Refills: 0 | Status: DISCONTINUED | COMMUNITY
Start: 2020-08-28 | End: 2021-06-04

## 2021-06-04 RX ORDER — LEVOTHYROXINE SODIUM 50 UG/1
50 TABLET ORAL
Refills: 0 | Status: DISCONTINUED | COMMUNITY
Start: 2020-08-28 | End: 2021-06-04

## 2021-06-04 RX ORDER — PREDNISONE 10 MG/1
10 TABLET ORAL
Refills: 0 | Status: DISCONTINUED | COMMUNITY
Start: 2020-08-28 | End: 2021-06-04

## 2021-06-04 RX ORDER — FUROSEMIDE 40 MG/1
40 TABLET ORAL DAILY
Refills: 0 | Status: DISCONTINUED | COMMUNITY
Start: 2020-08-28 | End: 2021-06-04

## 2021-06-04 RX ORDER — LEVOTHYROXINE SODIUM 100 UG/1
100 TABLET ORAL DAILY
Refills: 0 | Status: ACTIVE | COMMUNITY

## 2021-06-04 RX ORDER — WARFARIN SODIUM 3 MG/1
3 TABLET ORAL DAILY
Refills: 0 | Status: ACTIVE | COMMUNITY

## 2021-06-04 RX ORDER — LOSARTAN POTASSIUM 25 MG/1
25 TABLET, FILM COATED ORAL DAILY
Refills: 0 | Status: ACTIVE | COMMUNITY

## 2021-06-04 RX ORDER — SILDENAFIL 20 MG/1
20 TABLET ORAL 3 TIMES DAILY
Refills: 0 | Status: ACTIVE | COMMUNITY

## 2021-06-04 RX ORDER — DOCUSATE SODIUM 100 MG/1
CAPSULE ORAL
Refills: 0 | Status: ACTIVE | COMMUNITY

## 2021-06-04 RX ORDER — MELATONIN 3 MG
3 CAPSULE ORAL
Refills: 0 | Status: DISCONTINUED | COMMUNITY
Start: 2020-08-28 | End: 2021-06-04

## 2021-06-04 RX ORDER — POTASSIUM CHLORIDE 750 MG/1
10 CAPSULE, EXTENDED RELEASE ORAL
Refills: 0 | Status: DISCONTINUED | COMMUNITY
Start: 2020-08-28 | End: 2021-06-04

## 2021-06-04 NOTE — PHYSICAL EXAM
[No Acute Distress] : no acute distress [Well Developed] : well developed [Chronically Ill] : chronically ill [Normal Voice Quality] : was normal [Normal Sclera/Conjunctiva] : normal sclera/conjunctiva [No JVD] : no jugular venous distention [No Tracheal Deviation] : the trachea was midline [No Respiratory Distress] : no respiratory distress  [Normal Rhythm/Effort] : normal respiratory rhythm and effort [Normal Rate] : normal rate  [Normal S1, S2] : normal S1 and S2 [Irregularly Irregular] : irregularly irregular [Pedal Pulses Present] : the pedal pulses are present [No Extremity Clubbing/Cyanosis] : no extremity clubbing/cyanosis [Non Tender] : non-tender [Soft] : abdomen soft [Normal Bowel Sounds] : normal bowel sounds [No CVA Tenderness] : no CVA  tenderness [No Joint Swelling] : no joint swelling [de-identified] : b/l slightly diminished breath sounds [de-identified] : b/l trace LE edema  [de-identified] : fungal rash under right breast

## 2021-06-04 NOTE — ASSESSMENT
[FreeTextEntry1] : Mrs. Melara is a 89 y/o woman with COPD, AFIB, HF and PULM HTN with recent admission for Heart Failure exacerbation and COPD exacerbation. She resides in a private home with [facility] care. Supportive family. Visiting MD service. She remains bedbound, encouraged OOB to chair with PT. Discussed the importance of aspiration precautions. GOC with patient's daughter, Lilliam: she states that they completed it this morning; DNR/DNI. Overall, she is clinically stable and will closely monitor.

## 2021-06-04 NOTE — PLAN
[FreeTextEntry1] : she is scheduled for blood work on 6/5/21\par -f/u with cardiologist next week\[facility] TCM STARS teaching: Advised patient to adhere to all medications including demonstrating proper use of inhalers and nebulizers. Encourage the use of proper breathing techniques such as pursed lip breathing or leaning forward during exhalation. Patient understands proper use of oxygen therapy. Increase activity as tolerated and maintain optimal activity levels. Continue coughing and deep breathing exercises including use of Incentive Spirometry. Receive routine pneumococcal and influenza vaccinations. Identify early signs and sx of disease and notify NP/MD. Maintain proper nutrition and hydration. Follow up with Pulmonologist within 7 days of discharge. Contact information given, patient advised to call with any questions/concerns. \par

## 2021-06-04 NOTE — REVIEW OF SYSTEMS
[Fatigue] : fatigue [Lower Ext Edema] : lower extremity edema [Shortness Of Breath] : shortness of breath [Wheezing] : wheezing [Incontinence] : incontinence [Skin Rash] : skin rash [Anxiety] : anxiety [Negative] : ENT

## 2021-06-04 NOTE — HISTORY OF PRESENT ILLNESS
[Post-hospitalization from ___ Hospital] : Post-hospitalization from [unfilled] Hospital [Admitted on: ___] : The patient was admitted on [unfilled] [Discharged on ___] : discharged on [unfilled] [Discharge Summary] : discharge summary [Discharge Med List] : discharge medication list [Pertinent Labs] : pertinent labs [Med Reconciliation] : medication reconciliation has been completed [Patient Contacted By: ____] : and contacted by [unfilled] [FreeTextEntry2] : As per [facility]'s hospital course authored on 5/31/21:\par "89F with a PMH of HTN, COPD on O2, Diastolic CHF, Severe Pulmonary HTN, Hypothyroidism, Atrial fibrillation on Coumadin, hx of multiple admissions for COPD and CHF exacerbation presents to the ED for dyspnea, treated for acute CHF / COPD / RLL pneumonia.  TTE showing normal EF with severe pulmonary hypertension, Grade III diastolic dysfunction"\par \par Mrs. Melara seen in the home today. She is bedbound and leaving home requires a considerable and taxing effort.\par \par \par Mrs. Melara  \par \par

## 2021-06-06 ENCOUNTER — EMERGENCY (EMERGENCY)
Facility: HOSPITAL | Age: 86
LOS: 2 days | Discharge: HOME | End: 2021-06-09
Attending: STUDENT IN AN ORGANIZED HEALTH CARE EDUCATION/TRAINING PROGRAM | Admitting: GENERAL ACUTE CARE HOSPITAL
Payer: MEDICARE

## 2021-06-06 VITALS
RESPIRATION RATE: 22 BRPM | OXYGEN SATURATION: 99 % | TEMPERATURE: 98 F | SYSTOLIC BLOOD PRESSURE: 157 MMHG | HEIGHT: 60 IN | HEART RATE: 98 BPM | DIASTOLIC BLOOD PRESSURE: 97 MMHG | WEIGHT: 139.99 LBS

## 2021-06-06 DIAGNOSIS — I50.9 HEART FAILURE, UNSPECIFIED: ICD-10-CM

## 2021-06-06 DIAGNOSIS — Z79.01 LONG TERM (CURRENT) USE OF ANTICOAGULANTS: ICD-10-CM

## 2021-06-06 DIAGNOSIS — I48.91 UNSPECIFIED ATRIAL FIBRILLATION: ICD-10-CM

## 2021-06-06 DIAGNOSIS — R06.02 SHORTNESS OF BREATH: ICD-10-CM

## 2021-06-06 DIAGNOSIS — I10 ESSENTIAL (PRIMARY) HYPERTENSION: ICD-10-CM

## 2021-06-06 DIAGNOSIS — J44.1 CHRONIC OBSTRUCTIVE PULMONARY DISEASE WITH (ACUTE) EXACERBATION: ICD-10-CM

## 2021-06-06 DIAGNOSIS — E03.9 HYPOTHYROIDISM, UNSPECIFIED: ICD-10-CM

## 2021-06-06 LAB
ALBUMIN SERPL ELPH-MCNC: 3 G/DL — LOW (ref 3.3–5)
ALP SERPL-CCNC: 59 U/L — SIGNIFICANT CHANGE UP (ref 40–120)
ALT FLD-CCNC: 13 U/L — SIGNIFICANT CHANGE UP (ref 12–78)
ANION GAP SERPL CALC-SCNC: 3 MMOL/L — LOW (ref 5–17)
APTT BLD: 31 SEC — SIGNIFICANT CHANGE UP (ref 27.5–35.5)
AST SERPL-CCNC: 19 U/L — SIGNIFICANT CHANGE UP (ref 15–37)
BASOPHILS # BLD AUTO: 0.05 K/UL — SIGNIFICANT CHANGE UP (ref 0–0.2)
BASOPHILS NFR BLD AUTO: 0.6 % — SIGNIFICANT CHANGE UP (ref 0–2)
BILIRUB SERPL-MCNC: 0.3 MG/DL — SIGNIFICANT CHANGE UP (ref 0.2–1.2)
BUN SERPL-MCNC: 29 MG/DL — HIGH (ref 7–23)
CALCIUM SERPL-MCNC: 8.7 MG/DL — SIGNIFICANT CHANGE UP (ref 8.5–10.1)
CHLORIDE SERPL-SCNC: 102 MMOL/L — SIGNIFICANT CHANGE UP (ref 96–108)
CO2 SERPL-SCNC: 36 MMOL/L — HIGH (ref 22–31)
CREAT SERPL-MCNC: 1.03 MG/DL — SIGNIFICANT CHANGE UP (ref 0.5–1.3)
EOSINOPHIL # BLD AUTO: 0.18 K/UL — SIGNIFICANT CHANGE UP (ref 0–0.5)
EOSINOPHIL NFR BLD AUTO: 2.1 % — SIGNIFICANT CHANGE UP (ref 0–6)
GLUCOSE SERPL-MCNC: 131 MG/DL — HIGH (ref 70–99)
HCT VFR BLD CALC: 29.1 % — LOW (ref 34.5–45)
HGB BLD-MCNC: 9.1 G/DL — LOW (ref 11.5–15.5)
IMM GRANULOCYTES NFR BLD AUTO: 0.1 % — SIGNIFICANT CHANGE UP (ref 0–1.5)
INR BLD: 2.18 RATIO — HIGH (ref 0.88–1.16)
LYMPHOCYTES # BLD AUTO: 1.77 K/UL — SIGNIFICANT CHANGE UP (ref 1–3.3)
LYMPHOCYTES # BLD AUTO: 20.8 % — SIGNIFICANT CHANGE UP (ref 13–44)
MCHC RBC-ENTMCNC: 31.3 GM/DL — LOW (ref 32–36)
MCHC RBC-ENTMCNC: 34.5 PG — HIGH (ref 27–34)
MCV RBC AUTO: 110.2 FL — HIGH (ref 80–100)
MONOCYTES # BLD AUTO: 0.84 K/UL — SIGNIFICANT CHANGE UP (ref 0–0.9)
MONOCYTES NFR BLD AUTO: 9.8 % — SIGNIFICANT CHANGE UP (ref 2–14)
NEUTROPHILS # BLD AUTO: 5.68 K/UL — SIGNIFICANT CHANGE UP (ref 1.8–7.4)
NEUTROPHILS NFR BLD AUTO: 66.6 % — SIGNIFICANT CHANGE UP (ref 43–77)
NRBC # BLD: 0 /100 WBCS — SIGNIFICANT CHANGE UP (ref 0–0)
NT-PROBNP SERPL-SCNC: 2925 PG/ML — HIGH (ref 0–450)
PLATELET # BLD AUTO: 174 K/UL — SIGNIFICANT CHANGE UP (ref 150–400)
POTASSIUM SERPL-MCNC: 4.3 MMOL/L — SIGNIFICANT CHANGE UP (ref 3.5–5.3)
POTASSIUM SERPL-SCNC: 4.3 MMOL/L — SIGNIFICANT CHANGE UP (ref 3.5–5.3)
PROT SERPL-MCNC: 7.6 GM/DL — SIGNIFICANT CHANGE UP (ref 6–8.3)
PROTHROM AB SERPL-ACNC: 24.4 SEC — HIGH (ref 10.6–13.6)
RBC # BLD: 2.64 M/UL — LOW (ref 3.8–5.2)
RBC # FLD: 15 % — HIGH (ref 10.3–14.5)
SODIUM SERPL-SCNC: 141 MMOL/L — SIGNIFICANT CHANGE UP (ref 135–145)
TROPONIN I SERPL-MCNC: 0.03 NG/ML — SIGNIFICANT CHANGE UP (ref 0.01–0.04)
WBC # BLD: 8.53 K/UL — SIGNIFICANT CHANGE UP (ref 3.8–10.5)
WBC # FLD AUTO: 8.53 K/UL — SIGNIFICANT CHANGE UP (ref 3.8–10.5)

## 2021-06-06 PROCEDURE — 93010 ELECTROCARDIOGRAM REPORT: CPT

## 2021-06-06 PROCEDURE — 99285 EMERGENCY DEPT VISIT HI MDM: CPT | Mod: CS

## 2021-06-06 PROCEDURE — 99284 EMERGENCY DEPT VISIT MOD MDM: CPT

## 2021-06-06 PROCEDURE — 99222 1ST HOSP IP/OBS MODERATE 55: CPT

## 2021-06-06 PROCEDURE — 71045 X-RAY EXAM CHEST 1 VIEW: CPT | Mod: 26

## 2021-06-06 RX ORDER — IPRATROPIUM/ALBUTEROL SULFATE 18-103MCG
3 AEROSOL WITH ADAPTER (GRAM) INHALATION ONCE
Refills: 0 | Status: COMPLETED | OUTPATIENT
Start: 2021-06-06 | End: 2021-06-06

## 2021-06-06 RX ORDER — LOSARTAN POTASSIUM 100 MG/1
25 TABLET, FILM COATED ORAL DAILY
Refills: 0 | Status: DISCONTINUED | OUTPATIENT
Start: 2021-06-06 | End: 2021-06-09

## 2021-06-06 RX ORDER — FUROSEMIDE 40 MG
40 TABLET ORAL ONCE
Refills: 0 | Status: COMPLETED | OUTPATIENT
Start: 2021-06-06 | End: 2021-06-06

## 2021-06-06 RX ORDER — IPRATROPIUM/ALBUTEROL SULFATE 18-103MCG
3 AEROSOL WITH ADAPTER (GRAM) INHALATION EVERY 6 HOURS
Refills: 0 | Status: DISCONTINUED | OUTPATIENT
Start: 2021-06-06 | End: 2021-06-09

## 2021-06-06 RX ORDER — LEVOTHYROXINE SODIUM 125 MCG
100 TABLET ORAL DAILY
Refills: 0 | Status: DISCONTINUED | OUTPATIENT
Start: 2021-06-06 | End: 2021-06-09

## 2021-06-06 RX ORDER — ALBUTEROL 90 UG/1
1 AEROSOL, METERED ORAL EVERY 4 HOURS
Refills: 0 | Status: DISCONTINUED | OUTPATIENT
Start: 2021-06-06 | End: 2021-06-09

## 2021-06-06 RX ORDER — PANTOPRAZOLE SODIUM 20 MG/1
40 TABLET, DELAYED RELEASE ORAL
Refills: 0 | Status: DISCONTINUED | OUTPATIENT
Start: 2021-06-06 | End: 2021-06-09

## 2021-06-06 RX ORDER — FUROSEMIDE 40 MG
60 TABLET ORAL DAILY
Refills: 0 | Status: DISCONTINUED | OUTPATIENT
Start: 2021-06-07 | End: 2021-06-07

## 2021-06-06 RX ORDER — DILTIAZEM HCL 120 MG
300 CAPSULE, EXT RELEASE 24 HR ORAL DAILY
Refills: 0 | Status: DISCONTINUED | OUTPATIENT
Start: 2021-06-06 | End: 2021-06-09

## 2021-06-06 RX ORDER — CLONAZEPAM 1 MG
1 TABLET ORAL
Refills: 0 | Status: DISCONTINUED | OUTPATIENT
Start: 2021-06-06 | End: 2021-06-09

## 2021-06-06 RX ADMIN — Medication 3 MILLILITER(S): at 20:17

## 2021-06-06 RX ADMIN — Medication 3 MILLILITER(S): at 20:35

## 2021-06-06 RX ADMIN — Medication 40 MILLIGRAM(S): at 23:09

## 2021-06-06 RX ADMIN — Medication 125 MILLIGRAM(S): at 23:09

## 2021-06-06 NOTE — ED ADULT NURSE NOTE - ED STAT RN HANDOFF DETAILS
Report endorsed to oncoming Maria D LEWIS RN. Safety checks compld this shift/Safety rounds completed hourly.  IV sites checked Q2+remains WDL. Meds given as ord with no s/s of adverse RXNs. Fall +skin precs in place. Any issues endorsed to oncoming RN for follow up.

## 2021-06-06 NOTE — ED ADULT NURSE NOTE - NSIMPLEMENTINTERV_GEN_ALL_ED
Implemented All Fall with Harm Risk Interventions:  [place] to call system. Call bell, personal items and telephone within reach. Instruct patient to call for assistance. Room bathroom lighting operational. Non-slip footwear when patient is off stretcher. Physically safe environment: no spills, clutter or unnecessary equipment. Stretcher in lowest position, wheels locked, appropriate side rails in place. Provide visual cue, wrist band, yellow gown, etc. Monitor gait and stability. Monitor for mental status changes and reorient to person, place, and time. Review medications for side effects contributing to fall risk. Reinforce activity limits and safety measures with patient and family. Provide visual clues: red socks.

## 2021-06-06 NOTE — ED ADULT NURSE NOTE - ED STAT RN HANDOFF DETAILS 2
Report received from RN at 7pm. Assessment available on KB. will continue to monitor. on cardiac monitor at bedside. awaiting doctor maren. on 10L nonrebreather

## 2021-06-06 NOTE — ED PROVIDER NOTE - MDM ORDERS SUBMITTED SELECTION
Constitutional: WDWN resting comfortably in bed; NAD  HEENT: NC/AT, PERRL, EOMI, anicteric sclera, no nasal discharge; uvula midline, no oropharyngeal erythema or exudates  Neck: supple; no JVD or thyromegaly  Respiratory: CTA B/L; no W/R/R, no retractions  Cardiac: +S1/S2; RRR; no M/R/G; PMI non-displaced  Gastrointestinal: soft, distended, tenderness in LLQ, +hypoactive BS only in LLQ, NG tube in place w 250cc output, bilious colored fluid  Extremities: , no clubbing or cyanosis; no peripheral edema  Musculoskeletal:  no joint swelling, tenderness or erythema  Vascular: 2+ radial, femoral, DP/PT pulses B/L  Skin: warm, dry and intact; no rashes, wounds, or scars  Neurologic: AAOx3; CNS grossly intact; no focal deficits    Vital Signs Last 24 Hrs  T(C): 36.7 (31 Jul 2019 15:00), Max: 36.9 (31 Jul 2019 10:22)  T(F): 98.1 (31 Jul 2019 15:00), Max: 98.5 (31 Jul 2019 10:22)  HR: 90 (31 Jul 2019 15:00) (76 - 90)  BP: 122/65 (31 Jul 2019 15:00) (108/68 - 140/84)  BP(mean): --  RR: 20 (31 Jul 2019 15:00) (20 - 29)  SpO2: 95% (31 Jul 2019 15:00) (95% - 99%) Labs/EKG/Imaging Studies

## 2021-06-06 NOTE — H&P ADULT - HISTORY OF PRESENT ILLNESS
89 y/o female w/ a PMHx of HTN, COPD, Chronic Hypoxic Respiratory failure on O2 at home, GIIIDiastolic CHF, Severe Pulmonary HTN, Hypothyroidism, Atrial fibrillation on Coumadin, Anemia, hx of multiple admissions for COPD and CHF exacerbation last admission at the end of May (also for RLL PNA) presents to the ED c/o SOB and wheezing. Pt reports she worsening SOB, stated she is just not feeling well, hx limited from patient, when asked further questions even w/ an [place] , pt kept repeating she cant breath and is not feeling well. Hx obtained from son over the phone. Per son, he pt has had worsening SOB as well as wheezing over the last 2 days. He also reported pt was unable to sleep last night due to SOB. No reports of chest pain, cough, fever or chills. Per ED EMS reported SPO2 in the 60s on arrival. Pt given Decadron and nebs by EMS.     On arrival in ED /97, RR in 20s, SPO2 98% on 100% NRB. Labs sig for H/H 9.1/29.1, BNP 2925, INR 2.1. Pt given more duonebs

## 2021-06-06 NOTE — H&P ADULT - ASSESSMENT
89 y/o female w/ a PMHx of HTN, COPD, Chronic Hypoxic Respiratory failure on O2 at home, GIIIDiastolic CHF, Severe Pulmonary HTN, Hypothyroidism, Atrial fibrillation on Coumadin, Anemia, hx of multiple admissions for COPD and CHF exacerbation last admission at the end of May (also for RLL PNA) presents to the ED c/o SOB and wheezing. Pt being admitted for Acute on chronic respiratory failure with hypoxia due to COPD Exacerbation, CHF     1) COPD Exacerbation  - s/p Solumedrol 125mg IV x 1 in ED  - place on PO Prednisone taper  - c/w supplemental oxygen; pt on 3L at home, currently at 4L w/ SPO high 80s to low 90s  - Keep SPO2 between 88 - 92%  - flu and COVID neg  - duonebs ATC and prn     2) Chronic Diastolic congestive heart failure  - b/l pleural effusions improved however still present, in sig size  - gave 1 dose of IV lasix in ED  - c/w PO lasix, inc to 60mg qd for now; wean down to home dose as pt improves      3) Severe Pulmonary hypertension.    - c/w sildenafil.    - c/w supplemental oxygen    4) Macrocytic anemia  - stable  - cont to monitor    5) Anxiety   - stable, c/w Klonopin.     6) Persistent Atrial fibrillation   - c/w Diltiazem  - c/w Coumadin, dose daily, INR therapeutic    7) Hypothyroidism  - c/w synthroid.    8) Essential hypertension.  - c/w Losartan    9) DVT ppx - INR therapeutic on Coumadin    Code status - DNR/DNI

## 2021-06-06 NOTE — ED ADULT NURSE NOTE - OBJECTIVE STATEMENT
PMH COPD. Pt BIBA c/o SOB w/ breathing treatment and NRB. Pt currently O2 sat 96% on NRB. Decadron 10 mg given in the field. Endorses headache. Denies chest pain, n/v/d and dizziness. Auditory wheezes auscultated. Pt appears to be in distress. Currently 96% on NRB 15L and NSR on tele.

## 2021-06-06 NOTE — ED PROVIDER NOTE - CLINICAL SUMMARY MEDICAL DECISION MAKING FREE TEXT BOX
multiple admissions lately for copd exacerbation. clinical picture c/w same today. Pt w/ scattered wheezes and shortness of breath. Steroids given en route, nebulizer treatments initiated here on arrival. Will draw labs, obtain cxr, and admit.

## 2021-06-06 NOTE — ED ADULT NURSE NOTE - ED STAT RN HANDOFF DETAILS 3
Report endorsed to hold RN Annamarie. Safety checks completed this shift. Safety rounds completed hourly.  IV sites checked Q2+remains WDL. Medications administered as ordered with no signs/symptoms of adverse reactions. Fall & skin precautions in place. Any issues endorsed to hold RN for follow up. Awaiting bed assignment. on nonrebreather. on cardiac monitor at bedside. awaiting EKG

## 2021-06-06 NOTE — H&P ADULT - NSHPLABSRESULTS_GEN_ALL_CORE
T(C): 36.8 (06-06-21 @ 18:09), Max: 36.8 (06-06-21 @ 18:09)  HR: 93 (06-06-21 @ 19:18) (93 - 98)  BP: 116/59 (06-06-21 @ 19:18) (116/59 - 157/97)  RR: 28 (06-06-21 @ 19:18) (22 - 28)  SpO2: 97% (06-06-21 @ 19:18) (97% - 99%)                        9.1    8.53  )-----------( 174      ( 06 Jun 2021 19:54 )             29.1     06-06    141  |  102  |  29<H>  ----------------------------<  131<H>  4.3   |  36<H>  |  1.03    Ca    8.7      06 Jun 2021 19:54    TPro  7.6  /  Alb  3.0<L>  /  TBili  0.3  /  DBili  x   /  AST  19  /  ALT  13  /  AlkPhos  59  06-06    LIVER FUNCTIONS - ( 06 Jun 2021 19:54 )  Alb: 3.0 g/dL / Pro: 7.6 gm/dL / ALK PHOS: 59 U/L / ALT: 13 U/L / AST: 19 U/L / GGT: x           PT/INR - ( 06 Jun 2021 19:54 )   PT: 24.4 sec;   INR: 2.18 ratio         PTT - ( 06 Jun 2021 19:54 )  PTT:31.0 sec        ALBUTerol    90 MICROgram(s) HFA Inhaler 1 Puff(s) Inhalation every 4 hours PRN  albuterol/ipratropium for Nebulization 3 milliLiter(s) Nebulizer every 6 hours  clonazePAM  Tablet 1 milliGRAM(s) Oral two times a day PRN  diltiazem    milliGRAM(s) Oral daily  furosemide   Injectable 40 milliGRAM(s) IV Push once  levothyroxine 100 MICROGram(s) Oral daily  losartan 25 milliGRAM(s) Oral daily  methylPREDNISolone sodium succinate Injectable 125 milliGRAM(s) IV Push once  multivitamin 1 Tablet(s) Oral daily  pantoprazole    Tablet 40 milliGRAM(s) Oral before breakfast  sildenafil (REVATIO) 20 milliGRAM(s) Oral every 8 hours T(C): 36.8 (06-06-21 @ 18:09), Max: 36.8 (06-06-21 @ 18:09)  HR: 93 (06-06-21 @ 19:18) (93 - 98)  BP: 116/59 (06-06-21 @ 19:18) (116/59 - 157/97)  RR: 28 (06-06-21 @ 19:18) (22 - 28)  SpO2: 97% (06-06-21 @ 19:18) (97% - 99%)                        9.1    8.53  )-----------( 174      ( 06 Jun 2021 19:54 )             29.1     06-06    141  |  102  |  29<H>  ----------------------------<  131<H>  4.3   |  36<H>  |  1.03    Ca    8.7      06 Jun 2021 19:54    TPro  7.6  /  Alb  3.0<L>  /  TBili  0.3  /  DBili  x   /  AST  19  /  ALT  13  /  AlkPhos  59  06-06    LIVER FUNCTIONS - ( 06 Jun 2021 19:54 )  Alb: 3.0 g/dL / Pro: 7.6 gm/dL / ALK PHOS: 59 U/L / ALT: 13 U/L / AST: 19 U/L / GGT: x           PT/INR - ( 06 Jun 2021 19:54 )   PT: 24.4 sec;   INR: 2.18 ratio         PTT - ( 06 Jun 2021 19:54 )  PTT:31.0 sec    CXR - Pre-jordan b/l pleural effusions; improved from prior    ALBUTerol    90 MICROgram(s) HFA Inhaler 1 Puff(s) Inhalation every 4 hours PRN  albuterol/ipratropium for Nebulization 3 milliLiter(s) Nebulizer every 6 hours  clonazePAM  Tablet 1 milliGRAM(s) Oral two times a day PRN  diltiazem    milliGRAM(s) Oral daily  furosemide   Injectable 40 milliGRAM(s) IV Push once  levothyroxine 100 MICROGram(s) Oral daily  losartan 25 milliGRAM(s) Oral daily  methylPREDNISolone sodium succinate Injectable 125 milliGRAM(s) IV Push once  multivitamin 1 Tablet(s) Oral daily  pantoprazole    Tablet 40 milliGRAM(s) Oral before breakfast  sildenafil (REVATIO) 20 milliGRAM(s) Oral every 8 hours

## 2021-06-06 NOTE — ED PROVIDER NOTE - OBJECTIVE STATEMENT
89F with a PMH of HTN, COPD on O2, Diastolic CHF, Severe Pulmonary HTN, Hypothyroidism, Atrial fibrillation on Coumadin, hx of multiple admissions for COPD and CHF exacerbation presents to the ED for dyspnea. Pt minimally verbal, Citizen of [place] speaking, and a difficult interview. Initial attempts made by phone to reach family unsuccessful. Pt appears wheezy and dyspneic on arrival; decadron given en route by EMS. Nebs started here.

## 2021-06-07 LAB
ANION GAP SERPL CALC-SCNC: 3 MMOL/L — LOW (ref 5–17)
BASOPHILS # BLD AUTO: 0 K/UL — SIGNIFICANT CHANGE UP (ref 0–0.2)
BASOPHILS NFR BLD AUTO: 0 % — SIGNIFICANT CHANGE UP (ref 0–2)
BUN SERPL-MCNC: 35 MG/DL — HIGH (ref 7–23)
CALCIUM SERPL-MCNC: 9 MG/DL — SIGNIFICANT CHANGE UP (ref 8.5–10.1)
CHLORIDE SERPL-SCNC: 102 MMOL/L — SIGNIFICANT CHANGE UP (ref 96–108)
CO2 SERPL-SCNC: 35 MMOL/L — HIGH (ref 22–31)
CREAT SERPL-MCNC: 1.18 MG/DL — SIGNIFICANT CHANGE UP (ref 0.5–1.3)
EOSINOPHIL # BLD AUTO: 0 K/UL — SIGNIFICANT CHANGE UP (ref 0–0.5)
EOSINOPHIL NFR BLD AUTO: 0 % — SIGNIFICANT CHANGE UP (ref 0–6)
FOLATE SERPL-MCNC: 11.9 NG/ML — SIGNIFICANT CHANGE UP
GLUCOSE SERPL-MCNC: 197 MG/DL — HIGH (ref 70–99)
HCT VFR BLD CALC: 27.3 % — LOW (ref 34.5–45)
HGB BLD-MCNC: 8.6 G/DL — LOW (ref 11.5–15.5)
INR BLD: 2.28 RATIO — HIGH (ref 0.88–1.16)
LYMPHOCYTES # BLD AUTO: 0.51 K/UL — LOW (ref 1–3.3)
LYMPHOCYTES # BLD AUTO: 12 % — LOW (ref 13–44)
MAGNESIUM SERPL-MCNC: 2.3 MG/DL — SIGNIFICANT CHANGE UP (ref 1.6–2.6)
MCHC RBC-ENTMCNC: 31.5 GM/DL — LOW (ref 32–36)
MCHC RBC-ENTMCNC: 33.9 PG — SIGNIFICANT CHANGE UP (ref 27–34)
MCV RBC AUTO: 107.5 FL — HIGH (ref 80–100)
MONOCYTES # BLD AUTO: 0.13 K/UL — SIGNIFICANT CHANGE UP (ref 0–0.9)
MONOCYTES NFR BLD AUTO: 3 % — SIGNIFICANT CHANGE UP (ref 2–14)
NEUTROPHILS # BLD AUTO: 3.5 K/UL — SIGNIFICANT CHANGE UP (ref 1.8–7.4)
NEUTROPHILS NFR BLD AUTO: 82 % — HIGH (ref 43–77)
NEUTS BAND # BLD: 1 % — SIGNIFICANT CHANGE UP (ref 0–8)
NRBC # BLD: 0 /100 — SIGNIFICANT CHANGE UP (ref 0–0)
NRBC # BLD: SIGNIFICANT CHANGE UP /100 WBCS (ref 0–0)
PHOSPHATE SERPL-MCNC: 3.6 MG/DL — SIGNIFICANT CHANGE UP (ref 2.5–4.5)
PLAT MORPH BLD: NORMAL — SIGNIFICANT CHANGE UP
PLATELET # BLD AUTO: 172 K/UL — SIGNIFICANT CHANGE UP (ref 150–400)
POTASSIUM SERPL-MCNC: 4.7 MMOL/L — SIGNIFICANT CHANGE UP (ref 3.5–5.3)
POTASSIUM SERPL-SCNC: 4.7 MMOL/L — SIGNIFICANT CHANGE UP (ref 3.5–5.3)
PROTHROM AB SERPL-ACNC: 25.4 SEC — HIGH (ref 10.6–13.6)
RAPID RVP RESULT: SIGNIFICANT CHANGE UP
RBC # BLD: 2.54 M/UL — LOW (ref 3.8–5.2)
RBC # FLD: 15 % — HIGH (ref 10.3–14.5)
RBC BLD AUTO: NORMAL — SIGNIFICANT CHANGE UP
SARS-COV-2 RNA SPEC QL NAA+PROBE: SIGNIFICANT CHANGE UP
SODIUM SERPL-SCNC: 140 MMOL/L — SIGNIFICANT CHANGE UP (ref 135–145)
VARIANT LYMPHS # BLD: 2 % — SIGNIFICANT CHANGE UP (ref 0–6)
VIT B12 SERPL-MCNC: 752 PG/ML — SIGNIFICANT CHANGE UP (ref 232–1245)
WBC # BLD: 4.22 K/UL — SIGNIFICANT CHANGE UP (ref 3.8–10.5)
WBC # FLD AUTO: 4.22 K/UL — SIGNIFICANT CHANGE UP (ref 3.8–10.5)

## 2021-06-07 PROCEDURE — 99232 SBSQ HOSP IP/OBS MODERATE 35: CPT

## 2021-06-07 RX ORDER — NYSTATIN CREAM 100000 [USP'U]/G
1 CREAM TOPICAL
Refills: 0 | Status: DISCONTINUED | OUTPATIENT
Start: 2021-06-07 | End: 2021-06-09

## 2021-06-07 RX ORDER — WARFARIN SODIUM 2.5 MG/1
3 TABLET ORAL ONCE
Refills: 0 | Status: COMPLETED | OUTPATIENT
Start: 2021-06-07 | End: 2021-06-07

## 2021-06-07 RX ORDER — FUROSEMIDE 40 MG
40 TABLET ORAL DAILY
Refills: 0 | Status: DISCONTINUED | OUTPATIENT
Start: 2021-06-08 | End: 2021-06-09

## 2021-06-07 RX ADMIN — Medication 3 MILLILITER(S): at 01:21

## 2021-06-07 RX ADMIN — PANTOPRAZOLE SODIUM 40 MILLIGRAM(S): 20 TABLET, DELAYED RELEASE ORAL at 06:04

## 2021-06-07 RX ADMIN — NYSTATIN CREAM 1 APPLICATION(S): 100000 CREAM TOPICAL at 23:55

## 2021-06-07 RX ADMIN — Medication 20 MILLIGRAM(S): at 14:37

## 2021-06-07 RX ADMIN — Medication 40 MILLIGRAM(S): at 06:04

## 2021-06-07 RX ADMIN — LOSARTAN POTASSIUM 25 MILLIGRAM(S): 100 TABLET, FILM COATED ORAL at 14:37

## 2021-06-07 RX ADMIN — Medication 3 MILLILITER(S): at 23:53

## 2021-06-07 RX ADMIN — Medication 60 MILLIGRAM(S): at 11:46

## 2021-06-07 RX ADMIN — Medication 20 MILLIGRAM(S): at 23:02

## 2021-06-07 RX ADMIN — Medication 3 MILLILITER(S): at 05:01

## 2021-06-07 RX ADMIN — Medication 2 DROP(S): at 23:55

## 2021-06-07 RX ADMIN — Medication 100 MICROGRAM(S): at 06:03

## 2021-06-07 RX ADMIN — Medication 3 MILLILITER(S): at 17:05

## 2021-06-07 RX ADMIN — WARFARIN SODIUM 3 MILLIGRAM(S): 2.5 TABLET ORAL at 23:56

## 2021-06-07 RX ADMIN — Medication 20 MILLIGRAM(S): at 06:04

## 2021-06-07 RX ADMIN — Medication 300 MILLIGRAM(S): at 11:46

## 2021-06-07 RX ADMIN — Medication 3 MILLILITER(S): at 11:42

## 2021-06-07 RX ADMIN — Medication 1 TABLET(S): at 14:37

## 2021-06-07 RX ADMIN — Medication 1 MILLIGRAM(S): at 18:47

## 2021-06-07 NOTE — PROGRESS NOTE ADULT - SUBJECTIVE AND OBJECTIVE BOX
Patient is a 89y old  Female who presents with a chief complaint of CHF, COPD Exacerbation (07 Jun 2021 16:49)      INTERVAL HPI/ OVERNIGHT EVENTS: Pt was seen and examined at bedside today, No significant overnight events, pt denies feeling sob with Nasal canula, denies wheezing or cough, c/o thirstiness      MEDICATIONS  (STANDING):  albuterol/ipratropium for Nebulization 3 milliLiter(s) Nebulizer every 6 hours  diltiazem    milliGRAM(s) Oral daily  levothyroxine 100 MICROGram(s) Oral daily  losartan 25 milliGRAM(s) Oral daily  multivitamin 1 Tablet(s) Oral daily  pantoprazole    Tablet 40 milliGRAM(s) Oral before breakfast  predniSONE   Tablet 40 milliGRAM(s) Oral daily  sildenafil (REVATIO) 20 milliGRAM(s) Oral every 8 hours  warfarin 3 milliGRAM(s) Oral once    MEDICATIONS  (PRN):  ALBUTerol    90 MICROgram(s) HFA Inhaler 1 Puff(s) Inhalation every 4 hours PRN Shortness of Breath and/or Wheezing  clonazePAM  Tablet 1 milliGRAM(s) Oral two times a day PRN ANXIETY      Allergies    No Known Allergies    Intolerances        REVIEW OF SYSTEMS:    Unable to examine due to [ ] Encephalopathy [ ] Advanced Dementia [ ] Expressive Aphasia [ ] Non-verbal patient    CONSTITUTIONAL: No fever, NO generalized weakness/Fatigue, No weight loss  EYES: No eye pain, visual disturbances, or discharge  ENMT:  No difficulty hearing, tinnitus, vertigo; No sinus or throat pain  NECK: No pain or stiffness  RESPIRATORY: No shortness of breath,  cough, wheezing, sputum or hemoptysis   CARDIOVASCULAR: No chest pain, palpitations, or leg swelling  GASTROINTESTINAL: No abdominal pain. No nausea, vomiting, diarrhea or constipation. No melena or hematochezia.  GENITOURINARY: No dysuria, frequency, hematuria, or incontinence  NEUROLOGICAL: No headaches, Dizziness, memory loss, loss of strength, numbness, or tremors  SKIN: No itching, burning, rashes, or lesions   MUSCULOSKELETAL: No joint pain or swelling; No muscle, back, or extremity pain  PSYCHIATRIC: No depression, anxiety, mood swings, or difficulty sleeping  HEME/LYMPH: No easy bruising, or bleeding gums      Vital Signs Last 24 Hrs  T(C): 36.6 (07 Jun 2021 17:29), Max: 37 (07 Jun 2021 01:13)  T(F): 97.9 (07 Jun 2021 17:29), Max: 98.6 (07 Jun 2021 01:13)  HR: 81 (07 Jun 2021 17:41) (81 - 105)  BP: 122/59 (07 Jun 2021 17:29) (100/60 - 147/71)  BP(mean): 84 (07 Jun 2021 17:29) (84 - 84)  RR: 19 (07 Jun 2021 17:29) (19 - 26)  SpO2: 93% (07 Jun 2021 17:41) (91% - 98%)    PHYSICAL EXAM:  GENERAL: NAD on NC, Anxious   HEAD:  Atraumatic, Normocephalic  EYES: conjunctiva and sclera clear  ENMT: dry mucous membranes  NECK: Supple, No JVD, Normal thyroid  CHEST/LUNG: Clear to Auscultation bilaterally; No rales, rhonchi, wheezing, or rubs  HEART: Regular rate and rhythm; No murmurs, rubs, or gallops  ABDOMEN: Soft, Nontender, Nondistended; Bowel sounds present  EXTREMITIES:  2+ Peripheral Pulses, No clubbing, cyanosis, or edema  SKIN: No rashes or lesions  NERVOUS SYSTEM:  Anxious, Alert & Oriented X2, Good concentration; generalized weakness     LABS:                        8.6    4.22  )-----------( 172      ( 07 Jun 2021 06:21 )             27.3     06-07    140  |  102  |  35<H>  ----------------------------<  197<H>  4.7   |  35<H>  |  1.18    Ca    9.0      07 Jun 2021 06:21  Phos  3.6     06-07  Mg     2.3     06-07    TPro  7.6  /  Alb  3.0<L>  /  TBili  0.3  /  DBili  x   /  AST  19  /  ALT  13  /  AlkPhos  59  06-06    PT/INR - ( 07 Jun 2021 06:21 )   PT: 25.4 sec;   INR: 2.28 ratio         PTT - ( 06 Jun 2021 19:54 )  PTT:31.0 sec    CAPILLARY BLOOD GLUCOSE              RADIOLOGY & ADDITIONAL TESTS:          Imaging Personally Reviewed:  [ ] YES  [ ] NO    Consultant(s) Notes Reviewed:  [ ] YES  [ ] NO    Care Discussed with Consultants/Other Providers [x ] YES  [ ] NO

## 2021-06-07 NOTE — CONSULT NOTE ADULT - SUBJECTIVE AND OBJECTIVE BOX
HPI:   89 y/o female w/ a PMHx of HTN, COPD, Chronic Hypoxic Respiratory failure on O2 at home, GIIIDiastolic CHF, Severe Pulmonary HTN, Hypothyroidism, Atrial fibrillation on Coumadin, Anemia, hx of multiple admissions for COPD and CHF exacerbation last admission at the end of May (also for RLL PNA) presents to the ED c/o SOB and wheezing. Pt reports she worsening SOB, stated she is just not feeling well, hx limited from patient, when asked further questions even w/ an [place] citizen , pt kept repeating she cant breath and is not feeling well. Hx obtained from son over the phone. Per son, he pt has had worsening SOB as well as wheezing over the last 2 days. He also reported pt was unable to sleep last night due to SOB. No reports of chest pain, cough, fever or chills. Per ED EMS reported SPO2 in the 60s on arrival. Pt given Decadron and nebs by EMS.     On arrival in ED /97, RR in 20s, SPO2 98% on 100% NRB. Labs sig for H/H 9.1/29.1, BNP 2925, INR 2.1. Pt given more duonebs   (06 Jun 2021 21:39)    PERTINENT PM/SXH:   COPD exacerbation    Atrial fibrillation    Hypothyroidism    Essential hypertension      No significant past surgical history      FAMILY HISTORY:      SOCIAL HISTORY:   Significant other/partner: Yes [ ]  No [ ] Children:  Yes [ ]  No [ ] Worship/Spirituality:  Substance hx: Yes[ ]  No [ ]   Tobacco hx:  Yes [ ] No [ ]   Alcohol hx: Yes [ ] No [ ]   Home Opioid hx:  Yes [ ] No [ ]  [ ] I-Stop Reference No:  Living Situation: [ ]Home  [ ]Long term care  [ ]Rehab [ ]Other    ADVANCE DIRECTIVES:    DNR  Yes    MOLST  Yes [x ] No [ ]  Living Will  Yes [ ]  No [x ]     [ ] Health Care Proxy(s)  [ x] Surrogate(s)  [ ] Guardian           Name(s): Phone Number(s):    BASELINE (I)ADL(s) (prior to admission):  [place]: [ ]Total  [ ] Moderate [x ]Dependent    Allergies    No Known Allergies    Intolerances    MEDICATIONS  (STANDING):  albuterol/ipratropium for Nebulization 3 milliLiter(s) Nebulizer every 6 hours  diltiazem    milliGRAM(s) Oral daily  furosemide    Tablet 60 milliGRAM(s) Oral daily  levothyroxine 100 MICROGram(s) Oral daily  losartan 25 milliGRAM(s) Oral daily  multivitamin 1 Tablet(s) Oral daily  pantoprazole    Tablet 40 milliGRAM(s) Oral before breakfast  predniSONE   Tablet 40 milliGRAM(s) Oral daily  sildenafil (REVATIO) 20 milliGRAM(s) Oral every 8 hours    MEDICATIONS  (PRN):  ALBUTerol    90 MICROgram(s) HFA Inhaler 1 Puff(s) Inhalation every 4 hours PRN Shortness of Breath and/or Wheezing  clonazePAM  Tablet 1 milliGRAM(s) Oral two times a day PRN ANXIETY    PRESENT SYMPTOMS: [ x]Unable to obtain due to poor mentation   Source if other than patient:  [ ]Family   [ ]Team   *Using language line  ID [id number] Lila for [place] citizen* pt is confused not attending to questions, not oriented, repeating same requests   Pain (Impact on QOL):    Location -   Severity -        Minimal acceptable level (0-10 scale):  Quality:   Onset:   Duration:                 Aggravating factors -  Relieving factors -  Radiation -    PAIN AD Score:     [email or web address]/painad.pdf (press ctrl +  left click to view)    Dyspnea:  Yes [ ] No [ ] - [ ]Mild [ ]Moderate [ ]Severe  Anxiety:    Yes [ ] No [ ] - [ ]Mild [ ]Moderate [ ]Severe  Fatigue:    Yes [ ] No [ ] - [ ]Mild [ ]Moderate [ ]Severe  Nausea:    Yes [ ] No [ ] - [ ]Mild [ ]Moderate [ ]Severe                         Loss of appetite: Yes [ ] No [ ] - [ ]Mild [ ]Moderate [ ]Severe             Constipation:  Yes [ ] No [ ] - [ ]Mild [ ]Moderate [ ]Severe  Grief: Yes [ ] No [ ]     Other Symptoms:  [ ]All other review of systems negative     Karnofsky Performance Score/Palliative Performance Status Version 2:         %    http://palliative.info/resource_material/[email or web address].pdf    PHYSICAL EXAM:  Vital Signs Last 24 Hrs  T(C): 36.4 (07 Jun 2021 11:03), Max: 37 (07 Jun 2021 01:13)  T(F): 97.6 (07 Jun 2021 11:03), Max: 98.6 (07 Jun 2021 01:13)  HR: 82 (07 Jun 2021 12:00) (82 - 105)  BP: 134/75 (07 Jun 2021 11:03) (100/60 - 157/97)  BP(mean): --  RR: 20 (07 Jun 2021 11:03) (19 - 28)  SpO2: 91% (07 Jun 2021 12:00) (91% - 99%) I&O's Summary    07 Jun 2021 07:01  -  07 Jun 2021 16:50  --------------------------------------------------------  IN: 0 mL / OUT: 1 mL / NET: -1 mL        GENERAL:  [ ]Alert  [ ]Oriented x   [ ]Lethargic  [ ]Cachexia  [ ]Unarousable  [ ]Verbal  [ ]Non-Verbal  Behavioral:   [ ] Anxiety  [ ] Delirium [ ] Agitation [ ] Other  HEENT:  [ ]Normal   [ ]Dry mouth   [ ]ET Tube/Trach  [ ]Oral lesions  PULMONARY:   [ ]Clear  [ ]Tachypnea  [ ]Audible excessive secretions   [ ]Rhonchi        [ ]Right [ ]Left [ ]Bilateral  [ ]Crackles        [ ]Right [ ]Left [ ]Bilateral  [ ]Wheezing     [ ]Right [ ]Left [ ]Bilateral  CARDIOVASCULAR:    [ ]Regular [ ]Irregular [ ]Tachy  [ ]Tho [ ]Murmur [ ]Other  GASTROINTESTINAL:  [ ]Soft  [ ]Distended   [ ]+BS  [ ]Non tender [ ]Tender  [ ]PEG [ ]OGT/ NGT  Last BM:     GENITOURINARY:  [ ]Normal [ ] Incontinent   [ ]Oliguria/Anuria   [ ]  MUSCULOSKELETAL:   [ ]Normal   [ ]Weakness  [ ]Bed/Wheelchair bound [ ]Edema  NEUROLOGIC:   [ ]No focal deficits  [ ] Cognitive impairment  [ ] Dysphagia [ ]Dysarthria [ ] Paresis [ ]Other   SKIN:   [ ]Normal   [ ]Pressure ulcer(s)  [ ]Rash    LABS:                        8.6    4.22  )-----------( 172      ( 07 Jun 2021 06:21 )             27.3   06-07    140  |  102  |  35<H>  ----------------------------<  197<H>  4.7   |  35<H>  |  1.18    Ca    9.0      07 Jun 2021 06:21  Phos  3.6     06-07  Mg     2.3     06-07    TPro  7.6  /  Alb  3.0<L>  /  TBili  0.3  /  DBili  x   /  AST  19  /  ALT  13  /  AlkPhos  59  06-06  PT/INR - ( 07 Jun 2021 06:21 )   PT: 25.4 sec;   INR: 2.28 ratio         PTT - ( 06 Jun 2021 19:54 )  PTT:31.0 sec      RADIOLOGY & ADDITIONAL STUDIES:    PROTEIN CALORIE MALNUTRITION PRESENT: [ ] Yes [ ] No  [ ] PPSV2 < or = to 30% [ ] significant weight loss  [ ] poor nutritional intake [ ] catabolic state [ ] anasarca     Albumin, Serum: 3.0 g/dL (06-06-21 @ 19:54)      REFERRALS:   [ ]Chaplaincy  [ ] Hospice  [ ]Child Life  [ ]Social Work  [ ]Case management [ ]Holistic Therapy   Goals of Care Discussion Document:  HPI:   89 y/o female w/ a PMHx of HTN, COPD, Chronic Hypoxic Respiratory failure on O2 at home, GIIIDiastolic CHF, Severe Pulmonary HTN, Hypothyroidism, Atrial fibrillation on Coumadin, Anemia, hx of multiple admissions for COPD and CHF exacerbation last admission at the end of May (also for RLL PNA) presents to the ED c/o SOB and wheezing. Pt reports she worsening SOB, stated she is just not feeling well, hx limited from patient, when asked further questions even w/ an Q[place]ari , pt kept repeating she cant breath and is not feeling well. Hx obtained from son over the phone. Per son, he pt has had worsening SOB as well as wheezing over the last 2 days. He also reported pt was unable to sleep last night due to SOB. No reports of chest pain, cough, fever or chills. Per ED EMS reported SPO2 in the 60s on arrival. Pt given Decadron and nebs by EMS.     On arrival in ED /97, RR in 20s, SPO2 98% on 100% NRB. Labs sig for H/H 9.1/29.1, BNP 2925, INR 2.1. Pt given more duonebs   (06 Jun 2021 21:39)    PERTINENT PM/SXH:   COPD exacerbation    Atrial fibrillation    Hypothyroidism    Essential hypertension      No significant past surgical history      FAMILY HISTORY:      SOCIAL HISTORY:   Significant other/partner: Yes [ ]  No [x ] Children:  Yes [x ]  No [ ] Confucianism/Spirituality: Sabianist  Substance hx: Yes[ ]  No [ ]   Tobacco hx:  Yes [ ] No [ ]   Alcohol hx: Yes [ ] No [ ]   Home Opioid hx:  Yes [ ] No [ ]  [ ] I-Stop Reference No:  Living Situation: [ ]Home  [ ]Long term care  [ ]Rehab [ ]Other    ADVANCE DIRECTIVES:    DNR  Yes COPY OF MOLST IN ALPHA    MOLST  Yes [x ] No [ ]  Living Will  Yes [ ]  No [x ]     [ ] Health Care Proxy(s)  [ x] Surrogate(s)  [ ] Guardian           Name(s): Phone Number(s):dtr Lilliam Naidu [phone] son Candelario Melara     BASELINE (I)ADL(s) (prior to admission):  [place]: [ ]Total  [ ] Moderate [x ]Dependent    Allergies    No Known Allergies    Intolerances    MEDICATIONS  (STANDING):  albuterol/ipratropium for Nebulization 3 milliLiter(s) Nebulizer every 6 hours  diltiazem    milliGRAM(s) Oral daily  furosemide    Tablet 60 milliGRAM(s) Oral daily  levothyroxine 100 MICROGram(s) Oral daily  losartan 25 milliGRAM(s) Oral daily  multivitamin 1 Tablet(s) Oral daily  pantoprazole    Tablet 40 milliGRAM(s) Oral before breakfast  predniSONE   Tablet 40 milliGRAM(s) Oral daily  sildenafil (REVATIO) 20 milliGRAM(s) Oral every 8 hours    MEDICATIONS  (PRN):  ALBUTerol    90 MICROgram(s) HFA Inhaler 1 Puff(s) Inhalation every 4 hours PRN Shortness of Breath and/or Wheezing  clonazePAM  Tablet 1 milliGRAM(s) Oral two times a day PRN ANXIETY    PRESENT SYMPTOMS: [ x]Unable to obtain due to poor mentation   Source if other than patient:  [ ]Family   [ ]Team   *Using language line  ID [id number] Lila for [place]* pt is confused not attending to questions, not oriented, repeating same requests   Pain (Impact on QOL):    Location -   Severity -        Minimal acceptable level (0-10 scale):  Quality:   Onset:   Duration:                 Aggravating factors -  Relieving factors -  Radiation -    PAIN AD Score:     [email or web address].[email or web address]/cog/painad.pdf (press ctrl +  left click to view)    Dyspnea:  Yes [ ] No [ ] - [ ]Mild [ ]Moderate [ ]Severe  Anxiety:    Yes [ ] No [ ] - [ ]Mild [ ]Moderate [ ]Severe  Fatigue:    Yes [ ] No [ ] - [ ]Mild [ ]Moderate [ ]Severe  Nausea:    Yes [ ] No [ ] - [ ]Mild [ ]Moderate [ ]Severe                         Loss of appetite: Yes [ ] No [ ] - [ ]Mild [ ]Moderate [ ]Severe             Constipation:  Yes [ ] No [ ] - [ ]Mild [ ]Moderate [ ]Severe  Grief: Yes [ ] No [ ]     Other Symptoms:  [ ]All other review of systems negative     Karnofsky Performance Score/Palliative Performance Status Version 2:         %    http://palliative.info/resource_material/[email or web address].pdf    PHYSICAL EXAM:  Vital Signs Last 24 Hrs  T(C): 36.4 (07 Jun 2021 11:03), Max: 37 (07 Jun 2021 01:13)  T(F): 97.6 (07 Jun 2021 11:03), Max: 98.6 (07 Jun 2021 01:13)  HR: 82 (07 Jun 2021 12:00) (82 - 105)  BP: 134/75 (07 Jun 2021 11:03) (100/60 - 157/97)  BP(mean): --  RR: 20 (07 Jun 2021 11:03) (19 - 28)  SpO2: 91% (07 Jun 2021 12:00) (91% - 99%) I&O's Summary    07 Jun 2021 07:01  -  07 Jun 2021 16:50  --------------------------------------------------------  IN: 0 mL / OUT: 1 mL / NET: -1 mL        GENERAL:  [ ]Alert  [ ]Oriented x   [ ]Lethargic  [ ]Cachexia  [ ]Unarousable  [ ]Verbal  [ ]Non-Verbal  Behavioral:   [ ] Anxiety  [ ] Delirium [ ] Agitation [ ] Other  HEENT:  [ ]Normal   [ ]Dry mouth   [ ]ET Tube/Trach  [ ]Oral lesions  PULMONARY:   [ ]Clear  [ ]Tachypnea  [ ]Audible excessive secretions   [ ]Rhonchi        [ ]Right [ ]Left [ ]Bilateral  [ ]Crackles        [ ]Right [ ]Left [ ]Bilateral  [ ]Wheezing     [ ]Right [ ]Left [ ]Bilateral  CARDIOVASCULAR:    [ ]Regular [ ]Irregular [ ]Tachy  [ ]Tho [ ]Murmur [ ]Other  GASTROINTESTINAL:  [ ]Soft  [ ]Distended   [ ]+BS  [ ]Non tender [ ]Tender  [ ]PEG [ ]OGT/ NGT  Last BM:     GENITOURINARY:  [ ]Normal [ ] Incontinent   [ ]Oliguria/Anuria   [ ]  MUSCULOSKELETAL:   [ ]Normal   [ ]Weakness  [ ]Bed/Wheelchair bound [ ]Edema  NEUROLOGIC:   [ ]No focal deficits  [ ] Cognitive impairment  [ ] Dysphagia [ ]Dysarthria [ ] Paresis [ ]Other   SKIN:   [ ]Normal   [ ]Pressure ulcer(s)  [ ]Rash    LABS:                        8.6    4.22  )-----------( 172      ( 07 Jun 2021 06:21 )             27.3   06-07    140  |  102  |  35<H>  ----------------------------<  197<H>  4.7   |  35<H>  |  1.18    Ca    9.0      07 Jun 2021 06:21  Phos  3.6     06-07  Mg     2.3     06-07    TPro  7.6  /  Alb  3.0<L>  /  TBili  0.3  /  DBili  x   /  AST  19  /  ALT  13  /  AlkPhos  59  06-06  PT/INR - ( 07 Jun 2021 06:21 )   PT: 25.4 sec;   INR: 2.28 ratio         PTT - ( 06 Jun 2021 19:54 )  PTT:31.0 sec      RADIOLOGY & ADDITIONAL STUDIES:    PROTEIN CALORIE MALNUTRITION PRESENT: [ ] Yes [ ] No  [ ] PPSV2 < or = to 30% [ ] significant weight loss  [ ] poor nutritional intake [ ] catabolic state [ ] anasarca     Albumin, Serum: 3.0 g/dL (06-06-21 @ 19:54)      REFERRALS:   [ ]Chaplaincy  [ ] Hospice  [ ]Child Life  [ ]Social Work  [ ]Case management [ ]Holistic Therapy   Goals of Care Discussion Document:  HPI:   89 y/o female w/ a PMHx of HTN, COPD, Chronic Hypoxic Respiratory failure on O2 at home, GIIIDiastolic CHF, Severe Pulmonary HTN, Hypothyroidism, Atrial fibrillation on Coumadin, Anemia, hx of multiple admissions for COPD and CHF exacerbation last admission at the end of May (also for RLL PNA) presents to the ED c/o SOB and wheezing. Pt reports she worsening SOB, stated she is just not feeling well, hx limited from patient, when asked further questions even w/ an [place] , pt kept repeating she cant breath and is not feeling well. Hx obtained from son over the phone. Per son, he pt has had worsening SOB as well as wheezing over the last 2 days. He also reported pt was unable to sleep last night due to SOB. No reports of chest pain, cough, fever or chills. Per ED EMS reported SPO2 in the 60s on arrival. Pt given Decadron and nebs by EMS.     On arrival in ED /97, RR in 20s, SPO2 98% on 100% NRB. Labs sig for H/H 9.1/29.1, BNP 2925, INR 2.1. Pt given more duonebs   (06 Jun 2021 21:39)    PERTINENT PM/SXH:   COPD exacerbation    Atrial fibrillation    Hypothyroidism    Essential hypertension      No significant past surgical history      FAMILY HISTORY:      SOCIAL HISTORY:   Significant other/partner: Yes [ ]  No [x ] Children:  Yes [x ]  No [ ] Buddhist/Spirituality: Anglican  Substance hx: Yes[ ]  No [ ]   Tobacco hx:  Yes [ ] No [ ]   Alcohol hx: Yes [ ] No [ ]   Home Opioid hx:  Yes [ ] No [ ]  [x ] I-Stop Reference No:[id number]    Others' Prescriptions  Patient Name: Milly Tariq Date: 01/25/1929  Address: [address] [place] [id number]: Female  Rx Written	Rx Dispensed	Drug	Quantity	Days Supply	Prescriber Name	Prescriber Nita #	Payment Method  07/08/2020	07/08/2020	alprazolam 0.25 mg tablet	5	5	Tatianna Canchola C Cnn-NP FNP-Bc	[id number]	Cash  Living Situation: [x ]Home  [ ]Long term care  [ ]Rehab [ ]Other    ADVANCE DIRECTIVES:    DNR  Yes COPY OF MOLST IN ALPHA- printed in chart     MOLST  Yes [x ] No [ ]  Living Will  Yes [ ]  No [x ]     [ ] Health Care Proxy(s)  [ x] Surrogate(s)  [ ] Guardian           Name(s): Phone Number(s):dtnj Naidu [phone] keeley Melara     BASELINE (I)ADL(s) (prior to admission):  [place]: [ ]Total  [ ] Moderate [x ]Dependent    Allergies    No Known Allergies    Intolerances    MEDICATIONS  (STANDING):  albuterol/ipratropium for Nebulization 3 milliLiter(s) Nebulizer every 6 hours  diltiazem    milliGRAM(s) Oral daily  furosemide    Tablet 60 milliGRAM(s) Oral daily  levothyroxine 100 MICROGram(s) Oral daily  losartan 25 milliGRAM(s) Oral daily  multivitamin 1 Tablet(s) Oral daily  pantoprazole    Tablet 40 milliGRAM(s) Oral before breakfast  predniSONE   Tablet 40 milliGRAM(s) Oral daily  sildenafil (REVATIO) 20 milliGRAM(s) Oral every 8 hours    MEDICATIONS  (PRN):  ALBUTerol    90 MICROgram(s) HFA Inhaler 1 Puff(s) Inhalation every 4 hours PRN Shortness of Breath and/or Wheezing  clonazePAM  Tablet 1 milliGRAM(s) Oral two times a day PRN ANXIETY    PRESENT SYMPTOMS: [ x]Unable to obtain due to poor mentation   Source if other than patient:  [ ]Family   [ ]Team   *Using language line  ID [id number] Lila for Danish* pt is confused not attending to questions, not oriented, repeating same requests   Pain (Impact on QOL):    Location -   Severity -        Minimal acceptable level (0-10 scale):  Quality:   Onset:   Duration:                 Aggravating factors -  Relieving factors -  Radiation -    PAIN AD Score: 7    http://geriatrictoolkit.[employer]/cog/painad.pdf (press ctrl +  left click to view)  pt able to state she can't breathe and she is nervous   Dyspnea:  Yes [ x] No [ ] - [ ]Mild [ ]Moderate [x ]Severe  Anxiety:    Yes [x ] No [ ] - [ ]Mild [ ]Moderate [x ]Severe  Fatigue:    Yes [ ] No [ ] - [ ]Mild [ ]Moderate [ ]Severe  Nausea:    Yes [ ] No [ ] - [ ]Mild [ ]Moderate [ ]Severe                         Loss of appetite: Yes [ ] No [ ] - [ ]Mild [ ]Moderate [ ]Severe             Constipation:  Yes [ ] No [ ] - [ ]Mild [ ]Moderate [ ]Severe  Grief: Yes [ ] No [ ]     Other Symptoms:  [ ]All other review of systems negative     Karnofsky Performance Score/Palliative Performance Status Version 2:        30 %    http://palliative.info/resource_material/PPSv2.pdf    PHYSICAL EXAM:  Vital Signs Last 24 Hrs  T(C): 36.4 (07 Jun 2021 11:03), Max: 37 (07 Jun 2021 01:13)  T(F): 97.6 (07 Jun 2021 11:03), Max: 98.6 (07 Jun 2021 01:13)  HR: 82 (07 Jun 2021 12:00) (82 - 105)  BP: 134/75 (07 Jun 2021 11:03) (100/60 - 157/97)  BP(mean): --  RR: 20 (07 Jun 2021 11:03) (19 - 28)  SpO2: 91% (07 Jun 2021 12:00) (91% - 99%) I&O's Summary    07 Jun 2021 07:01  -  07 Jun 2021 16:50  --------------------------------------------------------  IN: 0 mL / OUT: 1 mL / NET: -1 mL        GENERAL:  [ x]Alert  [x ]Oriented x  2 [ ]Lethargic  [ ]Cachexia  [ ]Unarousable  [x ]Verbal  [ ]Non-Verbal  Behavioral:   [x ] Anxiety  [ x] Delirium [ ] Agitation [ ] Other  HEENT:  [ ]Normal   [x ]Dry mouth   [ ]ET Tube/Trach  [ ]Oral lesions  PULMONARY:   [ ]Clear  [x ]Tachypnea  [ ]Audible excessive secretions   [ ]Rhonchi        [ ]Right [ ]Left [ ]Bilateral  [x ]Crackles        [ ]Right [ ]Left [x ]Bilateral base poor air entry  [ ]Wheezing     [ ]Right [ ]Left [ ]Bilateral  CARDIOVASCULAR:    [ ]Regular [x ]Irregular [ ]Tachy  [ ]Tho [ ]Murmur [ ]Other  GASTROINTESTINAL:  [x ]Soft  [ ]Distended   [ x]+BS  [x ]Non tender [ ]Tender  [ ]PEG [ ]OGT/ NGT  Last BM: 6/7    GENITOURINARY:  [ ]Normal [x ] Incontinent   [ ]Oliguria/Anuria   [ ]  MUSCULOSKELETAL:   [ ]Normal   [x ]Weakness  [ ]Bed/Wheelchair bound [ ]Edema  NEUROLOGIC:   [ ]No focal deficits  [x ] Cognitive impairment  [ ] Dysphagia [ ]Dysarthria [ ] Paresis [ ]Other   SKIN:   [ x]Normal   [ ]Pressure ulcer(s)  [ ]Rash    LABS:                        8.6    4.22  )-----------( 172      ( 07 Jun 2021 06:21 )             27.3   06-07    140  |  102  |  35<H>  ----------------------------<  197<H>  4.7   |  35<H>  |  1.18    Ca    9.0      07 Jun 2021 06:21  Phos  3.6     06-07  Mg     2.3     06-07    TPro  7.6  /  Alb  3.0<L>  /  TBili  0.3  /  DBili  x   /  AST  19  /  ALT  13  /  AlkPhos  59  06-06  PT/INR - ( 07 Jun 2021 06:21 )   PT: 25.4 sec;   INR: 2.28 ratio         PTT - ( 06 Jun 2021 19:54 )  PTT:31.0 sec      RADIOLOGY & ADDITIONAL STUDIES:   < from: Xray Chest 1 View- PORTABLE-Urgent (Xray Chest 1 View- PORTABLE-Urgent .) (06.06.21 @ 19:30) >    EXAM:  XR CHEST PORTABLE URGENT 1V                            PROCEDURE DATE:  06/06/2021          INTERPRETATION:  AP semierect chest on June 6, 2021 at 7:25 PM. Patient is short of breath.    Heart is enlarged. Sternotomy again noted.    Bilateral effusions right greater than left are noted.    Left effusion is increased from May 27. Otherwise no change.    IMPRESSION: Bilateral effusions as above likely congestive.            MCKENNA RANDOLPH MD; Attending Radiologist  This document has been electronically signed. Jun 7 2021 11:38AM    < end of copied text >  < from: CT Chest No Cont (05.28.21 @ 11:08) >  EXAM:  CT CHEST                            PROCEDURE DATE:  05/28/2021          INTERPRETATION:  CLINICAL INFORMATION: Shortness of breath    COMPARISON: 5/12/2021    CONTRAST/COMPLICATIONS:  IV Contrast: None  Oral Contrast: None  Complications: None    PROCEDURE:  CT of the Chest was performed.  Sagittal and coronal reformats were performed.    FINDINGS:    LUNGS AND AIRWAYS: Mucus or debris within distal branches of the lower lobe bronchi.  Bilateral compressive atelectasis.  PLEURA: Moderate right and small left pleural effusions, mildly increased on the left.  MEDIASTINUM AND URBAN: Prominent lymph nodes.  VESSELS: Atherosclerotic calcifications of thoracic aorta and coronary arteries.  HEART: Cardiomegaly. Aortic valve calcifications. No pericardial effusion.  CHEST WALL AND LOWER NECK: Sternotomy.  VISUALIZED UPPER ABDOMEN: Renal hypodensities, possibly cysts. Colonic diverticulosis.  BONES: Degenerative changes.    IMPRESSION:  Moderate right pleural effusion, stable.  Small leftpleural effusion, mildly increased.            DARRIUS BOURGEOIS MD; Attending Radiologist  This document has been electronically signed. May 28 2021 11:50AM    < end of copied text >    PROTEIN CALORIE MALNUTRITION PRESENT: [ ] Yes [x ] No  [ ] PPSV2 < or = to 30% [ ] significant weight loss  [ ] poor nutritional intake [ ] catabolic state [ ] anasarca     Albumin, Serum: 3.0 g/dL (06-06-21 @ 19:54)      REFERRALS:   [ ]Chaplaincy  [ ] Hospice  [ ]Child Life  [ ]Social Work  [ ]Case management [ ]Holistic Therapy   Goals of Care Discussion Document:

## 2021-06-07 NOTE — CONSULT NOTE ADULT - PROBLEM SELECTOR RECOMMENDATION 9
[facility], anticoag on hold   INR therapeutic pt has recurrent admissions for acute dyspnea exacerbations of both CHF and COPD which are escalated by her anxiety related to her breathing  pt is on nasal oxygen, BDs, steroids   calling out saying she can't breathe her lungs are blocked and she needs help out of bed and no one is helping her. she does not understand she is in hospital (per )  can add low dose morphine to assist with dyspnea that is otherwise not helped by convential breathing treatments nor her use of BZDs  will order mophine 0.5mg IV q4PRN dyspnea

## 2021-06-07 NOTE — CONSULT NOTE ADULT - PROBLEM SELECTOR RECOMMENDATION 10
call placed to pt's daughter Lilliam - [phone]- message left.   will follow up  need to discuss end stage state of disease and possible hospice, do not rehospitalize

## 2021-06-07 NOTE — CONSULT NOTE ADULT - PROBLEM SELECTOR RECOMMENDATION 5
frequent exacerbations with need for hospitalization  end stage, oxygen dependent  pt has MOLST with only DNR/I filled out- will need to discuss other goals including rehospitalization and possible hospice care with family '

## 2021-06-07 NOTE — CONSULT NOTE ADULT - ASSESSMENT
89 y/o female w/ a PMHx of HTN, COPD, Chronic Hypoxic Respiratory failure on O2 at home, GIIIDiastolic CHF, Severe Pulmonary HTN, Hypothyroidism, Atrial fibrillation on Coumadin, Anemia with recurrent admission for dyspnea admitted for same. GOC are known, pt has a MOLST with DNR/I.  Palliative Care consulted for assistance with symptoms.

## 2021-06-08 DIAGNOSIS — R06.02 SHORTNESS OF BREATH: ICD-10-CM

## 2021-06-08 DIAGNOSIS — I48.11 LONGSTANDING PERSISTENT ATRIAL FIBRILLATION: ICD-10-CM

## 2021-06-08 DIAGNOSIS — E03.9 HYPOTHYROIDISM, UNSPECIFIED: ICD-10-CM

## 2021-06-08 DIAGNOSIS — R41.0 DISORIENTATION, UNSPECIFIED: ICD-10-CM

## 2021-06-08 DIAGNOSIS — Z51.5 ENCOUNTER FOR PALLIATIVE CARE: ICD-10-CM

## 2021-06-08 DIAGNOSIS — I50.32 CHRONIC DIASTOLIC (CONGESTIVE) HEART FAILURE: ICD-10-CM

## 2021-06-08 DIAGNOSIS — J44.9 CHRONIC OBSTRUCTIVE PULMONARY DISEASE, UNSPECIFIED: ICD-10-CM

## 2021-06-08 DIAGNOSIS — R53.81 OTHER MALAISE: ICD-10-CM

## 2021-06-08 DIAGNOSIS — F41.9 ANXIETY DISORDER, UNSPECIFIED: ICD-10-CM

## 2021-06-08 DIAGNOSIS — I10 ESSENTIAL (PRIMARY) HYPERTENSION: ICD-10-CM

## 2021-06-08 LAB
ANION GAP SERPL CALC-SCNC: 5 MMOL/L — SIGNIFICANT CHANGE UP (ref 5–17)
BUN SERPL-MCNC: 52 MG/DL — HIGH (ref 7–23)
CALCIUM SERPL-MCNC: 8.8 MG/DL — SIGNIFICANT CHANGE UP (ref 8.5–10.1)
CHLORIDE SERPL-SCNC: 103 MMOL/L — SIGNIFICANT CHANGE UP (ref 96–108)
CO2 SERPL-SCNC: 32 MMOL/L — HIGH (ref 22–31)
CREAT SERPL-MCNC: 1.49 MG/DL — HIGH (ref 0.5–1.3)
GLUCOSE SERPL-MCNC: 121 MG/DL — HIGH (ref 70–99)
HCT VFR BLD CALC: 27 % — LOW (ref 34.5–45)
HGB BLD-MCNC: 8.5 G/DL — LOW (ref 11.5–15.5)
INR BLD: 2.16 RATIO — HIGH (ref 0.88–1.16)
MCHC RBC-ENTMCNC: 31.5 GM/DL — LOW (ref 32–36)
MCHC RBC-ENTMCNC: 34 PG — SIGNIFICANT CHANGE UP (ref 27–34)
MCV RBC AUTO: 108 FL — HIGH (ref 80–100)
NRBC # BLD: 0 /100 WBCS — SIGNIFICANT CHANGE UP (ref 0–0)
PLATELET # BLD AUTO: 186 K/UL — SIGNIFICANT CHANGE UP (ref 150–400)
POTASSIUM SERPL-MCNC: 3.8 MMOL/L — SIGNIFICANT CHANGE UP (ref 3.5–5.3)
POTASSIUM SERPL-SCNC: 3.8 MMOL/L — SIGNIFICANT CHANGE UP (ref 3.5–5.3)
PROTHROM AB SERPL-ACNC: 24.2 SEC — HIGH (ref 10.6–13.6)
RBC # BLD: 2.5 M/UL — LOW (ref 3.8–5.2)
RBC # FLD: 15.3 % — HIGH (ref 10.3–14.5)
SODIUM SERPL-SCNC: 140 MMOL/L — SIGNIFICANT CHANGE UP (ref 135–145)
WBC # BLD: 7.18 K/UL — SIGNIFICANT CHANGE UP (ref 3.8–10.5)
WBC # FLD AUTO: 7.18 K/UL — SIGNIFICANT CHANGE UP (ref 3.8–10.5)

## 2021-06-08 PROCEDURE — 99221 1ST HOSP IP/OBS SF/LOW 40: CPT | Mod: AI

## 2021-06-08 PROCEDURE — 99282 EMERGENCY DEPT VISIT SF MDM: CPT

## 2021-06-08 RX ORDER — ACETAMINOPHEN 500 MG
650 TABLET ORAL EVERY 6 HOURS
Refills: 0 | Status: DISCONTINUED | OUTPATIENT
Start: 2021-06-08 | End: 2021-06-09

## 2021-06-08 RX ORDER — WARFARIN SODIUM 2.5 MG/1
3 TABLET ORAL ONCE
Refills: 0 | Status: COMPLETED | OUTPATIENT
Start: 2021-06-08 | End: 2021-06-08

## 2021-06-08 RX ORDER — MORPHINE SULFATE 50 MG/1
0.5 CAPSULE, EXTENDED RELEASE ORAL EVERY 4 HOURS
Refills: 0 | Status: DISCONTINUED | OUTPATIENT
Start: 2021-06-08 | End: 2021-06-09

## 2021-06-08 RX ADMIN — Medication 650 MILLIGRAM(S): at 22:15

## 2021-06-08 RX ADMIN — Medication 20 MILLIGRAM(S): at 22:17

## 2021-06-08 RX ADMIN — Medication 650 MILLIGRAM(S): at 23:10

## 2021-06-08 RX ADMIN — Medication 20 MILLIGRAM(S): at 05:47

## 2021-06-08 RX ADMIN — NYSTATIN CREAM 1 APPLICATION(S): 100000 CREAM TOPICAL at 05:48

## 2021-06-08 RX ADMIN — Medication 300 MILLIGRAM(S): at 05:47

## 2021-06-08 RX ADMIN — Medication 20 MILLIGRAM(S): at 15:50

## 2021-06-08 RX ADMIN — Medication 650 MILLIGRAM(S): at 12:07

## 2021-06-08 RX ADMIN — Medication 650 MILLIGRAM(S): at 13:04

## 2021-06-08 RX ADMIN — NYSTATIN CREAM 1 APPLICATION(S): 100000 CREAM TOPICAL at 17:29

## 2021-06-08 RX ADMIN — Medication 3 MILLILITER(S): at 17:09

## 2021-06-08 RX ADMIN — Medication 3 MILLILITER(S): at 11:12

## 2021-06-08 RX ADMIN — Medication 3 MILLILITER(S): at 05:20

## 2021-06-08 RX ADMIN — LOSARTAN POTASSIUM 25 MILLIGRAM(S): 100 TABLET, FILM COATED ORAL at 05:48

## 2021-06-08 RX ADMIN — Medication 1 TABLET(S): at 12:07

## 2021-06-08 RX ADMIN — Medication 100 MICROGRAM(S): at 05:48

## 2021-06-08 RX ADMIN — PANTOPRAZOLE SODIUM 40 MILLIGRAM(S): 20 TABLET, DELAYED RELEASE ORAL at 06:33

## 2021-06-08 RX ADMIN — Medication 40 MILLIGRAM(S): at 05:47

## 2021-06-08 RX ADMIN — Medication 40 MILLIGRAM(S): at 05:48

## 2021-06-08 RX ADMIN — Medication 1 MILLIGRAM(S): at 22:17

## 2021-06-08 RX ADMIN — WARFARIN SODIUM 3 MILLIGRAM(S): 2.5 TABLET ORAL at 22:16

## 2021-06-08 NOTE — HOSPICE CARE NOTE - CONVESATION DETAILS
Telephone call to son Candelario. Discussed hospice services . He asked that writer call his sister Lilliam to discuss and have her sign consents.   Telephone call to Lilliam who said that she will not take responsibility to discuss and sign consents for hospice as her brother Candelario is in charge of the finances and making  decision for hospice. NEELAM De Luna informed . Dr. Ho called this writer and writer explained the same.

## 2021-06-08 NOTE — PROGRESS NOTE ADULT - SUBJECTIVE AND OBJECTIVE BOX
Patient is a 89y old  Female who presents with a chief complaint of CHF, COPD Exacerbation (07 Jun 2021 19:28)      INTERVAL HPI/ OVERNIGHT EVENTS: Pt was seen and examined at bedside today, No significant overnight events, pt denies any SOB on nasal canula, admits to feeling anxious      MEDICATIONS  (STANDING):  albuterol/ipratropium for Nebulization 3 milliLiter(s) Nebulizer every 6 hours  diltiazem    milliGRAM(s) Oral daily  furosemide    Tablet 40 milliGRAM(s) Oral daily  levothyroxine 100 MICROGram(s) Oral daily  losartan 25 milliGRAM(s) Oral daily  multivitamin 1 Tablet(s) Oral daily  nystatin Powder 1 Application(s) Topical two times a day  pantoprazole    Tablet 40 milliGRAM(s) Oral before breakfast  predniSONE   Tablet 40 milliGRAM(s) Oral daily  sildenafil (REVATIO) 20 milliGRAM(s) Oral every 8 hours  warfarin 3 milliGRAM(s) Oral once    MEDICATIONS  (PRN):  acetaminophen   Tablet .. 650 milliGRAM(s) Oral every 6 hours PRN Mild Pain (1 - 3)  ALBUTerol    90 MICROgram(s) HFA Inhaler 1 Puff(s) Inhalation every 4 hours PRN Shortness of Breath and/or Wheezing  artificial  tears Solution 2 Drop(s) Both EYES five times a day PRN Dry Eyes  clonazePAM  Tablet 1 milliGRAM(s) Oral two times a day PRN ANXIETY      Allergies    No Known Allergies    Intolerances        REVIEW OF SYSTEMS:    Unable to examine due to [ ] Encephalopathy [ ] Advanced Dementia [ ] Expressive Aphasia [ ] Non-verbal patient    CONSTITUTIONAL: No fever, NO generalized weakness/Fatigue, No weight loss  EYES: No eye pain, visual disturbances, or discharge  ENMT:  No difficulty hearing, tinnitus, vertigo; No sinus or throat pain  NECK: No pain or stiffness  RESPIRATORY: No shortness of breath,  cough, wheezing, sputum or hemoptysis   CARDIOVASCULAR: No chest pain, palpitations, or leg swelling  GASTROINTESTINAL: No abdominal pain. No nausea, vomiting, diarrhea or constipation. No melena or hematochezia.  GENITOURINARY: No dysuria, frequency, hematuria, or incontinence  NEUROLOGICAL: No headaches, Dizziness, memory loss, loss of strength, numbness, or tremors  SKIN: No itching, burning, rashes, or lesions   MUSCULOSKELETAL: No joint pain or swelling; No muscle, back, or extremity pain  PSYCHIATRIC: No depression, anxiety, mood swings, or difficulty sleeping  HEME/LYMPH: No easy bruising, or bleeding gums      Vital Signs Last 24 Hrs  T(C): 36.3 (08 Jun 2021 16:44), Max: 36.6 (07 Jun 2021 17:29)  T(F): 97.4 (08 Jun 2021 16:44), Max: 97.9 (07 Jun 2021 17:29)  HR: 73 (08 Jun 2021 16:44) (63 - 93)  BP: 115/74 (08 Jun 2021 16:44) (113/73 - 127/61)  BP(mean): 84 (07 Jun 2021 17:29) (84 - 84)  RR: 17 (08 Jun 2021 16:44) (17 - 19)  SpO2: 93% (08 Jun 2021 16:44) (91% - 94%)    PHYSICAL EXAM:  GENERAL: NAD on NC, Anxious   HEAD:  Atraumatic, Normocephalic  EYES: conjunctiva and sclera clear  ENMT: dry mucous membranes  NECK: Supple, No JVD, Normal thyroid  CHEST/LUNG: Clear to Auscultation bilaterally; No rales, rhonchi, wheezing, or rubs  HEART: Regular rate and rhythm; No murmurs, rubs, or gallops  ABDOMEN: Soft, Nontender, Nondistended; Bowel sounds present  EXTREMITIES:  2+ Peripheral Pulses, No clubbing, cyanosis, or edema  SKIN: No rashes or lesions  NERVOUS SYSTEM:  Anxious, Alert & Oriented X2, Good concentration; generalized weakness     LABS:                        8.5    7.18  )-----------( 186      ( 08 Jun 2021 06:19 )             27.0     06-08    140  |  103  |  52<H>  ----------------------------<  121<H>  3.8   |  32<H>  |  1.49<H>    Ca    8.8      08 Jun 2021 06:19  Phos  3.6     06-07  Mg     2.3     06-07    TPro  7.6  /  Alb  3.0<L>  /  TBili  0.3  /  DBili  x   /  AST  19  /  ALT  13  /  AlkPhos  59  06-06    PT/INR - ( 08 Jun 2021 06:19 )   PT: 24.2 sec;   INR: 2.16 ratio         PTT - ( 06 Jun 2021 19:54 )  PTT:31.0 sec    CAPILLARY BLOOD GLUCOSE              RADIOLOGY & ADDITIONAL TESTS:          Imaging Personally Reviewed:  [ ] YES  [ ] NO    Consultant(s) Notes Reviewed:  [ ] YES  [ ] NO    Care Discussed with Consultants/Other Providers [x ] YES  [ ] NO

## 2021-06-08 NOTE — PROGRESS NOTE ADULT - ASSESSMENT
89 y/o female w/ a PMHx of HTN, COPD, Chronic Hypoxic Respiratory failure on O2 at home, GIIIDiastolic CHF, Severe Pulmonary HTN, Hypothyroidism, Atrial fibrillation on Coumadin, Anemia, hx of multiple admissions for COPD and CHF exacerbation last admission at the end of May (also for RLL PNA) presents to the ED c/o SOB and wheezing. Pt being admitted for Acute on chronic respiratory failure with hypoxia due to COPD Exacerbation, CHF     1) COPD Exacerbation  - s/p Solumedrol 125mg IV x 1 in ED  - cont w/ tapering Oral steroids, and duo-nebs   - pt does not tolerate Symbicort      2) Chronic Diastolic congestive heart failure  - b/l pleural effusions improved  - gave 1 dose of IV lasix in ED  - c/w PO lasix       3) Severe Pulmonary hypertension.    - c/w sildenafil.    - c/w supplemental oxygen    4) Macrocytic anemia  - stable  - cont to monitor    5) Anxiety   - stable, c/w Klonopin.     6) Persistent Atrial fibrillation   - c/w Diltiazem  - c/w Coumadin, dose daily, INR therapeutic    7) Hypothyroidism  - c/w synthroid.    8) Essential hypertension.  - c/w Losartan    9) DVT ppx - INR therapeutic on Coumadin    Code status - DNR/DNI     Plan discussed with Candelario, who agrees with home Hospice evaluation   Discharge planning once home services can be established.     Estimated time for encounter 35 minutes.   
89 y/o female w/ a PMHx of HTN, COPD, Chronic Hypoxic Respiratory failure on O2 at home, GIIIDiastolic CHF, Severe Pulmonary HTN, Hypothyroidism, Atrial fibrillation on Coumadin, Anemia, hx of multiple admissions for COPD and CHF exacerbation last admission at the end of May (also for RLL PNA) presents to the ED c/o SOB and wheezing. Pt being admitted for Acute on chronic respiratory failure with hypoxia due to COPD Exacerbation, CHF     1) COPD Exacerbation  - s/p Solumedrol 125mg IV x 1 in ED  - cont w/ tapering Oral steroids, and duo-nebs   - pt does not tolerate Symbicort      2) Chronic Diastolic congestive heart failure  - b/l pleural effusions improved however still present, in sig size  - gave 1 dose of IV lasix in ED  - c/w PO lasix,       3) Severe Pulmonary hypertension.    - c/w sildenafil.    - c/w supplemental oxygen    4) Macrocytic anemia  - stable  - cont to monitor    5) Anxiety   - stable, c/w Klonopin.     6) Persistent Atrial fibrillation   - c/w Diltiazem  - c/w Coumadin, dose daily, INR therapeutic    7) Hypothyroidism  - c/w synthroid.    8) Essential hypertension.  - c/w Losartan    9) DVT ppx - INR therapeutic on Coumadin    Code status - DNR/DNI     Plan discussed with Daughter, agree to Hospice evaluation

## 2021-06-09 ENCOUNTER — TRANSCRIPTION ENCOUNTER (OUTPATIENT)
Age: 86
End: 2021-06-09

## 2021-06-09 VITALS
HEART RATE: 104 BPM | OXYGEN SATURATION: 95 % | DIASTOLIC BLOOD PRESSURE: 73 MMHG | SYSTOLIC BLOOD PRESSURE: 119 MMHG | RESPIRATION RATE: 17 BRPM

## 2021-06-09 DIAGNOSIS — I50.33 ACUTE ON CHRONIC DIASTOLIC (CONGESTIVE) HEART FAILURE: ICD-10-CM

## 2021-06-09 DIAGNOSIS — J44.0 CHRONIC OBSTRUCTIVE PULMONARY DISEASE WITH (ACUTE) LOWER RESPIRATORY INFECTION: ICD-10-CM

## 2021-06-09 DIAGNOSIS — I48.11 LONGSTANDING PERSISTENT ATRIAL FIBRILLATION: ICD-10-CM

## 2021-06-09 DIAGNOSIS — J96.21 ACUTE AND CHRONIC RESPIRATORY FAILURE WITH HYPOXIA: ICD-10-CM

## 2021-06-09 DIAGNOSIS — N28.9 DISORDER OF KIDNEY AND URETER, UNSPECIFIED: ICD-10-CM

## 2021-06-09 DIAGNOSIS — J18.9 PNEUMONIA, UNSPECIFIED ORGANISM: ICD-10-CM

## 2021-06-09 DIAGNOSIS — Z79.890 HORMONE REPLACEMENT THERAPY: ICD-10-CM

## 2021-06-09 DIAGNOSIS — I48.91 UNSPECIFIED ATRIAL FIBRILLATION: ICD-10-CM

## 2021-06-09 DIAGNOSIS — I50.32 CHRONIC DIASTOLIC (CONGESTIVE) HEART FAILURE: ICD-10-CM

## 2021-06-09 DIAGNOSIS — J96.10 CHRONIC RESPIRATORY FAILURE, UNSPECIFIED WHETHER WITH HYPOXIA OR HYPERCAPNIA: ICD-10-CM

## 2021-06-09 DIAGNOSIS — E03.9 HYPOTHYROIDISM, UNSPECIFIED: ICD-10-CM

## 2021-06-09 DIAGNOSIS — J44.1 CHRONIC OBSTRUCTIVE PULMONARY DISEASE WITH (ACUTE) EXACERBATION: ICD-10-CM

## 2021-06-09 DIAGNOSIS — R53.2 FUNCTIONAL QUADRIPLEGIA: ICD-10-CM

## 2021-06-09 DIAGNOSIS — I48.20 CHRONIC ATRIAL FIBRILLATION, UNSPECIFIED: ICD-10-CM

## 2021-06-09 DIAGNOSIS — F41.9 ANXIETY DISORDER, UNSPECIFIED: ICD-10-CM

## 2021-06-09 DIAGNOSIS — R13.10 DYSPHAGIA, UNSPECIFIED: ICD-10-CM

## 2021-06-09 DIAGNOSIS — Z79.01 LONG TERM (CURRENT) USE OF ANTICOAGULANTS: ICD-10-CM

## 2021-06-09 DIAGNOSIS — I27.20 PULMONARY HYPERTENSION, UNSPECIFIED: ICD-10-CM

## 2021-06-09 DIAGNOSIS — D64.9 ANEMIA, UNSPECIFIED: ICD-10-CM

## 2021-06-09 LAB
ANION GAP SERPL CALC-SCNC: 7 MMOL/L — SIGNIFICANT CHANGE UP (ref 5–17)
BUN SERPL-MCNC: 65 MG/DL — HIGH (ref 7–23)
CALCIUM SERPL-MCNC: 8.9 MG/DL — SIGNIFICANT CHANGE UP (ref 8.5–10.1)
CHLORIDE SERPL-SCNC: 102 MMOL/L — SIGNIFICANT CHANGE UP (ref 96–108)
CO2 SERPL-SCNC: 33 MMOL/L — HIGH (ref 22–31)
CREAT SERPL-MCNC: 1.75 MG/DL — HIGH (ref 0.5–1.3)
GLUCOSE SERPL-MCNC: 96 MG/DL — SIGNIFICANT CHANGE UP (ref 70–99)
HCT VFR BLD CALC: 27.7 % — LOW (ref 34.5–45)
HGB BLD-MCNC: 8.9 G/DL — LOW (ref 11.5–15.5)
INR BLD: 2.19 RATIO — HIGH (ref 0.88–1.16)
MCHC RBC-ENTMCNC: 32.1 GM/DL — SIGNIFICANT CHANGE UP (ref 32–36)
MCHC RBC-ENTMCNC: 34 PG — SIGNIFICANT CHANGE UP (ref 27–34)
MCV RBC AUTO: 105.7 FL — HIGH (ref 80–100)
NRBC # BLD: 0 /100 WBCS — SIGNIFICANT CHANGE UP (ref 0–0)
PLATELET # BLD AUTO: 184 K/UL — SIGNIFICANT CHANGE UP (ref 150–400)
POTASSIUM SERPL-MCNC: 3.7 MMOL/L — SIGNIFICANT CHANGE UP (ref 3.5–5.3)
POTASSIUM SERPL-SCNC: 3.7 MMOL/L — SIGNIFICANT CHANGE UP (ref 3.5–5.3)
PROTHROM AB SERPL-ACNC: 24.5 SEC — HIGH (ref 10.6–13.6)
RBC # BLD: 2.62 M/UL — LOW (ref 3.8–5.2)
RBC # FLD: 15.8 % — HIGH (ref 10.3–14.5)
SODIUM SERPL-SCNC: 142 MMOL/L — SIGNIFICANT CHANGE UP (ref 135–145)
WBC # BLD: 7.83 K/UL — SIGNIFICANT CHANGE UP (ref 3.8–10.5)
WBC # FLD AUTO: 7.83 K/UL — SIGNIFICANT CHANGE UP (ref 3.8–10.5)

## 2021-06-09 RX ORDER — ACETAMINOPHEN 500 MG
2 TABLET ORAL
Qty: 0 | Refills: 0 | DISCHARGE
Start: 2021-06-09

## 2021-06-09 RX ORDER — WARFARIN SODIUM 2.5 MG/1
1 TABLET ORAL
Qty: 10 | Refills: 0
Start: 2021-06-09 | End: 2021-06-18

## 2021-06-09 RX ORDER — FUROSEMIDE 40 MG
1 TABLET ORAL
Qty: 0 | Refills: 0 | DISCHARGE

## 2021-06-09 RX ORDER — DILTIAZEM HCL 120 MG
1 CAPSULE, EXT RELEASE 24 HR ORAL
Qty: 0 | Refills: 0 | DISCHARGE
Start: 2021-06-09

## 2021-06-09 RX ORDER — ALBUTEROL 90 UG/1
1 AEROSOL, METERED ORAL
Qty: 1 | Refills: 0
Start: 2021-06-09

## 2021-06-09 RX ORDER — FUROSEMIDE 40 MG
1 TABLET ORAL
Qty: 0 | Refills: 0 | DISCHARGE
Start: 2021-06-09

## 2021-06-09 RX ORDER — POTASSIUM CHLORIDE 20 MEQ
1 PACKET (EA) ORAL
Qty: 0 | Refills: 0 | DISCHARGE

## 2021-06-09 RX ORDER — IPRATROPIUM/ALBUTEROL SULFATE 18-103MCG
3 AEROSOL WITH ADAPTER (GRAM) INHALATION
Qty: 0 | Refills: 0 | DISCHARGE

## 2021-06-09 RX ORDER — WARFARIN SODIUM 2.5 MG/1
1 TABLET ORAL
Qty: 7 | Refills: 0

## 2021-06-09 RX ADMIN — Medication 20 MILLIGRAM(S): at 05:50

## 2021-06-09 RX ADMIN — Medication 300 MILLIGRAM(S): at 11:57

## 2021-06-09 RX ADMIN — Medication 40 MILLIGRAM(S): at 05:50

## 2021-06-09 RX ADMIN — NYSTATIN CREAM 1 APPLICATION(S): 100000 CREAM TOPICAL at 05:50

## 2021-06-09 RX ADMIN — Medication 100 MICROGRAM(S): at 05:50

## 2021-06-09 RX ADMIN — Medication 3 MILLILITER(S): at 11:37

## 2021-06-09 RX ADMIN — Medication 1 TABLET(S): at 12:01

## 2021-06-09 RX ADMIN — Medication 3 MILLILITER(S): at 00:36

## 2021-06-09 RX ADMIN — Medication 20 MILLIGRAM(S): at 13:39

## 2021-06-09 RX ADMIN — PANTOPRAZOLE SODIUM 40 MILLIGRAM(S): 20 TABLET, DELAYED RELEASE ORAL at 06:56

## 2021-06-09 RX ADMIN — Medication 3 MILLILITER(S): at 05:14

## 2021-06-09 NOTE — DISCHARGE NOTE PROVIDER - NSDCMRMEDTOKEN_GEN_ALL_CORE_FT
clonazePAM 0.5 mg oral tablet: 1 tab(s) orally 2 times a day as needed anxiety MDD:2  Coumadin 3 mg oral tablet: 1 tab(s) orally once a day as directed  dilTIAZem 300 mg/24 hours oral capsule, extended release: 1 cap(s) orally once a day  Dulcolax Stool Softener 100 mg oral capsule: 1 cap(s) orally 2 times a day as needed constipation  furosemide 40 mg oral tablet: 1 tab(s) orally once a day  ipratropium-albuterol 0.5 mg-2.5 mg/3 mLinhalation solution: 3 milliliter(s) inhaled every 6 hours, As Needed  losartan 25 mg oral tablet: 1 tab(s) orally once a day  Metamucil 3.4 g/3.7 g oral powder for reconstitution: 3.4 gram(s) orally once a day with 12oz H20.   Multiple Vitamins oral tablet: 1 tab(s) orally once a day  pantoprazole 40 mg oral delayed release tablet: 1 tab(s) orally once a day (before a meal)  potassium chloride 10 mEq oral tablet, extended release: 1 tab(s) orally once a day  sildenafil 20 mg oral tablet: 1 tab(s) orally every 8 hours  Synthroid 100 mcg (0.1 mg) oral tablet: 1 tab(s) orally once a day  Vitamin D2 50,000 intl units (1.25 mg) oral capsule: 1 cap(s) orally once a week   acetaminophen 325 mg oral tablet: 2 tab(s) orally every 6 hours, As needed, Mild Pain (1 - 3)  albuterol 90 mcg/inh inhalation aerosol: 1 puff(s) inhaled every 4 hours, As needed, Shortness of Breath and/or Wheezing  clonazePAM 0.5 mg oral tablet: 1 tab(s) orally 2 times a day as needed anxiety MDD:2  dilTIAZem 300 mg/24 hours oral capsule, extended release: 1 cap(s) orally once a day  Dulcolax Stool Softener 100 mg oral capsule: 1 cap(s) orally 2 times a day as needed constipation  furosemide 40 mg oral tablet: 1 tab(s) orally once a day  Metamucil 3.4 g/3.7 g oral powder for reconstitution: 3.4 gram(s) orally once a day with 12oz H20.   Multiple Vitamins oral tablet: 1 tab(s) orally once a day  ocular lubricant ophthalmic solution: 2 drop(s) to each affected eye 5 times a day, As needed, Dry Eyes  pantoprazole 40 mg oral delayed release tablet: 1 tab(s) orally once a day (before a meal)  predniSONE 10 mg oral tablet: 4 tab(s) orally once a day x 2 days  3 tabs once a day x 2 days  2 tabs once a day x 2 days  1 tab once a day x 2 days  0.5 tab once a day x 2 days then stop      sildenafil 20 mg oral tablet: 1 tab(s) orally every 8 hours  Synthroid 100 mcg (0.1 mg) oral tablet: 1 tab(s) orally once a day  Vitamin D2 50,000 intl units (1.25 mg) oral capsule: 1 cap(s) orally once a week  warfarin 3 mg oral tablet: 1 tab(s) orally once a day (at bedtime)

## 2021-06-09 NOTE — DISCHARGE NOTE PROVIDER - NSDCCPCAREPLAN_GEN_ALL_CORE_FT
PRINCIPAL DISCHARGE DIAGNOSIS  Diagnosis: Shortness of breath  Assessment and Plan of Treatment: Resolved, Duoneb inhaler PRN      SECONDARY DISCHARGE DIAGNOSES  Diagnosis: Debility  Assessment and Plan of Treatment: Supportive Care    Diagnosis: Delirium  Assessment and Plan of Treatment: REorient frequently    Diagnosis: COPD (chronic obstructive pulmonary disease)  Assessment and Plan of Treatment: Prednisone, Duoneb    Diagnosis: Anxiety  Assessment and Plan of Treatment: Supportive care    Diagnosis: Essential hypertension  Assessment and Plan of Treatment: Continue Cardizem, Losartan    Diagnosis: Longstanding persistent atrial fibrillation  Assessment and Plan of Treatment: Continue Cardizem    Diagnosis: Hypothyroidism  Assessment and Plan of Treatment: Continue Levothyroxine    Diagnosis: Chronic diastolic congestive heart failure  Assessment and Plan of Treatment: Cardizem, Sildenafil

## 2021-06-09 NOTE — DISCHARGE NOTE PROVIDER - HOSPITAL COURSE
89 y/o female w/ a PMHx of HTN, COPD, Chronic Hypoxic Respiratory failure on O2 at home, GIIIDiastolic CHF, Severe Pulmonary HTN, Hypothyroidism, Atrial fibrillation on Coumadin, Anemia, hx of multiple admissions for COPD and CHF exacerbation last admission at the end of May (also for RLL PNA) presents to the ED c/o SOB and wheezing. Pt being admitted for Acute on chronic respiratory failure with hypoxia due to COPD Exacerbation, CHF     1) COPD Exacerbation  - s/p Solumedrol 125mg IV x 1 in ED  - cont w/ tapering Oral steroids, and duo-nebs   - pt does not tolerate Symbicort      2) Chronic Diastolic congestive heart failure  - b/l pleural effusions improved  - gave 1 dose of IV lasix in ED  - c/w PO lasix       3) Severe Pulmonary hypertension.    - c/w sildenafil.    - c/w supplemental oxygen    4) Macrocytic anemia  - stable  - cont to monitor    5) Anxiety   - stable, c/w Klonopin.     6) Persistent Atrial fibrillation   - c/w Diltiazem  - c/w Coumadin, dose daily, INR therapeutic    7) Hypothyroidism  - c/w synthroid.    8) Essential hypertension.  - c/w Losartan    9) DVT ppx - INR therapeutic on Coumadin    Code status - DNR/DNI     Plan discussed with Candelario, who agrees with home Hospice evaluation   Discharge home with Home Hospice.    Discharged approved by Dr Spicer    89 y/o female w/ a PMHx of HTN, COPD, Chronic Hypoxic Respiratory failure on O2 at home, GIIIDiastolic CHF, Severe Pulmonary HTN, Hypothyroidism, Atrial fibrillation on Coumadin, Anemia, hx of multiple admissions for COPD and CHF exacerbation last admission at the end of May (also for RLL PNA) presents to the ED c/o SOB and wheezing. Pt being admitted for Acute on chronic respiratory failure with hypoxia due to COPD Exacerbation, CHF    DISCHARGE DIAGNOSES      1) COPD Exacerbation  - s/p Solumedrol 125mg IV x 1 in ED  - cont w/ tapering Oral steroids, and duo-nebs   - pt does not tolerate Symbicort      2) Chronic Diastolic congestive heart failure  - b/l pleural effusions improved  - gave 1 dose of IV lasix in ED  - c/w PO lasix       3) Severe Pulmonary hypertension.    - c/w sildenafil.    - c/w supplemental oxygen    4) Macrocytic anemia  - stable  - cont to monitor    5) Anxiety   - stable, c/w Klonopin.     6) Persistent Atrial fibrillation   - c/w Diltiazem  - c/w Coumadin  3 mg daily and repeat INR in 3-5 days     7) Hypothyroidism  - c/w synthroid.    8) Essential hypertension.  d/c Losartan in view of MARIUSZ , will need repeat BMP in 3-5 days. family aware   bladder scan      MARIUSZ  hold losartan   most likely sec to lasix , elevated BUN   hold lasix tomorrow and resume 6/11/21       Code status - DNR/DNI     Plan discussed with Candelario, who agrees with home Hospice evaluation   Discharge home with Home Hospice.    Discharged approved by Dr Spicer     Discharge time : 40 min       RETURN PARAMETERS DISCUSSED WITH PATIENT, PATIENT EXPRESSED UNDERSTANDING AND IS AGREEABLE. DISCUSSED WITH PATIENT ON REFRAINING FROM DRIVING UNTIL FOLLOW-UP/ CLEARED BY PMD. PATIENT EXPRESSED UNDERSTANDING.

## 2021-07-02 ENCOUNTER — INPATIENT (INPATIENT)
Facility: HOSPITAL | Age: 86
LOS: 3 days | Discharge: HOME | End: 2021-07-06
Attending: INTERNAL MEDICINE | Admitting: INTERNAL MEDICINE
Payer: OTHER MISCELLANEOUS

## 2021-07-02 VITALS
RESPIRATION RATE: 27 BRPM | TEMPERATURE: 98 F | HEART RATE: 105 BPM | SYSTOLIC BLOOD PRESSURE: 160 MMHG | DIASTOLIC BLOOD PRESSURE: 92 MMHG | WEIGHT: 149.91 LBS | OXYGEN SATURATION: 80 % | HEIGHT: 60 IN

## 2021-07-02 PROCEDURE — 99291 CRITICAL CARE FIRST HOUR: CPT

## 2021-07-02 PROCEDURE — 93010 ELECTROCARDIOGRAM REPORT: CPT

## 2021-07-02 NOTE — ED ADULT NURSE NOTE - ED STAT RN HANDOFF DETAILS
Report given to Samuel RN ED hold Report given to Samuel RN ED hold. Endorsed Bipap settings, pt hx. Endorsed pt heart rhythm and vitals.

## 2021-07-02 NOTE — ED ADULT NURSE NOTE - NSIMPLEMENTINTERV_GEN_ALL_ED
Implemented All Fall with Harm Risk Interventions:  [place] to call system. Call bell, personal items and telephone within reach. Instruct patient to call for assistance. Room bathroom lighting operational. Non-slip footwear when patient is off stretcher. Physically safe environment: no spills, clutter or unnecessary equipment. Stretcher in lowest position, wheels locked, appropriate side rails in place. Provide visual cue, wrist band, yellow gown, etc. Monitor gait and stability. Monitor for mental status changes and reorient to person, place, and time. Review medications for side effects contributing to fall risk. Reinforce activity limits and safety measures with patient and family. Provide visual clues: red socks.

## 2021-07-02 NOTE — ED ADULT NURSE NOTE - OBJECTIVE STATEMENT
Assisting primary RN. Received patient in bed 11. PMH COPD. Pt BIBA c/o SOB w/ breathing treatment and NRB. Pt currently O2 sat 96% on NRB. Denies chest pain, n/v/d and dizziness. Auditory wheezes auscultated. Pt appears to be in distress. Currently 96% on NRB 15L and NSR on tele. States "I can't breath"

## 2021-07-02 NOTE — ED ADULT NURSE NOTE - AS SC BRADEN MOISTURE
CERTIFICATE OF WORK      February 20, 2017      Re: Rashawn Lion  [address] [facility] [address]      This is to certify that Rashawn Lion has been under my care from 2/20/2017 and please excuse him from work for 3 days .              SIGNATURE:___________________________________________,   2/20/2017  DR VALLEJO        Urgent Care Services  [facility]  [address] [place]  [address] [phone]        
(2) very moist

## 2021-07-02 NOTE — ED ADULT TRIAGE NOTE - CHIEF COMPLAINT QUOTE
BIBA- SOB POX 64-70% at home.   EMS DuonebX1 BIBA- SOB POX 64-70% at home.   EMS DuonebX1  HX COPD, Afib, admitted last month for exacerbation

## 2021-07-03 DIAGNOSIS — J96.01 ACUTE RESPIRATORY FAILURE WITH HYPOXIA: ICD-10-CM

## 2021-07-03 DIAGNOSIS — R79.1 ABNORMAL COAGULATION PROFILE: ICD-10-CM

## 2021-07-03 DIAGNOSIS — E03.9 HYPOTHYROIDISM, UNSPECIFIED: ICD-10-CM

## 2021-07-03 DIAGNOSIS — I50.32 CHRONIC DIASTOLIC (CONGESTIVE) HEART FAILURE: ICD-10-CM

## 2021-07-03 DIAGNOSIS — I27.20 PULMONARY HYPERTENSION, UNSPECIFIED: ICD-10-CM

## 2021-07-03 DIAGNOSIS — I48.20 CHRONIC ATRIAL FIBRILLATION, UNSPECIFIED: ICD-10-CM

## 2021-07-03 LAB
ALBUMIN SERPL ELPH-MCNC: 3.1 G/DL — LOW (ref 3.3–5)
ALBUMIN SERPL ELPH-MCNC: 3.1 G/DL — LOW (ref 3.3–5)
ALP SERPL-CCNC: 73 U/L — SIGNIFICANT CHANGE UP (ref 40–120)
ALP SERPL-CCNC: 73 U/L — SIGNIFICANT CHANGE UP (ref 40–120)
ALT FLD-CCNC: 19 U/L — SIGNIFICANT CHANGE UP (ref 12–78)
ALT FLD-CCNC: 21 U/L — SIGNIFICANT CHANGE UP (ref 12–78)
ANION GAP SERPL CALC-SCNC: 5 MMOL/L — SIGNIFICANT CHANGE UP (ref 5–17)
ANION GAP SERPL CALC-SCNC: 5 MMOL/L — SIGNIFICANT CHANGE UP (ref 5–17)
APTT BLD: 48.9 SEC — HIGH (ref 27.5–35.5)
AST SERPL-CCNC: 16 U/L — SIGNIFICANT CHANGE UP (ref 15–37)
AST SERPL-CCNC: 18 U/L — SIGNIFICANT CHANGE UP (ref 15–37)
BASE EXCESS BLDA CALC-SCNC: 10.6 MMOL/L — HIGH (ref -2–2)
BASOPHILS # BLD AUTO: 0.03 K/UL — SIGNIFICANT CHANGE UP (ref 0–0.2)
BASOPHILS NFR BLD AUTO: 0.5 % — SIGNIFICANT CHANGE UP (ref 0–2)
BILIRUB SERPL-MCNC: 0.3 MG/DL — SIGNIFICANT CHANGE UP (ref 0.2–1.2)
BILIRUB SERPL-MCNC: 0.3 MG/DL — SIGNIFICANT CHANGE UP (ref 0.2–1.2)
BLOOD GAS COMMENTS: SIGNIFICANT CHANGE UP
BLOOD GAS COMMENTS: SIGNIFICANT CHANGE UP
BLOOD GAS SOURCE: SIGNIFICANT CHANGE UP
BLOOD GAS SOURCE: SIGNIFICANT CHANGE UP
BUN SERPL-MCNC: 25 MG/DL — HIGH (ref 7–23)
BUN SERPL-MCNC: 26 MG/DL — HIGH (ref 7–23)
CALCIUM SERPL-MCNC: 8.7 MG/DL — SIGNIFICANT CHANGE UP (ref 8.5–10.1)
CALCIUM SERPL-MCNC: 9.2 MG/DL — SIGNIFICANT CHANGE UP (ref 8.5–10.1)
CHLORIDE SERPL-SCNC: 100 MMOL/L — SIGNIFICANT CHANGE UP (ref 96–108)
CHLORIDE SERPL-SCNC: 99 MMOL/L — SIGNIFICANT CHANGE UP (ref 96–108)
CO2 SERPL-SCNC: 36 MMOL/L — HIGH (ref 22–31)
CO2 SERPL-SCNC: 36 MMOL/L — HIGH (ref 22–31)
CREAT SERPL-MCNC: 0.92 MG/DL — SIGNIFICANT CHANGE UP (ref 0.5–1.3)
CREAT SERPL-MCNC: 1.02 MG/DL — SIGNIFICANT CHANGE UP (ref 0.5–1.3)
EOSINOPHIL # BLD AUTO: 0.25 K/UL — SIGNIFICANT CHANGE UP (ref 0–0.5)
EOSINOPHIL NFR BLD AUTO: 4.2 % — SIGNIFICANT CHANGE UP (ref 0–6)
FLUAV AG NPH QL: SIGNIFICANT CHANGE UP
FLUBV AG NPH QL: SIGNIFICANT CHANGE UP
GLUCOSE SERPL-MCNC: 123 MG/DL — HIGH (ref 70–99)
GLUCOSE SERPL-MCNC: 151 MG/DL — HIGH (ref 70–99)
HCO3 BLDA-SCNC: 38 MMOL/L — HIGH (ref 21–29)
HCT VFR BLD CALC: 32 % — LOW (ref 34.5–45)
HCT VFR BLD CALC: 32 % — LOW (ref 34.5–45)
HGB BLD-MCNC: 10 G/DL — LOW (ref 11.5–15.5)
HGB BLD-MCNC: 9.8 G/DL — LOW (ref 11.5–15.5)
HOROWITZ INDEX BLDA+IHG-RTO: 0.35 — SIGNIFICANT CHANGE UP
HOROWITZ INDEX BLDA+IHG-RTO: 100 — SIGNIFICANT CHANGE UP
IMM GRANULOCYTES NFR BLD AUTO: 0.2 % — SIGNIFICANT CHANGE UP (ref 0–1.5)
INR BLD: 6.54 RATIO — CRITICAL HIGH (ref 0.88–1.16)
INR BLD: 6.64 RATIO — CRITICAL HIGH (ref 0.88–1.16)
LYMPHOCYTES # BLD AUTO: 1.4 K/UL — SIGNIFICANT CHANGE UP (ref 1–3.3)
LYMPHOCYTES # BLD AUTO: 23.5 % — SIGNIFICANT CHANGE UP (ref 13–44)
MAGNESIUM SERPL-MCNC: 1.9 MG/DL — SIGNIFICANT CHANGE UP (ref 1.6–2.6)
MCHC RBC-ENTMCNC: 30.6 GM/DL — LOW (ref 32–36)
MCHC RBC-ENTMCNC: 31.3 GM/DL — LOW (ref 32–36)
MCHC RBC-ENTMCNC: 33.7 PG — SIGNIFICANT CHANGE UP (ref 27–34)
MCHC RBC-ENTMCNC: 33.8 PG — SIGNIFICANT CHANGE UP (ref 27–34)
MCV RBC AUTO: 108.1 FL — HIGH (ref 80–100)
MCV RBC AUTO: 110 FL — HIGH (ref 80–100)
MONOCYTES # BLD AUTO: 0.52 K/UL — SIGNIFICANT CHANGE UP (ref 0–0.9)
MONOCYTES NFR BLD AUTO: 8.7 % — SIGNIFICANT CHANGE UP (ref 2–14)
NEUTROPHILS # BLD AUTO: 3.76 K/UL — SIGNIFICANT CHANGE UP (ref 1.8–7.4)
NEUTROPHILS NFR BLD AUTO: 62.9 % — SIGNIFICANT CHANGE UP (ref 43–77)
NRBC # BLD: 0 /100 WBCS — SIGNIFICANT CHANGE UP (ref 0–0)
NRBC # BLD: 0 /100 WBCS — SIGNIFICANT CHANGE UP (ref 0–0)
NT-PROBNP SERPL-SCNC: 1843 PG/ML — HIGH (ref 0–450)
PCO2 BLDA: 72 MMHG — CRITICAL HIGH (ref 32–46)
PCO2 BLDA: >96 MMHG — CRITICAL HIGH (ref 32–46)
PH BLD: 7.24 — LOW (ref 7.35–7.45)
PH BLD: 7.34 — LOW (ref 7.35–7.45)
PLATELET # BLD AUTO: 200 K/UL — SIGNIFICANT CHANGE UP (ref 150–400)
PLATELET # BLD AUTO: 210 K/UL — SIGNIFICANT CHANGE UP (ref 150–400)
PO2 BLDA: 318 MMHG — HIGH (ref 74–108)
PO2 BLDA: 70 MMHG — LOW (ref 74–108)
POTASSIUM SERPL-MCNC: 3.6 MMOL/L — SIGNIFICANT CHANGE UP (ref 3.5–5.3)
POTASSIUM SERPL-MCNC: 3.9 MMOL/L — SIGNIFICANT CHANGE UP (ref 3.5–5.3)
POTASSIUM SERPL-SCNC: 3.6 MMOL/L — SIGNIFICANT CHANGE UP (ref 3.5–5.3)
POTASSIUM SERPL-SCNC: 3.9 MMOL/L — SIGNIFICANT CHANGE UP (ref 3.5–5.3)
PROT SERPL-MCNC: 7.7 GM/DL — SIGNIFICANT CHANGE UP (ref 6–8.3)
PROT SERPL-MCNC: 7.8 GM/DL — SIGNIFICANT CHANGE UP (ref 6–8.3)
PROTHROM AB SERPL-ACNC: 69.4 SEC — HIGH (ref 10.6–13.6)
PROTHROM AB SERPL-ACNC: 70.4 SEC — HIGH (ref 10.6–13.6)
RBC # BLD: 2.91 M/UL — LOW (ref 3.8–5.2)
RBC # BLD: 2.96 M/UL — LOW (ref 3.8–5.2)
RBC # FLD: 14.7 % — HIGH (ref 10.3–14.5)
RBC # FLD: 14.7 % — HIGH (ref 10.3–14.5)
SAO2 % BLDA: 94 % — SIGNIFICANT CHANGE UP (ref 92–96)
SAO2 % BLDA: 99 % — HIGH (ref 92–96)
SARS-COV-2 RNA SPEC QL NAA+PROBE: SIGNIFICANT CHANGE UP
SODIUM SERPL-SCNC: 140 MMOL/L — SIGNIFICANT CHANGE UP (ref 135–145)
SODIUM SERPL-SCNC: 141 MMOL/L — SIGNIFICANT CHANGE UP (ref 135–145)
TROPONIN I SERPL-MCNC: 0.02 NG/ML — SIGNIFICANT CHANGE UP (ref 0.01–0.04)
WBC # BLD: 4.21 K/UL — SIGNIFICANT CHANGE UP (ref 3.8–10.5)
WBC # BLD: 5.97 K/UL — SIGNIFICANT CHANGE UP (ref 3.8–10.5)
WBC # FLD AUTO: 4.21 K/UL — SIGNIFICANT CHANGE UP (ref 3.8–10.5)
WBC # FLD AUTO: 5.97 K/UL — SIGNIFICANT CHANGE UP (ref 3.8–10.5)

## 2021-07-03 PROCEDURE — 71045 X-RAY EXAM CHEST 1 VIEW: CPT | Mod: 26

## 2021-07-03 PROCEDURE — 12345: CPT | Mod: NC

## 2021-07-03 PROCEDURE — 99222 1ST HOSP IP/OBS MODERATE 55: CPT

## 2021-07-03 RX ORDER — ENOXAPARIN SODIUM 100 MG/ML
40 INJECTION SUBCUTANEOUS DAILY
Refills: 0 | Status: DISCONTINUED | OUTPATIENT
Start: 2021-07-03 | End: 2021-07-03

## 2021-07-03 RX ORDER — BUDESONIDE AND FORMOTEROL FUMARATE DIHYDRATE 160; 4.5 UG/1; UG/1
2 AEROSOL RESPIRATORY (INHALATION)
Refills: 0 | Status: DISCONTINUED | OUTPATIENT
Start: 2021-07-03 | End: 2021-07-03

## 2021-07-03 RX ORDER — TIOTROPIUM BROMIDE 18 UG/1
1 CAPSULE ORAL; RESPIRATORY (INHALATION) DAILY
Refills: 0 | Status: DISCONTINUED | OUTPATIENT
Start: 2021-07-03 | End: 2021-07-03

## 2021-07-03 RX ORDER — SODIUM CHLORIDE 9 MG/ML
1000 INJECTION, SOLUTION INTRAVENOUS
Refills: 0 | Status: COMPLETED | OUTPATIENT
Start: 2021-07-03 | End: 2021-07-03

## 2021-07-03 RX ORDER — IPRATROPIUM/ALBUTEROL SULFATE 18-103MCG
3 AEROSOL WITH ADAPTER (GRAM) INHALATION EVERY 6 HOURS
Refills: 0 | Status: DISCONTINUED | OUTPATIENT
Start: 2021-07-03 | End: 2021-07-06

## 2021-07-03 RX ORDER — POTASSIUM CHLORIDE 20 MEQ
40 PACKET (EA) ORAL ONCE
Refills: 0 | Status: COMPLETED | OUTPATIENT
Start: 2021-07-03 | End: 2021-07-03

## 2021-07-03 RX ORDER — IPRATROPIUM/ALBUTEROL SULFATE 18-103MCG
3 AEROSOL WITH ADAPTER (GRAM) INHALATION
Refills: 0 | Status: COMPLETED | OUTPATIENT
Start: 2021-07-03 | End: 2021-07-03

## 2021-07-03 RX ORDER — FUROSEMIDE 40 MG
40 TABLET ORAL ONCE
Refills: 0 | Status: COMPLETED | OUTPATIENT
Start: 2021-07-03 | End: 2021-07-03

## 2021-07-03 RX ORDER — FUROSEMIDE 40 MG
40 TABLET ORAL EVERY 12 HOURS
Refills: 0 | Status: DISCONTINUED | OUTPATIENT
Start: 2021-07-03 | End: 2021-07-03

## 2021-07-03 RX ORDER — FUROSEMIDE 40 MG
40 TABLET ORAL DAILY
Refills: 0 | Status: DISCONTINUED | OUTPATIENT
Start: 2021-07-04 | End: 2021-07-04

## 2021-07-03 RX ORDER — BUDESONIDE AND FORMOTEROL FUMARATE DIHYDRATE 160; 4.5 UG/1; UG/1
2 AEROSOL RESPIRATORY (INHALATION)
Refills: 0 | Status: DISCONTINUED | OUTPATIENT
Start: 2021-07-03 | End: 2021-07-06

## 2021-07-03 RX ORDER — DILTIAZEM HCL 120 MG
10 CAPSULE, EXT RELEASE 24 HR ORAL ONCE
Refills: 0 | Status: COMPLETED | OUTPATIENT
Start: 2021-07-03 | End: 2021-07-03

## 2021-07-03 RX ORDER — IPRATROPIUM/ALBUTEROL SULFATE 18-103MCG
3 AEROSOL WITH ADAPTER (GRAM) INHALATION EVERY 4 HOURS
Refills: 0 | Status: DISCONTINUED | OUTPATIENT
Start: 2021-07-03 | End: 2021-07-06

## 2021-07-03 RX ORDER — METOPROLOL TARTRATE 50 MG
12.5 TABLET ORAL EVERY 12 HOURS
Refills: 0 | Status: DISCONTINUED | OUTPATIENT
Start: 2021-07-03 | End: 2021-07-05

## 2021-07-03 RX ORDER — FUROSEMIDE 40 MG
40 TABLET ORAL DAILY
Refills: 0 | Status: DISCONTINUED | OUTPATIENT
Start: 2021-07-03 | End: 2021-07-03

## 2021-07-03 RX ORDER — SENNA PLUS 8.6 MG/1
2 TABLET ORAL AT BEDTIME
Refills: 0 | Status: DISCONTINUED | OUTPATIENT
Start: 2021-07-03 | End: 2021-07-06

## 2021-07-03 RX ADMIN — Medication 125 MILLIGRAM(S): at 02:13

## 2021-07-03 RX ADMIN — Medication 10 MILLIGRAM(S): at 05:15

## 2021-07-03 RX ADMIN — SODIUM CHLORIDE 40 MILLILITER(S): 9 INJECTION, SOLUTION INTRAVENOUS at 06:17

## 2021-07-03 RX ADMIN — Medication 3 MILLILITER(S): at 11:22

## 2021-07-03 RX ADMIN — Medication 40 MILLIGRAM(S): at 06:16

## 2021-07-03 RX ADMIN — Medication 3 MILLILITER(S): at 02:13

## 2021-07-03 RX ADMIN — Medication 40 MILLIEQUIVALENT(S): at 15:33

## 2021-07-03 RX ADMIN — Medication 3 MILLILITER(S): at 06:58

## 2021-07-03 RX ADMIN — Medication 3 MILLILITER(S): at 02:58

## 2021-07-03 RX ADMIN — Medication 40 MILLIGRAM(S): at 14:00

## 2021-07-03 RX ADMIN — Medication 40 MILLIGRAM(S): at 02:13

## 2021-07-03 RX ADMIN — SENNA PLUS 2 TABLET(S): 8.6 TABLET ORAL at 21:03

## 2021-07-03 RX ADMIN — Medication 3 MILLILITER(S): at 02:33

## 2021-07-03 RX ADMIN — Medication 12.5 MILLIGRAM(S): at 18:00

## 2021-07-03 RX ADMIN — BUDESONIDE AND FORMOTEROL FUMARATE DIHYDRATE 2 PUFF(S): 160; 4.5 AEROSOL RESPIRATORY (INHALATION) at 18:01

## 2021-07-03 RX ADMIN — Medication 3 MILLILITER(S): at 17:55

## 2021-07-03 NOTE — CONSULT NOTE ADULT - ASSESSMENT
FRANCESCA MACK 89 f 7/3/2021 1/25/1929 DR ROXY MOSER     Initial evaluation/Pulmonary Critical Care consultation requested on  7/3/2021  by Dr MILTON  from Dr Aponte   Patient examined chart reviewed    HOSPITAL ADMISSION   PATIENT CAME  FROM (if information available)      FRANCESCA MACK 89 f 7/3/2021 1/25/1929 DR ROXY MOSER     REVIEW OF SYMPTOMS      Able to give (reliable) ROS  NO     PHYSICAL EXAM    HEENT Unremarkable  atraumatic   RESP Fair air entry EXP prolonged    Harsh breath sound Resp distres mild   CARDIAC S1 S2 No S3     NO JVD    ABDOMEN SOFT BS PRESENT NOT DISTENDED No hepatosplenomegaly   PEDAL EDEMA present No calf tenderness  NO rash       PATIENT PRESENTATION.   89F with a PMH HTN, Chronic Hypoxic Respiratory failure on O2 at home, HFpEF, Severe Pulmonary HTN, Hypothyroidism, Afib on coumadin, hx of multiple admissions for COPD and CHF exacerbation who presents to the ED for dyspnea.  Patient speaks primarily Irish,  provided however patient was unable to provide history.  SpO2 reportedly 65-70% at home.  Noted to be extremely hypercarbic in ED, placed on BiPAP with improvement.  Patient lethargic but wakes to mild touch.  ulm consulted 7/3/2021        Home meds.   · furosemide 40 mg oral tablet: 1 tab(s) orally once a day  · dilTIAZem 300 mg/24 hours oral capsule, extended release: 1 cap(s) orally once a day  · albuterol 90 mcg/inh inhalation aerosol: 1 puff(s) inhaled every 4 hours, As needed, Shortness of Breath and/or Wheezing  · warfarin 3 mg oral tablet: 1 tab(s) orally once a day (at bedtime)   · acetaminophen 325 mg oral tablet: 2 tab(s) orally every 6 hours, As needed, Mild Pain (1 - 3)  · clonazePAM 0.5 mg oral tablet: 1 tab(s) orally 2 times a day as needed anxiety MDD:2  · sildenafil 20 mg oral tablet: 1 tab(s) orally every 8 hours  · pantoprazole 40 mg oral delayed release tablet: 1 tab(s) orally once a day (before a meal)  · Multiple Vitamins oral tablet: 1 tab(s) orally once a day  · Synthroid 100 mcg (0.1 mg) oral tablet: 1 tab(s) orally once a day      SERGIOSEN FREDERICK 89 f 7/3/2021  admission PROBLEMS    ADVANCED DIRECTIVES.  dnr   COVID STATUS.   Scv2 7/3/2021 (-)   CC. shortness of berath     AC HYPERCAPNIC RESP FAILURE poa   BPAP STARTED 7/3/2021   AOC HFPEF   R HEART FAILURE   PULM HYPERTENSION (likely type 2)   COAGULOPATHY poa         PMH Chronic Hypoxic Respiratory failure on O2 at home,   PMH HFpEF, Severe PMH Pulmonary HTN,  ON SILDENAFIL poa  PMH Hypothyroidism,   PMH Afib on coumadin,    GLOBAL AND BEST PRACTICE ISSUES                     ALLERGY.    NKA   WEIGHT.      7/3/2021 68            BMI              7/3/2021 29           .                          ADVANCED DIRECTIVE. dnr                              HEAD OF BED ELEVATION. Yes  DVT PROPHYLAXIS.   APA.                   EMANUEL PROPHYLAXIS.                                                                     DYSPHAGIA RECOMM.   DIET.    npo 97/3/95593   INFECTION PROPHYLAXIS.     PATIENT DATA                ABG.     7/3/2021 2a bpap 20/6/.35 734/72/70   7/3/2021 12a 724/96/318              OXYGENATION.       7/3/2021 3l 96%             VITALS/IO/VENT/DRIPS.     7/3/2021 afeb 86 130/60     PATIENT DATA.    INFECTION  w 7/3/2021 w 5.9   RO MI  tr 7/3/2021 .022   HFPEF R HEART FAILURE    bnp 7/3/2021 bn 1843  cxr 7/3/2021 cxr diffuse bl opac henry r   ech 5/14/2021 ef 60% dd3 kristie pasp 63 n lvsf  lasix 40 97/3/2021)   COPD EX   DUONEB.4 (7/3/68505   symbicort 97/3/2021)     solumed 40.3 (7/3/2021)   spiriva (7/3/2021)   ANEMIA  hb 7/3/2021 Hb 10   COAGULOPATHY poa  INR 7/3/2021 inr 6.6   RENAL   cr 7/3/2021 cr .9         ASSESSMENT/RECOMMENDATIONS.      RESP FAILURE  Likely largely sec chf based on cxr   gas exchange improvd with bpap   INFECTION  No leukocytosis fever Doubt infection  check procalc   AOC HFPEF   R HEART FAILURE   PULM HYPERTENSION (likely type 2)  Increase lasix 40.2   COPD ex  Change solumed to 40 97/3/2021)   COAGULOPATH  Hold coumadin    TIME SPENT   Over 55 minutes aggregate care time spent on encounter; activities included   direct patient care, counseling and/or coordinating care reviewing notes, lab data/ imaging , discussion with multidisciplinary team/ patient  /family and explaining in detail risks, benefits, alternatives  of the recommendations     FRANCESCA MACK 89 f 7/3/2021 1/25/1929 DR ROXY MOSER

## 2021-07-03 NOTE — H&P ADULT - HISTORY OF PRESENT ILLNESS
Patient is a 89F with a PMH HTN, Chronic Hypoxic Respiratory failure on O2 at home, HFpEF, Severe Pulmonary HTN, Hypothyroidism, Afib on coumadin, hx of multiple admissions for COPD and CHF exacerbation who presents to the ED for dyspnea.  Patient speaks primarily Libyan,  provided however patient was unable to provide history.  SpO2 reportedly 65-70% at home.  Noted to be extremely hypercarbic in ED, placed on BiPAP with improvement.  Patient lethargic but wakes to mild touch.  Vitals stable, labs show elevated INR.  Will admit to med surg.        Code status: DNR/DNI (MOLST signed in chart)

## 2021-07-03 NOTE — CONSULT NOTE ADULT - SUBJECTIVE AND OBJECTIVE BOX
FREDERICK MA    [facility] MED 03    Patient is a 89y old  Female who presents with a chief complaint of dyspnea (03 Jul 2021 05:22)       Allergies    No Known Allergies    Intolerances        HPI:  Patient is a 89F with a PMH HTN, Chronic Hypoxic Respiratory failure on O2 at home, HFpEF, Severe Pulmonary HTN, Hypothyroidism, Afib on coumadin, hx of multiple admissions for COPD and CHF exacerbation who presents to the ED for dyspnea.  Patient speaks primarily Yi,  provided however patient was unable to provide history.  SpO2 reportedly 65-70% at home.  Noted to be extremely hypercarbic in ED, placed on BiPAP with improvement.  Patient lethargic but wakes to mild touch.  Vitals stable, labs show elevated INR.  Will admit to med surg.        Code status: DNR/DNI (MOLST signed in chart)   (03 Jul 2021 05:22)      PAST MEDICAL & SURGICAL HISTORY:  COPD exacerbation    Atrial fibrillation    Hypothyroidism    Essential hypertension    No significant past surgical history        FAMILY HISTORY:        MEDICATIONS   albuterol/ipratropium for Nebulization 3 milliLiter(s) Nebulizer every 6 hours  budesonide 160 MICROgram(s)/formoterol 4.5 MICROgram(s) Inhaler 2 Puff(s) Inhalation two times a day  furosemide   Injectable 40 milliGRAM(s) IV Push every 12 hours  methylPREDNISolone sodium succinate Injectable 40 milliGRAM(s) IV Push every 8 hours  tiotropium 18 MICROgram(s) Capsule 1 Capsule(s) Inhalation daily      Vital Signs Last 24 Hrs  T(C): 36.9 (03 Jul 2021 09:37), Max: 36.9 (03 Jul 2021 09:37)  T(F): 98.5 (03 Jul 2021 09:37), Max: 98.5 (03 Jul 2021 09:37)  HR: 86 (03 Jul 2021 09:37) (77 - 105)  BP: 141/64 (03 Jul 2021 09:37) (104/62 - 160/92)  BP(mean): 85 (03 Jul 2021 05:22) (85 - 85)  RR: 17 (03 Jul 2021 09:37) (16 - 27)  SpO2: 96% (03 Jul 2021 09:37) (80% - 99%)            LABS:                        10.0   5.97  )-----------( 200      ( 03 Jul 2021 00:09 )             32.0     07-03    141  |  100  |  25<H>  ----------------------------<  123<H>  3.9   |  36<H>  |  0.92    Ca    8.7      03 Jul 2021 00:09  Mg     1.9     07-03    TPro  7.7  /  Alb  3.1<L>  /  TBili  0.3  /  DBili  x   /  AST  18  /  ALT  21  /  AlkPhos  73  07-03    PT/INR - ( 03 Jul 2021 00:09 )   PT: 70.4 sec;   INR: 6.64 ratio         PTT - ( 03 Jul 2021 00:09 )  PTT:48.9 sec      ABG - ( 03 Jul 2021 02:03 )  pH, Arterial: x     pH, Blood: 7.34  /  pCO2: 72    /  pO2: 70    / HCO3: 38    / Base Excess: 10.6  /  SaO2: 94                  WBC:  WBC Count: 5.97 K/uL (07-03 @ 00:09)      MICROBIOLOGY:  RECENT CULTURES:        CARDIAC MARKERS ( 03 Jul 2021 00:09 )  .022 ng/mL / x     / x     / x     / x            PT/INR - ( 03 Jul 2021 00:09 )   PT: 70.4 sec;   INR: 6.64 ratio         PTT - ( 03 Jul 2021 00:09 )  PTT:48.9 sec    Sodium:  Sodium, Serum: 141 mmol/L (07-03 @ 00:09)      0.92 mg/dL 07-03 @ 00:09      Hemoglobin:  Hemoglobin: 10.0 g/dL (07-03 @ 00:09)      Platelets: Platelet Count - Automated: 200 K/uL (07-03 @ 00:09)      LIVER FUNCTIONS - ( 03 Jul 2021 00:09 )  Alb: 3.1 g/dL / Pro: 7.7 gm/dL / ALK PHOS: 73 U/L / ALT: 21 U/L / AST: 18 U/L / GGT: x                 RADIOLOGY & ADDITIONAL STUDIES:

## 2021-07-03 NOTE — ED PROVIDER NOTE - CARE PLAN
Principal Discharge DX:	Hypercapnic respiratory failure  Secondary Diagnosis:	COPD exacerbation  Secondary Diagnosis:	Pleural effusion  Secondary Diagnosis:	CHF (congestive heart failure)

## 2021-07-03 NOTE — H&P ADULT - PROBLEM SELECTOR PLAN 1
Patient DNR/DNI, presents with resp failure as she did last month.    Continue bipap.    Solumedrol 40mg IVPB Q8hrs, Dulera 2 puffs BID, Spiriva  Duonebs nebulizer Q6hrs standing. Patient DNR/DNI, presents with resp failure as she did last month.    Continue bipap.    Solumedrol 40mg IVPB Q8hrs, Dulera 2 puffs BID, Spiriva  Duonebs nebulizer Q6hrs standing.  Pulernesto Garner [facility]  Palliative care maren

## 2021-07-03 NOTE — CHART NOTE - NSCHARTNOTEFT_GEN_A_CORE
Patient seen and examined bedside.     Vital Signs Last 24 Hrs  T(C): 36.6 (03 Jul 2021 16:52), Max: 36.9 (03 Jul 2021 09:37)  T(F): 97.9 (03 Jul 2021 16:52), Max: 98.5 (03 Jul 2021 09:37)  HR: 93 (03 Jul 2021 21:41) (77 - 105)  BP: 130/77 (03 Jul 2021 16:52) (104/62 - 160/92)  BP(mean): 85 (03 Jul 2021 05:22) (85 - 85)  RR: 18 (03 Jul 2021 16:52) (16 - 27)  SpO2: 95% (03 Jul 2021 21:41) (80% - 99%)    GENERAL: NAD, thin and frail, not using accessory muscles, speaking in full sentences   HEAD:  Atraumatic, Normocephalic  EYES: EOMI, PERRLA, conjunctiva and sclera clear  ENMT: No tonsillar erythema, exudates, or enlargement; Moist mucous membranes  NECK: Supple, No JVD  NERVOUS SYSTEM:  awake, alert, moving all extremities   CHEST/LUNG: good b/l air entry, no wheezing b/l   HEART: Regular rate and rhythm; No murmurs, rubs, or gallops  ABDOMEN: Soft, Nontender, Nondistended; Bowel sounds present  EXTREMITIES:  2+ Peripheral Pulses b/l, No clubbing, cyanosis, calf tenderness or edema b/l                             9.8    4.21  )-----------( 210      ( 03 Jul 2021 11:09 )             32.0   07-03    140  |  99  |  26<H>  ----------------------------<  151<H>  3.6   |  36<H>  |  1.02    Ca    9.2      03 Jul 2021 11:09  Mg     1.9     07-03    TPro  7.8  /  Alb  3.1<L>  /  TBili  0.3  /  DBili  x   /  AST  16  /  ALT  19  /  AlkPhos  73  07-03    Assessment and Plan:   Acute on chronic hypoxic and hypercarbic respiratory failure   Elevated INR , no e/o bleeding or bruising   Acute on chronic diastlic CHF   chronic atrial fib on coumadin , coumadin on hold d/t elevated INR , rate controlled   COPD on home O2 3L NC   Hypothyroidism   Essential HTN   Severe Pulm HTN   Pleural effusions   DNR/DNI     off BIPAP, doing well on 3-4L NC   continue with duonebs , IV steroids   pulmonary following   monitor INR   lasix IV   monitor labs   continue with home meds     the rest of management per H&P

## 2021-07-03 NOTE — H&P ADULT - ASSESSMENT
Patient is a 89F with a PMH HTN, Chronic Hypoxic Respiratory failure on O2 at home, HFpEF, Severe Pulmonary HTN, Hypothyroidism, Afib on coumadin, hx of multiple admissions for COPD and CHF exacerbation who presents to the ED for dyspnea.  Patient speaks primarily Angolan,  provided however patient was unable to provide history.  SpO2 reportedly 65-70% at home.  Noted to be extremelly hypercarbic in ED, placed on BiPAP with improvement.  Patient lethargic but wakes to mild touch.  Vitals stable, labs show elevated INR.  Will admit to med surg.

## 2021-07-03 NOTE — ED PROVIDER NOTE - PHYSICAL EXAMINATION
Gen: Alert, tachypneic, sat 98% on 10L NRB  Head: NC, AT, PERRL, normal lids/conjunctiva   ENT:  patent oropharynx without erythema/exudate, uvula midline  Neck: supple, no tenderness/meningismus  Pulm: diminished, tahcypneic, + b/l rales  CV: RRR, no M/R/G, +dist pulses   Abd: soft, NT/ND, +BS, no guarding/rebound tenderness  Mskel: no edema/erythema/cyanosis   Skin: no rash, no bruising  Neuro: AAO, no sensory/motor deficits

## 2021-07-03 NOTE — H&P ADULT - NSHPLABSRESULTS_GEN_ALL_CORE
Recent Vitals  T(C): 36.4 (07-02-21 @ 23:22), Max: 36.4 (07-02-21 @ 23:22)  HR: 77 (07-03-21 @ 02:34) (77 - 105)  BP: 104/62 (07-03-21 @ 02:34) (104/62 - 160/92)  RR: 16 (07-03-21 @ 02:34) (16 - 27)  SpO2: 99% (07-03-21 @ 02:34) (80% - 99%)                        10.0   5.97  )-----------( 200      ( 03 Jul 2021 00:09 )             32.0     07-03    141  |  100  |  25<H>  ----------------------------<  123<H>  3.9   |  36<H>  |  0.92    Ca    8.7      03 Jul 2021 00:09  Mg     1.9     07-03    TPro  7.7  /  Alb  3.1<L>  /  TBili  0.3  /  DBili  x   /  AST  18  /  ALT  21  /  AlkPhos  73  07-03    PT/INR - ( 03 Jul 2021 00:09 )   PT: 70.4 sec;   INR: 6.64 ratio         PTT - ( 03 Jul 2021 00:09 )  PTT:48.9 sec  LIVER FUNCTIONS - ( 03 Jul 2021 00:09 )  Alb: 3.1 g/dL / Pro: 7.7 gm/dL / ALK PHOS: 73 U/L / ALT: 21 U/L / AST: 18 U/L / GGT: x               Home Medications:  acetaminophen 325 mg oral tablet: 2 tab(s) orally every 6 hours, As needed, Mild Pain (1 - 3) (09 Jun 2021 14:21)  dilTIAZem 300 mg/24 hours oral capsule, extended release: 1 cap(s) orally once a day (09 Jun 2021 14:21)  Dulcolax Stool Softener 100 mg oral capsule: 1 cap(s) orally 2 times a day as needed constipation (19 May 2021 13:28)  furosemide 40 mg oral tablet: 1 tab(s) orally once a day (09 Jun 2021 14:21)  Metamucil 3.4 g/3.7 g oral powder for reconstitution: 3.4 gram(s) orally once a day with 12oz H20.  (19 May 2021 13:28)  Multiple Vitamins oral tablet: 1 tab(s) orally once a day (19 Oct 2020 12:47)  ocular lubricant ophthalmic solution: 2 drop(s) to each affected eye 5 times a day, As needed, Dry Eyes (09 Jun 2021 14:21)  pantoprazole 40 mg oral delayed release tablet: 1 tab(s) orally once a day (before a meal) (19 Oct 2020 12:47)  sildenafil 20 mg oral tablet: 1 tab(s) orally every 8 hours (19 Oct 2020 12:47)  Synthroid 100 mcg (0.1 mg) oral tablet: 1 tab(s) orally once a day (19 Oct 2020 12:47)  Vitamin D2 50,000 intl units (1.25 mg) oral capsule: 1 cap(s) orally once a week (19 Oct 2020 12:47)

## 2021-07-03 NOTE — H&P ADULT - NSHPPHYSICALEXAM_GEN_ALL_CORE
Physical exam:  General: patient in no acute distress, resting comfortably  Head:  Atraumatic, Normocephalic  Eyes: EOMI, PERRLA, clear sclera  Neck: Supple, thyroid nontender, non enlarged  Cardio: S1/S2 +ve, regular rate and rhythm, no M/G/R  Resp: poor air entry bilaterally, mild wheezing   GI: abdomen soft, nontender, non distended, no guarding, BS +ve x 4  Ext: no significant pedal edema  Neuro: CN 2-12 intact, no significant motor or sensory deficits.  Skin: No rashes or lesions

## 2021-07-03 NOTE — ED PROVIDER NOTE - OBJECTIVE STATEMENT
89yo female with PMH HTN, COPD, Chronic Hypoxic Respiratory failure on O2 at home, Diastolic CHF, Severe Pulmonary HTN, Hypothyroidism, Atrial fibrillation on Coumadin, Anemia, hx of multiple admissions for COPD and CHF exacerbation last admission 1 month ago presents with SOB. denies fever, cp, vomiting, abd pain.     No fever/chills, No ear pain, No chest pain/palpitations, +SOB/cough, no wheeze/stridor, No abdominal pain, No N/V/D, no dysuria/frequency/discharge, No neck/back pain, no rash, no changes in neurological status/function. 89yo female with PMH HTN, COPD, Chronic Hypoxic Respiratory failure on O2 at home, Diastolic CHF, Severe Pulmonary HTN, Hypothyroidism, Atrial fibrillation on Coumadin, Anemia, hx of multiple admissions for COPD and CHF exacerbation last admission 1 month ago presents with SOB. denies fever, cp, vomiting, abd pain. had palliative consult, DNR/DNI (confirmed with son), and pending hospice eval per son presents with sob. no fever, vomiting, apparent pain. Pt reports "I can't breathe". Attempted to use , but unable to communicate, unclear if due to hearing or pt doesn't understand.     No fever/chills, No CP. +SOB/cough, No abdominal pain, No V/D, No neck/back pain, no rash, no changes in neurological status/function.

## 2021-07-04 DIAGNOSIS — J90 PLEURAL EFFUSION, NOT ELSEWHERE CLASSIFIED: ICD-10-CM

## 2021-07-04 DIAGNOSIS — I50.9 HEART FAILURE, UNSPECIFIED: ICD-10-CM

## 2021-07-04 DIAGNOSIS — J44.1 CHRONIC OBSTRUCTIVE PULMONARY DISEASE WITH (ACUTE) EXACERBATION: ICD-10-CM

## 2021-07-04 DIAGNOSIS — I48.91 UNSPECIFIED ATRIAL FIBRILLATION: ICD-10-CM

## 2021-07-04 LAB
ALBUMIN SERPL ELPH-MCNC: 2.8 G/DL — LOW (ref 3.3–5)
ALP SERPL-CCNC: 64 U/L — SIGNIFICANT CHANGE UP (ref 40–120)
ALT FLD-CCNC: 17 U/L — SIGNIFICANT CHANGE UP (ref 12–78)
ANION GAP SERPL CALC-SCNC: 6 MMOL/L — SIGNIFICANT CHANGE UP (ref 5–17)
ANION GAP SERPL CALC-SCNC: 7 MMOL/L — SIGNIFICANT CHANGE UP (ref 5–17)
AST SERPL-CCNC: 12 U/L — LOW (ref 15–37)
BASE EXCESS BLDA CALC-SCNC: 11.5 MMOL/L — HIGH (ref -2–2)
BASOPHILS # BLD AUTO: 0.01 K/UL — SIGNIFICANT CHANGE UP (ref 0–0.2)
BASOPHILS NFR BLD AUTO: 0.2 % — SIGNIFICANT CHANGE UP (ref 0–2)
BILIRUB SERPL-MCNC: 0.4 MG/DL — SIGNIFICANT CHANGE UP (ref 0.2–1.2)
BLOOD GAS COMMENTS: SIGNIFICANT CHANGE UP
BLOOD GAS COMMENTS: SIGNIFICANT CHANGE UP
BLOOD GAS SOURCE: SIGNIFICANT CHANGE UP
BUN SERPL-MCNC: 36 MG/DL — HIGH (ref 7–23)
BUN SERPL-MCNC: 39 MG/DL — HIGH (ref 7–23)
CALCIUM SERPL-MCNC: 8.4 MG/DL — LOW (ref 8.5–10.1)
CALCIUM SERPL-MCNC: 9.1 MG/DL — SIGNIFICANT CHANGE UP (ref 8.5–10.1)
CHLORIDE SERPL-SCNC: 97 MMOL/L — SIGNIFICANT CHANGE UP (ref 96–108)
CHLORIDE SERPL-SCNC: 99 MMOL/L — SIGNIFICANT CHANGE UP (ref 96–108)
CO2 SERPL-SCNC: 34 MMOL/L — HIGH (ref 22–31)
CO2 SERPL-SCNC: 37 MMOL/L — HIGH (ref 22–31)
COVID-19 SPIKE DOMAIN AB INTERP: NEGATIVE — SIGNIFICANT CHANGE UP
COVID-19 SPIKE DOMAIN ANTIBODY RESULT: 0.4 U/ML — SIGNIFICANT CHANGE UP
CREAT SERPL-MCNC: 1.6 MG/DL — HIGH (ref 0.5–1.3)
CREAT SERPL-MCNC: 1.64 MG/DL — HIGH (ref 0.5–1.3)
EOSINOPHIL # BLD AUTO: 0 K/UL — SIGNIFICANT CHANGE UP (ref 0–0.5)
EOSINOPHIL NFR BLD AUTO: 0 % — SIGNIFICANT CHANGE UP (ref 0–6)
FERRITIN SERPL-MCNC: 146 NG/ML — SIGNIFICANT CHANGE UP (ref 15–150)
FOLATE SERPL-MCNC: 9.9 NG/ML — SIGNIFICANT CHANGE UP
GLUCOSE SERPL-MCNC: 126 MG/DL — HIGH (ref 70–99)
GLUCOSE SERPL-MCNC: 141 MG/DL — HIGH (ref 70–99)
HCO3 BLDA-SCNC: 37 MMOL/L — HIGH (ref 21–29)
HCT VFR BLD CALC: 28.7 % — LOW (ref 34.5–45)
HGB BLD-MCNC: 9.1 G/DL — LOW (ref 11.5–15.5)
HOROWITZ INDEX BLDA+IHG-RTO: 36 — SIGNIFICANT CHANGE UP
IMM GRANULOCYTES NFR BLD AUTO: 0.4 % — SIGNIFICANT CHANGE UP (ref 0–1.5)
INR BLD: 6.47 RATIO — CRITICAL HIGH (ref 0.88–1.16)
IRON SATN MFR SERPL: 11 % — LOW (ref 14–50)
IRON SATN MFR SERPL: 31 UG/DL — SIGNIFICANT CHANGE UP (ref 30–160)
LYMPHOCYTES # BLD AUTO: 0.99 K/UL — LOW (ref 1–3.3)
LYMPHOCYTES # BLD AUTO: 19.6 % — SIGNIFICANT CHANGE UP (ref 13–44)
MAGNESIUM SERPL-MCNC: 2.2 MG/DL — SIGNIFICANT CHANGE UP (ref 1.6–2.6)
MCHC RBC-ENTMCNC: 31.7 GM/DL — LOW (ref 32–36)
MCHC RBC-ENTMCNC: 33.5 PG — SIGNIFICANT CHANGE UP (ref 27–34)
MCV RBC AUTO: 105.5 FL — HIGH (ref 80–100)
MONOCYTES # BLD AUTO: 0.16 K/UL — SIGNIFICANT CHANGE UP (ref 0–0.9)
MONOCYTES NFR BLD AUTO: 3.2 % — SIGNIFICANT CHANGE UP (ref 2–14)
NEUTROPHILS # BLD AUTO: 3.86 K/UL — SIGNIFICANT CHANGE UP (ref 1.8–7.4)
NEUTROPHILS NFR BLD AUTO: 76.6 % — SIGNIFICANT CHANGE UP (ref 43–77)
NRBC # BLD: 0 /100 WBCS — SIGNIFICANT CHANGE UP (ref 0–0)
PCO2 BLDA: 55 MMHG — HIGH (ref 32–46)
PH BLD: 7.44 — SIGNIFICANT CHANGE UP (ref 7.35–7.45)
PHOSPHATE SERPL-MCNC: 2.6 MG/DL — SIGNIFICANT CHANGE UP (ref 2.5–4.5)
PLATELET # BLD AUTO: 228 K/UL — SIGNIFICANT CHANGE UP (ref 150–400)
PO2 BLDA: 85 MMHG — SIGNIFICANT CHANGE UP (ref 74–108)
POTASSIUM SERPL-MCNC: 3.7 MMOL/L — SIGNIFICANT CHANGE UP (ref 3.5–5.3)
POTASSIUM SERPL-MCNC: 3.7 MMOL/L — SIGNIFICANT CHANGE UP (ref 3.5–5.3)
POTASSIUM SERPL-SCNC: 3.7 MMOL/L — SIGNIFICANT CHANGE UP (ref 3.5–5.3)
POTASSIUM SERPL-SCNC: 3.7 MMOL/L — SIGNIFICANT CHANGE UP (ref 3.5–5.3)
PROCALCITONIN SERPL-MCNC: 0.13 NG/ML — HIGH (ref 0.02–0.1)
PROT SERPL-MCNC: 7.7 GM/DL — SIGNIFICANT CHANGE UP (ref 6–8.3)
PROTHROM AB SERPL-ACNC: 68.7 SEC — HIGH (ref 10.6–13.6)
RBC # BLD: 2.72 M/UL — LOW (ref 3.8–5.2)
RBC # FLD: 14.6 % — HIGH (ref 10.3–14.5)
SAO2 % BLDA: 97 % — HIGH (ref 92–96)
SARS-COV-2 IGG+IGM SERPL QL IA: 0.4 U/ML — SIGNIFICANT CHANGE UP
SARS-COV-2 IGG+IGM SERPL QL IA: NEGATIVE — SIGNIFICANT CHANGE UP
SODIUM SERPL-SCNC: 139 MMOL/L — SIGNIFICANT CHANGE UP (ref 135–145)
SODIUM SERPL-SCNC: 141 MMOL/L — SIGNIFICANT CHANGE UP (ref 135–145)
TIBC SERPL-MCNC: 280 UG/DL — SIGNIFICANT CHANGE UP (ref 220–430)
UIBC SERPL-MCNC: 249 UG/DL — SIGNIFICANT CHANGE UP (ref 110–370)
VIT B12 SERPL-MCNC: 916 PG/ML — SIGNIFICANT CHANGE UP (ref 232–1245)
WBC # BLD: 5.04 K/UL — SIGNIFICANT CHANGE UP (ref 3.8–10.5)
WBC # FLD AUTO: 5.04 K/UL — SIGNIFICANT CHANGE UP (ref 3.8–10.5)

## 2021-07-04 PROCEDURE — 99233 SBSQ HOSP IP/OBS HIGH 50: CPT

## 2021-07-04 PROCEDURE — 71045 X-RAY EXAM CHEST 1 VIEW: CPT | Mod: 26

## 2021-07-04 PROCEDURE — 99232 SBSQ HOSP IP/OBS MODERATE 35: CPT

## 2021-07-04 RX ORDER — PREGABALIN 225 MG/1
1000 CAPSULE ORAL DAILY
Refills: 0 | Status: DISCONTINUED | OUTPATIENT
Start: 2021-07-04 | End: 2021-07-06

## 2021-07-04 RX ORDER — FUROSEMIDE 40 MG
40 TABLET ORAL DAILY
Refills: 0 | Status: DISCONTINUED | OUTPATIENT
Start: 2021-07-04 | End: 2021-07-06

## 2021-07-04 RX ORDER — SODIUM CHLORIDE 9 MG/ML
500 INJECTION INTRAMUSCULAR; INTRAVENOUS; SUBCUTANEOUS ONCE
Refills: 0 | Status: COMPLETED | OUTPATIENT
Start: 2021-07-04 | End: 2021-07-04

## 2021-07-04 RX ORDER — FUROSEMIDE 40 MG
20 TABLET ORAL DAILY
Refills: 0 | Status: DISCONTINUED | OUTPATIENT
Start: 2021-07-04 | End: 2021-07-04

## 2021-07-04 RX ADMIN — Medication 12.5 MILLIGRAM(S): at 05:49

## 2021-07-04 RX ADMIN — SODIUM CHLORIDE 500 MILLILITER(S): 9 INJECTION INTRAMUSCULAR; INTRAVENOUS; SUBCUTANEOUS at 09:58

## 2021-07-04 RX ADMIN — Medication 3 MILLILITER(S): at 17:40

## 2021-07-04 RX ADMIN — Medication 40 MILLIGRAM(S): at 05:49

## 2021-07-04 RX ADMIN — Medication 3 MILLILITER(S): at 00:13

## 2021-07-04 RX ADMIN — SENNA PLUS 2 TABLET(S): 8.6 TABLET ORAL at 22:11

## 2021-07-04 RX ADMIN — Medication 3 MILLILITER(S): at 05:34

## 2021-07-04 RX ADMIN — Medication 40 MILLIGRAM(S): at 10:09

## 2021-07-04 RX ADMIN — PREGABALIN 1000 MICROGRAM(S): 225 CAPSULE ORAL at 11:22

## 2021-07-04 RX ADMIN — Medication 3 MILLILITER(S): at 11:18

## 2021-07-04 RX ADMIN — BUDESONIDE AND FORMOTEROL FUMARATE DIHYDRATE 2 PUFF(S): 160; 4.5 AEROSOL RESPIRATORY (INHALATION) at 17:57

## 2021-07-04 RX ADMIN — Medication 12.5 MILLIGRAM(S): at 17:57

## 2021-07-05 LAB
ANION GAP SERPL CALC-SCNC: 5 MMOL/L — SIGNIFICANT CHANGE UP (ref 5–17)
BUN SERPL-MCNC: 43 MG/DL — HIGH (ref 7–23)
CALCIUM SERPL-MCNC: 8.7 MG/DL — SIGNIFICANT CHANGE UP (ref 8.5–10.1)
CHLORIDE SERPL-SCNC: 96 MMOL/L — SIGNIFICANT CHANGE UP (ref 96–108)
CO2 SERPL-SCNC: 39 MMOL/L — HIGH (ref 22–31)
CREAT SERPL-MCNC: 1.31 MG/DL — HIGH (ref 0.5–1.3)
GLUCOSE SERPL-MCNC: 77 MG/DL — SIGNIFICANT CHANGE UP (ref 70–99)
HCT VFR BLD CALC: 32.2 % — LOW (ref 34.5–45)
HGB BLD-MCNC: 10.1 G/DL — LOW (ref 11.5–15.5)
INR BLD: 3.53 RATIO — HIGH (ref 0.88–1.16)
MAGNESIUM SERPL-MCNC: 2 MG/DL — SIGNIFICANT CHANGE UP (ref 1.6–2.6)
MCHC RBC-ENTMCNC: 31.4 GM/DL — LOW (ref 32–36)
MCHC RBC-ENTMCNC: 33.6 PG — SIGNIFICANT CHANGE UP (ref 27–34)
MCV RBC AUTO: 107 FL — HIGH (ref 80–100)
NRBC # BLD: 0 /100 WBCS — SIGNIFICANT CHANGE UP (ref 0–0)
PHOSPHATE SERPL-MCNC: 2.6 MG/DL — SIGNIFICANT CHANGE UP (ref 2.5–4.5)
PLATELET # BLD AUTO: 267 K/UL — SIGNIFICANT CHANGE UP (ref 150–400)
POTASSIUM SERPL-MCNC: 3.4 MMOL/L — LOW (ref 3.5–5.3)
POTASSIUM SERPL-SCNC: 3.4 MMOL/L — LOW (ref 3.5–5.3)
PROTHROM AB SERPL-ACNC: 38.6 SEC — HIGH (ref 10.6–13.6)
RBC # BLD: 3.01 M/UL — LOW (ref 3.8–5.2)
RBC # FLD: 14.9 % — HIGH (ref 10.3–14.5)
SODIUM SERPL-SCNC: 140 MMOL/L — SIGNIFICANT CHANGE UP (ref 135–145)
WBC # BLD: 7.97 K/UL — SIGNIFICANT CHANGE UP (ref 3.8–10.5)
WBC # FLD AUTO: 7.97 K/UL — SIGNIFICANT CHANGE UP (ref 3.8–10.5)

## 2021-07-05 PROCEDURE — 99232 SBSQ HOSP IP/OBS MODERATE 35: CPT

## 2021-07-05 PROCEDURE — 99233 SBSQ HOSP IP/OBS HIGH 50: CPT

## 2021-07-05 RX ORDER — CLONAZEPAM 1 MG
0.5 TABLET ORAL
Refills: 0 | Status: DISCONTINUED | OUTPATIENT
Start: 2021-07-05 | End: 2021-07-06

## 2021-07-05 RX ORDER — PANTOPRAZOLE SODIUM 20 MG/1
40 TABLET, DELAYED RELEASE ORAL
Refills: 0 | Status: DISCONTINUED | OUTPATIENT
Start: 2021-07-05 | End: 2021-07-06

## 2021-07-05 RX ORDER — POTASSIUM CHLORIDE 20 MEQ
20 PACKET (EA) ORAL ONCE
Refills: 0 | Status: COMPLETED | OUTPATIENT
Start: 2021-07-05 | End: 2021-07-05

## 2021-07-05 RX ORDER — NYSTATIN CREAM 100000 [USP'U]/G
1 CREAM TOPICAL
Refills: 0 | Status: DISCONTINUED | OUTPATIENT
Start: 2021-07-05 | End: 2021-07-06

## 2021-07-05 RX ORDER — DILTIAZEM HCL 120 MG
300 CAPSULE, EXT RELEASE 24 HR ORAL DAILY
Refills: 0 | Status: DISCONTINUED | OUTPATIENT
Start: 2021-07-05 | End: 2021-07-06

## 2021-07-05 RX ORDER — POTASSIUM CHLORIDE 20 MEQ
20 PACKET (EA) ORAL ONCE
Refills: 0 | Status: DISCONTINUED | OUTPATIENT
Start: 2021-07-05 | End: 2021-07-05

## 2021-07-05 RX ORDER — LEVOTHYROXINE SODIUM 125 MCG
100 TABLET ORAL DAILY
Refills: 0 | Status: DISCONTINUED | OUTPATIENT
Start: 2021-07-05 | End: 2021-07-06

## 2021-07-05 RX ADMIN — BUDESONIDE AND FORMOTEROL FUMARATE DIHYDRATE 2 PUFF(S): 160; 4.5 AEROSOL RESPIRATORY (INHALATION) at 17:54

## 2021-07-05 RX ADMIN — Medication 40 MILLIGRAM(S): at 05:37

## 2021-07-05 RX ADMIN — SENNA PLUS 2 TABLET(S): 8.6 TABLET ORAL at 21:44

## 2021-07-05 RX ADMIN — NYSTATIN CREAM 1 APPLICATION(S): 100000 CREAM TOPICAL at 17:54

## 2021-07-05 RX ADMIN — Medication 3 MILLILITER(S): at 17:13

## 2021-07-05 RX ADMIN — Medication 0.5 MILLIGRAM(S): at 21:44

## 2021-07-05 RX ADMIN — Medication 300 MILLIGRAM(S): at 21:46

## 2021-07-05 RX ADMIN — PANTOPRAZOLE SODIUM 40 MILLIGRAM(S): 20 TABLET, DELAYED RELEASE ORAL at 21:44

## 2021-07-05 RX ADMIN — Medication 20 MILLIEQUIVALENT(S): at 12:25

## 2021-07-05 RX ADMIN — Medication 0.5 MILLIGRAM(S): at 12:25

## 2021-07-05 RX ADMIN — PREGABALIN 1000 MICROGRAM(S): 225 CAPSULE ORAL at 14:10

## 2021-07-05 RX ADMIN — Medication 1 DROP(S): at 12:25

## 2021-07-05 RX ADMIN — Medication 3 MILLILITER(S): at 05:10

## 2021-07-05 RX ADMIN — Medication 20 MILLIGRAM(S): at 15:05

## 2021-07-05 RX ADMIN — BUDESONIDE AND FORMOTEROL FUMARATE DIHYDRATE 2 PUFF(S): 160; 4.5 AEROSOL RESPIRATORY (INHALATION) at 05:36

## 2021-07-05 RX ADMIN — Medication 40 MILLIGRAM(S): at 12:25

## 2021-07-05 RX ADMIN — Medication 3 MILLILITER(S): at 11:11

## 2021-07-05 RX ADMIN — Medication 3 MILLILITER(S): at 00:40

## 2021-07-05 RX ADMIN — Medication 20 MILLIGRAM(S): at 21:44

## 2021-07-05 RX ADMIN — Medication 12.5 MILLIGRAM(S): at 05:37

## 2021-07-05 NOTE — DIETITIAN INITIAL EVALUATION ADULT. - OTHER CALCULATIONS
Ht (cm): 152.4cm   Wt (kg): 68kg (dosing weight 07/02)   BMI: 29.3     IBW: 45.3kg +/- 10% %IBW: 150% UBW: 60.3kg %UBW: 113%

## 2021-07-05 NOTE — DIETITIAN INITIAL EVALUATION ADULT. - REASON INDICATOR FOR ASSESSMENT
Pt seen for nutrition consult for bariatric surgery and pressure injury > stage II  Per chart pt has no history of bariatric surgery and has no pressure injuries at this time.

## 2021-07-05 NOTE — DIETITIAN INITIAL EVALUATION ADULT. - PERTINENT MEDS FT
MEDICATIONS  (STANDING):  albuterol/ipratropium for Nebulization 3 milliLiter(s) Nebulizer every 6 hours  budesonide 160 MICROgram(s)/formoterol 4.5 MICROgram(s) Inhaler 2 Puff(s) Inhalation two times a day  clonazePAM  Tablet 0.5 milliGRAM(s) Oral two times a day  cyanocobalamin 1000 MICROGram(s) Oral daily  diltiazem    milliGRAM(s) Oral daily  furosemide    Tablet 40 milliGRAM(s) Oral daily  levothyroxine 100 MICROGram(s) Oral daily  nystatin Powder 1 Application(s) Topical two times a day  pantoprazole    Tablet 40 milliGRAM(s) Oral before breakfast  predniSONE   Tablet 40 milliGRAM(s) Oral daily  senna 2 Tablet(s) Oral at bedtime  sildenafil (REVATIO) 20 milliGRAM(s) Oral every 8 hours    MEDICATIONS  (PRN):  albuterol/ipratropium for Nebulization 3 milliLiter(s) Nebulizer every 4 hours PRN Shortness of Breath and/or Wheezing  artificial  tears Solution 1 Drop(s) Both EYES two times a day PRN Dry Eyes

## 2021-07-05 NOTE — SWALLOW BEDSIDE ASSESSMENT ADULT - SWALLOW EVAL: RECOMMENDED FEEDING/EATING TECHNIQUES
alternate food with liquid/check mouth frequently for oral residue/pocketing/crush medication (when feasible)/maintain upright posture during/after eating for 30 mins/oral hygiene/small sips/bites

## 2021-07-05 NOTE — DIETITIAN INITIAL EVALUATION ADULT. - OTHER INFO
Per chart pt with PMH: HTN, CHF, chronic respiratory failure, COPD, Afib on coumadin, pulmonary HTN, presents with dyspnea, found with acute respiratory failure and CHF. Per hospitalist note 07/04 pt's daughter reports pt under home hospice care. Per chart pt has 24/7 HHA and supportive children nearby. Per chart pt German speaking but cannot understand ; can communicate in English; noted with some confusion. At time of visit pt asked this clinician to call her son so she could speak to him (son was reached via pt's room phone to pt's satisfaction); was not interested in engaging with this RD at this time.  Per flow sheets pt consuming 100% of documented meals. Observed <25% of pt's lunch consumed at time of visit- pt was preoccupied with trying to reach her son. Noted pending swallow evaluation consult. No reports of nausea, vomiting, diarrhea, or constipation. Weight history per previous RD notes: (05/292021) 57.1kg (05/18/2021) 60kg.   RD remains available.

## 2021-07-05 NOTE — PROGRESS NOTE ADULT - TIME BILLING
min spent reviewing chart, examining patient, discussing plan with patient and family
min spent reviewing chart, examining patient, discussing plan with patient and family

## 2021-07-05 NOTE — SWALLOW BEDSIDE ASSESSMENT ADULT - SPECIFY REASON(S)
Clinical assessment of swallow function; DNR/DNI Clinical assessment of swallow function; primary language Kyrgyz, son present for exam;  DNR/DNI

## 2021-07-05 NOTE — SWALLOW BEDSIDE ASSESSMENT ADULT - SLP GENERAL OBSERVATIONS
alert, restless, c/o body pain, verbal with some confusion; poorly cooperative for po trials and simple instructions

## 2021-07-05 NOTE — DIETITIAN INITIAL EVALUATION ADULT. - DIET TYPE
Ensure Enlive x 1/day (provides 350 kcal, 20 g protein); defer consistency/texture to SLP/medical team/low sodium/mechanical soft/supplement (specify)

## 2021-07-05 NOTE — SWALLOW BEDSIDE ASSESSMENT ADULT - ORAL PREPARATORY PHASE
Refuses to accept bolus into oral cavity/Reduced oral grading Reduced oral grading Refuses to accept bolus into oral cavity

## 2021-07-05 NOTE — SWALLOW BEDSIDE ASSESSMENT ADULT - COMMENTS
(5/28 CT chest bilateral pl effusions)   HTN, Chronic Hypoxic Respiratory failure on O2 at home, HFpEF, Severe Pulmonary HTN, Hypothyroidism, Afib on coumadin, hx of multiple admissions for COPD and CHF exacerbation who   7/3 CXR bilateral right greater than left pleural effusions and/or basilar airspace disease  . Pt is under home hospice currently as per Dtr, Lilliam [phone]-off BIPAP, doing well on 3-4L NC   7/5 Pulm note COPD exacerbation.  P (5/28 CT chest bilateral pl effusions)  PMH HTN, Chronic Hypoxic Respiratory failure on O2 at home, HFpEF, Severe Pulmonary HTN, Hypothyroidism, Afib on coumadin, hx of multiple admissions for COPD and CHF exacerbation   7/3 CXR bilateral right greater than left pleural effusions and/or basilar airspace disease  7/5 MD note Acute on chronic hypoxic and hypercarbic respiratory failure; off BIPAP, doing well on 3-4L NC ; Pt under home hospice currently as per Lilliam Joyce   7/5 Pulm note COPD exacerbation pt refused solid trials stating pain and bathroom needs

## 2021-07-05 NOTE — SWALLOW BEDSIDE ASSESSMENT ADULT - PHARYNGEAL PHASE
Delayed pharyngeal swallow/Decreased laryngeal elevation Decreased laryngeal elevation delayed burping/Delayed pharyngeal swallow/Decreased laryngeal elevation

## 2021-07-05 NOTE — SWALLOW BEDSIDE ASSESSMENT ADULT - SWALLOW EVAL: PATIENT/FAMILY GOALS STATEMENT
pt stated "I no feel good, no more, I have to go bathroom"; also stated in Dominican that she felt pain all over body

## 2021-07-05 NOTE — SWALLOW BEDSIDE ASSESSMENT ADULT - SWALLOW EVAL: DIAGNOSIS
Oropharyngeal dysphagia associated with reduced coordination of breathing and swallowing; r/o aspiration from pharyngeal swallow based on limited observations; Reduced swallow efficiency with work of eating in setting of generalized weakness

## 2021-07-05 NOTE — DIETITIAN INITIAL EVALUATION ADULT. - PERTINENT LABORATORY DATA
07-05 Na140 mmol/L Glu 77 mg/dL K+ 3.4 mmol/L<L> Cr  1.31 mg/dL<H> BUN 43 mg/dL<H> 07-05 Phos 2.6 mg/dL 07-04 Alb 2.8 g/dL<L>

## 2021-07-05 NOTE — SWALLOW BEDSIDE ASSESSMENT ADULT - POSITIONING
You have an appointment with Dr. Brown on 8/18/20 12:30  You have an appointment for labs and possible platelets on 8/17 at 1pm, 8/19 1:30, 8/21 3pm
upright (90 degrees)

## 2021-07-05 NOTE — DIETITIAN INITIAL EVALUATION ADULT. - PROBLEM SELECTOR PLAN 1
Patient DNR/DNI, presents with resp failure as she did last month.    Continue bipap.    Solumedrol 40mg IVPB Q8hrs, Dulera 2 puffs BID, Spiriva  Duonebs nebulizer Q6hrs standing.  Pulernesto Garner [facility]  Palliative care maren

## 2021-07-06 ENCOUNTER — TRANSCRIPTION ENCOUNTER (OUTPATIENT)
Age: 86
End: 2021-07-06

## 2021-07-06 VITALS — OXYGEN SATURATION: 97 %

## 2021-07-06 LAB
ANION GAP SERPL CALC-SCNC: 5 MMOL/L — SIGNIFICANT CHANGE UP (ref 5–17)
BUN SERPL-MCNC: 47 MG/DL — HIGH (ref 7–23)
CALCIUM SERPL-MCNC: 8.4 MG/DL — LOW (ref 8.5–10.1)
CHLORIDE SERPL-SCNC: 98 MMOL/L — SIGNIFICANT CHANGE UP (ref 96–108)
CO2 SERPL-SCNC: 40 MMOL/L — HIGH (ref 22–31)
CREAT SERPL-MCNC: 1.21 MG/DL — SIGNIFICANT CHANGE UP (ref 0.5–1.3)
GLUCOSE SERPL-MCNC: 111 MG/DL — HIGH (ref 70–99)
INR BLD: 1.96 RATIO — HIGH (ref 0.88–1.16)
POTASSIUM SERPL-MCNC: 4.6 MMOL/L — SIGNIFICANT CHANGE UP (ref 3.5–5.3)
POTASSIUM SERPL-SCNC: 4.6 MMOL/L — SIGNIFICANT CHANGE UP (ref 3.5–5.3)
PROTHROM AB SERPL-ACNC: 22 SEC — HIGH (ref 10.6–13.6)
SODIUM SERPL-SCNC: 143 MMOL/L — SIGNIFICANT CHANGE UP (ref 135–145)

## 2021-07-06 PROCEDURE — 99232 SBSQ HOSP IP/OBS MODERATE 35: CPT

## 2021-07-06 PROCEDURE — 99239 HOSP IP/OBS DSCHRG MGMT >30: CPT

## 2021-07-06 RX ORDER — NYSTATIN CREAM 100000 [USP'U]/G
1 CREAM TOPICAL
Qty: 0 | Refills: 0 | DISCHARGE
Start: 2021-07-06

## 2021-07-06 RX ORDER — PREGABALIN 225 MG/1
1 CAPSULE ORAL
Qty: 0 | Refills: 0 | DISCHARGE
Start: 2021-07-06

## 2021-07-06 RX ADMIN — Medication 300 MILLIGRAM(S): at 07:39

## 2021-07-06 RX ADMIN — Medication 0.5 MILLIGRAM(S): at 05:45

## 2021-07-06 RX ADMIN — Medication 20 MILLIGRAM(S): at 07:39

## 2021-07-06 RX ADMIN — NYSTATIN CREAM 1 APPLICATION(S): 100000 CREAM TOPICAL at 05:46

## 2021-07-06 RX ADMIN — Medication 3 MILLILITER(S): at 17:08

## 2021-07-06 RX ADMIN — PREGABALIN 1000 MICROGRAM(S): 225 CAPSULE ORAL at 11:43

## 2021-07-06 RX ADMIN — Medication 100 MICROGRAM(S): at 05:45

## 2021-07-06 RX ADMIN — Medication 3 MILLILITER(S): at 00:09

## 2021-07-06 RX ADMIN — Medication 20 MILLIGRAM(S): at 14:52

## 2021-07-06 RX ADMIN — Medication 3 MILLILITER(S): at 11:07

## 2021-07-06 RX ADMIN — PANTOPRAZOLE SODIUM 40 MILLIGRAM(S): 20 TABLET, DELAYED RELEASE ORAL at 07:55

## 2021-07-06 RX ADMIN — Medication 3 MILLILITER(S): at 05:52

## 2021-07-06 RX ADMIN — BUDESONIDE AND FORMOTEROL FUMARATE DIHYDRATE 2 PUFF(S): 160; 4.5 AEROSOL RESPIRATORY (INHALATION) at 05:45

## 2021-07-06 RX ADMIN — Medication 40 MILLIGRAM(S): at 05:45

## 2021-07-06 RX ADMIN — Medication 40 MILLIGRAM(S): at 05:46

## 2021-07-06 NOTE — PROGRESS NOTE ADULT - SUBJECTIVE AND OBJECTIVE BOX
HPI:  Patient is a 89F with a PMH HTN, Chronic Hypoxic Respiratory failure on O2 at home, HFpEF, Severe Pulmonary HTN, Hypothyroidism, Afib on coumadin, hx of multiple admissions for COPD and CHF exacerbation who presents to the ED for dyspnea.  Patient speaks primarily Uruguayan,  provided however patient was unable to provide history.  SpO2 reportedly 65-70% at home.  Noted to be extremely hypercarbic in ED, placed on BiPAP with improvement.  Patient lethargic but wakes to mild touch.  Vitals stable, labs show elevated INR.  Will admit to med surg.        Code status: DNR/DNI (MOLST signed in chart)   (03 Jul 2021 05:22)      SUBJECTIVE & OBJECTIVE: Pt seen and examined at bedside.   no overnight events  no complaints.     Denies chills, N/V, dizziness, HA, cough, CP, palpitations, SOB, abdominal pain, dysuria, diarrhea, constipation.     PHYSICAL EXAM:  T(C): 36.9 (07-04-21 @ 05:40), Max: 36.9 (07-03-21 @ 09:37)  HR: 82 (07-04-21 @ 08:12) (82 - 103)  BP: 139/73 (07-04-21 @ 05:40) (130/77 - 141/64)  RR: 18 (07-04-21 @ 05:40) (17 - 18)  SpO2: 98% (07-04-21 @ 08:12) (95% - 98%)  Wt(kg): --   I&O's Detail    03 Jul 2021 07:01  -  04 Jul 2021 07:00  --------------------------------------------------------  IN:  Total IN: 0 mL    OUT:    Voided (mL): 400 mL  Total OUT: 400 mL    Total NET: -400 mL      04 Jul 2021 07:01  -  04 Jul 2021 09:21  --------------------------------------------------------  IN:    Oral Fluid: 360 mL  Total IN: 360 mL    OUT:  Total OUT: 0 mL    Total NET: 360 mL      GENERAL: NAD, thin and frail, not using accessory muscles, speaking in full sentences   HEAD:  Atraumatic, Normocephalic  EYES: EOMI, PERRLA, conjunctiva and sclera clear  ENMT: No tonsillar erythema, exudates, or enlargement; Moist mucous membranes  NECK: Supple, No JVD  NERVOUS SYSTEM:  awake, alert, moving all extremities   CHEST/LUNG: good b/l air entry, no wheezing b/l   HEART: Regular rate and rhythm; No murmurs, rubs, or gallops  ABDOMEN: Soft, Nontender, Nondistended; Bowel sounds present  EXTREMITIES:  2+ Peripheral Pulses b/l, No clubbing, cyanosis, calf tenderness or edema b/l         MEDICATIONS  (STANDING):  albuterol/ipratropium for Nebulization 3 milliLiter(s) Nebulizer every 6 hours  budesonide 160 MICROgram(s)/formoterol 4.5 MICROgram(s) Inhaler 2 Puff(s) Inhalation two times a day  cyanocobalamin 1000 MICROGram(s) Oral daily  furosemide    Tablet 20 milliGRAM(s) Oral daily  metoprolol tartrate 12.5 milliGRAM(s) Oral every 12 hours  senna 2 Tablet(s) Oral at bedtime  sodium chloride 0.9% Bolus 500 milliLiter(s) IV Bolus once    MEDICATIONS  (PRN):  albuterol/ipratropium for Nebulization 3 milliLiter(s) Nebulizer every 4 hours PRN Shortness of Breath and/or Wheezing      LABS:                        9.1    5.04  )-----------( 228      ( 04 Jul 2021 06:44 )             28.7     07-04    139  |  99  |  36<H>  ----------------------------<  141<H>  3.7   |  34<H>  |  1.60<H>    Ca    9.1      04 Jul 2021 06:44  Phos  2.6     07-04  Mg     2.2     07-04    TPro  7.7  /  Alb  2.8<L>  /  TBili  0.4  /  DBili  x   /  AST  12<L>  /  ALT  17  /  AlkPhos  64  07-04    PT/INR - ( 04 Jul 2021 06:44 )   PT: 68.7 sec;   INR: 6.47 ratio         PTT - ( 03 Jul 2021 00:09 )  PTT:48.9 sec    Magnesium, Serum: 2.2 mg/dL (07-04 @ 06:44)    CAPILLARY BLOOD GLUCOSE        ABG - ( 04 Jul 2021 06:24 )  pH, Arterial: x     pH, Blood: 7.44  /  pCO2: 55    /  pO2: 85    / HCO3: 37    / Base Excess: 11.5  /  SaO2: 97                CARDIAC MARKERS ( 03 Jul 2021 00:09 )  .022 ng/mL / x     / x     / x     / x            RECENT CULTURES:      RADIOLOGY & ADDITIONAL TESTS:        Imaging Personally Reviewed:  [ ] YES  [ ] NO    Consultant(s) Notes Reviewed:  [ x] YES  [ ] NO    Care Discussed with Consultants/Other Providers [ x] YES  [ ] NO  Care discussed in detail with patient.  All questions and concerns addressed    
Patient is a 89y old  Female who presents with a chief complaint of dyspnea (05 Jul 2021 10:13)      OVERNIGHT EVENTS:  none     REVIEW OF SYSTEMS: denies chest pain/SOB, diaphoresis, no F/C, cough, dizziness, headache, blurry vision, nausea, vomiting, abdominal pain. All others review of systems negative     MEDICATIONS  (STANDING):  albuterol/ipratropium for Nebulization 3 milliLiter(s) Nebulizer every 6 hours  budesonide 160 MICROgram(s)/formoterol 4.5 MICROgram(s) Inhaler 2 Puff(s) Inhalation two times a day  clonazePAM  Tablet 0.5 milliGRAM(s) Oral two times a day  cyanocobalamin 1000 MICROGram(s) Oral daily  diltiazem    milliGRAM(s) Oral daily  furosemide    Tablet 40 milliGRAM(s) Oral daily  levothyroxine 100 MICROGram(s) Oral daily  nystatin Powder 1 Application(s) Topical two times a day  pantoprazole    Tablet 40 milliGRAM(s) Oral before breakfast  senna 2 Tablet(s) Oral at bedtime  sildenafil (REVATIO) 20 milliGRAM(s) Oral every 8 hours    MEDICATIONS  (PRN):  albuterol/ipratropium for Nebulization 3 milliLiter(s) Nebulizer every 4 hours PRN Shortness of Breath and/or Wheezing  artificial  tears Solution 1 Drop(s) Both EYES two times a day PRN Dry Eyes      Allergies    No Known Allergies    Intolerances        T(F): 98.1 (07-05-21 @ 11:05), Max: 98.1 (07-04-21 @ 23:18)  HR: 90 (07-05-21 @ 11:11) (57 - 102)  BP: 127/82 (07-05-21 @ 11:05) (124/76 - 151/76)  RR: 19 (07-05-21 @ 11:05) (18 - 19)  SpO2: 94% (07-05-21 @ 11:11) (94% - 98%)  Wt(kg): --    PHYSICAL EXAM:  GENERAL: NAD  HEAD:  Atraumatic, Normocephalic  EYES: PERRLA, conjunctiva and sclera clear  ENMT: Moist mucous membranes  NECK: Supple, No JVD, Normal thyroid  NERVOUS SYSTEM:  Alert & Awake  CHEST/LUNG: Clear to percussion bilaterally;   HEART: Regular rate and rhythm;   ABDOMEN: Soft, Nontender, Nondistended; Bowel sounds present  EXTREMITIES:  no edema BL LE  SKIN: moist    LABS:                        10.1   7.97  )-----------( 267      ( 05 Jul 2021 07:13 )             32.2     07-05    140  |  96  |  43<H>  ----------------------------<  77  3.4<L>   |  39<H>  |  1.31<H>    Ca    8.7      05 Jul 2021 07:13  Phos  2.6     07-05  Mg     2.0     07-05    TPro  7.7  /  Alb  2.8<L>  /  TBili  0.4  /  DBili  x   /  AST  12<L>  /  ALT  17  /  AlkPhos  64  07-04    PT/INR - ( 05 Jul 2021 07:13 )   PT: 38.6 sec;   INR: 3.53 ratio             Cultures;   CAPILLARY BLOOD GLUCOSE        Lipid panel:           RADIOLOGY & ADDITIONAL TESTS:    Imaging Personally Reviewed:  [x ] YES      Consultant(s) Notes Reviewed:  [x ] YES     Care Discussed with [x ] Consultants [X ] Patient [ ] Family  [x ]    [x ]  Other; RN
    FREDERICK MA    [facility] [address]    Patient is a 89y old  Female who presents with a chief complaint of dyspnea (05 Jul 2021 13:57)       Allergies    No Known Allergies    Intolerances        HPI:  Patient is a 89F with a PMH HTN, Chronic Hypoxic Respiratory failure on O2 at home, HFpEF, Severe Pulmonary HTN, Hypothyroidism, Afib on coumadin, hx of multiple admissions for COPD and CHF exacerbation who presents to the ED for dyspnea.  Patient speaks primarily Kinyarwanda,  provided however patient was unable to provide history.  SpO2 reportedly 65-70% at home.  Noted to be extremely hypercarbic in ED, placed on BiPAP with improvement.  Patient lethargic but wakes to mild touch.  Vitals stable, labs show elevated INR.  Will admit to med surg.        Code status: DNR/DNI (MOLST signed in chart)   (03 Jul 2021 05:22)      PAST MEDICAL & SURGICAL HISTORY:  COPD exacerbation    Atrial fibrillation    Hypothyroidism    Essential hypertension    No significant past surgical history        FAMILY HISTORY:        MEDICATIONS   albuterol/ipratropium for Nebulization 3 milliLiter(s) Nebulizer every 4 hours PRN  albuterol/ipratropium for Nebulization 3 milliLiter(s) Nebulizer every 6 hours  artificial  tears Solution 1 Drop(s) Both EYES two times a day PRN  budesonide 160 MICROgram(s)/formoterol 4.5 MICROgram(s) Inhaler 2 Puff(s) Inhalation two times a day  clonazePAM  Tablet 0.5 milliGRAM(s) Oral two times a day  cyanocobalamin 1000 MICROGram(s) Oral daily  diltiazem    milliGRAM(s) Oral daily  furosemide    Tablet 40 milliGRAM(s) Oral daily  levothyroxine 100 MICROGram(s) Oral daily  nystatin Powder 1 Application(s) Topical two times a day  pantoprazole    Tablet 40 milliGRAM(s) Oral before breakfast  predniSONE   Tablet 40 milliGRAM(s) Oral daily  senna 2 Tablet(s) Oral at bedtime  sildenafil (REVATIO) 20 milliGRAM(s) Oral every 8 hours      Vital Signs Last 24 Hrs  T(C): 36.3 (06 Jul 2021 05:26), Max: 36.8 (05 Jul 2021 17:12)  T(F): 97.3 (06 Jul 2021 05:26), Max: 98.2 (05 Jul 2021 17:12)  HR: 74 (06 Jul 2021 08:37) (74 - 111)  BP: 105/69 (06 Jul 2021 05:26) (105/69 - 127/82)  BP(mean): --  RR: 20 (06 Jul 2021 05:26) (17 - 20)  SpO2: 97% (06 Jul 2021 08:37) (63% - 99%)      07-05-21 @ 07:01  -  07-06-21 @ 07:00  --------------------------------------------------------  IN: 460 mL / OUT: 1100 mL / NET: -640 mL            LABS:                        10.1   7.97  )-----------( 267      ( 05 Jul 2021 07:13 )             32.2     07-06    143  |  98  |  47<H>  ----------------------------<  111<H>  4.6   |  40<H>  |  1.21    Ca    8.4<L>      06 Jul 2021 06:24  Phos  2.6     07-05  Mg     2.0     07-05      PT/INR - ( 06 Jul 2021 06:24 )   PT: 22.0 sec;   INR: 1.96 ratio                   WBC:  WBC Count: 7.97 K/uL (07-05 @ 07:13)  WBC Count: 5.04 K/uL (07-04 @ 06:44)  WBC Count: 4.21 K/uL (07-03 @ 11:09)  WBC Count: 5.97 K/uL (07-03 @ 00:09)      MICROBIOLOGY:  RECENT CULTURES:              PT/INR - ( 06 Jul 2021 06:24 )   PT: 22.0 sec;   INR: 1.96 ratio             Sodium:  Sodium, Serum: 143 mmol/L (07-06 @ 06:24)  Sodium, Serum: 140 mmol/L (07-05 @ 07:13)  Sodium, Serum: 141 mmol/L (07-04 @ 16:01)  Sodium, Serum: 139 mmol/L (07-04 @ 06:44)  Sodium, Serum: 140 mmol/L (07-03 @ 11:09)  Sodium, Serum: 141 mmol/L (07-03 @ 00:09)      1.21 mg/dL 07-06 @ 06:24  1.31 mg/dL 07-05 @ 07:13  1.64 mg/dL 07-04 @ 16:01  1.60 mg/dL 07-04 @ 06:44  1.02 mg/dL 07-03 @ 11:09  0.92 mg/dL 07-03 @ 00:09      Hemoglobin:  Hemoglobin: 10.1 g/dL (07-05 @ 07:13)  Hemoglobin: 9.1 g/dL (07-04 @ 06:44)  Hemoglobin: 9.8 g/dL (07-03 @ 11:09)  Hemoglobin: 10.0 g/dL (07-03 @ 00:09)      Platelets: Platelet Count - Automated: 267 K/uL (07-05 @ 07:13)  Platelet Count - Automated: 228 K/uL (07-04 @ 06:44)  Platelet Count - Automated: 210 K/uL (07-03 @ 11:09)  Platelet Count - Automated: 200 K/uL (07-03 @ 00:09)              RADIOLOGY & ADDITIONAL STUDIES:  
    FREDERICK MA    [address] [address]    Patient is a 89y old  Female who presents with a chief complaint of dyspnea (04 Jul 2021 09:21)       Allergies    No Known Allergies    Intolerances        HPI:  Patient is a 89F with a PMH HTN, Chronic Hypoxic Respiratory failure on O2 at home, HFpEF, Severe Pulmonary HTN, Hypothyroidism, Afib on coumadin, hx of multiple admissions for COPD and CHF exacerbation who presents to the ED for dyspnea.  Patient speaks primarily Nepali,  provided however patient was unable to provide history.  SpO2 reportedly 65-70% at home.  Noted to be extremely hypercarbic in ED, placed on BiPAP with improvement.  Patient lethargic but wakes to mild touch.  Vitals stable, labs show elevated INR.  Will admit to med surg.        Code status: DNR/DNI (MOLST signed in chart)   (03 Jul 2021 05:22)      PAST MEDICAL & SURGICAL HISTORY:  COPD exacerbation    Atrial fibrillation    Hypothyroidism    Essential hypertension    No significant past surgical history        FAMILY HISTORY:        MEDICATIONS   albuterol/ipratropium for Nebulization 3 milliLiter(s) Nebulizer every 6 hours  albuterol/ipratropium for Nebulization 3 milliLiter(s) Nebulizer every 4 hours PRN  budesonide 160 MICROgram(s)/formoterol 4.5 MICROgram(s) Inhaler 2 Puff(s) Inhalation two times a day  cyanocobalamin 1000 MICROGram(s) Oral daily  furosemide    Tablet 40 milliGRAM(s) Oral daily  metoprolol tartrate 12.5 milliGRAM(s) Oral every 12 hours  senna 2 Tablet(s) Oral at bedtime      Vital Signs Last 24 Hrs  T(C): 36.9 (04 Jul 2021 05:40), Max: 36.9 (04 Jul 2021 05:40)  T(F): 98.4 (04 Jul 2021 05:40), Max: 98.4 (04 Jul 2021 05:40)  HR: 99 (04 Jul 2021 10:07) (82 - 103)  BP: 128/83 (04 Jul 2021 10:07) (128/83 - 139/73)  BP(mean): --  RR: 25 (04 Jul 2021 10:07) (18 - 25)  SpO2: 94% (04 Jul 2021 10:07) (94% - 98%)      07-03-21 @ 07:01 - 07-04-21 @ 07:00  --------------------------------------------------------  IN: 0 mL / OUT: 400 mL / NET: -400 mL    07-04-21 @ 07:01  -  07-04-21 @ 11:00  --------------------------------------------------------  IN: 360 mL / OUT: 0 mL / NET: 360 mL            LABS:                        9.1    5.04  )-----------( 228      ( 04 Jul 2021 06:44 )             28.7     07-04    139  |  99  |  36<H>  ----------------------------<  141<H>  3.7   |  34<H>  |  1.60<H>    Ca    9.1      04 Jul 2021 06:44  Phos  2.6     07-04  Mg     2.2     07-04    TPro  7.7  /  Alb  2.8<L>  /  TBili  0.4  /  DBili  x   /  AST  12<L>  /  ALT  17  /  AlkPhos  64  07-04    PT/INR - ( 04 Jul 2021 06:44 )   PT: 68.7 sec;   INR: 6.47 ratio         PTT - ( 03 Jul 2021 00:09 )  PTT:48.9 sec      ABG - ( 04 Jul 2021 06:24 )  pH, Arterial: x     pH, Blood: 7.44  /  pCO2: 55    /  pO2: 85    / HCO3: 37    / Base Excess: 11.5  /  SaO2: 97                  WBC:  WBC Count: 5.04 K/uL (07-04 @ 06:44)  WBC Count: 4.21 K/uL (07-03 @ 11:09)  WBC Count: 5.97 K/uL (07-03 @ 00:09)      MICROBIOLOGY:  RECENT CULTURES:        CARDIAC MARKERS ( 03 Jul 2021 00:09 )  .022 ng/mL / x     / x     / x     / x            PT/INR - ( 04 Jul 2021 06:44 )   PT: 68.7 sec;   INR: 6.47 ratio         PTT - ( 03 Jul 2021 00:09 )  PTT:48.9 sec    Sodium:  Sodium, Serum: 139 mmol/L (07-04 @ 06:44)  Sodium, Serum: 140 mmol/L (07-03 @ 11:09)  Sodium, Serum: 141 mmol/L (07-03 @ 00:09)      1.60 mg/dL 07-04 @ 06:44  1.02 mg/dL 07-03 @ 11:09  0.92 mg/dL 07-03 @ 00:09      Hemoglobin:  Hemoglobin: 9.1 g/dL (07-04 @ 06:44)  Hemoglobin: 9.8 g/dL (07-03 @ 11:09)  Hemoglobin: 10.0 g/dL (07-03 @ 00:09)      Platelets: Platelet Count - Automated: 228 K/uL (07-04 @ 06:44)  Platelet Count - Automated: 210 K/uL (07-03 @ 11:09)  Platelet Count - Automated: 200 K/uL (07-03 @ 00:09)      LIVER FUNCTIONS - ( 04 Jul 2021 06:44 )  Alb: 2.8 g/dL / Pro: 7.7 gm/dL / ALK PHOS: 64 U/L / ALT: 17 U/L / AST: 12 U/L / GGT: x                 RADIOLOGY & ADDITIONAL STUDIES:  
    FREDERICK MA    [facility] [address]    Patient is a 89y old  Female who presents with a chief complaint of dyspnea (04 Jul 2021 11:00)       Allergies    No Known Allergies    Intolerances        HPI:  Patient is a 89F with a PMH HTN, Chronic Hypoxic Respiratory failure on O2 at home, HFpEF, Severe Pulmonary HTN, Hypothyroidism, Afib on coumadin, hx of multiple admissions for COPD and CHF exacerbation who presents to the ED for dyspnea.  Patient speaks primarily Syriac,  provided however patient was unable to provide history.  SpO2 reportedly 65-70% at home.  Noted to be extremely hypercarbic in ED, placed on BiPAP with improvement.  Patient lethargic but wakes to mild touch.  Vitals stable, labs show elevated INR.  Will admit to med surg.        Code status: DNR/DNI (MOLST signed in chart)   (03 Jul 2021 05:22)      PAST MEDICAL & SURGICAL HISTORY:  COPD exacerbation    Atrial fibrillation    Hypothyroidism    Essential hypertension    No significant past surgical history        FAMILY HISTORY:        MEDICATIONS   albuterol/ipratropium for Nebulization 3 milliLiter(s) Nebulizer every 6 hours  albuterol/ipratropium for Nebulization 3 milliLiter(s) Nebulizer every 4 hours PRN  budesonide 160 MICROgram(s)/formoterol 4.5 MICROgram(s) Inhaler 2 Puff(s) Inhalation two times a day  cyanocobalamin 1000 MICROGram(s) Oral daily  furosemide    Tablet 40 milliGRAM(s) Oral daily  metoprolol tartrate 12.5 milliGRAM(s) Oral every 12 hours  senna 2 Tablet(s) Oral at bedtime      Vital Signs Last 24 Hrs  T(C): 36.6 (05 Jul 2021 05:10), Max: 36.7 (04 Jul 2021 11:32)  T(F): 97.8 (05 Jul 2021 05:10), Max: 98.1 (04 Jul 2021 23:18)  HR: 102 (05 Jul 2021 09:24) (57 - 102)  BP: 151/76 (05 Jul 2021 05:10) (124/76 - 151/76)  BP(mean): --  RR: 18 (05 Jul 2021 05:10) (18 - 19)  SpO2: 95% (05 Jul 2021 09:24) (95% - 98%)      07-04-21 @ 07:01 - 07-05-21 @ 07:00  --------------------------------------------------------  IN: 840 mL / OUT: 1400 mL / NET: -560 mL            LABS:                        10.1   7.97  )-----------( 267      ( 05 Jul 2021 07:13 )             32.2     07-05    140  |  96  |  43<H>  ----------------------------<  77  3.4<L>   |  39<H>  |  1.31<H>    Ca    8.7      05 Jul 2021 07:13  Phos  2.6     07-05  Mg     2.0     07-05    TPro  7.7  /  Alb  2.8<L>  /  TBili  0.4  /  DBili  x   /  AST  12<L>  /  ALT  17  /  AlkPhos  64  07-04    PT/INR - ( 05 Jul 2021 07:13 )   PT: 38.6 sec;   INR: 3.53 ratio               ABG - ( 04 Jul 2021 06:24 )  pH, Arterial: x     pH, Blood: 7.44  /  pCO2: 55    /  pO2: 85    / HCO3: 37    / Base Excess: 11.5  /  SaO2: 97                  WBC:  WBC Count: 7.97 K/uL (07-05 @ 07:13)  WBC Count: 5.04 K/uL (07-04 @ 06:44)  WBC Count: 4.21 K/uL (07-03 @ 11:09)  WBC Count: 5.97 K/uL (07-03 @ 00:09)      MICROBIOLOGY:  RECENT CULTURES:              PT/INR - ( 05 Jul 2021 07:13 )   PT: 38.6 sec;   INR: 3.53 ratio             Sodium:  Sodium, Serum: 140 mmol/L (07-05 @ 07:13)  Sodium, Serum: 141 mmol/L (07-04 @ 16:01)  Sodium, Serum: 139 mmol/L (07-04 @ 06:44)  Sodium, Serum: 140 mmol/L (07-03 @ 11:09)  Sodium, Serum: 141 mmol/L (07-03 @ 00:09)      1.31 mg/dL 07-05 @ 07:13  1.64 mg/dL 07-04 @ 16:01  1.60 mg/dL 07-04 @ 06:44  1.02 mg/dL 07-03 @ 11:09  0.92 mg/dL 07-03 @ 00:09      Hemoglobin:  Hemoglobin: 10.1 g/dL (07-05 @ 07:13)  Hemoglobin: 9.1 g/dL (07-04 @ 06:44)  Hemoglobin: 9.8 g/dL (07-03 @ 11:09)  Hemoglobin: 10.0 g/dL (07-03 @ 00:09)      Platelets: Platelet Count - Automated: 267 K/uL (07-05 @ 07:13)  Platelet Count - Automated: 228 K/uL (07-04 @ 06:44)  Platelet Count - Automated: 210 K/uL (07-03 @ 11:09)  Platelet Count - Automated: 200 K/uL (07-03 @ 00:09)      LIVER FUNCTIONS - ( 04 Jul 2021 06:44 )  Alb: 2.8 g/dL / Pro: 7.7 gm/dL / ALK PHOS: 64 U/L / ALT: 17 U/L / AST: 12 U/L / GGT: x                 RADIOLOGY & ADDITIONAL STUDIES:

## 2021-07-06 NOTE — DISCHARGE NOTE PROVIDER - HOSPITAL COURSE
89F with a PMH HTN, Chronic Hypoxic Respiratory failure on O2 at home, HFpEF, Severe Pulmonary HTN, Hypothyroidism, Afib on coumadin, hx of multiple admissions for COPD and CHF exacerbation who presents to the ED for dyspnea. Pt is under home hospice currently as per Dtr, Lilliam [phone]

## 2021-07-06 NOTE — DISCHARGE NOTE NURSING/CASE MANAGEMENT/SOCIAL WORK - PATIENT PORTAL LINK FT
You can access the FollowMyHealth Patient Portal offered by [facility] by registering at the following website: [email or web address]. By joining [facility]’s FollowMyHealth portal, you will also be able to view your health information using other applications (apps) compatible with our system.

## 2021-07-06 NOTE — PROGRESS NOTE ADULT - ASSESSMENT
89F with a PMH HTN, Chronic Hypoxic Respiratory failure on O2 at home, HFpEF, Severe Pulmonary HTN, Hypothyroidism, Afib on coumadin, hx of multiple admissions for COPD and CHF exacerbation who presents to the ED for dyspnea. Pt is under home hospice currently as per AlexandrerLilliam [phone]    Assessment and Plan:   Acute on chronic hypoxic and hypercarbic respiratory failure   Elevated INR, no e/o bleeding or bruising   Acute on chronic diastlic CHF   MARIUSZ   chronic atrial fib on coumadin, coumadin on hold d/t elevated INR , rate controlled   acute COPDE on home O2 3L NC and BIPAP at night  Hypothyroidism   Essential HTN   Severe Pulm HTN   BL Pleural effusions   macrocytic anemia, H/H stable   DNR/DNI     -off BIPAP, doing well on 3-4L NC   -ABG reviewed , improved   -5/21 ECHO: pEF, severely enlarged LA, grade III DD, severe pulm HTN , mild aortic stenosis   -continue with duonebs , change IV steroids to PO  -pulmonary following   -monitor INR   -changed lasix to PO   -monitor labs   -continue with home meds   -bladder scan for PVR   -s/p 500cc bolus NS  -repeat CXR; 07.04; Pulmonary edema pleural effusions are unchanged.  -home meds reconciled w/Dtr   -fall precautions   -PT eval     Pt is under home hospice currently as per Lilliam Joyce [phone]  Dispo; demi ref to activate hospice and 24 hr HHA  
 89F with a PMH HTN, Chronic Hypoxic Respiratory failure on O2 at home, HFpEF, Severe Pulmonary HTN, Hypothyroidism, Afib on coumadin, hx of multiple admissions for COPD and CHF exacerbation who presents to the ED for dyspnea.     Assessment and Plan:   Acute on chronic hypoxic and hypercarbic respiratory failure   Elevated INR , no e/o bleeding or bruising   Acute on chronic diastlic CHF   MARUISZ   chronic atrial fib on coumadin , coumadin on hold d/t elevated INR , rate controlled   COPD on home O2 3L NC   Hypothyroidism   Essential HTN   Severe Pulm HTN   Pleural effusions   macrocytic anemia , follow anemia labs , H/H stable   DNR/DNI     off BIPAP, doing well on 3-4L NC   ABG reviewed , improved   5/21 ECHO: pEF, severely enlarged LA, grade III DD, severe pulm HTN , mild aortic stenosis   continue with duonebs , IV steroids   pulmonary following   monitor INR   changed lasix to PO   monitor labs   continue with home meds   bladder scan for PVR   will give 500cc bolus NS  repeat BMP in the afternoon  follow repeat CXR  fall precautions   PT eval   
    BRYCEGIOVANA AMCK 89 f 7/3/2021 1/25/1929 DR ROXY MOSER           REVIEW OF SYMPTOMS      Able to give ROS  Yes     RELIABLE +/-   CONSTITUTIONAL Weakness Yes  Chills No   ENDOCRINE  No heat or cold intolerance    ALLERGY No hives  Sore throat No stridor  RESP Coughing blood no  Shortness of breath YES   NEURO No Headache  Confusion Pain neck No   CARDIAC No Chest pain No Palpitations   GI  Pain abdomen NO   Vomiting NO     PHYSICAL EXAM    HEENT Unremarkable  atraumatic   RESP Fair air entry EXP prolonged    Harsh breath sound Resp distres mild   CARDIAC S1 S2 No S3     NO JVD    ABDOMEN SOFT BS PRESENT NOT DISTENDED No hepatosplenomegaly PEDAL EDEMA present No calf tenderness  NO rash         KRISTIEMAL MACK 89 f 7/3/2021  admission PROBLEMS    ADVANCED DIRECTIVES.  dnr   COVID STATUS.   Scv2 7/3 (-)   spkab 7/4 (-) .4    CC. shortness of berath 7/3    AC HYPERCAPNIC RESP FAILURE 7/3/2021   BPAP STARTED 7/3/2021     AOC HFPEF 7/3  R HEART FAILURE 7/3  PULM HYTN  (Tp 2) 5/2021 PASP 63    BL PL EFFS cxr 7/3  COAGULOPATHY 7/3  inr 6.6  MARIUSZ 7/3-7/4-7/6/2021 cr .9 - 1.6- 1.2      PMH Chronic Hypoxic Respiratory failure on O2 at home,   PMH HFpEF, Severe   PMH Pulmonary HTN,  ON SILDENAFIL poa    PMH Hypothyroidism,   PMH Afib on coumadin,    GLOBAL AND BEST PRACTICE ISSUES                     ALLERGY.    NKA   WEIGHT.      7/3/2021 68            BMI              7/3/2021 29           .                          ADVANCED DIRECTIVE. dnr                              HEAD OF BED ELEVATION. Yes  DVT PROPHYLAXIS. COAGULOpathy poa   APA.                   EMANUEL PROPHYLAXIS.                                                                     DYSPHAGIA RECOMM.   DIET.    npo 97/3/75884 -> DASH (7/3) -> dys 1 puree nectar (7/5/2021)   INFECTION PROPHYLAXIS.     PATIENT DATA                ABG.     7/4/2021 4l 744/55/85   7/3/2021 2a bpap 20/6/.35 734/72/70   7/3/2021 12a 724/96/318              OXYGENATION.       7/3/2021 3l 96%             VITALS/IO/VENT/DRIPS.   7/6/2021 afeb 87 100/60     ASSESSMENT/RECOMMENDATIONS.    RESP FAILURE  Likely largely sec chf based on cxr   gas exchange improvd with bpap   7/4/2021 abg on 4l acceptable   7/5/2021 nppv on standby    AOC HFPEF   R HEART FAILURE   PULM HYPERTENSION (likely type 2)  On lasix 40 (7/4)   Lasix decrased to Lasix 40 as Cr increased 7/4/2021   revatio 20.3 was restarted 7/5/2021 However in Type 2 ph revatio is not needed   COPD ex  On bd ics   pred 40 started by hospitalizt 7/5/2021   COAGULOPATH  INR 7/3-7/4-7/5/2021 inr 6.6 - 6.4-3.3  Hold coumadin  MARIUSZ  cr 7/3-7/4-7/5/2021 cr .9 - 1.6- 1.3  lasix dec 7/4 t lasix 40   Improving     OVERALL PLAN.  COAGULOPATHY Hold coumadin  CHF On lasix 40   MARIUSZ Monitor lytes cr   COPD on bd     TIME SPENT   Over 25 minutes aggregate care time spent on encounter; activities included   direct patient care, counseling and/or coordinating care reviewing notes, lab data/ imaging , discussion with multidisciplinary team/ patient  /family and explaining in detail risks, benefits, alternatives  of the recommendations     FRANCESCA MACK 89 f 7/3/2021 1/25/1929 DR ROXY MOSER    
    BRYCEGIOVANA MACK 89 f 7/3/2021 1/25/1929 DR ROXY MOSER           REVIEW OF SYMPTOMS      Able to give ROS  Yes     RELIABLE +/-   CONSTITUTIONAL Weakness Yes  Chills No   ENDOCRINE  No heat or cold intolerance    ALLERGY No hives  Sore throat No stridor  RESP Coughing blood no  Shortness of breath YES   NEURO No Headache  Confusion Pain neck No   CARDIAC No Chest pain No Palpitations   GI  Pain abdomen NO   Vomiting NO     PHYSICAL EXAM    HEENT Unremarkable  atraumatic   RESP Fair air entry EXP prolonged    Harsh breath sound Resp distres mild   CARDIAC S1 S2 No S3     NO JVD    ABDOMEN SOFT BS PRESENT NOT DISTENDED No hepatosplenomegaly PEDAL EDEMA present No calf tenderness  NO rash       KRISTIEMAL FREDERICK 89 f 7/3/2021  admission PROBLEMS    ADVANCED DIRECTIVES.  dnr   COVID STATUS.   Scv2 7/3 (-)   spkab 7/4 (-) .4    CC. shortness of berath 7/3    AC HYPERCAPNIC RESP FAILURE 7/3/2021   BPAP STARTED 7/3/2021     AOC HFPEF 7/3  R HEART FAILURE 7/3  PULM HYTN  (Tp 2) 5/2021 PASP 63    BL PL EFFS cxr 7/3  COAGULOPATHY 7/3  inr 6.6  MARIUSZ 7/3-7/4/2021 cr .9 - 1.6        PMH Chronic Hypoxic Respiratory failure on O2 at home,   PMH HFpEF, Severe   PMH Pulmonary HTN,  ON SILDENAFIL poa    PMH Hypothyroidism,   PMH Afib on coumadin,      GLOBAL AND BEST PRACTICE ISSUES                     ALLERGY.    NKA   WEIGHT.      7/3/2021 68            BMI              7/3/2021 29           .                          ADVANCED DIRECTIVE. dnr                              HEAD OF BED ELEVATION. Yes  DVT PROPHYLAXIS. COAGULOpathy poa   APA.                   EMANUEL PROPHYLAXIS.                                                                     DYSPHAGIA RECOMM.   DIET.    npo 97/3/30557 -> DASH (7/3) -> dys 1 puree nectar (7/5/2021)   INFECTION PROPHYLAXIS.     PATIENT DATA                ABG.     7/4/2021 4l 744/55/85   7/3/2021 2a bpap 20/6/.35 734/72/70   7/3/2021 12a 724/96/318              OXYGENATION.       7/3/2021 3l 96%             VITALS/IO/VENT/DRIPS.   7/5/2021 afeb 100 120/70       ASSESSMENT/RECOMMENDATIONS.    RESP FAILURE  Likely largely sec chf based on cxr   gas exchange improvd with bpap   7/4/2021 abg on 4l acceptable   7/5/2021 nppv on standby    AOC HFPEF   R HEART FAILURE   PULM HYPERTENSION (likely type 2)  On lasix 40 (7/4)   Lasix decrased to Lasix 40 as Cr increased 7/4/2021   revatio 20.3 was restarted 7/5/2021 However in Type 2 ph revatio is not needed   COPD ex  On bd ics   pred 40 started by hospitalizt 7/5/2021   COAGULOPATH  INR 7/3-7/4-7/5/2021 inr 6.6 - 6.4-3.3  Hold coumadin  MARIUSZ  cr 7/3-7/4-7/5/2021 cr .9 - 1.6- 1.3  lasix dec 7/4 t lasix 40   Improving     OVERALL PLAN.  COAGULOPATHY Hold coumadin  CHF On lasix 40   MARIUSZ Monitor lytes cr   COPD on bd     TIME SPENT   Over 25 minutes aggregate care time spent on encounter; activities included   direct patient care, counseling and/or coordinating care reviewing notes, lab data/ imaging , discussion with multidisciplinary team/ patient  /family and explaining in detail risks, benefits, alternatives  of the recommendations     FRANCESCA MACK 89 f 7/3/2021 1/25/1929 DR ROXY MOSER    
    SERGIOSEN FREDERICK 89 f 7/3/2021 1/25/1929 DR ROXY MOSER     REVIEW OF SYMPTOMS      Able to give (reliable) ROS  NO     PHYSICAL EXAM    HEENT Unremarkable  atraumatic   RESP Fair air entry EXP prolonged    Harsh breath sound Resp distres mild   CARDIAC S1 S2 No S3     NO JVD    ABDOMEN SOFT BS PRESENT NOT DISTENDED No hepatosplenomegaly   PEDAL EDEMA present No calf tenderness  NO rash       FRANCESCA MACK 89 f 7/3/2021  admission PROBLEMS    ADVANCED DIRECTIVES.  dnr   COVID STATUS.   Scv2 7/3/2021 (-)   spkab 7/4 (-) .4    CC. shortness of berath     AC HYPERCAPNIC RESP FAILURE poa   BPAP STARTED 7/3/2021   AOC HFPEF   R HEART FAILURE   BL PL EFFS  PULM HYPERTENSION (likely type 2)     COAGULOPATHY poa         GLOBAL AND BEST PRACTICE ISSUES                     ALLERGY.    NKA   WEIGHT.      7/3/2021 68            BMI              7/3/2021 29           .                          ADVANCED DIRECTIVE. dnr                              HEAD OF BED ELEVATION. Yes  DVT PROPHYLAXIS. COAGULOpathy poa   APA.                   EMANUEL PROPHYLAXIS.                                                                     DYSPHAGIA RECOMM.   DIET.    npo 97/3/14423 -> DASH (7/3)   INFECTION PROPHYLAXIS.     PATIENT DATA                ABG.     7/4/2021 4l 744/55/85   7/3/2021 2a bpap 20/6/.35 734/72/70   7/3/2021 12a 724/96/318              OXYGENATION.       7/3/2021 3l 96%             VITALS/IO/VENT/DRIPS.     7/4/2021 afeb 80 130/80     PATIENT DATA.    INFECTION  w 7/3/2021 w 5.9   pr 7/4/2021 pr .13   RO MI  tr 7/3/2021 .022   metoprolol 12.5x2 (7/3)   HFPEF R HEART FAILURE    bnp 7/3/2021 bn 1843  cxr 7/3/2021 cxr diffuse bl opac henry r   ech 5/14/2021 ef 60% dd3 kristie pasp 63 n lvsf  lasix 40 97/3/2021) -> Lasix 40.2 (7/3) -> Lasix 40 (7/4)   COPD EX   DUONEB.4 (7/3/73851   symbicort 97/3/2021)     solumed 40.3 (7/3/2021) -> solumed 40 (7/3) -> solumed dced by hospitalist (7/4/2021)   spiriva (7/3/2021)   ANEMIA  hb 7/3-7/4/2021 Hb 10 -9.1   b12 1g (7/4)   COAGULOPATHY poa  INR 7/3-7/4- 6.4/2021 inr 6.6   RENAL   cr 7/3-7/4/2021 cr .9 - 1.6              ASSESSMENT/RECOMMENDATIONS.      RESP FAILURE  Likely largely sec chf based on cxr   gas exchange improvd with bpap   7/4/2021 abg on 4l acceptable   INFECTION  No leukocytosis fever Doubt infection  Procalc 7/4/2021 .13   antibio being deferred   AOC HFPEF   R HEART FAILURE   PULM HYPERTENSION (likely type 2)  On lasix 40 (7/4)   Lasix decrased as Cr increased 7/4/2021   COPD ex  On bd ics   COAGULOPATH  Hold coumadin    OVERALL PLAN.  COAGULOPATHY Hold coumadin  CHF On lasix 40   MARIUSZ Monitor lytes cr   COPD on bd     TIME SPENT   Over 25 minutes aggregate care time spent on encounter; activities included   direct patient care, counseling and/or coordinating care reviewing notes, lab data/ imaging , discussion with multidisciplinary team/ patient  /family and explaining in detail risks, benefits, alternatives  of the recommendations     FRANCESCA MACK 89 f 7/3/2021 1/25/1929 DR ROXY MOSER

## 2021-07-06 NOTE — DISCHARGE NOTE PROVIDER - NSDCCPCAREPLAN_GEN_ALL_CORE_FT
PRINCIPAL DISCHARGE DIAGNOSIS  Diagnosis: Hypercapnic respiratory failure  Assessment and Plan of Treatment:       SECONDARY DISCHARGE DIAGNOSES  Diagnosis: COPD exacerbation  Assessment and Plan of Treatment:     Diagnosis: Pleural effusion  Assessment and Plan of Treatment:     Diagnosis: CHF (congestive heart failure)  Assessment and Plan of Treatment:

## 2021-07-06 NOTE — PHYSICAL THERAPY INITIAL EVALUATION ADULT - GENERAL OBSERVATIONS, REHAB EVAL
Chart (EMR) reviewed. Received supine c HOB elevated, NAD. +O2 via nasal cannula, +primafit, +IV intact, +Bipap on standby. Language barrier(Swiss) with video  Lila#[id number] utilized. Alert. Ox2. Able to follow simple commands/directions.

## 2021-07-06 NOTE — PHYSICAL THERAPY INITIAL EVALUATION ADULT - ADDITIONAL COMMENTS
Patient lives c 24/7 HHA.  Dependent c all ADL's and transfers. Non-ambulatory at baseline.
Patient eating poorly since surgery  Protein malnutrition mild  Will consult Dr Alatorre given NPO status

## 2021-07-09 NOTE — DIETITIAN INITIAL EVALUATION ADULT. - WEIGHT (LBS)
[place] [facility] [place] s/p fall down 3 cement steps. pt arrives in c collar, c/o b/l hand pain numbness and tingling.  hx of carpal tunnel. bruising and swelling to left side of face and lacerations.  KIM, upper extremeties with increase pain.
100

## 2021-07-11 NOTE — PATIENT PROFILE ADULT - NSPROMEDSBROUGHTTOHOSP_GEN_A_NUR
Infectious Disease Consult    Date of Consultation:  2021    Referring Physician: Dr Fer Marquez:     Patient is a 47 y.o. female who is being seen for chest pain, elevated troponin and GI complaints. She states that she has had significant diarrhea starting late last week. She is not aware of exposures, etc that she may have had. She came to the hospital when she developed chest pain. She has not had obvious fevers, but may have had chills. She was vaccinated with the J&J vaccine about 6 weeks ago. She states that she is feeling some better today. No real etiology of her complaints is obvious at this point. She has no HIV risk factors or concerning family history. Patient Active Problem List   Diagnosis Code    NSTEM[facility] (non-ST elevated [facility]) [facility] I21.4    Diarrhea of presumed infectious origin R19.7     Past Medical History:   Diagnosis Date    Cervical spondylosis with radiculopathy       History reviewed. No pertinent family history. Social History     Tobacco Use    Smoking status: Current Some Day Smoker    Smokeless tobacco: Never Used   Substance Use Topics    Alcohol use: Yes     Past Surgical History:   Procedure Laterality Date    HX GYN      cervical conization       Prior to Admission medications    Not on File     No Known Allergies     Review of Systems:  Pertinent items are noted in the History of Present Illness. Objective:   Blood pressure 118/74, pulse 66, temperature 98 °F (36.7 °C), resp. rate 16, height 5' 8\" (1.727 m), weight 73 kg (160 lb 15 oz), SpO2 98 %.   Temp (24hrs), Av.4 °F (36.9 °C), Min:98 °F (36.7 °C), Max:98.6 °F (37 °C)    Current Facility-Administered Medications   Medication Dose Route Frequency    sodium chloride (NS) flush 5-40 mL  5-40 mL IntraVENous Q8H    sodium chloride (NS) flush 5-40 mL  5-40 mL IntraVENous PRN    nitroglycerin (NITROBID) 2 % ointment 1 Inch  1 Inch Topical Q6H    [Held by provider] metoprolol tartrate (LOPRESSOR) tablet 12.5 mg  12.5 mg Oral BID    aspirin chewable tablet 81 mg  81 mg Oral DAILY    rosuvastatin (CRESTOR) tablet 40 mg  40 mg Oral QHS    acetaminophen (TYLENOL) tablet 650 mg  650 mg Oral Q4H PRN    oxyCODONE IR (ROXICODONE) tablet 5 mg  5 mg Oral Q4H PRN    morphine injection 2 mg  2 mg IntraVENous Q4H PRN    pantoprazole (PROTONIX) 40 mg in 0.9% sodium chloride 10 mL injection  40 mg IntraVENous DAILY    0.9% sodium chloride infusion  125 mL/hr IntraVENous CONTINUOUS    ondansetron (ZOFRAN) injection 4 mg  4 mg IntraVENous Q8H PRN    enoxaparin (LOVENOX) injection 70 mg  1 mg/kg SubCUTAneous Q12H    melatonin tablet 3 mg  3 mg Oral QHS PRN    simethicone (MYLICON) tablet 80 mg  80 mg Oral QID PRN        Exam:  Lungs:  clear to auscultation bilaterally  Heart:  regular rate and rhythm  Abdomen:  soft, non-tender.  Bowel sounds normal. No masses,  no organomegaly  Skin:  no rash or abnormalities    Data Review:   CBC:   Recent Labs     07/11/21  1220 07/11/21  0114 07/10/21  1542   WBC 2.5* 2.9* 4.0   RBC 3.26* 2.89* 3.76*   HGB 10.3* 9.0* 11.9   HCT 30.4* 26.5* 34.8*    150 216   GRANS  --  45 58   LYMPH  --  30 18   EOS  --  2 2     CMP:   Recent Labs     07/11/21  0114 07/10/21  1542   * 92   * 131*   K 3.1* 3.4*    96*   CO2 26 28   BUN 5* 4*   CREA 0.52* 0.66   CA 7.5* 9.3   AGAP 4* 7   BUCR 10* 6*   AP 36* 50   TP 5.3* 7.1   ALB 2.8* 3.9   GLOB 2.5 3.2   AGRAT 1.1 1.2     Urinalysis:   Lab Results   Component Value Date/Time    Color YELLOW/STRAW 07/10/2021 03:42 PM    Appearance CLEAR 07/10/2021 03:42 PM    pH (UA) 6.0 07/10/2021 03:42 PM    Protein Negative 07/10/2021 03:42 PM    Glucose Negative 07/10/2021 03:42 PM    Ketone TRACE (A) 07/10/2021 03:42 PM    Bilirubin Negative 07/10/2021 03:42 PM    Blood Negative 07/10/2021 03:42 PM    Urobilinogen 0.2 07/10/2021 03:42 PM    Nitrites Negative 07/10/2021 03:42 PM    Leukocyte Esterase Negative 07/10/2021 03:42 PM    WBC 0-4 07/10/2021 03:42 PM    RBC 0-5 07/10/2021 03:42 PM    Bacteria Negative 07/10/2021 03:42 PM       Impression:   · Diarrhea and myocarditis--could all be viral infection.  J&J vaccine is another possibility    Plan:     Continue supportive care  Follow up cultures and studies    Signed By: Emi Mendez MD     July 11, 2021 no

## 2021-07-16 DIAGNOSIS — J96.22 ACUTE AND CHRONIC RESPIRATORY FAILURE WITH HYPERCAPNIA: ICD-10-CM

## 2021-07-16 DIAGNOSIS — I27.20 PULMONARY HYPERTENSION, UNSPECIFIED: ICD-10-CM

## 2021-07-16 DIAGNOSIS — Z66 DO NOT RESUSCITATE: ICD-10-CM

## 2021-07-16 DIAGNOSIS — I11.0 HYPERTENSIVE HEART DISEASE WITH HEART FAILURE: ICD-10-CM

## 2021-07-16 DIAGNOSIS — N17.9 ACUTE KIDNEY FAILURE, UNSPECIFIED: ICD-10-CM

## 2021-07-16 DIAGNOSIS — Z79.52 LONG TERM (CURRENT) USE OF SYSTEMIC STEROIDS: ICD-10-CM

## 2021-07-16 DIAGNOSIS — J44.1 CHRONIC OBSTRUCTIVE PULMONARY DISEASE WITH (ACUTE) EXACERBATION: ICD-10-CM

## 2021-07-16 DIAGNOSIS — I48.20 CHRONIC ATRIAL FIBRILLATION, UNSPECIFIED: ICD-10-CM

## 2021-07-16 DIAGNOSIS — Z79.51 LONG TERM (CURRENT) USE OF INHALED STEROIDS: ICD-10-CM

## 2021-07-16 DIAGNOSIS — I50.33 ACUTE ON CHRONIC DIASTOLIC (CONGESTIVE) HEART FAILURE: ICD-10-CM

## 2021-07-16 DIAGNOSIS — D64.9 ANEMIA, UNSPECIFIED: ICD-10-CM

## 2021-07-16 DIAGNOSIS — J96.21 ACUTE AND CHRONIC RESPIRATORY FAILURE WITH HYPOXIA: ICD-10-CM

## 2021-07-16 DIAGNOSIS — D68.9 COAGULATION DEFECT, UNSPECIFIED: ICD-10-CM

## 2021-07-16 DIAGNOSIS — I35.0 NONRHEUMATIC AORTIC (VALVE) STENOSIS: ICD-10-CM

## 2021-07-16 DIAGNOSIS — Z79.01 LONG TERM (CURRENT) USE OF ANTICOAGULANTS: ICD-10-CM

## 2021-07-16 DIAGNOSIS — E03.9 HYPOTHYROIDISM, UNSPECIFIED: ICD-10-CM

## 2021-07-16 DIAGNOSIS — R13.10 DYSPHAGIA, UNSPECIFIED: ICD-10-CM

## 2021-07-16 DIAGNOSIS — R06.00 DYSPNEA, UNSPECIFIED: ICD-10-CM

## 2021-12-06 NOTE — PROGRESS NOTE ADULT - PROBLEM SELECTOR PROBLEM 3
Leukocytosis, unspecified type
Hypothyroidism, unspecified type
Leukocytosis, unspecified type
Leukocytosis, unspecified type
Hypothyroidism, unspecified type
Hypothyroidism, unspecified type
no

## 2021-12-30 NOTE — DISCHARGE NOTE PROVIDER - NSDCCPCAREPLAN_GEN_ALL_CORE_FT
PRINCIPAL DISCHARGE DIAGNOSIS  Diagnosis: Arrhythmia  Assessment and Plan of Treatment: continue all medications, follow with your primary doctor      
31-Dec-2021

## 2022-02-03 NOTE — PATIENT PROFILE ADULT - PRO INTERPRETER NEED 2
Teresa with [facility] called and stated that she spoke with Maye Celaya regarding a clearance for the pt [phone]
appt 3/8/22
English

## 2022-02-25 NOTE — ED ADULT NURSE NOTE - SUICIDE SCREENING DEPRESSION
Lab Results   Component Value Date    HGBA1C 7 7 (H) 09/03/2021       Recent Labs     02/24/22  2100 02/25/22  0620 02/25/22  1240 02/25/22  1614   POCGLU 210* 148* 127 105     Continue Lantus with sliding scale insulin Negative

## 2022-05-12 NOTE — CONSULT NOTE ADULT - NSHPATTENDINGPLANDISCUSS_GEN_ALL_CORE
" SLEEP STUDY INTERPRETATION  DIAGNOSTIC POLYSOMNOGRAPHY REPORT      Patient: MARYBETH MCDERMOTT  YOB: 1993  Study Date: 5/8/2022  MRN: [id number]  Referring Provider: MD Mejia Mary E.  Ordering Provider: MARTINA Warner Amber    Indications for Polysomnography: The patient is a 28 year old Male who is 5' 8\" and weighs 185.0 lbs. His BMI is 28.4, [place] sleepiness scale 21 and neck circumference is 39 cm. Relevant medical history includes hypersomnia. A diagnostic polysomnogram was performed to evaluate for sleep apnea/PLMS.    Polysomnogram Data: A full night polysomnogram recorded the standard physiologic parameters including EEG, EOG, EMG, ECG, nasal and oral airflow. Respiratory parameters of chest and abdominal movements were recorded with respiratory inductance plethysmography. Oxygen saturation was recorded by pulse oximetry. Hypopnea scoring rule used: 1B 4%.    Sleep Architecture: Sleep fragmentation. Normal REM latency.   The total recording time of the polysomnogram was 595.5 minutes. The total sleep time was 566.0 minutes. Sleep latency was 5.0 minutes. REM latency was 143.5 minutes. Arousal index was 18.7 arousals per hour. Sleep efficiency was 95.0%. Wake after sleep onset was 24.5 minutes. The patient spent 4.6% of total sleep time in Stage N1, 46.5% in Stage N2, 28.3% in Stage N3, and 20.7% in REM. Time in REM supine was 102.0 minutes.    Respiration: This study did not demonstrate clinically significant sleep disordered breathing.  This was a good study that included REM supine.      Events ? The polysomnogram revealed a presence of - obstructive, 3 central, and - mixed apneas resulting in an apnea index of 0.3 events per hour. There were 16 obstructive hypopneas and - central hypopneas resulting in an obstructive hypopnea index of 1.7 and central hypopnea index of - events per hour. The combined apnea/hypopnea index was 2.0 events per hour (central apnea/hypopnea index was 0.3 events " per hour). The REM AHI was 3.1 events per hour. The supine AHI was 3.1 events per hour. The RERA index was 1.5 events per hour.  The RDI was 3.5 events per hour.    Snoring - was reported as minimal.    Respiratory rate and pattern - was notable for normal respiratory rate and pattern.    Sustained Sleep Associated Hypoventilation - Transcutaneous carbon dioxide monitoring was not used.    Sleep Associated Hypoxemia - (Greater than 5 minutes O2 sat at or below 88%) was not present. Baseline oxygen saturation was 93.4%. Lowest oxygen saturation was 85.0%. Time spent less than or equal to 88% was 0.3 minutes. Time spent less than or equal to 89% was 0.5 minutes.    Movement Activity: No evidence of sleep related movement disorder.     Periodic Limb Activity - There were 40 PLMs during the entire study. The PLM index was 4.2 movements per hour. The PLM Arousal Index was 1.9 per hour.    REM EMG Activity - Excessive muscle activity was not present.    Nocturnal Behavior - Abnormal sleep related behaviors were not noted.    Bruxism - None apparent.    Cardiac Summary: Sinus  The average pulse rate was 53.8 bpm. The minimum pulse rate was 43.0 bpm while the maximum pulse rate was 95.0 bpm.  Arrhythmias were/were not noted.    Pre PSG Evaluation: Relatively consistent sleep intake of approximately 8 hours from 4851-2772    Assessment:     This study did not demonstrate clinically significant sleep disordered breathing.  This was a good study that included REM supine.      No evidence of sleep related movement disorder.    Recommendations:    Patient may be reassured that per this study they did not demonstrate clinically significant sleep disordered breathing or sleep related movement disorder.     Patient to stay for multiple sleep latency testing.     Advice regarding the risks of drowsy driving.    Suggest optimizing sleep schedule and avoiding sleep deprivation.    Diagnostic Code: G47.9      Torrey Tinajero MD  5-12-22  Diplomate, ABPN Sleep Medicine   Hospitalist

## 2022-09-22 NOTE — ED ADULT NURSE NOTE - NS ED NURSE REPORT GIVEN TO FT
[facility]    [address] [place] [address] [address] [place] [address]    Phone:  [phone]    Fax:  [phone]       Thank You for choosing us for your health care visit. We are glad to serve you and happy to provide you with this summary of your visit. Please help us to ensure we have accurate records. If you find anything that needs to be changed, please let our staff know as soon as possible.          Your Demographic Information     Patient Name Sex Ysabel Monsalve Female 1936       Ethnic Group Patient Race    Not of  or  Origin White      Your Visit Details     Date & Time Provider Department    2017 9:15 AM Evgeny Golden MD [facility]      Your Upcoming Appointment*(Max 10)     2017  9:30 AM CST   Office Visit with Killian Davison MD   [facility] ([facility])    [address] [place] [address] [address]  [place] [address]   [phone]            2017  1:00 PM CST   Procedure with Evgeny Golden MD   [facility] ([facility])    [address] [place] [address] [address] [place] [address]   [phone]            2017  1:00 PM CDT   Procedure with Evgeny Golden MD   [facility] ([facility])    [address] [place] [address] [address] [place] [address]   [phone]              We Ordered or Performed the Following     INJECTION INTRAVITREAL PHARMCOLOGIC     INJECTION INTRAVITREAL PHARMCOLOGIC       Conditions Discussed Today or Order-Related Diagnoses        Comments    Exudative age-related macular degeneration    -  Primary     Macular edema           Your Vitals Were     Smoking Status                   Former Smoker           Medications Prescribed or Re-Ordered Today     None      Your Current  Medications Are        Disp Refills Start End    simvastatin (ZOCOR) 20 MG tablet 30 tablet 6 9/26/2016     Sig: Take 1 tablet by mouth  nightly    Class: Eprescribe    pantoprazole (PROTONIX) 40 MG tablet 30 tablet 11 8/9/2016     Sig: Take 1 tablet by mouth  daily    Class: Eprescribe    diphenhydramine-acetaminophen (TYLENOL PM)  MG Tab        Sig - Route: Take 1 tablet by mouth nightly as needed. - Oral    Class: Historical Med    aspirin 81 MG tablet        Sig - Route: Take 81 mg by mouth daily. - Oral    Class: Historical Med    clopidogrel (PLAVIX) 75 MG tablet 90 tablet 3 6/1/2016     Sig - Route: Take 1 tablet by mouth daily. - Oral    Class: Eprescribe    acetaminophen (TYLENOL) 500 MG tablet        Sig - Route: Take 500 mg by mouth every 6 hours as needed for Pain. Indications: Pain - Oral    Class: Historical Med    Coenzyme Q10 (COQ-10) 200 MG CAPS        Sig - Route: Take 1 capsule by mouth daily.  - Oral    Class: Historical Med      Allergies     No Known Allergies      Immunizations History as of 2/7/2017     Name Date    Influenza 10/1/2015, 10/2/2013, 10/11/2012, 10/25/2011    Pneumococcal Conjugate 13 Valent 7/29/2016    Pneumococcal Polysaccharide Adult 11/24/2009    Td:Adult type tetanus/diphtheria 10/12/2010      Medications Administered Today        Admin Date Action Route                aflibercept (EYLEA) 2 MG/0.05ML injection 2 mg 02/07/2017 Given Intravitreal                  Admin Date Action Route                aflibercept (EYLEA) 2 MG/0.05ML injection 2 mg 02/07/2017 Given Intravitreal                 Problem List as of 2/7/2017     Pseudophakia    Myopia    Astigmatism    Exudative age-related macular degeneration both eyes    Choroidal nevus of right eye    History of tobacco use disorder    Atherosclerosis of right lower extremity with intermittent claudication            Patient Instructions     None       ENDOCRINE INITIAL CONSULT NOTE:    HPI:  42 year old male with hx of T2DM  HTN, HLD presented with a left foot wound, admitted for left foot infection and to rule out osteomyelitis of left foot. On admission, patient's Blood sugars were noted to be high with elevated HBA1C. Endocrinology is consulted for glycemic management.     Patient is seen at the bedside, reports his appetite is good and finishing all his meals. Patient stated that the left foot wound initially started 6 weeks ago when the patient had a screw puncture his sole of his slipper and then suffered a wound on the bottom of the left foot. She was treated with Unasyn in [facility] for one week and was later discharged with PICC line for additional 2-3 weeks of IV unasyn. Patient had wound care nurse come to his home, however started to develop blistering of the left foot and enlargement of the wound on the sole of his foot with involvement between the left great toe and 2nd toe, also noticed purulent discharge and pain which was aggravated with ambulation Wounds also developed between the left great toe and 2nd toe. Patient is providing diabetes hx as below    Diabetes History: Diagnosed with diabetes at the age of 25 years when he had a traumatic shoulder injury and found out to have serum blood sugars in 400s without DKA. He was initially started on oral medications but recently started on insulin due to poorly controlled sugars.  Home regimen: Lantus 20 units at night and Humalog 10 units with meals since Aug 2022. He has stopped taking Janumet 50- 1000mg and Glipizide 10 mg after he starting insulin  Home fingersticks: Checks Blood sugars 3-4 times a day. Reports improved glycemic control since one month after starting insulin. Fasting Blood sugars are in range of 100-170 and Postprandial Blood sugars are <180  Hypoglycemia events: None at home  Retinopathy/most recent opthalmology: Saw the ophthalmologist in Apr 2022, was advised to follow up in less than 6 months due to cataract and abnormal retinal exam. Patient does not know exactly if he was told that he has retinopathy.  Peripheral Neuropathy: Yes sometimes.   Nephropathy: Unknown  Cardiovascular disease: Denies  Peripheral vascular disease: Patient has hx of varicose veins in both legs and peripheral vascular disease s/p 2 surgeries of leg.   Diet: Patient has recently improved his dietary habits. Breakfast: oatmeal with sugar free tea, Lunch and dinner: Has started eating vegetables, chicken, fish and small portions of rice. Drinks water and tea. Sometimes Gatorade. Denies drinking orange juices.    Recent A1C ( Reported): >14% in March 2022  PCP: Dr. Gordon. Has never seen an endocrinologist.       Review of systems:  Constitutional:  Constitutional: No fever, weight loss, fatigue, low energy, generalized weakness, poor appetite  Eyes: No redness, no dryness, no pain, no tearing, no gritty or og feeling, no bulging  Cardiovascular/ Respiratory: No palpitations,, no chest pain, no shortness of breath, no exercise intolerance, no cough, no leg/ ankle swelling  Gastrointestinal: No trouble swallowing, no heart burn, no abdominal pain, no bloating, no nausea, no vomiting, no constipation, no diarrhea, no frequent bowel movements  Skin: No excessive hair growth, no hair loss, no acne, no excessive sweating, no rash, no easy bruising  Neurological: No headaches, no change in vision, no dizziness/ lightheadedness, no tremors, no numbness/ tingling in feet, no pain/ burning in feet, no trouble with balance, no muscular weakness.   Endocrine: Frequent urination, excessive urination, excessive thirst, symptoms     PAST MEDICAL & SURGICAL HISTORY:  Obesity (BMI 30.0-34.9)      HLD (hyperlipidemia)      Hypertension      Diabetes mellitus, type 2      GERD (gastroesophageal reflux disease)      No significant past surgical history          FAMILY HISTORY:  Diabetes mellitus, type 2 (Mother)        Social History:  Occupation:  Marital status/Lives with  Exercise:  Tobacco:  Alcohol:  illicit drug abuse:  Health insurance status:    MEDICATIONS  (STANDING):  aspirin  chewable 81 milliGRAM(s) Oral daily  atorvastatin 20 milliGRAM(s) Oral at bedtime  cefepime   IVPB      cefepime   IVPB 1000 milliGRAM(s) IV Intermittent every 12 hours  enalapril 10 milliGRAM(s) Oral daily  glucagon  Injectable 1 milliGRAM(s) IntraMuscular once  heparin   Injectable 5000 Unit(s) SubCutaneous every 8 hours  influenza   Vaccine 0.5 milliLiter(s) IntraMuscular once  insulin glargine Injectable (LANTUS) 20 Unit(s) SubCutaneous every morning  insulin lispro (ADMELOG) corrective regimen sliding scale   SubCutaneous three times a day before meals  insulin lispro Injectable (ADMELOG) 5 Unit(s) SubCutaneous three times a day before meals  vancomycin  IVPB 1750 milliGRAM(s) IV Intermittent every 12 hours    MEDICATIONS  (PRN):  acetaminophen     Tablet .. 650 milliGRAM(s) Oral every 6 hours PRN Temp greater or equal to 38C (100.4F), Mild Pain (1 - 3)      Physical Examination  Vital Signs Last 24 Hrs  T(C): 37.6 (22 Sep 2022 13:38), Max: 38.3 (21 Sep 2022 20:17)  T(F): 99.7 (22 Sep 2022 13:38), Max: 100.9 (21 Sep 2022 20:17)  HR: 99 (22 Sep 2022 13:38) (88 - 112)  BP: 140/95 (22 Sep 2022 13:38) (126/71 - 155/99)  BP(mean): --  RR: 18 (22 Sep 2022 13:38) (17 - 20)  SpO2: 98% (22 Sep 2022 13:38) (95% - 98%)    Parameters below as of 22 Sep 2022 13:38  Patient On (Oxygen Delivery Method): room air      Constitutional: No acute distress, ill- appearing, no anxious appearing, hyperkinetic, no diaphoretic  HEENT: Moist mucous membranes  Neck:  No JVD, bruits or thyromegaly, No thyroid nodules palpable, no LAD  Respiratory:  Respiratory effort normal, lungs clear to ausculation, without rales or rhonchi  Cardiovascular:  Regular heart rate, normal S1 and S2 sounds, without murmur, rub or gallop.  Gastrointestinal: Soft, non tender without hepatosplenomegaly and masses, no abdominal obesity  Extremities: Sensation intact to monofilament in feet, no cyanosis, clubbing or edema, positive pedal pulses  Neurological:  Oriented to person, place and time, No gross sensory or motor defects, visual fields intact to confrontation, normal deep tendon reflexes    Labs:                        10.2   9.28  )-----------( 265      ( 22 Sep 2022 07:05 )             31.9     09-22    135  |  100  |  9   ----------------------------<  272<H>  4.2   |  27  |  0.81    Ca    8.6      22 Sep 2022 07:05  Phos  3.2     09-22  Mg     1.7     09-22    TPro  7.8  /  Alb  2.1<L>  /  TBili  0.5  /  DBili  x   /  AST  10  /  ALT  12  /  AlkPhos  67  09-22        PT/INR - ( 22 Sep 2022 07:05 )   PT: 13.9 sec;   INR: 1.17 ratio         PTT - ( 22 Sep 2022 07:05 )  PTT:31.2 sec    CAPILLARY BLOOD GLUCOSE      POCT Blood Glucose.: 332 mg/dL (22 Sep 2022 11:46)  POCT Blood Glucose.: 263 mg/dL (22 Sep 2022 08:02)      Radiology and diagnostic studies:      Assessment and Plan:  42y Male   Endocrinology is consulted fro    1) Type 2 diabetes:      Recommendations:  Basal Insulin:   Glargine ( Lantus) units once daily    Nutritional Insulin:   Lispro ( Humalog) units with breakfast, Hold if NPO or eating <50% of meals  Lispro ( Humalog) units with lunch, Hold if NPO or eating < 50% of meals  Lispro ( Humalog) units with dinner, Hold if NPO or eating < 50% of meals  Lispro ( Humalog) units every 4 hours, Hold if NPO or tube feeds are on hold    Correctional Insulin:  Normal Lispro ( Humalog) correctional scale with meals and bedtime    Oral Diabetes Medications:  Non in the hospital     ENDOCRINE INITIAL CONSULT NOTE:    HPI:  42 year old male with hx of T2DM  HTN, HLD, Obesity presented with a left foot wound, admitted for left foot infection and to rule out osteomyelitis of left foot. On admission, patient's Blood sugars were noted to be high with elevated HBA1C. Endocrinology is consulted for glycemic management.     Patient is seen at the bedside, reports his appetite is good and finishing all his meals. Patient stated that the left foot wound initially started 6 weeks ago when the patient had a screw puncture his sole of his slipper and then suffered a wound on the bottom of the left foot. She was treated with Unasyn in [facility] for one week and was later discharged with PICC line for additional 2-3 weeks of IV unasyn. Patient had wound care nurse come to his home, however started to develop blistering of the left foot and enlargement of the wound on the sole of his foot with involvement between the left great toe and 2nd toe, also noticed purulent discharge and pain which was aggravated with ambulation Wound also developed between the left great toe and 2nd toe.     Patient is providing diabetes hx as below    Diabetes History: Diagnosed with diabetes at the age of 25 years when he had a traumatic shoulder injury and found out to have serum blood sugars in 400s without DKA. He was initially started on oral medications but recently started on insulin due to poorly controlled sugars.  Home regimen: Lantus 20 units at night and Humalog 10 units with meals since Aug 2022. He was told to stop taking Janumet 50- 1000mg and Glipizide 10 mg after he started insulin. Patient injects his insulin subcutaneously in abdomen. Patient reports compliance with insulin however before coming in this hospital, he was in [facility] for 24 hours where he did not get the insulin. He signs out from [facility] and came home but forgot to take Lantus and therefore on this admission his Blood sugars were in 200s. For hyperlipidemia and hypertension, patient is taking atorvastatin 40 mg daily and lisinopril 10 mg daily.   Home fingersticks: Checks Blood sugars 3-4 times a day. Reports improved glycemic control since one month after starting insulin. Fasting Blood sugars are in range of 100-170 and Postprandial Blood sugars are <180. Patient states that his PCP has prescribed continuous glucose monitor but he was unable to get it as it needs authorization from insurance.    Hypoglycemia events: None at home  Retinopathy/most recent opthalmology: Saw the ophthalmologist in April 2022, was advised to follow up in less than 6 months due to cataract and abnormal retinal exam. Patient does not know exactly if he was told that he has retinopathy.  Peripheral Neuropathy: Yes sometimes.   Nephropathy: Unknown  Cardiovascular disease: Denies  Peripheral vascular disease: Patient has hx of varicose veins in both legs and peripheral vascular disease s/p 2 surgeries in both legs.   Diet: Patient has recently improved his dietary habits. Breakfast: oatmeal with sugar free tea, Lunch and dinner: Has started eating vegetables, chicken, fish and small portions of rice. Drinks water and tea. Sometimes Gatorade. Denies drinking orange juices. Patient has never seen a dietician.  Exercise: Patient reports he walks a lot at his work  Recent A1C ( Reported): >14% in March 2022  PCP: Dr. Gordon. Has never seen an endocrinologist.     Review of systems:  Constitutional: +ve fever, +ve 15lb weight loss in one month, +ve fatigue, +ve low energy, +ve generalized weakness, denies poor appetite  Eyes: No redness, no dryness, no pain, no tearing, no gritty or og feeling, no bulging  Cardiovascular/ Respiratory: No palpitations,, no chest pain, no shortness of breath, no exercise intolerance, no cough, +ve left foot wound and swelling. -ve Right leg/ ankle swelling  Gastrointestinal: No trouble swallowing, no heart burn, no abdominal pain, no bloating, no nausea, no vomiting, no constipation, no diarrhea, no frequent bowel movements  Skin: No excessive hair growth, no hair loss, no acne, no excessive sweating, no rash, no easy bruising  Neurological: No headaches, no change in vision, no dizziness/ lightheadedness, no tremors, +ve numbness/ tingling in left foot, +ve pain/ burning in left foot, no trouble with balance, no muscular weakness.   Endocrine: -ve Frequent urination, excessive urination, excessive thirst, symptoms     Past Medical and Surgical History:   Obesity (BMI 30.0-34.9)  HLD (hyperlipidemia)  Hypertension  Diabetes mellitus, type 2  GERD (gastroesophageal reflux disease)  Leg surgeries for varicose veins and peripheral vascular disease     Family History:  Patient has strong family hx of poorly controlled diabetes with complications on maternal side. His mother had insulin dependant diabetes and had amputations of feet, also had stroke. Patient states that many of his maternal cousins has early onset diabetes and early age deaths due to complications such as stroke.   Father also had Diabetes mellitus, type 2    Social History:  Occupation: Works in the [employer]  Marital status/Lives with wife  Tobacco: Never smoked  Alcohol: Denies  illicit drug abuse: Denies  Health insurance status: Yes insured    Medications ( Standing):  aspirin  chewable 81 milliGRAM(s) Oral daily  atorvastatin 20 milliGRAM(s) Oral at bedtime  cefepime   IVPB      cefepime   IVPB 1000 milliGRAM(s) IV Intermittent every 12 hours  enalapril 10 milliGRAM(s) Oral daily  glucagon  Injectable 1 milliGRAM(s) IntraMuscular once  heparin   Injectable 5000 Unit(s) SubCutaneous every 8 hours  influenza   Vaccine 0.5 milliLiter(s) IntraMuscular once  insulin glargine Injectable (LANTUS) 20 Unit(s) SubCutaneous every morning  insulin lispro (ADMELOG) corrective regimen sliding scale   SubCutaneous three times a day before meals  insulin lispro Injectable (ADMELOG) 5 Unit(s) SubCutaneous three times a day before meals  vancomycin  IVPB 1750 milliGRAM(s) IV Intermittent every 12 hours    Medications ( PRN):   acetaminophen  Tablet .. 650 milliGRAM(s) Oral every 6 hours PRN Temp greater or equal to 38C (100.4F), Mild Pain (1 - 3)    Physical Examination  Vital Signs Last 24 Hrs  T(C): 37.6 (22 Sep 2022 13:38), Max: 38.3 (21 Sep 2022 20:17)  T(F): 99.7 (22 Sep 2022 13:38), Max: 100.9 (21 Sep 2022 20:17)  HR: 99 (22 Sep 2022 13:38) (88 - 112)  BP: 140/95 (22 Sep 2022 13:38) (126/71 - 155/99)  RR: 18 (22 Sep 2022 13:38) (17 - 20)  SpO2: 98% (22 Sep 2022 13:38) (95% - 98%)    Constitutional: A pleasant male with no acute distress, non- ill- appearing, obese   HEENT: Moist mucous membranes  Neck:  No JVD, bruits or thyromegaly, No thyroid nodules palpable, no LAD  Respiratory:  Respiratory effort normal, lungs clear to ausculation, without rales or rhonchi  Cardiovascular:  Regular heart rate, normal S1 and S2 sounds, without murmur, rub or gallop.  Gastrointestinal: Soft, non tender without hepatosplenomegaly and masses, mild +ve abdominal obesity  Extremities: Sensation intact to monofilament in feet, no cyanosis, clubbing or edema, positive pedal pulses  Neurological:  Oriented to person, place and time, No gross sensory or motor defects,     Labs:                     10.2   9.28  )-----------( 265      ( 22 Sep 2022 07:05 )             31.9   09-22    135  |  100  |  9   ----------------------------<  272<H>  4.2   |  27  |  0.81  Ca    8.6      22 Sep 2022 07:05  Phos  3.2     09-22  Mg     1.7     09-22  TPro  7.8  /  Alb  2.1<L>  /  TBili  0.5  /  DBili  x   /  AST  10  /  ALT  12  /  AlkPhos  67  09-22  PT/INR - ( 22 Sep 2022 07:05 )   PT: 13.9 sec;   INR: 1.17 ratio     PTT - ( 22 Sep 2022 07:05 )  PTT:31.2 sec    CAPILLARY BLOOD GLUCOSE  POCT Blood Glucose.: 332 mg/dL (22 Sep 2022 11:46)  POCT Blood Glucose.: 263 mg/dL (22 Sep 2022 08:02)      Assessment and Plan:  42 year old male with hx of T2DM  HTN, HLD, Obesity presented with a left foot wound, admitted for left foot infection and to rule out osteomyelitis of left foot. On admission, patient's Blood sugars were noted to be high with elevated HBA1C. Endocrinology is consulted for glycemic management.     1) Poorly Controlled Diabetes Mellitus:  Patient most likely has type 2 diabetes as he is overweight however he was diagnosed with diabetes at the age of 25 when he was not overweight. He also has multiple family members with early age diabetes and microvascular complications. Therefore, he could have Late onset Autoimmune Diabetes ( LUIS MIGUEL) or Maturity onset of Diabetes of young. (ANA LUISA)  Home regimen: Recently started basal bolus regimen and stopped metformin, Januvia and glipizide  On admission, patient's blood sugars were in 200s as he did not receive his insulin 24 hours prior  Patient reports good appetite.   In hospital blood sugars are high in 200-300s on current sliding scale insulin and the ongoing foot infection can also be contributing to high blood sugars  Will start basal and bolus regimen in the hospital as below    Inpatient Recommendations:    Basal Insulin:   Start Glargine ( Lantus) 20 units once daily. Give first dose now and then start from tomorrow morning    Nutritional Insulin:  Start 5 units of Lispro ( Admelog) units with meals, Hold if NPO or eating <50% of meals    Correctional Insulin:  Agree with low Lispro ( Admelog) correctional scale with meals and bedtime    Oral Diabetes Medications:  None in the hospital    Please consult dietician  Check [id number] and Anti-islet cell antibody to evaluate the autoimmune status  Check urine albumin to creatinine ratio  Check Lipid profile.     Extensive education about diabetes, its microvascular and macrovascular complications including infections, compliance with insulin administration, diet, physical activity provided. Counselled patient at length the target range of HBA1C.   Discussed that type 2 diabetes management includes insulin plus oral medications such as metformin to counteract the insulin resistance state and improve glycemic control.  Therefore, will likely restart the metformin on discharge if there will be intact renal function.  Reviewed hypoglycemic sign/symptoms and necessary precautions.    2) Hyperlipidemia:  Patient reports that he was told that his cholesterol was very high.  Atorvastatin 20 mg was increased to 40 mg daily recently.     Recommendations:  Check the lipid profile  Agree to continue Atorvastatin 40 mg daily   ENDOCRINE INITIAL CONSULT NOTE:    HPI:  42 year old male with hx of T2DM  HTN, HLD, Obesity presented with a left foot wound, admitted for left foot infection and to rule out osteomyelitis of left foot. On admission, patient's Blood sugars were noted to be high with elevated HBA1C. Endocrinology is consulted for glycemic management.     Patient is seen at the bedside, reports his appetite is good and finishing all his meals. Patient stated that the left foot wound initially started 6 weeks ago when the patient had a screw puncture his sole of his slipper and then suffered a wound on the bottom of the left foot. She was treated with Unasyn in [facility] for one week and was later discharged with PICC line for additional 2-3 weeks of IV unasyn. Patient had wound care nurse come to his home, however started to develop blistering of the left foot and enlargement of the wound on the sole of his foot with involvement between the left great toe and 2nd toe, also noticed purulent discharge and pain which was aggravated with ambulation Wound also developed between the left great toe and 2nd toe.     Patient is providing diabetes hx as below    Diabetes History: Diagnosed with diabetes at the age of 25 years when he had a traumatic shoulder injury and found out to have serum blood sugars in 400s without DKA. He was initially started on oral medications but recently started on insulin due to poorly controlled sugars.  Home regimen: Lantus 20 units at night and Humalog 10 units with meals since Aug 2022. He was told to stop taking Janumet 50- 1000mg and Glipizide 10 mg after he started insulin. Patient injects his insulin subcutaneously in abdomen. Patient reports compliance with insulin however before coming in this hospital, he was in [facility] for 24 hours where he did not get the insulin. He signs out from [place] [facility] and came home but forgot to take Lantus and therefore on this admission his Blood sugars were in 200s. For hyperlipidemia and hypertension, patient is taking atorvastatin 40 mg daily and lisinopril 10 mg daily.   Home fingersticks: Checks Blood sugars 3-4 times a day. Reports improved glycemic control since one month after starting insulin. Fasting Blood sugars are in range of 100-170 and Postprandial Blood sugars are <180. Patient states that his PCP has prescribed continuous glucose monitor but he was unable to get it as it needs authorization from insurance.    Hypoglycemia events: None at home  Retinopathy/most recent opthalmology: Saw the ophthalmologist in April 2022, was advised to follow up in less than 6 months due to cataract and abnormal retinal exam. Patient does not know exactly if he was told that he has retinopathy.  Peripheral Neuropathy: Yes sometimes.   Nephropathy: Unknown  Cardiovascular disease: Denies  Peripheral vascular disease: Patient has hx of varicose veins in both legs and peripheral vascular disease s/p 2 surgeries in both legs.   Diet: Patient has recently improved his dietary habits. Breakfast: oatmeal with sugar free tea, Lunch and dinner: Has started eating vegetables, chicken, fish and small portions of rice. Drinks water and tea. Sometimes Gatorade. Denies drinking orange juices. Patient has never seen a dietician.  Exercise: Patient reports he walks a lot at his work  Recent A1C ( Reported): >14% in March 2022  PCP: Dr. Gordon. Has never seen an endocrinologist.     Review of systems:  Constitutional: +ve fever, +ve 15lb weight loss in one month, +ve fatigue, +ve low energy, +ve generalized weakness, denies poor appetite  Eyes: No redness, no dryness, no pain, no tearing, no gritty or og feeling, no bulging  Cardiovascular/ Respiratory: No palpitations,, no chest pain, no shortness of breath, no exercise intolerance, no cough, +ve left foot wound and swelling. -ve Right leg/ ankle swelling  Gastrointestinal: No trouble swallowing, no heart burn, no abdominal pain, no bloating, no nausea, no vomiting, no constipation, no diarrhea, no frequent bowel movements  Skin: No excessive hair growth, no hair loss, no acne, no excessive sweating, no rash, no easy bruising  Neurological: No headaches, no change in vision, no dizziness/ lightheadedness, no tremors, +ve numbness/ tingling in left foot, +ve pain/ burning in left foot, no trouble with balance, no muscular weakness.   Endocrine: -ve Frequent urination, excessive urination, excessive thirst, symptoms     Past Medical and Surgical History:   Obesity (BMI 30.0-34.9)  HLD (hyperlipidemia)  Hypertension  Diabetes mellitus, type 2  GERD (gastroesophageal reflux disease)  Leg surgeries for varicose veins and peripheral vascular disease     Family History:  Patient has strong family hx of poorly controlled diabetes with complications on maternal side. His mother had insulin dependant diabetes and had amputations of feet, also had stroke. Patient states that many of his maternal cousins has early onset diabetes and early age deaths due to complications such as stroke.   Father also had Diabetes mellitus, type 2    Social History:  Occupation: Works in the [employer]  Marital status/Lives with wife  Tobacco: Never smoked  Alcohol: Denies  illicit drug abuse: Denies  Health insurance status: Yes insured    Medications ( Standing):  aspirin  chewable 81 milliGRAM(s) Oral daily  atorvastatin 20 milliGRAM(s) Oral at bedtime  cefepime   IVPB      cefepime   IVPB 1000 milliGRAM(s) IV Intermittent every 12 hours  enalapril 10 milliGRAM(s) Oral daily  glucagon  Injectable 1 milliGRAM(s) IntraMuscular once  heparin   Injectable 5000 Unit(s) SubCutaneous every 8 hours  influenza   Vaccine 0.5 milliLiter(s) IntraMuscular once  insulin glargine Injectable (LANTUS) 20 Unit(s) SubCutaneous every morning  insulin lispro (ADMELOG) corrective regimen sliding scale   SubCutaneous three times a day before meals  insulin lispro Injectable (ADMELOG) 5 Unit(s) SubCutaneous three times a day before meals  vancomycin  IVPB 1750 milliGRAM(s) IV Intermittent every 12 hours    Medications ( PRN):   acetaminophen  Tablet .. 650 milliGRAM(s) Oral every 6 hours PRN Temp greater or equal to 38C (100.4F), Mild Pain (1 - 3)    Physical Examination  Vital Signs Last 24 Hrs  T(C): 37.6 (22 Sep 2022 13:38), Max: 38.3 (21 Sep 2022 20:17)  T(F): 99.7 (22 Sep 2022 13:38), Max: 100.9 (21 Sep 2022 20:17)  HR: 99 (22 Sep 2022 13:38) (88 - 112)  BP: 140/95 (22 Sep 2022 13:38) (126/71 - 155/99)  RR: 18 (22 Sep 2022 13:38) (17 - 20)  SpO2: 98% (22 Sep 2022 13:38) (95% - 98%)    Constitutional: A pleasant male with no acute distress, non- ill- appearing, obese   HEENT: Moist mucous membranes  Neck:  No JVD, bruits or thyromegaly, No thyroid nodules palpable, no LAD  Respiratory:  Respiratory effort normal, lungs clear to ausculation, without rales or rhonchi  Cardiovascular:  Regular heart rate, normal S1 and S2 sounds, without murmur, rub or gallop.  Gastrointestinal: Soft, non tender without hepatosplenomegaly and masses, mild +ve abdominal obesity  Extremities:  Bandage on the left foot present. Sensation intact in R feet, no cyanosis, clubbing or edema, positive pedal pulses +ve in right foot. Venous stasis and varicose veins +ve in lower extremities.  Neurological:  Oriented to person, place and time, No gross sensory or motor defects,     Labs:                     10.2   9.28  )-----------( 265      ( 22 Sep 2022 07:05 )             31.9   09-22    135  |  100  |  9   ----------------------------<  272<H>  4.2   |  27  |  0.81  Ca    8.6      22 Sep 2022 07:05  Phos  3.2     09-22  Mg     1.7     09-22  TPro  7.8  /  Alb  2.1<L>  /  TBili  0.5  /  DBili  x   /  AST  10  /  ALT  12  /  AlkPhos  67  09-22  PT/INR - ( 22 Sep 2022 07:05 )   PT: 13.9 sec;   INR: 1.17 ratio     PTT - ( 22 Sep 2022 07:05 )  PTT:31.2 sec    CAPILLARY BLOOD GLUCOSE  POCT Blood Glucose.: 332 mg/dL (22 Sep 2022 11:46)  POCT Blood Glucose.: 263 mg/dL (22 Sep 2022 08:02)      Assessment and Plan:  42 year old male with hx of T2DM  HTN, HLD, Obesity presented with a left foot wound, admitted for left foot infection and to rule out osteomyelitis of left foot. On admission, patient's Blood sugars were noted to be high with elevated HBA1C. Endocrinology is consulted for glycemic management.     1) Poorly Controlled Diabetes Mellitus:  Patient most likely has type 2 diabetes as he is overweight however he was diagnosed with diabetes at the age of 25 when he was not overweight. He also has multiple family members with early age diabetes and microvascular complications. Therefore, he could have Late onset Autoimmune Diabetes ( LUIS MIGUEL) or Maturity onset of Diabetes of young. (ANA LUISA)  Home regimen: Recently started basal bolus regimen and stopped metformin, Januvia and glipizide  On admission, patient's blood sugars were in 200s as he did not receive his insulin 24 hours prior to admission  Patient reports good appetite.   In hospital, blood sugars are high in 200-300s on current sliding scale insulin and the ongoing foot infection can also be contributing to high blood sugars  Will start basal and bolus regimen in the hospital as below    Inpatient Recommendations:    Basal Insulin:   Start Glargine ( Lantus) 20 units once daily. Give first dose now and then start from tomorrow morning    Nutritional Insulin:  Start 5 units of Lispro ( Admelog) units with meals, Hold if NPO or eating <50% of meals    Correctional Insulin:  Agree with low Lispro ( Admelog) correctional scale with meals and bedtime    Oral Diabetes Medications:  None in the hospital    Please consult dietician  Check JESÚS-65 and Anti-islet cell antibody to evaluate the autoimmune status  Check urine albumin to creatinine ratio  Check Lipid profile.   Please consider changing the IV antibiotics premixed in normal saline solutions rather dextrose solutions    Extensive education about diabetes, its microvascular and macrovascular complications including infections, compliance with insulin administration, diet, physical activity provided. Discussed the target range of HBA1C.   Explained that type 2 diabetes management includes insulin plus oral medications such as metformin to counteract the insulin resistance state and improve glycemic control. Therefore, will likely restart the metformin on discharge if there will be intact renal function.  Reviewed hypoglycemic sign/symptoms and necessary precautions.    2) Hyperlipidemia:  Patient reports that he was told that his cholesterol was very high.  Atorvastatin 20 mg was increased to 40 mg daily recently.     Recommendations:  Check the lipid profile  Agree to continue Atorvastatin 40 mg daily    Plan discussed in detail with patient and primary team    Carisa Marr MD   Endocrinology, Diabetes and Metabolism   Available on MS. teams [place]

## 2022-12-13 NOTE — ED ADULT TRIAGE NOTE - TEMPERATURE IN FAHRENHEIT (DEGREES F)
[facility] AT [address], [place], [place]  Phone: [phone] Fax [phone]  DISCHARGE SUMMARY  Patient Name: Una Ray : 1960   Treatment/Medical Diagnosis: Left hip pain [M25.552]  Right hip pain [M25.551]   Referral Source: Yulia Christy MD     Date of Initial Visit: 22 Attended Visits: 2 Missed Visits: 1     SUMMARY OF TREATMENT  Pt is a 57 yo female presenting with B hip pain s/sx consistent with greater trochanteric bursitis. Treatment plan has consisted of Therapeutic exercise, Therapeutic activities, Neuromuscular re-education, Physical agent/modality, Gait/balance training, Patient education, Functional mobility training. CURRENT STATUS  Pt attended 2 skilled Physical Therapy appointments including Initial Evaluation. Pt failed to attend remaining scheduled appointments and therefore is being discharged from our services. Unable to complete a formal re-assessment due to lack of compliance to appointments. See below for goals updated at last treatment session. Please send new consult if further therapy should be indicated. Progress towards goals: Did not meet therapeutic goals d/t lack of compliance with attendance. Short Term Goals: To be accomplished in  1  week:   1. Pt will be compliant with initial HEP to improve ability to independently manage symptoms. Status at last assessment: reviewed and initiated HEP   Current 22 goal met, pt notes daily compliance  Long Term Goals: To be accomplished in  4 weeks:  1. Pt to report FOTO score of at least 74/100 pts to demonstrate improved function and quality of life. Status at last assessment: 67/100  2. Pt to demo B hip ext and abd strength of 5/5 for improved pelvic stability and ease with transfers, stairs, and weight lifting. Status at last assessment: L: 4-/5, R: 4/5   3.  Pt to demo 30 sec STS of greater than 12 reps with proper mechanics and no pain for improved functional LE and hip strength. Status at last assessment: x12, from low plinth, narrow LUIS, mild B DKV, pain in R knee     4. Pt to report pain no greater than 4/10 at worst in order to sleep without interruption. Status at last assessment: 6/10 at worst when laying on R side      RECOMMENDATIONS  Discontinue therapy due to lack of attendance or compliance. If you have any questions/comments please contact us directly at [phone]. Thank you for allowing us to assist in the care of your patient.   Therapist Signature: Mandy Sims PT Date: 12/13/22   Reporting Period: 11/1/22 - 12/13/22 Time: 6:01 PM 97.5

## 2023-05-24 NOTE — DIETITIAN INITIAL EVALUATION ADULT. - OBTAIN WEEKLY WEIGHT
Ms. Gaspar is a 73yo F that presents for follow-up of her hemochromatosis and underlying irritable bowel syndrome.  An outline of her history can be seen below.  over time her IBS and altered bowel habits have been able to be controlled with some probiotics and fiber supplementation.  At last visit this was very stable.  Her hemochromatosis has also been followed by her hematologist and she has required phlebotomy in the past.  Since last visit she says she has had a lot of stress.  Her ex- passed away and her daughter had to have back surgery and she had to help out.  She developed some altered bowel habits but these have now normalized she is taking her stool softener twice daily and does well with this.  She has follow back up with Hematology and her last iron level was elevated but this was to be expect that she had to miss some visits due to COVID.  She is now getting phlebotomy once againShe was previously followed by my partner, Dr. Contreras, who has since retired.  She is followed for her GERD as well as hemochromatosis.  She also has known gallstones as well. She also has what appears to be an underlying irritable bowel syndrome as she has had lifelong issues with alternating constipation and diarrhea.  Regarding her hemochromatosis she has been followed up by Dr. Ignacio Maynard, her hematologist.  She is closely monitored regarding her iron studies there and also gets periodic phlebotomy.  Her LFTs on checks have been normal. Her GERD is doing well overall.  She will have days of constipation followed by days of diarrhea.  Some urgency.  Again this is been going on for what she describes as nearly her entire life and she manage this mostly with over-the-counter stool softeners as needed and dietary modification.  Overall she says she is doing very well. She did recently have a CT scan by her hematologist which showed gallstones, diverticulosis, and a normal liver.  I reviewed this CT report.  yes

## 2023-06-21 NOTE — DISCHARGE NOTE NURSING/CASE MANAGEMENT/SOCIAL WORK - NSDCPNDISPN_GEN_ALL_CORE
Case Management Admission Note      Anne Poag      :1934  BQX:[id number]  Rehab Dx/Hx: Cerebral infarction, unspecified [I63.9]  Acute CVA (cerebrovascular accident) [facility]) [I63.9]    Chief Complaint: No past medical history on file. No past surgical history on file. Allergies   Allergen Reactions    Motrin [Ibuprofen]     Tramadol      Precautions: falls    Date of Admit: 2023  Room #: 1006/1006-A      Current functional status at time of admit:        Home Living/DME Available:      Type of Home: House  Home Access: Stairs to enter with rails  Bathroom Shower/Tub: Tub/Shower unit  Bathroom Toilet: Standard  Bathroom Equipment: Shower chair  Home Equipment: Walker, rolling, Cane       IADL Hx:      Active : No     Occupation: Retired  Leisure & Hobbies: enjoys walking, cooking, cleaning. No pets. Son manages medications and finances. [employer] and [employer].  [facility] being outside. Spouse:   Family: lives with son Lázaro Munoz and his spouse    Patient's Goal:  non verbal  Family's Goal:   patient's family would like her to be able to toilet independently at discharge  Comments:  Case mgt spoke with patient's son Lázaro Munoz by phone. He confirmed that patient was independent with no AD PTA. She completed self care, including showers, independently. She also helped with cooking & dishes. His goals is for her toilet independently - including ambulate to bathroom, transfer & hygiene. 1330:  multiple attempted to meet with patient in room. Patient either sleeping soundly or working with therapy. Will continue to attempt.     Grzegorz Ann, 2023, 10:07 AM
Patient provided with list of Medicare participating [place] [address] in the geographic area of the patient served. Patient selected TBD and was provided with a comparative data handout from [address] [place] [address] website. The patient (and/or Family) was educated on the quality outcomes for each provider. Patient (and/or Family) demonstrated understanding. Per patient/family request, referral made to TBD.    0930:  case mgt left VM for patient's son Jenny Paris with dc date and plan. Office phone number provided. Mentioned scheduling caregiver training next week so he can ask questions and confirm he and his wife can provide the care she'll need.
Patient reviewed at today's care conference. Patent will dc home with son and dtr in law. HHC pt/ot/RN/HHA/slp recommended. RW/BSC/TTB needed. Case mgt updated patient and daughter in room following care conference. Both are agreeable to dc date and plan. No concerns at this time. Written dc communication left in room. Whiteboard updated.
Opioids not applicable/not prescribed/Education provided on the pain management plan of care/Safe use, storage and disposal of opioids when prescribed/Side effects of pain management treatment/Activities of daily living, including home environment that might     exacerbate pain or reduce effectiveness of the pain management plan of care as well as strategies to address these issues

## 2023-07-10 NOTE — ED PROVIDER NOTE - CPE EDP RESP NORM
AUDIOLOGY REPORT    SUMMARY: Audiology visit completed. See audiogram for results.      RECOMMENDATIONS: Follow-up with ENT.    LUKE Sorto.   Audiology Doctoral Student   [place] #[id number]      I was present with the patient for the entire Audiology appointment including all procedures/testing performed by the AuD student, and agree with the assessment and plan as documented.    Bashir Jang, CCC-A, [facility]  Licensed Audiologist  [place] #[id number]     normal...

## 2023-10-19 NOTE — ED ADULT NURSE NOTE - HOW OFTEN DO YOU HAVE A DRINK CONTAINING ALCOHOL?
[address] [place] [address] placed.      Standing Status:   Future     Standing Expiration Date:   10/19/2024
Never

## 2023-10-20 NOTE — PHYSICAL THERAPY INITIAL EVALUATION ADULT - PHYSICAL ASSIST/NONPHYSICAL ASSIST: STAND/SIT, REHAB EVAL
1 person assist Include Z78.9 (Other Specified Conditions Influencing Health Status) As An Associated Diagnosis?: No

## 2024-01-19 NOTE — PROGRESS NOTE ADULT - PROVIDER SPECIALTY LIST ADULT
History Of Present Illness  Jackson Julian is a 28 y.o. female here for consultation for bariatric surgery. She suffers from morbid obesity and has been overweight for many years and has a number of weight related comorbidities. She has attempted to lose weight several times over the years. She has had successes but has regained the weight at the completion of each of her dieting attempts. She is being referred for surgical intervention for her clinically significant morbid obesity.     -Bariatric Consultation:          START WT:  228     IDEAL WT:140  START EXCESS: 88  HT: 63 IN    YRS OF OBESITY: 10 +  GREATEST WT LOSS IN LBS: 15 LBS  PROGRAMS FOR WT LOSS IN THE PAST:  CALORIE RESTRICTION/ LOW FAT LOW CALORIE, EATING FRESH FRUITS, ALLIA     Past Medical History  Past Medical History:   Diagnosis Date    PCOS (polycystic ovarian syndrome)        Surgical History  Past Surgical History:   Procedure Laterality Date    BREAST SURGERY  06/20/2023    breast reduction        Social History  She reports that she has never smoked. She has never used smokeless tobacco. She reports current alcohol use. She reports that she does not use drugs.    Family History  No family history on file.     Allergies  Patient has no known allergies.    Review of Systems   Allergy/Immunologic:          HIV / AIDS No.  Hepatitis A No.  Hepatitis B No.  Hepatitis C No.  Immunosuppressent drugs No.         HEENT:          Headache YES.         CARDIOLOGY:          History of Hyperlipidemia No.  Last stress test N/A.  Last echocardiogram 2023  Chest pain No.  High blood pressure No.  Irregular heart beat has heart murmur.  Known coronary artery disease No.  Pacemaker No.  Palpitations No.         RESPIRATORY:          Hx steroid use No.  ER visits or Hospitalizations for breathing problems No.  Sleep Apnea SNORING, DAYTIME SLEEPINESS.  MENEZES (dyspnea on exertion) YES  Hx of Asthma/COPD No.         GASTROENTEROLOGY:          Peptic ulcer No, 
Hospitalist
Last EGD N/A, Last UGI N/A.  Colonoscopy Last Colonoscopy N/A.  Heartburn YES/ TAKES TUMS         ENDOCRINOLOGY:          Diabetes No.  Thyroid disorder No.         EXTREMITIES:          Varicose Veins No.  Stasis Ulcers No.  Ankle swelling No.  Personal history DVT No.  Personal history PE No.  Personal history of other thrombolic events No.  Family history of VTE No.  Known genetic bleeding or clotting disorder No.         FEMALE REPRODUCTIVE:          Uterine fibroids No.  Ovarian Cyst YES  Infertility No.  Menstrual history PCOS/LAST MENSTRUAL CYCLE 12/01/2023.         UROLOGY:          Kidney disease No.  Kidney stones No.  Previous UTIs No.  Urinary incontinence No.         MUSCULOSKELETAL:          Osteoporosis/Osteopenia No.  Arthritis No.  Joint pain YES.         SKIN:          Hidradenitis No.  Open skin wounds YES.  Rosacea No.  Healing problems No.         PSYCHOLOGY:          Anxiety none.  Depression none.  Eating disorder denies.         Last Recorded Vitals  Weight 103 kg (228 lb).         Assessment/Plan   Problem List Items Addressed This Visit             ICD-10-CM       Endocrine/Metabolic    Morbid obesity with BMI of 40.0-44.9, adult (CMS/[facility]) - Primary E66.01, Z68.41       Gastrointestinal and Abdominal    Gastroesophageal reflux disease without esophagitis K21.9       Neuro    Daytime somnolence R40.0       We spent 45 minutes reviewing the three surgical options available in the treatment of morbid obesity in our practice. We reviewed the laparoscopic approach to the Rgacy-en-Y gastric bypass, the vertical sleeve gastrectomy, and the adjustable gastric band. We reviewed the surgical technique, the risks, and my complication rates. We also reviewed the expected weight loss, the rules necessary for mahsa-term success, the nutritional supplementation recommended for each operation, and the importance of incorporating excercise into the lifestyle to maintain the weight. We reviewed both the 
national history with each operation, my experience with each operation, the lack of long-term weight loss data with the vertical sleeve gastrectomy and the potential for exacerbating reflux with the vertical sleeve gastrectomy. In addition, we discussed the risk of adhesions, internal hernias, iron and calcium deficiency, dumping syndrome and potential for gastrojejunal ulcer with the RYGB. [place] would like to proceed with VSG.  I explained that RYGB is more reliable treatment for GERD than VSG.  We will obtain EGD prior to surgery to assess esophagus.  She may have a hiatal hernia.  I explained what a hiatal hernia is using diagrams and that I will repair at time of surgery if encountered intraoperatively.       I spent 60 minutes in the professional and overall care of this patient.      Dominick Herrera MD    
Hospitalist
Pulmonology

## 2024-01-24 NOTE — ED ADULT TRIAGE NOTE - SPO2 (%)
Update History & Physical    The patient's History and Physical of January 24, 2024 was reviewed with the patient and I examined the patient. There was no change. The surgical site was confirmed by the patient and me.     Plan: The risks, benefits, expected outcome, and alternative to the recommended procedure have been discussed with the patient. Patient understands and wants to proceed with the procedure.     Electronically signed by Arthur Little MD on 1/24/2024 at 12:23 PM      
96

## 2024-01-25 NOTE — DISCHARGE NOTE PROVIDER - NSDCMRMEDTOKEN_GEN_ALL_CORE_FT
ALPRAZolam 0.25 mg oral tablet: 1 tab(s) orally 2 times a day, As Needed  Coumadin 3 mg oral tablet: 1 tab(s) orally once a day as directed  DilTIAZem Hydrochloride  mg/24 hours oral capsule, extended release: 1 cap(s) orally once a day  Dulcolax Stool Softener 100 mg oral capsule: 1 cap(s) orally 2 times a day as needed constipation  furosemide 40 mg oral tablet: 1 tab(s) orally once a day  ipratropium-albuterol 0.5 mg-2.5 mg/3 mLinhalation solution: 3 milliliter(s) inhaled every 6 hours, As Needed  losartan 25 mg oral tablet: 1 tab(s) orally once a day  Metamucil 3.4 g/3.7 g oral powder for reconstitution: 3.4 gram(s) orally once a day with 12oz H20.   MiraLax oral powder for reconstitution: 17 gram(s) orally once a day  Multiple Vitamins oral tablet: 1 tab(s) orally once a day  pantoprazole 40 mg oral delayed release tablet: 1 tab(s) orally once a day (before a meal)  potassium chloride 10 mEq oral tablet, extended release: 1 tab(s) orally once a day  sildenafil 20 mg oral tablet: 1 tab(s) orally every 8 hours  Synthroid 100 mcg (0.1 mg) oral tablet: 1 tab(s) orally once a day  Vitamin D2 50,000 intl units (1.25 mg) oral capsule: 1 cap(s) orally once a week  
English

## 2024-05-15 NOTE — H&P ADULT - NSTOBACCOSCREENHP_GEN_A_CS
Patient returned writers call.    Writer told patient that per Dr. Duenas, that her appointment on 5/28 is not needed unless she thinks she needs to see Dr. Duenas for any reason. Patient stated she does not have any specific reason to see Dr. Duenas but does want a provider to explain more about her chest CT scan.    Writer told patient that per Dr. Duenas, that local oncologist would be a great option and patient wanted to see Dr. Martínez.    No

## 2024-07-01 NOTE — ED ADULT TRIAGE NOTE - AS TEMP SITE
[facility] Emergency Department Note  Patient: Sharyn Mayen Age: 89 year old Sex: female      MRN: [id number]  : 1928    Patient Seen in: [facility] Emergency Department    History     Chief Complaint   Patient presents with    Difficulty Breathing     Stated Complaint:     History obtained from: patient     Very pleasant 89F hx of HTN, HLD, CVA in the past, CKD, CHF, presents to the ED with 2d of shortness of breath.  Patient states over the past 48 hours she has become more and more short of breath, worse with exertion.  She reports she chronically has a cough that has not significantly changed from her baseline.  She denies any fever or chills.  She denies chest pain or pressure.  No abdominal pain, nausea, vomiting or diarrhea.  She states she chronically does have leg swelling that seems to have been a little worse the last couple of days.  She denies any recent travel.  No history of DVT or PE.  No recent hospitalizations    Review of Systems:  Review of Systems  Positive for stated complaint: . Constitutional and vital signs reviewed. All other systems reviewed and negative except as noted above.    Patient History:  Past Medical History:    Cerebral vascular disease    HYPERLIPIDEMIA    HYPERTENSION    Ischemic colitis (HCC)    OTHER DISEASES    spastic colon    OTHER DISEASES    diverticulosis    OTHER DISEASES    hypoglycemia with + GTT    Rotator cuff tear arthropathy, left    SEASONAL ALLERGIES    Senile arteriosclerosis       Past Surgical History:   Procedure Laterality Date    Cataract      left , right     Colonoscopy,biopsy  16= Ischemic colitis, Diverticulosis, Polyp    Repeat PRN    D & c      Other surgical history      benign breast bx left         Family History   Problem Relation Age of Onset    Heart Disorder Father         MI    Heart Disorder Mother         MI    Heart Disorder Sister         MI       Specific Social Determinants of Health:   Social  History     Socioeconomic History    Marital status:    Tobacco Use    Smoking status: Never    Smokeless tobacco: Never   Substance and Sexual Activity    Alcohol use: Yes     Alcohol/week: 0.8 standard drinks of alcohol     Types: 1 Glasses of wine per week     Comment: 3-4 glasses of wine per month    Drug use: No           PSFH elements reviewed from today and agreed except as otherwise stated in HPI.    Physical Exam     ED Triage Vitals   BP 07/01/24 0343 152/82   Pulse 07/01/24 0345 92   Resp 07/01/24 0345 22   Temp 07/01/24 0350 98.2 °F (36.8 °C)   Temp src 07/01/24 0350 Oral   SpO2 07/01/24 0345 90 %   O2 Device 07/01/24 0345 Nasal cannula       Current:/55 (BP Location: Right arm)   Pulse 76   Temp 98.9 °F (37.2 °C) (Oral)   Resp 16   SpO2 90%         Physical Exam  Vitals and nursing note reviewed.   Constitutional:       General: She is not in acute distress.     Appearance: She is not ill-appearing.   HENT:      Head: Normocephalic and atraumatic.      Right Ear: External ear normal.      Left Ear: External ear normal.      Nose: Nose normal.      Mouth/Throat:      Mouth: Mucous membranes are moist.   Eyes:      Conjunctiva/sclera: Conjunctivae normal.   Neck:      Vascular: No JVD.   Cardiovascular:      Rate and Rhythm: Normal rate and regular rhythm.      Heart sounds: No murmur heard.  Pulmonary:      Effort: Pulmonary effort is normal. No accessory muscle usage or respiratory distress.      Breath sounds: Examination of the right-lower field reveals rales. Examination of the left-lower field reveals rales. Rales present. No wheezing or rhonchi.   Abdominal:      General: There is no distension.      Palpations: Abdomen is soft.      Tenderness: There is no abdominal tenderness. There is no guarding or rebound.   Musculoskeletal:         General: No deformity.      Right lower leg: No tenderness. Edema present.      Left lower leg: No tenderness. Edema present.      Comments: 2-3+  pitting edema to bilateral lower extremities up to mid thighs.  No calf tenderness.  Distal lower extremities are slightly erythematous bilaterally.   Skin:     General: Skin is warm and dry.      Capillary Refill: Capillary refill takes less than 2 seconds.   Neurological:      General: No focal deficit present.      Mental Status: She is alert.         ED Course   Labs:   Labs Reviewed   BNP (B TYPE NATRIURETIC PEPTIDE) - Abnormal; Notable for the following components:       Result Value    Beta Natriuretic Peptide 289 (*)     All other components within normal limits   BASIC METABOLIC PANEL (8) - Abnormal; Notable for the following components:    Glucose 111 (*)     BUN 25 (*)     Creatinine 1.06 (*)     BUN/CREA Ratio 23.6 (*)     eGFR-Cr 48 (*)     All other components within normal limits   D-DIMER - Abnormal; Notable for the following components:    D-Dimer 2.69 (*)     All other components within normal limits   CBC W/ DIFFERENTIAL - Abnormal; Notable for the following components:    RDW-SD 49.1 (*)     RDW 15.7 (*)     All other components within normal limits   TROPONIN I HIGH SENSITIVITY - Normal   LACTIC ACID, PLASMA - Normal   SARS-COV-2/FLU A AND B/RSV BY PCR (GENEXPERT) - Normal    Narrative:     This test is intended for the qualitative detection and differentiation of SARS-CoV-2, influenza A, influenza B, and respiratory syncytial virus (RSV) viral RNA in nasopharyngeal or nares swabs from individuals suspected of respiratory viral infection consistent with COVID-19 by their healthcare provider. Signs and symptoms of respiratory viral infection due to SARS-CoV-2, influenza, and RSV can be similar.    Test performed using the Xpert Xpress SARS-CoV-2/FLU/RSV (real time RT-PCR)  assay on the [employer] instrument, [employer], [place] [address].   This test is being used under the Food and Drug Administration's Emergency Use Authorization.    The authorized Fact Sheet for Healthcare Providers for this  assay is available upon request from the laboratory.   CBC WITH DIFFERENTIAL WITH PLATELET    Narrative:     The following orders were created for panel order CBC With Differential With Platelet.  Procedure                               Abnormality         Status                     ---------                               -----------         ------                     CBC W/ DIFFERENTIAL[[id number]]          Abnormal            Final result                 Please view results for these tests on the individual orders.   RAINBOW DRAW LAVENDER   RAINBOW DRAW LIGHT GREEN   RAINBOW DRAW BLUE   BLOOD CULTURE   BLOOD CULTURE     Radiology findings:  I personally reviewed the images.   US VENOUS DOPPLER LEG BILAT - DIAG IMG (CPT=93970)    Result Date: 7/1/2024  CONCLUSION: No evidence of acute deep venous thrombosis in the imaged veins of the bilateral lower extremities.   Preliminary report was given by [facility].  There are no clinically significant discrepancies.    elm-remote   Dictated by (CST): Jerzy Quigley MD on 7/01/2024 at 9:22 AM     Finalized by (CST): Jerzy Quigley MD on 7/01/2024 at 9:23 AM          XR CHEST AP PORTABLE  (CPT=71045)    Result Date: 7/1/2024  CONCLUSION:   Right pleural effusion and associated atelectasis.  Pulmonary edema.    Preliminary report was given by [facility].    elm-remote    Dictated by (CST): Jerzy Quigley MD on 7/01/2024 at 8:46 AM     Finalized by (CST): Jerzy Quigley MD on 7/01/2024 at 8:52 AM          CT CHEST PE AORTA (IV ONLY) (CPT=71260)    Result Date: 7/1/2024  CONCLUSION:   1. Moderate right pleural effusion with pleural thickening and enhancement suggestive of empyema.  Moderate right lung atelectasis is seen.  Underlying infection is a possibility.  2. No evidence of acute pulmonary embolism to the 1st subsegmental pulmonary artery level.  3. No aneurysm of the thoracic aorta.  Two penetrating ulcers/small dissections are seen at the proximal and distal  axillary descending aorta.  4. Mosaic ground-glass opacities which may indicate pulmonary edema or air trapping.  5. Marked cardiomegaly.  6. Large hiatal hernia. Additional chronic or incidental findings are described in the body of this report.     elm-remote   Dictated by (CST): Jerzy Quigley MD on 7/01/2024 at 7:22 AM     Finalized by (CST): Jerzy Quigley MD on 7/01/2024 at 7:31 AM           EKG as interpreted by me: sinus rhythm rate 83 bpm with nd degree AV block type 1  Cardiac Monitor: Interpreted by me.   Pulse Readings from Last 1 Encounters:   07/01/24 76   , sinus,     External non-ED records reviewed independently by me: echo from May 2019 shows LVEF 55-60%     MDM   This patient presents with shortness of breath and leg swelling. Exam is as above.  Patient hypoxic into upper 80s, placed on 3 L nasal cannula with improvement.    Differential diagnosis includes:  CHF onset/exacerbation seems more likely as patient does have signs of fluid overload on exam   ACS   Dysrhythmia - mobitz type 2 on ECG   COPD/asthma exacerbation, patient does not have wheezing on exam   Pneumonia  Viral syndrome  PE, patient is low risk by Wells score and not able to be excluded by PERC rule due to age   Anemia, acidosis, or other non primary cardiopulmonary cause     Plan: will obtain cbc, cmp, chest x-ray, ECG, troponin, dimer for further disposition     Anticipate admission given hypoxia requiring 3LNC     ED Course as of 07/01/24 1001  ------------------------------------------------------------  Time: 07/01 0428  Comment: Cbc unremarkable   ------------------------------------------------------------  Time: 07/01 0431  Value: Troponin I (High Sensitivity): 17  Comment: (Reviewed)  ------------------------------------------------------------  Time: 07/01 0431  Value: CREATININE(!): 1.06  Comment: Up a bit from baseline of 0.7   ------------------------------------------------------------  Time: 07/01 0440  Value: BETA NATRIURETIC  PEPTIDE(!): 289  Comment: (Reviewed)  ------------------------------------------------------------  Time: 07/01 0504  Value: D-Dimer(!): 2.69  Comment: (Reviewed)  ------------------------------------------------------------  Time: 07/01 0630  Value: US VENOUS DOPPLER LEG BILAT - DIAG IMG (CPT=93970)  Comment: IMPRESSION:  No evidence of DVT or Baker's cyst  Left calf edema    ------------------------------------------------------------  Time: 07/01 0631  Value: XR CHEST AP PORTABLE  (CPT=71045)  Comment: AP portable upright chest radiograph  4:31 AM    IMPRESSION:  Anterior oblique positioning limits detail  Moderate right hemidiaphragm elevation  subsegmental atelectasis medial basal left lower lobe  Arteriosclerosis aorta    ------------------------------------------------------------  Time: 07/01 0741  Value: CT CHEST PE AORTA (IV ONLY) (CPT=71260)  Comment: CONCLUSION:      1. Moderate right pleural effusion with pleural thickening and enhancement suggestive of empyema.  Moderate right lung atelectasis is seen.  Underlying infection is a possibility.      2. No evidence of acute pulmonary embolism to the 1st subsegmental pulmonary artery level.      3. No aneurysm of the thoracic aorta.  Two penetrating ulcers/small dissections are seen at the proximal and distal descending aorta.      4. Mosaic ground-glass opacities which may indicate pulmonary edema or air trapping.      5. Marked cardiomegaly.      6. Large hiatal hernia. Additional chronic or incidental findings are described in the body of this report.       Given CT findings will empirically cover with zosyn, obtain bcx/lactic, d/w pulmonology Dr. Sullivan will see on consult. D/w [facility] hospitalist accepted admission. Case d/w [facility] cardiology team agrees with lasix trial and will see on consult. Pt and daughter updated with results. They state pt has had a pleural effusion many years ago that she previously had to have drained. All questions answered.              Procedures:  Procedures        Disposition and Plan     Clinical Impression:  1. Shortness of breath    2. Leg swelling    3. Hypoxia    4. Pleural effusion on right        Disposition:  Admit        Hospital Problems       Present on Admission  Date Reviewed: 3/14/2022            ICD-10-CM Noted POA    * (Principal) Shortness of breath R06.02 7/1/2024 Unknown    Acute exacerbation of CHF (congestive heart failure) (HCC) I50.9 7/1/2024 Unknown    Hypoxia R09.02 7/1/2024 Unknown    Leg swelling M79.89 7/1/2024 Unknown        This note may have been created using voice dictation technology and may include inadvertent errors.      Saranya Nicholson DO  Attending Physician   Emergency Medicine

## 2024-07-15 NOTE — OCCUPATIONAL THERAPY INITIAL EVALUATION ADULT - BED MOBILITY LIMITATIONS, REHAB EVAL
decreased ability to use legs for bridging/pushing/decreased ability to use arms for pushing/pulling/impaired ability to control trunk for mobility
Home

## 2024-09-14 NOTE — REVIEW OF SYSTEMS
3 [Fever] : no fever [Chills] : no chills [Discharge] : no discharge [Nasal Discharge] : no nasal discharge [Sore Throat] : no sore throat [Chest Pain] : no chest pain [Palpitations] : no palpitations [Lower Ext Edema] : no lower extremity edema [Nausea] : no nausea [Shortness Of Breath] : no shortness of breath [Abdominal Pain] : no abdominal pain [Vomiting] : no vomiting [Constipation] : no constipation [Diarrhea] : diarrhea [Incontinence] : incontinence [Dysuria] : no dysuria [Negative] : Psychiatric [Dizziness] : no dizziness [Headache] : no headache [de-identified] : rash under both breast area and mild redness to sacral area [de-identified] : a

## 2024-11-19 NOTE — PATIENT PROFILE ADULT - PATIENT/CAREGIVER ACCEPTED INTERPRETER SERVICES
Subjective     Patient ID: Lashonda Charles yes

## 2025-01-07 NOTE — H&P ADULT - NSHPPHYSICALEXAM_GEN_ALL_CORE
[employer] Approved    Prior Authorization Number:[id number]  Dates of approval: 01/07/2025-07/07/2025  Refills:0  Filling Pharmacy: [facility]   Additional Information:     1st trial initial - must lose 5% body weight to qualify for renewal  Current Weight:209 lbs   BMI:38.2  Height:62 inches    If you have any questions on trials or wait time please look at the [facility] Handbook for more information Topic # 2419.     PHYSICAL EXAM:    Vital Signs Last 24 Hrs  T(C): 36.8 (06 Jun 2021 18:09), Max: 36.8 (06 Jun 2021 18:09)  T(F): 98.3 (06 Jun 2021 18:09), Max: 98.3 (06 Jun 2021 18:09)  HR: 93 (06 Jun 2021 19:18) (93 - 98)  BP: 116/59 (06 Jun 2021 19:18) (116/59 - 157/97)  BP(mean): --  RR: 28 (06 Jun 2021 19:18) (22 - 28)  SpO2: 97% (06 Jun 2021 19:18) (97% - 99%)    GENERAL: Pt lying in bed comfortably in NAD  HEENT:  Atraumatic, EOMI, PERRL, conjunctiva and sclera clear, MMM  NECK: Supple, No JVD  CHEST/LUNG: Clear to auscultation bilaterally; No rales, rhonchi, wheezing or rubs. Unlabored respirations  HEART: Regular rate and rhythm; No murmurs, rubs, or gallops  ABDOMEN: Bowel sounds present; Soft, Nontender, Nondistended. No guarding or rigidity    EXTREMITIES:  2+ Peripheral Pulses, brisk capillary refill. No clubbing, cyanosis, or edema  NEUROLOGICAL:  Alert & Oriented X3, speech clear. Answers questions appropriately. Full and equal strength B/L upper and lower extremities. No deficits   MSK: FROM x 4 extremities   SKIN: No rashes or lesions PHYSICAL EXAM:    Vital Signs Last 24 Hrs  T(C): 36.8 (06 Jun 2021 18:09), Max: 36.8 (06 Jun 2021 18:09)  T(F): 98.3 (06 Jun 2021 18:09), Max: 98.3 (06 Jun 2021 18:09)  HR: 93 (06 Jun 2021 19:18) (93 - 98)  BP: 116/59 (06 Jun 2021 19:18) (116/59 - 157/97)  BP(mean): --  RR: 28 (06 Jun 2021 19:18) (22 - 28)  SpO2: 97% (06 Jun 2021 19:18) (97% - 99%)    GENERAL: Pt lying in bed in NAD  HEENT:  Atraumatic, EOMI  NECK: Supple  CHEST/LUNG: faint basilar rale, some exp wheezes  HEART: Regular rate and rhythm  ABDOMEN: Bowel sounds present; Soft, Nontender, Nondistended.   EXTREMITIES:  2+ Peripheral Pulses, brisk capillary refill. 1+ b/l pitting edema  NEUROLOGICAL:  pt alert, unable to assess orientation.  MSK: moving all ext  SKIN: No rashes or lesions

## 2025-03-25 NOTE — PHYSICAL THERAPY INITIAL EVALUATION ADULT - DIAGNOSIS, PT EVAL
Lung sounds were clear today.  Spirometry showss only mild restrictive impairment.  Room air O2 saturation at rest normal at 99% but she did have some mild decrease in O2 saturation with ambulating up and down a flight of stairs and walking 100 feet.  Lowest O2 saturation was 91%.       weakness

## 2025-06-10 NOTE — DIETITIAN INITIAL EVALUATION ADULT. - PROBLEM/PLAN-4
Continued Stay SW/CM Assessment/Plan of Care Note       Active Substitute Decision Maker (SDM)    There are no active Substitute Decision Maker (SDM) on file.           Progress note:  Sent referral to [facility], called patient to inform of home health will be call them to set up an appointment     See BRAEDEN flowsheets for other objective data.    Disposition Recommendations:  Preliminary discharge destination:    SAMINA/MICHEL recommendation for discharge:      Destination Pharmacy:        Discharge Plan/Needs:     Continued Care and Services - Discharged on 6/9/2025 Admission date: 6/8/2025 - Discharge disposition: Home or Self Care   No active coordination exists for this encounter.           Devices/ Equipment that need to be arranged for discharge:     Accepted   Pending insurance authorization   Others:    Anticipated date of DME availability:     Prior To Hospitalization:    Living Situation:   and residing at      .  Support Systems:     Home Devices/Equipment:              Mobility Assist Devices:     Type of Service Prior to Hospitalization:                 Patient/Family discharge goal (s):        Resources provided:           Prior Function                Current Function  Last Filed Values       None            Therapy Recommendations for Discharge:   PT:      Last Filed Values       None          OT:       Last Filed Values       None          SLP:    Last Filed Values       None            Mobility Equipment Recommended for Discharge:        Barriers to Discharge  Identified Barriers to Discharge/Transition Planning:                     DISPLAY PLAN FREE TEXT

## 2025-07-10 NOTE — ED ADULT TRIAGE NOTE - ARRIVAL FROM
- 100.0 fL    MCH 29.1 27.0 - 31.0 pg    MCHC 33.9 32.0 - 36.0 g/dL    RDW 12.9 11.7 - 14.9 %    Platelets 232 140 - 440 k/uL    MPV 9.0 7.5 - 11.1 fL    Neutrophils % 69 (H) 36 - 66 %    Lymphocytes % 23 (L) 24 - 44 %    Monocytes % 7 (H) 0 - 5 %    Eosinophils % 1 0 - 3 %    Basophils % 0 0 - 1 %    Immature Granulocytes % 0 0 %    Neutrophils Absolute 3.93 k/uL    Lymphocytes Absolute 1.31 k/uL    Monocytes Absolute 0.39 k/uL    Eosinophils Absolute 0.04 k/uL    Basophils Absolute 0.02 k/uL    Immature Granulocytes Absolute 0.02 k/uL   Comprehensive Metabolic Panel   Result Value Ref Range    Sodium 139 136 - 145 mmol/L    Potassium 3.4 (L) 3.5 - 5.1 mmol/L    Chloride 100 99 - 110 mmol/L    CO2 25 21 - 32 mmol/L    Anion Gap 14 9 - 17 mmol/L    Glucose 91 74 - 99 mg/dL    BUN 7 7 - 20 mg/dL    Creatinine 0.9 0.6 - 1.1 mg/dL    Est, Glom Filt Rate 71 >60 mL/min/1.73m2    Calcium 9.3 8.3 - 10.6 mg/dL    Total Protein 7.4 6.4 - 8.2 g/dL    Albumin 4.1 3.4 - 5.0 g/dL    Albumin/Globulin Ratio 1.3     Total Bilirubin 0.8 0.0 - 1.0 mg/dL    Alkaline Phosphatase 85 40 - 129 U/L    ALT 16 10 - 40 U/L    AST 24 15 - 37 U/L   Urinalysis with Reflex to Culture    Specimen: Urine   Result Value Ref Range    Color, UA Yellow Yellow    Turbidity UA Clear Clear    Glucose, Ur NEGATIVE NEGATIVE mg/dL    Bilirubin, Urine NEGATIVE NEGATIVE    Ketones, Urine NEGATIVE NEGATIVE mg/dL    Specific Gravity, UA 1.015 1.005 - 1.030    Urine Hgb NEGATIVE NEGATIVE    pH, Urine 6.0 5.0 - 8.0    Protein, UA NEGATIVE NEGATIVE mg/dL    Urobilinogen, Urine 1.0 0.0 - 1.0 EU/dL    Nitrite, Urine NEGATIVE NEGATIVE    Leukocyte Esterase, Urine NEGATIVE NEGATIVE    Comment       Microscopic exam not performed based on chemical results unless requested in original order.      Radiographs (if obtained):  Radiologist's Report Reviewed:  No results found.        MDM:  CC/HPI Summary, DDx, ED Course, and Reassessment: 57-year-old female presents to the  soon as possible for a visit in 1 week      Disposition medications (if applicable):  New Prescriptions    CYCLOBENZAPRINE (FLEXERIL) 5 MG TABLET    Take 1 tablet by mouth 2 times daily as needed for Muscle spasms    LIDOCAINE (LIDODERM) 5 %    Place 1 patch onto the skin daily 12 hours on, 12 hours off.    NAPROXEN (NAPROSYN) 500 MG TABLET    Take 1 tablet by mouth 2 times daily (with meals) for 5 days     ED Provider Disposition Time  DISPOSITION Decision To Discharge 07/10/2025 02:23:48 PM   DISPOSITION CONDITION Stable           Comment: Please note this report has been produced using speech recognition software and may contain errors related to that system including errors in grammar, punctuation, and spelling, as well as words and phrases that may be inappropriate.  Efforts were made to edit the dictations.      Shaista Nguyen,   07/10/25 [id number]     Home